# Patient Record
Sex: MALE | Race: WHITE | NOT HISPANIC OR LATINO | Employment: OTHER | URBAN - METROPOLITAN AREA
[De-identification: names, ages, dates, MRNs, and addresses within clinical notes are randomized per-mention and may not be internally consistent; named-entity substitution may affect disease eponyms.]

---

## 2017-01-26 ENCOUNTER — ALLSCRIPTS OFFICE VISIT (OUTPATIENT)
Dept: OTHER | Facility: OTHER | Age: 62
End: 2017-01-26

## 2017-02-23 ENCOUNTER — ALLSCRIPTS OFFICE VISIT (OUTPATIENT)
Dept: OTHER | Facility: OTHER | Age: 62
End: 2017-02-23

## 2017-03-13 ENCOUNTER — ALLSCRIPTS OFFICE VISIT (OUTPATIENT)
Dept: OTHER | Facility: OTHER | Age: 62
End: 2017-03-13

## 2017-05-01 ENCOUNTER — GENERIC CONVERSION - ENCOUNTER (OUTPATIENT)
Dept: OTHER | Facility: OTHER | Age: 62
End: 2017-05-01

## 2017-06-28 ENCOUNTER — GENERIC CONVERSION - ENCOUNTER (OUTPATIENT)
Dept: OTHER | Facility: OTHER | Age: 62
End: 2017-06-28

## 2017-06-30 ENCOUNTER — APPOINTMENT (OUTPATIENT)
Dept: LAB | Facility: CLINIC | Age: 62
End: 2017-06-30
Payer: COMMERCIAL

## 2017-06-30 ENCOUNTER — TRANSCRIBE ORDERS (OUTPATIENT)
Dept: LAB | Facility: CLINIC | Age: 62
End: 2017-06-30

## 2017-06-30 DIAGNOSIS — K90.89 OTHER SPECIFIED INTESTINAL MALABSORPTION: ICD-10-CM

## 2017-06-30 DIAGNOSIS — Z98.84 BARIATRIC SURGERY STATUS: Primary | ICD-10-CM

## 2017-06-30 DIAGNOSIS — Z98.84 BARIATRIC SURGERY STATUS: ICD-10-CM

## 2017-06-30 LAB
25(OH)D3 SERPL-MCNC: 20 NG/ML (ref 30–100)
ERYTHROCYTE [DISTWIDTH] IN BLOOD BY AUTOMATED COUNT: 12.8 % (ref 11.6–15.1)
FERRITIN SERPL-MCNC: 13 NG/ML (ref 8–388)
HCT VFR BLD AUTO: 35.9 % (ref 36.5–49.3)
HGB BLD-MCNC: 12.5 G/DL (ref 12–17)
MCH RBC QN AUTO: 30 PG (ref 26.8–34.3)
MCHC RBC AUTO-ENTMCNC: 34.8 G/DL (ref 31.4–37.4)
MCV RBC AUTO: 86 FL (ref 82–98)
PLATELET # BLD AUTO: 248 THOUSANDS/UL (ref 149–390)
PMV BLD AUTO: 10.1 FL (ref 8.9–12.7)
RBC # BLD AUTO: 4.17 MILLION/UL (ref 3.88–5.62)
VIT B12 SERPL-MCNC: 406 PG/ML (ref 100–900)
WBC # BLD AUTO: 5.24 THOUSAND/UL (ref 4.31–10.16)

## 2017-06-30 PROCEDURE — 82728 ASSAY OF FERRITIN: CPT

## 2017-06-30 PROCEDURE — 82306 VITAMIN D 25 HYDROXY: CPT

## 2017-06-30 PROCEDURE — 82607 VITAMIN B-12: CPT

## 2017-06-30 PROCEDURE — 85027 COMPLETE CBC AUTOMATED: CPT

## 2017-06-30 PROCEDURE — 36415 COLL VENOUS BLD VENIPUNCTURE: CPT

## 2017-08-07 ENCOUNTER — APPOINTMENT (OUTPATIENT)
Dept: LAB | Facility: CLINIC | Age: 62
End: 2017-08-07
Payer: COMMERCIAL

## 2017-08-07 ENCOUNTER — TRANSCRIBE ORDERS (OUTPATIENT)
Dept: ADMINISTRATIVE | Facility: HOSPITAL | Age: 62
End: 2017-08-07

## 2017-08-07 ENCOUNTER — TRANSCRIBE ORDERS (OUTPATIENT)
Dept: LAB | Facility: CLINIC | Age: 62
End: 2017-08-07

## 2017-08-07 ENCOUNTER — ALLSCRIPTS OFFICE VISIT (OUTPATIENT)
Dept: OTHER | Facility: OTHER | Age: 62
End: 2017-08-07

## 2017-08-07 DIAGNOSIS — R35.1 NOCTURIA: Primary | ICD-10-CM

## 2017-08-07 DIAGNOSIS — R35.1 NOCTURIA: ICD-10-CM

## 2017-08-07 DIAGNOSIS — G40.909 NONINTRACTABLE EPILEPSY WITHOUT STATUS EPILEPTICUS, UNSPECIFIED EPILEPSY TYPE (HCC): Primary | ICD-10-CM

## 2017-08-07 LAB — PSA SERPL-MCNC: 0.3 NG/ML (ref 0–4)

## 2017-08-07 PROCEDURE — 84153 ASSAY OF PSA TOTAL: CPT

## 2017-09-13 ENCOUNTER — GENERIC CONVERSION - ENCOUNTER (OUTPATIENT)
Dept: OTHER | Facility: OTHER | Age: 62
End: 2017-09-13

## 2017-10-05 ENCOUNTER — GENERIC CONVERSION - ENCOUNTER (OUTPATIENT)
Dept: OTHER | Facility: OTHER | Age: 62
End: 2017-10-05

## 2017-10-05 ENCOUNTER — HOSPITAL ENCOUNTER (OUTPATIENT)
Dept: NEUROLOGY | Facility: AMBULATORY SURGERY CENTER | Age: 62
Discharge: HOME | End: 2017-10-05
Payer: COMMERCIAL

## 2017-10-05 DIAGNOSIS — G40.909 NONINTRACTABLE EPILEPSY WITHOUT STATUS EPILEPTICUS, UNSPECIFIED EPILEPSY TYPE (HCC): ICD-10-CM

## 2017-10-05 PROCEDURE — 95819 EEG AWAKE AND ASLEEP: CPT

## 2017-10-05 NOTE — PROCEDURES
ELECTROENCEPHALOGRAM (EEG)      Patient Name:  Carmen Reyes  MRN: [id number]   :  1955 File #: [id number] -[id number]   Age: 57 y o  Encounter #: [id number]   Date performed: 10/5/2017            Report date: 10/5/2017          Study type: Routine, sleep deprived EEG    ICD 10 diagnosis: Other Epilepsy G40 909    Start time: 08:04 End time: 08:42     -------------------------------------------------------------------------------------------------------------------   Patient History: This recording was observed in a 57 y o  male with history of transient global amnesia to determine risk of epilepsy  Medications:  None listed  -------------------------------------------------------------------------------------------------------------------   Description of Procedure:  · 32 channel digital recording with electrodes placed according to the International 10-20 system with additional T1/T2 electrodes, EOG, EKG, and simultaneous video  The recording was technically satisfactory  -------------------------------------------------------------------------------------------------------------------   EEG Description:    The recording was performed with the patient awake, drowsy, and asleep  He was fully oriented  During wakefulness, there were long runs of well regulated, low amplitude, posteriorly dominant, symmetric 10-11 cps alpha rhythm that attenuated with eye opening  There were symmetric low amplitude, frontally dominant beta activities  With drowsiness, alpha activity attenuated and was replaced by diffusely distributed theta activities  During drowsiness, there was one left anterior temporal sharp wave discharge and one left anterior mid-temporal spike wave discharge    With sleep, symmetric vertex sharp waves and sleep spindles were present      -------------------------------------------------------------------------------------------------------------------   Activation Procedures:  Hyperventilation was performed for 3-4 minutes and did not produce any changes  Stepped photic stimulation between 1-20 cps was performed and induced symmetric photic driving  Other findings: The single lead ECG demonstrated normal sinus rhythm    -------------------------------------------------------------------------------------------------------------------   EEG Interpretation: This Routine, sleep deprived EEG recorded during wakefulness, drowsiness, and sleep is abnormal  Two left anterior and anterior mid-temporal spike wave discharges are a potential source of seizures       Stephenie Good MD   [facility] [place]

## 2017-10-11 ENCOUNTER — GENERIC CONVERSION - ENCOUNTER (OUTPATIENT)
Dept: OTHER | Facility: OTHER | Age: 62
End: 2017-10-11

## 2017-10-12 ENCOUNTER — GENERIC CONVERSION - ENCOUNTER (OUTPATIENT)
Dept: OTHER | Facility: OTHER | Age: 62
End: 2017-10-12

## 2017-11-02 ENCOUNTER — ALLSCRIPTS OFFICE VISIT (OUTPATIENT)
Dept: OTHER | Facility: OTHER | Age: 62
End: 2017-11-02

## 2017-11-28 ENCOUNTER — GENERIC CONVERSION - ENCOUNTER (OUTPATIENT)
Dept: OTHER | Facility: OTHER | Age: 62
End: 2017-11-28

## 2017-11-28 ENCOUNTER — ALLSCRIPTS OFFICE VISIT (OUTPATIENT)
Dept: OTHER | Facility: OTHER | Age: 62
End: 2017-11-28

## 2017-11-28 DIAGNOSIS — E01.0 IODINE-DEFICIENCY RELATED DIFFUSE GOITER: ICD-10-CM

## 2017-11-28 DIAGNOSIS — R53.83 OTHER FATIGUE: ICD-10-CM

## 2017-12-01 ENCOUNTER — APPOINTMENT (OUTPATIENT)
Dept: LAB | Facility: MEDICAL CENTER | Age: 62
End: 2017-12-01
Payer: COMMERCIAL

## 2017-12-01 ENCOUNTER — HOSPITAL ENCOUNTER (OUTPATIENT)
Dept: RADIOLOGY | Facility: MEDICAL CENTER | Age: 62
Discharge: HOME | End: 2017-12-01
Payer: COMMERCIAL

## 2017-12-01 DIAGNOSIS — R53.83 OTHER FATIGUE: ICD-10-CM

## 2017-12-01 DIAGNOSIS — E01.0 IODINE-DEFICIENCY RELATED DIFFUSE GOITER: ICD-10-CM

## 2017-12-01 LAB — TSH SERPL DL<=0.05 MIU/L-ACNC: 1.67 UIU/ML (ref 0.36–3.74)

## 2017-12-01 PROCEDURE — 84443 ASSAY THYROID STIM HORMONE: CPT

## 2017-12-01 PROCEDURE — 86800 THYROGLOBULIN ANTIBODY: CPT

## 2017-12-01 PROCEDURE — 76536 US EXAM OF HEAD AND NECK: CPT

## 2017-12-01 PROCEDURE — 36415 COLL VENOUS BLD VENIPUNCTURE: CPT

## 2017-12-02 LAB — THYROGLOB AB SERPL-ACNC: <1 IU/ML (ref 0–0.9)

## 2017-12-04 ENCOUNTER — GENERIC CONVERSION - ENCOUNTER (OUTPATIENT)
Dept: OTHER | Facility: OTHER | Age: 62
End: 2017-12-04

## 2017-12-18 ENCOUNTER — GENERIC CONVERSION - ENCOUNTER (OUTPATIENT)
Dept: FAMILY MEDICINE CLINIC | Facility: CLINIC | Age: 62
End: 2017-12-18

## 2018-01-10 NOTE — PROGRESS NOTES
Assessment    1  Allergic rhinitis (477 9) (J30 9)   2  Laryngeal spasm (478 75) (J38 5)   3  Encounter for preventive health examination (V70 0) (Z00 00)    Plan  Allergic rhinitis    · PredniSONE 10 MG Oral Tablet; Take 3 po bid for 2 days , then 2 po bid for 2  days, then 1 po bid for 2 days, then 1 qd for 2 days and stop  Disc degeneration, lumbar    · Oxycodone-Acetaminophen 5-325 MG Oral Tablet; Take 1 to 2 tablets every 6  hours as needed for pain  Health Maintenance    · Oxybutynin Chloride ER 5 MG Oral Tablet Extended Release 24 Hour; Take 1/2 tab  bid  Laryngeal spasm    · Hyoscyamine Sulfate ER 0 375 MG Oral Tablet Extended Release 12 Hour    Discussion/Summary    Willl await pts response to meds and see him back in one year's time  Possible side effects of new medications were reviewed with the patient/guardian today  The treatment plan was reviewed with the patient/guardian  The patient/guardian understands and agrees with the treatment plan      Chief Complaint  pe wellness - insurance      History of Present Illness  , Adult Male: The patient is being seen for a health maintenance evaluation  The last health maintenance visit was 1 year(s) ago  General Health: The patient's health since the last visit is described as good  He has regular dental visits  He denies vision problems  He denies hearing loss  Immunizations status: up to date  Lifestyle:  He consumes a diverse and healthy diet  He does not have any weight concerns  He does not exercise regularly  He does not use tobacco  He denies alcohol use  He denies drug use  Reproductive health:  the patient is sexually active  He denies erectile dysfunction  Screening: cancer screening reviewed and current  metabolic screening reviewed and current  risk screening reviewed and current  HPI: The patient presents for a wellness physical  He is up to date with labs and preventative studies   Recent ENT eval revealed allergic rhinitis and laryngeal spasm  current meds are not helping  Review of Systems    Constitutional: No fever or chills, feels well, no tiredness, no recent weight gain or weight loss  Eyes: No complaints of eye pain, no red eyes, no discharge from eyes, no itchy eyes  ENT: no complaints of earache, no hearing loss, no nosebleeds, no nasal discharge, no sore throat, no hoarseness  Cardiovascular: No complaints of slow heart rate, no fast heart rate, no chest pain, no palpitations, no leg claudication, no lower extremity  Respiratory: PND and + laryngeal s pasms, but No complaints of shortness of breath, no wheezing, no cough, no SOB on exertion, no orthopnea or PND  Gastrointestinal: No complaints of abdominal pain, no constipation, no nausea or vomiting, no diarrhea or bloody stools  Genitourinary: No complaints of dysuria, no incontinence, no hesitancy, no nocturia, no genital lesion, no testicular pain  Musculoskeletal: No complaints of arthralgia, no myalgias, no joint swelling or stiffness, no limb pain or swelling  Integumentary: No complaints of skin rash or skin lesions, no itching, no skin wound, no dry skin  Neurological: No compliants of headache, no confusion, no convulsions, no numbness or tingling, no dizziness or fainting, no limb weakness, no difficulty walking  Psychiatric: Is not suicidal, no sleep disturbances, no anxiety or depression, no change in personality, no emotional problems  Endocrine: No complaints of proptosis, no hot flashes, no muscle weakness, no erectile dysfunction, no deepening of the voice, no feelings of weakness  Hematologic/Lymphatic: No complaints of swollen glands, no swollen glands in the neck, does not bleed easily, no easy bruising  Active Problems    1  Acute asthma exacerbation (493 92) (J45 901)   2  Acute otitis media (382 9) (H66 90)   3  Acute sinusitis (461 9) (J01 90)   4  Allergic to IV contrast (V15 08) (Z91 041)   5   Amnesia, global, transient (437  7) (G45 4)   6  Anemia (285 9) (D64 9)   7  Anxiety (300 00) (F41 9)   8  Asthma (493 90) (J45 909)   9  Bilateral chronic knee pain (321 88,697 25) (M25 561,M25 562,G89 29)   10  BPH (benign prostatic hyperplasia) (600 00) (N40 0)   11  Candidiasis, cutaneous (112 3) (B37 2)   12  Cough (786 2) (R05)   13  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)   14  Disc degeneration, lumbar (722 52) (M51 36)   15  Encounter for screening for malignant neoplasm of colon (V76 51) (Z12 11)   16  Epilepsy (345 90) (G40 909)   17  Fatigue (780 79) (R53 83)   18  Flu vaccine need (V04 81) (Z23)   19  Hyperglycemia (790 29) (R73 9)   20  Hyperlipidemia (272 4) (E78 5)   21  Insomnia (780 52) (G47 00)   22  Lower abdominal pain (789 09) (R10 30)   23  Lumbar radiculopathy (724 4) (M54 16)   24  Macrocytic Anemia With Vitamin B12 Deficiency (281 1)   25  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)   26  Non morbid obesity due to excess calories (278 00) (E66 09)   27  Preoperative clearance (V72 84) (Z01 818)   28  S/P gastric bypass (V45 86) (Z98 84)   29  Sciatica of left side (724 3) (M54 32)   30  Vitamin B12 deficiency (266 2) (E53 8)   31   Vitamin D deficiency (268 9) (E55 9)    Past Medical History    · History of Preoperative clearance (V72 84) (Z01 818)    Surgical History    · History of Knee Surgery   · History of Lower Back Surgery   · History of Total Knee Arthroplasty    Family History  Mother    · Family history of Heart Disease (V17 49)   · Family history of Kidney Cancer (V16 51)   · Family history of No history of seizures  Father    · Family history of Hypertension (V17 49)   · Family history of No history of seizures  Sister    · Family history of Bipolar Disorder NOS    Social History    · Alcohol Use (History)   · Always uses seat belt   · Caffeine use (V49 89) (F15 90)   · Daily Coffee Consumption (___ Cups/Day)   · Daily Cola Consumption (___ Cans/Day)   · Dental care, regularly   · Employed   · Denied: History of Exercise frequency (times/week)   · Guns in the Home: Stored in locked cabinet   · Has 2 children   · Marital History - Currently    · Multiple organ donor (V59 9) (Z52 9)   · Never A Smoker   · No drug use   · Patient has living will (V49 89) (Z78 9)   · Denied: History of Pets / animals   · Power of  in existence   · Trade school   · Water intake, adequate (per day)    Current Meds   1  Aspir-81 81 MG Oral Tablet Delayed Release; TAKE 1 TABLET DAILY; Therapy: 07Sep2016 to Recorded   2  B-12 1000 MCG Oral Capsule; 1 TAB QD Recorded   3  Baclofen 10 MG Oral Tablet; Therapy: (Lawson Barber) to Recorded   4  [employer] D3 1000 UNIT Oral Capsule; 1 qd Recorded   5  Dulera 200-5 MCG/ACT Inhalation Aerosol; INHALE 2 PUFFS TWICE DAILY  RINSE   MOUTH AFTER USE; Therapy: 00OHS1166 to (Evaluate:23Jun2017); Last Rx:42Jhm7640 Ordered   6  Eszopiclone 3 MG Oral Tablet; Take 1 tablet by mouth at bedtime; Therapy: 99UBM8639 to (Madisyn Trammell)  Requested for: 17Ajt5521; Last   Rx:07Sep2017 Ordered   7  LevETIRAcetam 500 MG Oral Tablet; TAKE 1 TABLET TWICE DAILY AS DIRECTED; Therapy: 17CXU7315 to (Evaluate:11Oct2018)  Requested for: 74ILG5772; Last   Rx:21Idq3009 Ordered   8  Meloxicam 15 MG Oral Tablet; TAKE 1 TABLET DAILY WITH FOOD; Therapy: 06WYY0194 to (Evaluate:21Mar2017)  Requested for: 62Cxv3447; Last   Rx:21Nov2016 Ordered   9  Methocarbamol 500 MG Oral Tablet; TAKE 1 TABLET TWICE DAILY AS NEEDED FOR   spasms; Therapy: 26SFK4335 to (Evaluate:42Aen5273)  Requested for: 88Lpb6560; Last   Rx:33Otj7894 Ordered   10  Multivitamin TABS; Therapy: (Nia Melendez) to Recorded   11  NexIUM 20 MG Oral Packet; Therapy: (Lawson Barber) to Recorded   12  Oxycodone-Acetaminophen 5-325 MG Oral Tablet; Take 1 to 2 tablets every 6 hours as    needed for pain; Last Rx:04Oct2017 Ordered   13   Qvar 80 MCG/ACT Inhalation Aerosol Solution; INHALE 2 PUFFS TWICE DAILY; Therapy: (Recorded:10Tqj7800) to Recorded   14  Singulair 10 MG Oral Tablet; Therapy: 41YUB8811 to (Last Rx:02Mar2011)  Requested for: 02Mar2011 Ordered   15  Spiriva HandiHaler 18 MCG Inhalation Capsule; INHALE CONTENTS OF CAPSULE    ONCE DAY; Therapy: 74TXC2225 to (Evaluate:82Djx8893); Last Rx:13Mar2017 Ordered   16  Xopenex HFA AERO; INHALE 2 PUFFS EVERY 4-6 HOURS AS NEEDED; Therapy: (Recorded:65Jhr2856) to Recorded    Allergies    1  IV Dye    Vitals   Recorded: [id number] 01:18PM   Temperature 98 F   Heart Rate 70   Systolic 588   Diastolic 74   Height 5 ft 11 1 in   Weight 288 lb 3 oz   BMI Calculated 40 08   BSA Calculated 2 47     Physical Exam    Constitutional   General appearance: No acute distress, well appearing and well nourished  Head and Face   Head and face: Normal     Palpation of the face and sinuses: No sinus tenderness  Eyes   Conjunctiva and lids: No erythema, swelling or discharge  Pupils and irises: Equal, round, reactive to light  Ophthalmoscopic examination: Normal fundi and optic discs  Ears, Nose, Mouth, and Throat   External inspection of ears and nose: Normal     Otoscopic examination: Tympanic membranes translucent with normal light reflex  Canals patent without erythema  Hearing: Normal     Nasal mucosa, septum, and turbinates: Normal without edema or erythema  Lips, teeth, and gums: Normal, good dentition  Oropharynx: Abnormal   + pnd    Neck   Neck: Supple, symmetric, trachea midline, no masses  Thyroid: Normal, no thyromegaly  Pulmonary   Respiratory effort: No increased work of breathing or signs of respiratory distress  Auscultation of lungs: Clear to auscultation  Cardiovascular   Auscultation of heart: Normal rate and rhythm, normal S1 and S2, no murmurs  Carotid pulses: 2+ bilaterally  Pedal pulses: 2+ bilaterally      Examination of extremities for edema and/or varicosities: Normal     Abdomen   Abdomen: Non-tender, no masses  Liver and spleen: No hepatomegaly or splenomegaly  Lymphatic   Palpation of lymph nodes in neck: No lymphadenopathy  Musculoskeletal   Inspection/palpation of digits and nails: Normal without clubbing or cyanosis  Skin   Skin and subcutaneous tissue: Normal without rashes or lesions  Palpation of skin and subcutaneous tissue: Normal turgor  Neurologic   Reflexes: 2+ and symmetric  Psychiatric   Judgment and insight: Normal     Orientation to person, place and time: Normal     Recent and remote memory: Intact  Mood and affect: Normal        Results/Data  PHQ-2 Adult Depression Screening 64YEX7046 01:22PM User, [facility]     Test Name Result Flag Reference   PHQ-2 Adult Depression Score 0     Over the last two weeks, how often have you been bothered by any of the following problems? Little interest or pleasure in doing things: Not at all - 0  Feeling down, depressed, or hopeless: Not at all - 0   PHQ-2 Adult Depression Screening Negative         Health Management  Encounter for screening for malignant neoplasm of colon   COLONOSCOPY; every 5 years; Last 89JDM8094; Next Due: 68KTF6533; Active    Future Appointments    Date/Time Provider Specialty Site   02/07/2018 10:00 AM DOYLE Henao   Neurology [address]     Signatures   Electronically signed by : Ingris Lee DO; Nov 6 2017  5:37AM EST                       (Author)

## 2018-01-12 VITALS
BODY MASS INDEX: 41.02 KG/M2 | OXYGEN SATURATION: 96 % | HEART RATE: 76 BPM | SYSTOLIC BLOOD PRESSURE: 128 MMHG | TEMPERATURE: 96.5 F | WEIGHT: 293 LBS | HEIGHT: 71 IN | DIASTOLIC BLOOD PRESSURE: 80 MMHG

## 2018-01-12 NOTE — PROCEDURES
Procedures by Lavelle Price MD at 10/5/2017   2:53 PM      Author:  Lavelle Price MD Service:  Neurology Author Type:  Physician    Filed:  10/5/2017  3:40 PM Date of Service:  10/5/2017  2:53 PM Status:  Signed    :  Lavelle Price MD (Physician)        Procedure Orders:       1  EEG Sleep deprived [[id number]] ordered by  at 17 1021                  ELECTROENCEPHALOGRAM (EEG)      Patient Name:  Mari Xie  MRN: [id number]   :  1955 File #: [id number] -46   Age: 57 y o  Encounter #: [id number]   Date performed: 10/5/2017            Report date: 10/5/2017          Study type: Routine, sleep deprived EEG    ICD 10 diagnosis: Other Epilepsy G40 909    Start time: 08:04 End time: 08:42     -------------------------------------------------------------------------------------------------------------------   Patient History: This recording was observed in a 57 y o  male with history of transient global amnesia to determine risk of epilepsy  Medications:  None listed  -------------------------------------------------------------------------------------------------------------------   Description of Procedure:  ·  32 channel digital recording with electrodes placed according to the International 10-20 system with additional  T1/T2 electrodes, EOG, EKG, and simultaneous  video  The recording was technically satisfactory  -------------------------------------------------------------------------------------------------------------------   EEG Description:    The recording was performed with the patient awake, drowsy, and asleep  He was fully oriented  During wakefulness, there were long runs of well regulated, low amplitude, posteriorly  dominant, symmetric 10-11 cps alpha rhythm that attenuated with eye opening  There were symmetric low amplitude, frontally dominant beta activities       With drowsiness, alpha activity attenuated and was replaced by diffusely distributed theta activities  During drowsiness, there was one left anterior  temporal sharp wave discharge and one left anterior mid-temporal spike wave discharge  With sleep, symmetric vertex sharp waves and sleep spindles were present      -------------------------------------------------------------------------------------------------------------------   Activation Procedures:  Hyperventilation was performed for 3-4  minutes and did not produce any changes  Stepped photic stimulation between 1-20 cps was performed and induced symmetric  photic driving  Other findings: The single lead ECG demonstrated normal sinus rhythm    -------------------------------------------------------------------------------------------------------------------   EEG Interpretation: This Routine, sleep deprived EEG recorded during wakefulness, drowsiness, and sleep is abnormal   Two left anterior and anterior mid-temporal spike wave discharges are a potential source of seizures  hTalia Vidal MD   [address] [facility]               Received for:Pramod HIGUERA    Oct  5 2017  3:40PM [facility] Standard Time

## 2018-01-12 NOTE — RESULT NOTES
Verified Results  * XR CHEST PA & LATERAL 30WMW4667 09:58AM Leida Polka Order Number: [id number]     Test Name Result Flag Reference   XR CHEST PA & LATERAL (Report)     CHEST      INDICATION: Cough, wheezing and asthma  COMPARISON: Chest radiographs December 14, 2012     VIEWS: Frontal and lateral projections; 3 images     FINDINGS:        Cardiomediastinal silhouette appears unremarkable  The lungs are clear  Mild prominent interstitial markings, consistent with the patient's history of reactive airway disease and asthma  No pneumothorax or pleural effusion  Visualized osseous structures appear within normal limits for the patient's age  Mild degenerative changes thoracic spine  IMPRESSION:   No active pulmonary disease  Workstation performed: [id number]     Signed by:   Kale Nicholson DO   11/29/16     * XR SINUSES ROUTINE 3+ VIEWS 85IHT1836 09:58AM Leida Polka Order Number: [id number]     Test Name Result Flag Reference   XR SINUSES ROUTINE 3+ VIEWS (Report)     PARANASAL SINUSES     INDICATION: Nasal drainage  History of sinus infection  COMPARISON: None     VIEWS: 5; 5 images     FINDINGS:     No evidence of sinus opacification or air-fluid levels  No lytic or blastic lesions are identified  IMPRESSION:   No plain radiographic evidence of sinusitis         Workstation performed: [id number]     Signed by:   Kale Nicholson DO   11/29/16

## 2018-01-13 VITALS
HEIGHT: 71 IN | BODY MASS INDEX: 41.16 KG/M2 | DIASTOLIC BLOOD PRESSURE: 80 MMHG | SYSTOLIC BLOOD PRESSURE: 140 MMHG | TEMPERATURE: 98.3 F | WEIGHT: 294 LBS | HEART RATE: 76 BPM

## 2018-01-13 VITALS
HEART RATE: 72 BPM | SYSTOLIC BLOOD PRESSURE: 122 MMHG | OXYGEN SATURATION: 98 % | DIASTOLIC BLOOD PRESSURE: 84 MMHG | BODY MASS INDEX: 41.58 KG/M2 | WEIGHT: 297 LBS | TEMPERATURE: 96.3 F | HEIGHT: 71 IN

## 2018-01-13 VITALS
SYSTOLIC BLOOD PRESSURE: 128 MMHG | HEART RATE: 70 BPM | BODY MASS INDEX: 40.35 KG/M2 | DIASTOLIC BLOOD PRESSURE: 74 MMHG | HEIGHT: 71 IN | WEIGHT: 288.19 LBS | TEMPERATURE: 98 F

## 2018-01-14 VITALS
BODY MASS INDEX: 40.68 KG/M2 | DIASTOLIC BLOOD PRESSURE: 78 MMHG | SYSTOLIC BLOOD PRESSURE: 134 MMHG | OXYGEN SATURATION: 71 % | WEIGHT: 292.5 LBS | HEART RATE: 71 BPM

## 2018-01-22 ENCOUNTER — TRANSCRIBE ORDERS (OUTPATIENT)
Dept: ADMINISTRATIVE | Facility: HOSPITAL | Age: 63
End: 2018-01-22

## 2018-01-22 VITALS
SYSTOLIC BLOOD PRESSURE: 124 MMHG | DIASTOLIC BLOOD PRESSURE: 76 MMHG | HEART RATE: 74 BPM | RESPIRATION RATE: 18 BRPM | HEIGHT: 71 IN | BODY MASS INDEX: 40.09 KG/M2 | TEMPERATURE: 98.4 F | WEIGHT: 286.38 LBS

## 2018-01-22 DIAGNOSIS — J32.9 CHRONIC SINUSITIS, UNSPECIFIED LOCATION: Primary | ICD-10-CM

## 2018-01-23 NOTE — RESULT NOTES
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  History taken directly from the electronic ordering system  COMPARISON: None  TECHNIQUE:  Ultrasound of the thyroid was performed with a high frequency linear transducer in transverse and sagittal planes including volumetric imaging sweeps as well as traditional still imaging technique  FINDINGS: Normal homogeneous smooth echotexture  Right lobe: 5 x 1 5 x 2 1 cm  Left lobe: 4 6 x 1 9 x 1 6 cm  Isthmus: 0 8 cm  Nodule #1   Isthmus right of midline measuring 0 2 x 0 3 cm  No priors for comparison  COMPOSITION: 0 points, cystic or almost completely cystic  ECHOGENICITY: 0 points, anechoic  SHAPE: 0 points, wider-than-tall  MARGIN: 0 points, smooth  ECHOGENIC FOCI: 0 points, none or large comet-tail artifacts  TI-RADS Score: TR 1 (0 points), Benign  No FNA  IMPRESSION:     Subcentimeter cyst right side of the isthmus for which no follow-up imaging is required  Mild smooth thickening of the thyroid isthmus without overall gland enlargement  Advise continued intermittent clinical surveillance  Reference: ACR Thyroid Imaging, Reporting and Data System (TI-RADS): White Paper of the [employer]   J AM Hayden Radiol 4576;74:228-620  (additional recommendations based on American Thyroid Association 2015 guidelines )       Workstation performed: [id number]     Signed by:   Alejandra Hughes MD   12/3/17

## 2018-01-25 ENCOUNTER — TELEPHONE (OUTPATIENT)
Dept: FAMILY MEDICINE CLINIC | Facility: CLINIC | Age: 63
End: 2018-01-25

## 2018-01-25 NOTE — TELEPHONE ENCOUNTER
Tell them to just give him 120 pills then since they still of the scripted not dispense the 180 tablets

## 2018-01-25 NOTE — TELEPHONE ENCOUNTER
Spoke with pharmacy and they will only cover 60 before a prior auth is needed   [facility] if pt is interested and will give us a call back on what he decides to do

## 2018-01-29 ENCOUNTER — HOSPITAL ENCOUNTER (OUTPATIENT)
Dept: RADIOLOGY | Facility: MEDICAL CENTER | Age: 63
Discharge: HOME | End: 2018-01-29
Payer: COMMERCIAL

## 2018-01-29 DIAGNOSIS — J32.9 CHRONIC SINUSITIS, UNSPECIFIED LOCATION: ICD-10-CM

## 2018-01-29 PROCEDURE — 70486 CT MAXILLOFACIAL W/O DYE: CPT

## 2018-02-02 DIAGNOSIS — J30.9 CHRONIC ALLERGIC RHINITIS, UNSPECIFIED SEASONALITY, UNSPECIFIED TRIGGER: Primary | ICD-10-CM

## 2018-02-02 RX ORDER — PROMETHAZINE HYDROCHLORIDE 25 MG/1
TABLET ORAL
Refills: 0 | COMMUNITY
Start: 2017-12-21 | End: 2018-02-02 | Stop reason: SDUPTHER

## 2018-02-05 RX ORDER — PROMETHAZINE HYDROCHLORIDE 25 MG/1
25 TABLET ORAL EVERY 6 HOURS PRN
Qty: 30 TABLET | Refills: 0 | Status: SHIPPED | OUTPATIENT
Start: 2018-02-05 | End: 2018-03-12 | Stop reason: SDUPTHER

## 2018-02-22 DIAGNOSIS — G89.29 CHRONIC LOW BACK PAIN WITHOUT SCIATICA, UNSPECIFIED BACK PAIN LATERALITY: Primary | ICD-10-CM

## 2018-02-22 DIAGNOSIS — M54.50 CHRONIC LOW BACK PAIN WITHOUT SCIATICA, UNSPECIFIED BACK PAIN LATERALITY: Primary | ICD-10-CM

## 2018-02-22 RX ORDER — OXYCODONE HYDROCHLORIDE AND ACETAMINOPHEN 5; 325 MG/1; MG/1
1-2 TABLET ORAL EVERY 6 HOURS
Qty: 120 TABLET | Refills: 0 | Status: SHIPPED | OUTPATIENT
Start: 2018-02-22 | End: 2018-03-12 | Stop reason: SDUPTHER

## 2018-02-22 RX ORDER — OXYCODONE HYDROCHLORIDE AND ACETAMINOPHEN 5; 325 MG/1; MG/1
1-2 TABLET ORAL EVERY 6 HOURS
Refills: 0 | COMMUNITY
Start: 2018-01-26 | End: 2018-02-22 | Stop reason: SDUPTHER

## 2018-02-23 ENCOUNTER — TELEPHONE (OUTPATIENT)
Dept: FAMILY MEDICINE CLINIC | Facility: CLINIC | Age: 63
End: 2018-02-23

## 2018-03-12 DIAGNOSIS — G89.29 CHRONIC LOW BACK PAIN WITHOUT SCIATICA, UNSPECIFIED BACK PAIN LATERALITY: ICD-10-CM

## 2018-03-12 DIAGNOSIS — G47.00 INSOMNIA, UNSPECIFIED TYPE: Primary | ICD-10-CM

## 2018-03-12 DIAGNOSIS — M54.50 CHRONIC LOW BACK PAIN WITHOUT SCIATICA, UNSPECIFIED BACK PAIN LATERALITY: ICD-10-CM

## 2018-03-12 DIAGNOSIS — J30.9 CHRONIC ALLERGIC RHINITIS, UNSPECIFIED SEASONALITY, UNSPECIFIED TRIGGER: ICD-10-CM

## 2018-03-12 RX ORDER — OXYCODONE HYDROCHLORIDE AND ACETAMINOPHEN 5; 325 MG/1; MG/1
1-2 TABLET ORAL EVERY 6 HOURS
Qty: 120 TABLET | Refills: 0 | Status: SHIPPED | OUTPATIENT
Start: 2018-03-12 | End: 2018-04-10 | Stop reason: SDUPTHER

## 2018-03-12 RX ORDER — ESZOPICLONE 3 MG/1
1 TABLET, FILM COATED ORAL
COMMUNITY
Start: 2015-01-12 | End: 2018-03-12 | Stop reason: SDUPTHER

## 2018-03-12 RX ORDER — PROMETHAZINE HYDROCHLORIDE 25 MG/1
25 TABLET ORAL EVERY 6 HOURS PRN
Qty: 30 TABLET | Refills: 0 | Status: SHIPPED | OUTPATIENT
Start: 2018-03-12 | End: 2018-05-08 | Stop reason: SDUPTHER

## 2018-03-12 RX ORDER — ESZOPICLONE 3 MG/1
3 TABLET, FILM COATED ORAL
Qty: 30 TABLET | Refills: 2 | OUTPATIENT
Start: 2018-03-12 | End: 2018-07-18 | Stop reason: SDUPTHER

## 2018-04-10 DIAGNOSIS — M54.50 CHRONIC LOW BACK PAIN WITHOUT SCIATICA, UNSPECIFIED BACK PAIN LATERALITY: ICD-10-CM

## 2018-04-10 DIAGNOSIS — G89.29 CHRONIC LOW BACK PAIN WITHOUT SCIATICA, UNSPECIFIED BACK PAIN LATERALITY: ICD-10-CM

## 2018-04-10 RX ORDER — OXYCODONE HYDROCHLORIDE AND ACETAMINOPHEN 5; 325 MG/1; MG/1
1-2 TABLET ORAL EVERY 6 HOURS
Qty: 120 TABLET | Refills: 0 | Status: SHIPPED | OUTPATIENT
Start: 2018-04-10 | End: 2018-05-08 | Stop reason: SDUPTHER

## 2018-04-24 ENCOUNTER — OFFICE VISIT (OUTPATIENT)
Dept: NEUROLOGY | Facility: CLINIC | Age: 63
End: 2018-04-24
Payer: COMMERCIAL

## 2018-04-24 VITALS
SYSTOLIC BLOOD PRESSURE: 124 MMHG | HEIGHT: 71 IN | HEART RATE: 67 BPM | DIASTOLIC BLOOD PRESSURE: 72 MMHG | BODY MASS INDEX: 41.66 KG/M2 | WEIGHT: 297.6 LBS

## 2018-04-24 DIAGNOSIS — G40.009 PARTIAL IDIOPATHIC EPILEPSY WITH SEIZURES OF LOCALIZED ONSET, NOT INTRACTABLE, WITHOUT STATUS EPILEPTICUS (HCC): Primary | ICD-10-CM

## 2018-04-24 PROCEDURE — 99213 OFFICE O/P EST LOW 20 MIN: CPT | Performed by: PSYCHIATRY & NEUROLOGY

## 2018-04-24 RX ORDER — HYOSCYAMINE SULFATE EXTENDED-RELEASE 0.38 MG/1
1 TABLET ORAL EVERY 12 HOURS PRN
COMMUNITY
Start: 2017-11-02 | End: 2018-10-09 | Stop reason: ALTCHOICE

## 2018-04-24 RX ORDER — CHOLECALCIFEROL (VITAMIN D3) 25 MCG
1 TABLET,CHEWABLE ORAL
COMMUNITY

## 2018-04-24 RX ORDER — HYDROCHLOROTHIAZIDE 25 MG/1
1 TABLET ORAL
Refills: 3 | COMMUNITY
Start: 2018-02-20

## 2018-04-24 RX ORDER — NORTRIPTYLINE HYDROCHLORIDE 25 MG/1
CAPSULE ORAL
Refills: 2 | COMMUNITY
Start: 2018-04-09 | End: 2019-10-31 | Stop reason: ALTCHOICE

## 2018-04-24 RX ORDER — BACLOFEN 10 MG/1
TABLET ORAL
Refills: 2 | COMMUNITY
Start: 2018-04-09 | End: 2018-11-15

## 2018-04-24 RX ORDER — OXYBUTYNIN CHLORIDE 5 MG/1
0.5 TABLET, EXTENDED RELEASE ORAL 2 TIMES DAILY
COMMUNITY
Start: 2017-11-02 | End: 2018-06-07 | Stop reason: SDUPTHER

## 2018-04-24 RX ORDER — ESOMEPRAZOLE MAGNESIUM 20 MG/1
FOR SUSPENSION ORAL
COMMUNITY
End: 2018-10-09 | Stop reason: ALTCHOICE

## 2018-04-24 RX ORDER — DIPHENHYDRAMINE HCL 25 MG
25 TABLET ORAL EVERY 6 HOURS PRN
COMMUNITY
End: 2021-03-26 | Stop reason: ALTCHOICE

## 2018-04-24 RX ORDER — OMEPRAZOLE 40 MG/1
CAPSULE, DELAYED RELEASE ORAL
Refills: 3 | COMMUNITY
Start: 2018-04-09 | End: 2021-11-15 | Stop reason: ALTCHOICE

## 2018-04-24 RX ORDER — FLUTICASONE PROPIONATE 50 MCG
2 SPRAY, SUSPENSION (ML) NASAL 2 TIMES DAILY PRN
COMMUNITY

## 2018-04-24 RX ORDER — LEVALBUTEROL TARTRATE 45 UG/1
AEROSOL, METERED ORAL
Refills: 2 | COMMUNITY
Start: 2018-02-22 | End: 2019-10-11 | Stop reason: ALTCHOICE

## 2018-04-24 RX ORDER — LEVETIRACETAM 500 MG/1
1 TABLET ORAL 2 TIMES DAILY
COMMUNITY
Start: 2017-10-16 | End: 2018-04-24 | Stop reason: SDUPTHER

## 2018-04-24 RX ORDER — LEVETIRACETAM 500 MG/1
500 TABLET ORAL 2 TIMES DAILY
Qty: 180 TABLET | Refills: 3 | Status: SHIPPED | OUTPATIENT
Start: 2018-04-24 | End: 2019-04-23

## 2018-04-24 RX ORDER — MONTELUKAST SODIUM 10 MG/1
10 TABLET ORAL EVERY EVENING
Refills: 3 | COMMUNITY
Start: 2018-03-22

## 2018-04-24 RX ORDER — LORATADINE 10 MG/1
10 TABLET ORAL
COMMUNITY
End: 2018-10-09 | Stop reason: ALTCHOICE

## 2018-04-24 NOTE — PROGRESS NOTES
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  EEG reveals left temporal epileptiform discharges  He is at some elevated risk for injury due to his profession which is additional motivation to continue AED therapy  Transient global amnesia is another possible explanation for his events, but is less likely given his EEG findings  TGA is typically a single lifetime event with up to 10% of patients having a second event  The first event apparently involved additional symptoms including perioral numbness and lightheadedness  Patient Instructions   1  Continue levetiracetam 500mg twice daily  2  Return in about one year  Diagnoses and all orders for this visit:    Partial idiopathic epilepsy with seizures of localized onset, not intractable, without status epilepticus ([facility] [address] )  -     levETIRAcetam (KEPPRA) 500 mg tablet; Take 1 tablet (500 mg total) by mouth 2 (two) times a day    Other orders  -     levalbuterol (XOPENEX HFA) 45 mcg/act inhaler; INHALE 2 PUFFS EVERY 4 HOURS AS NEED FOR COUGH, WHEEZE, CHEST TIGHTNESS AND SHORTNESS OF BREATH  -     Discontinue: levETIRAcetam (KEPPRA) 500 mg tablet; Take 1 tablet by mouth 2 (two) times a day  -     hydrochlorothiazide (HYDRODIURIL) 25 mg tablet; Take 1 tablet by mouth daily  -     oxybutynin (DITROPAN-XL) 5 mg 24 hr tablet; Take 0 5 tablets by mouth 2 (two) times a day  -     montelukast (SINGULAIR) 10 mg tablet; Take 10 mg by mouth every evening  -     hyoscyamine (LEVBID) 0 375 mg 12 hr tablet; Take 1 tablet by mouth every 12 (twelve) hours as needed  -     omeprazole (PriLOSEC) 40 MG capsule; TAKE 1 CAPSULE EVERY MORNING (1/2 HOUR BEFORE BREAKFAST)  -     nortriptyline (PAMELOR) 25 mg capsule; TAKE 1 (ONE) CAPSULE DAILY AT BEDTIME  -     esomeprazole (NEXIUM) 20 mg packet;  Take by mouth  -     Cyanocobalamin (B-12) 1000 MCG CAPS; Take 1 tablet by mouth daily  -     PROAIR  (90 Base) MCG/ACT inhaler; INHALE 2 PUFFS EVERY 4 HOURS AS NEEDED  MAY USE 2 PUFFS 20-30 MINUTES BEFORE PHYSICAL EXERTION  -     baclofen 10 mg tablet; 1 (ONE) TABLET TWO TIMES DAILY, AS NEEDED  -     Cholecalciferol 2000 units CAPS; Take 1 capsule by mouth  -     Mometasone Furo-Formoterol Fum (DULERA) 200-5 MCG/ACT AERO; Inhale 2 puffs 2 (two) times a day  -     fluticasone (FLONASE) 50 mcg/act nasal spray; 2 sprays into each nostril  -     loratadine (CLARITIN) 10 mg tablet; Take 10 mg by mouth  -     beclomethasone (QVAR) 80 MCG/ACT inhaler; Inhale 2 puffs 2 (two) times a day  -     diphenhydrAMINE (BENADRYL) 25 mg tablet; Take 25 mg by mouth every 6 (six) hours as needed for itching        Subjective    Radha Lepe is returning to the [place] [address] [facility] for follow up regarding 2 events concerning for seizure  The first episode was 3/26/12  Most recent was event was 8/27/14  During the events he had trouble recalling the last few hours  Both were shortly after sex  Intake visit was 2/11/15  Interval Events:   Seizures since last visit: None  Hospitalizations: no    He was last seen in August 2017 at which time he had decreased levetiracetam on his own to a subtherapeutic dose  At that visit Adibu stop levetiracetam and get an EEG while off therapy  The sleep-deprived EEG revealed clear left anterior and anterior mid temporal spike wave discharges  Levetiracetam was restarted at 500 mg b i d     Continues to work primarily a night shift  He works at the [employer]  He sometimes has to climb up tall tab hours  He recently had to climb up 300 stairs to fix an elevator  There been no recent problems at work  He is trying to increase exercise and physical activity  Plans to start walking to work  Having 4 yards of topsoil dumped at his house today       Current AEDs:  levetiracetam 500 mg bid  Medication side effects: None  Medication adherence: Yes    Event/Seizure semiology:  Two episodes of transient memory problems and confusion lasting a couple hours occurring on 3/26/2012 and 8/27/2014  Special Features  Status epilepticus: no  Self Injury Seizures: no  Precipitating Factors: none    Epilepsy Risk Factors:  None    Prior AEDs:  None    Prior Evaluation:  Sleep-deprived EEG done October 2017: This Routine, sleep deprived EEG recorded during wakefulness,   drowsiness, and sleep is abnormal  Two left anterior and anterior   mid-temporal spike wave discharges are a potential source of   seizures  History Reviewed: The following were reviewed and updated as appropriate: allergies, current medications, past medical history, past surgical history and problem list    Psychiatric History:  None    Social History:   Driving: Yes  Lives Alone: No  Occupation: works at [employer]  Review of Systems  Constitutional: Negative  Negative for appetite change and fever  HENT: Positive for congestion and sore throat  Negative for hearing loss, tinnitus, trouble swallowing and voice change  Throat irritation   Eyes: Negative  Negative for photophobia and pain  Respiratory: Positive for shortness of breath  Cardiovascular: Negative  Negative for palpitations  Gastrointestinal: Negative  Negative for nausea and vomiting  Endocrine: Negative  Negative for cold intolerance and heat intolerance  Genitourinary: Negative  Negative for dysuria, frequency and urgency  Musculoskeletal: Negative  Negative for myalgias and neck pain  Skin: Negative  Negative for rash  Neurological: Negative  Negative for dizziness, tremors, seizures, syncope, facial asymmetry, speech difficulty, weakness, light-headedness, numbness and headaches  Hematological: Negative  Does not bruise/bleed easily  Psychiatric/Behavioral: Negative    Negative for confusion, hallucinations and sleep disturbance  Ten systems were reviewed and negative except for what is documented separately or in the HPI  Objective    /72 (BP Location: Left arm, Patient Position: Sitting, Cuff Size: Large)   Pulse 67   Ht 5' 10 5" (1 791 m)   Wt 135 kg (297 lb 9 6 oz)   BMI 42 10 kg/m²      General Exam  No acute distress  Neurologic Exam  Mental Status:  Alert and oriented x 3  Language: normal fluency and comprehension  Cranial Nerves: Face symmetric  No dysarthria  Gait: Normal casual gait

## 2018-04-24 NOTE — PROGRESS NOTES
Review of Systems   Constitutional: Negative  Negative for appetite change and fever  HENT: Positive for congestion and sore throat  Negative for hearing loss, tinnitus, trouble swallowing and voice change  Throat irritation   Eyes: Negative  Negative for photophobia and pain  Respiratory: Positive for shortness of breath  Cardiovascular: Negative  Negative for palpitations  Gastrointestinal: Negative  Negative for nausea and vomiting  Endocrine: Negative  Negative for cold intolerance and heat intolerance  Genitourinary: Negative  Negative for dysuria, frequency and urgency  Musculoskeletal: Negative  Negative for myalgias and neck pain  Skin: Negative  Negative for rash  Neurological: Negative  Negative for dizziness, tremors, seizures, syncope, facial asymmetry, speech difficulty, weakness, light-headedness, numbness and headaches  Hematological: Negative  Does not bruise/bleed easily  Psychiatric/Behavioral: Negative  Negative for confusion, hallucinations and sleep disturbance

## 2018-05-08 DIAGNOSIS — G89.29 CHRONIC LOW BACK PAIN WITHOUT SCIATICA, UNSPECIFIED BACK PAIN LATERALITY: ICD-10-CM

## 2018-05-08 DIAGNOSIS — M54.50 CHRONIC LOW BACK PAIN WITHOUT SCIATICA, UNSPECIFIED BACK PAIN LATERALITY: ICD-10-CM

## 2018-05-08 DIAGNOSIS — J30.89 ALLERGIC RHINITIS DUE TO OTHER ALLERGIC TRIGGER, UNSPECIFIED SEASONALITY: Primary | ICD-10-CM

## 2018-05-08 RX ORDER — PROMETHAZINE HYDROCHLORIDE 25 MG/1
25 TABLET ORAL EVERY 6 HOURS PRN
Qty: 30 TABLET | Refills: 0 | Status: SHIPPED | OUTPATIENT
Start: 2018-05-08 | End: 2018-06-04 | Stop reason: SDUPTHER

## 2018-05-08 RX ORDER — OXYCODONE HYDROCHLORIDE AND ACETAMINOPHEN 5; 325 MG/1; MG/1
1-2 TABLET ORAL EVERY 6 HOURS
Qty: 120 TABLET | Refills: 0 | Status: SHIPPED | OUTPATIENT
Start: 2018-05-08 | End: 2018-06-04 | Stop reason: SDUPTHER

## 2018-06-04 DIAGNOSIS — G89.29 CHRONIC LOW BACK PAIN WITHOUT SCIATICA, UNSPECIFIED BACK PAIN LATERALITY: ICD-10-CM

## 2018-06-04 DIAGNOSIS — J30.89 ALLERGIC RHINITIS DUE TO OTHER ALLERGIC TRIGGER, UNSPECIFIED SEASONALITY: ICD-10-CM

## 2018-06-04 DIAGNOSIS — M54.50 CHRONIC LOW BACK PAIN WITHOUT SCIATICA, UNSPECIFIED BACK PAIN LATERALITY: ICD-10-CM

## 2018-06-04 RX ORDER — PROMETHAZINE HYDROCHLORIDE 25 MG/1
25 TABLET ORAL EVERY 6 HOURS PRN
Qty: 30 TABLET | Refills: 0 | Status: SHIPPED | OUTPATIENT
Start: 2018-06-04 | End: 2018-06-07 | Stop reason: ALTCHOICE

## 2018-06-04 RX ORDER — OXYCODONE HYDROCHLORIDE AND ACETAMINOPHEN 5; 325 MG/1; MG/1
1-2 TABLET ORAL EVERY 6 HOURS
Qty: 120 TABLET | Refills: 0 | Status: SHIPPED | OUTPATIENT
Start: 2018-06-04 | End: 2018-07-02 | Stop reason: SDUPTHER

## 2018-06-07 ENCOUNTER — TELEPHONE (OUTPATIENT)
Dept: NEUROLOGY | Facility: CLINIC | Age: 63
End: 2018-06-07

## 2018-06-07 ENCOUNTER — OFFICE VISIT (OUTPATIENT)
Dept: FAMILY MEDICINE CLINIC | Facility: CLINIC | Age: 63
End: 2018-06-07
Payer: COMMERCIAL

## 2018-06-07 VITALS
HEIGHT: 71 IN | HEART RATE: 68 BPM | BODY MASS INDEX: 41.49 KG/M2 | WEIGHT: 296.4 LBS | TEMPERATURE: 98.1 F | SYSTOLIC BLOOD PRESSURE: 142 MMHG | DIASTOLIC BLOOD PRESSURE: 80 MMHG

## 2018-06-07 DIAGNOSIS — M65.331 TRIGGER MIDDLE FINGER OF RIGHT HAND: Primary | ICD-10-CM

## 2018-06-07 DIAGNOSIS — B35.4 TINEA CORPORIS: ICD-10-CM

## 2018-06-07 DIAGNOSIS — J30.9 ALLERGIC RHINITIS, UNSPECIFIED SEASONALITY, UNSPECIFIED TRIGGER: ICD-10-CM

## 2018-06-07 PROCEDURE — 99214 OFFICE O/P EST MOD 30 MIN: CPT | Performed by: FAMILY MEDICINE

## 2018-06-07 RX ORDER — CLOBETASOL PROPIONATE 0.5 MG/G
CREAM TOPICAL 2 TIMES DAILY
Qty: 60 G | Refills: 3 | Status: SHIPPED | OUTPATIENT
Start: 2018-06-07 | End: 2019-08-29 | Stop reason: SDUPTHER

## 2018-06-07 RX ORDER — KETOCONAZOLE 20 MG/G
CREAM TOPICAL 2 TIMES DAILY
Qty: 60 G | Refills: 3 | Status: SHIPPED | OUTPATIENT
Start: 2018-06-07 | End: 2019-03-11 | Stop reason: SDUPTHER

## 2018-06-07 RX ORDER — DEXTROMETHORPHAN HYDROBROMIDE AND PROMETHAZINE HYDROCHLORIDE 15; 6.25 MG/5ML; MG/5ML
5 SYRUP ORAL 4 TIMES DAILY PRN
Qty: 240 ML | Refills: 0 | Status: SHIPPED | OUTPATIENT
Start: 2018-06-07 | End: 2018-10-09 | Stop reason: ALTCHOICE

## 2018-06-07 RX ORDER — OXYBUTYNIN CHLORIDE 5 MG/1
TABLET ORAL
Refills: 3 | COMMUNITY
Start: 2018-05-23 | End: 2020-11-11 | Stop reason: ALTCHOICE

## 2018-06-08 ENCOUNTER — TELEPHONE (OUTPATIENT)
Dept: FAMILY MEDICINE CLINIC | Facility: CLINIC | Age: 63
End: 2018-06-08

## 2018-06-08 NOTE — PROGRESS NOTES
Patient ID: Arlen Ware is a 60 y o  male  HPI: 60 y o male presents for evaluation of bilateral middle trigger fingers and pnd coughing a dry cough  SUBJECTIVE    Family History   Problem Relation Age of Onset    Heart disease Mother     Kidney cancer Mother     Hypertension Father     Bipolar disorder Sister      bipolar disorder NOS     Social History     Social History    Marital status: /Civil Union     Spouse name: N/A    Number of children: 2    Years of education: trade school     Occupational History          employed     Social History Main Topics    Smoking status: Never Smoker    Smokeless tobacco: Never Used    Alcohol use Yes      Comment: rare    Drug use: No    Sexual activity: Not on file     Other Topics Concern    Not on file     Social History Narrative    Always uses seat belt    Caffeine use    Daily coffee consumption    Daily cola consumption    Dental care, regularly    DENIED exercise frequency    Guns in the home:stored in locked cabinet    Multiple organ donor    Patient has living will    DENIED pets/animals    Power of  in existence    Water intake, adequate (per day)     No past medical history on file    Past Surgical History:   Procedure Laterality Date    BACK SURGERY      lower back surgery    KNEE SURGERY      TOTAL KNEE ARTHROPLASTY      last assessed-11/22/2016     Allergies   Allergen Reactions    Iv Dye  [Iodinated Diagnostic Agents] Hives    Penicillins      Other reaction(s): Unknown Reaction       Current Outpatient Prescriptions:     baclofen 10 mg tablet, 1 (ONE) TABLET TWO TIMES DAILY, AS NEEDED, Disp: , Rfl: 2    beclomethasone (QVAR) 80 MCG/ACT inhaler, Inhale 2 puffs 2 (two) times a day, Disp: , Rfl:     Cholecalciferol 2000 units CAPS, Take 1 capsule by mouth, Disp: , Rfl:     Cyanocobalamin (B-12) 1000 MCG CAPS, Take 1 tablet by mouth daily, Disp: , Rfl:     diphenhydrAMINE (BENADRYL) 25 mg tablet, Take 25 mg by mouth every 6 (six) hours as needed for itching, Disp: , Rfl:     esomeprazole (NEXIUM) 20 mg packet, Take by mouth, Disp: , Rfl:     eszopiclone (LUNESTA) tablet, Take 1 tablet (3 mg total) by mouth daily at bedtime, Disp: 30 tablet, Rfl: 2    fluticasone (FLONASE) 50 mcg/act nasal spray, 2 sprays into each nostril, Disp: , Rfl:     hydrochlorothiazide (HYDRODIURIL) 25 mg tablet, Take 1 tablet by mouth daily, Disp: , Rfl: 3    hyoscyamine (LEVBID) 0 375 mg 12 hr tablet, Take 1 tablet by mouth every 12 (twelve) hours as needed, Disp: , Rfl:     levalbuterol (XOPENEX HFA) 45 mcg/act inhaler, INHALE 2 PUFFS EVERY 4 HOURS AS NEED FOR COUGH, WHEEZE, CHEST TIGHTNESS AND SHORTNESS OF BREATH, Disp: , Rfl: 2    levETIRAcetam (KEPPRA) 500 mg tablet, Take 1 tablet (500 mg total) by mouth 2 (two) times a day, Disp: 180 tablet, Rfl: 3    loratadine (CLARITIN) 10 mg tablet, Take 10 mg by mouth, Disp: , Rfl:     Mometasone Furo-Formoterol Fum (DULERA) 200-5 MCG/ACT AERO, Inhale 2 puffs 2 (two) times a day, Disp: , Rfl:     montelukast (SINGULAIR) 10 mg tablet, Take 10 mg by mouth every evening, Disp: , Rfl: 3    nortriptyline (PAMELOR) 25 mg capsule, TAKE 1 (ONE) CAPSULE DAILY AT BEDTIME, Disp: , Rfl: 2    omeprazole (PriLOSEC) 40 MG capsule, TAKE 1 CAPSULE EVERY MORNING (1/2 HOUR BEFORE BREAKFAST), Disp: , Rfl: 3    oxybutynin (DITROPAN) 5 mg tablet, TAKE 0 5 TABLETS (2 5 MG TOTAL) BY MOUTH 2 (TWO) TIMES A DAY , Disp: , Rfl: 3    oxyCODONE-acetaminophen (PERCOCET) 5-325 mg per tablet, Take 1-2 tablets by mouth every 6 (six) hours Max Daily Amount: 8 tablets, Disp: 120 tablet, Rfl: 0    PROAIR  (90 Base) MCG/ACT inhaler, INHALE 2 PUFFS EVERY 4 HOURS AS NEEDED  MAY USE 2 PUFFS 20-30 MINUTES BEFORE PHYSICAL EXERTION, Disp: , Rfl: 3    clobetasol (TEMOVATE) 0 05 % cream, Apply topically 2 (two) times a day, Disp: 60 g, Rfl: 3    ketoconazole (NIZORAL) 2 % cream, Apply topically 2 (two) times a day, Disp: 60 g, Rfl: 3    promethazine-dextromethorphan (PHENERGAN-DM) 6 25-15 mg/5 mL oral syrup, Take 5 mL by mouth 4 (four) times a day as needed for cough, Disp: 240 mL, Rfl: 0    Review of Systems  Constitutional:     Denies fever, chills ,fatigue ,weakness ,weight loss, weight gain     ENT: Denies earache ,loss of hearing ,nosebleed, nasal discharge,nasal congestion ,sore throat ,hoarseness  Pulmonary: Denies shortness of breath ,+ dry cough  ,dyspnea on exertion, orthopnea  ,PND   Cardiovascular:  Denies bradycardia , tachycardia  ,palpations, lower extremity edema leg, claudication  Breast:  Denies new or changing breast lumps ,nipple discharge ,nipple changes  Abdomen:  Denies abdominal pain , anorexia , indigestion, nausea, vomiting, constipation, diarrhea  Musculoskeletal: Denies myalgias, arthralgias, joint swelling, joint stiffness , limb pain, limb swelling + bilateral trigger fingers of middle fingers  Gu: denies dysuria, polyuria  Skin: Denies skin rash, skin lesion, skin wound, itching, dry skin  Neuro: Denies headache, numbness, tingling, confusion, loss of consciousness, dizziness, vertigo  Psychiatric: Denies feelings of depression, suicidal ideation, anxiety, sleep disturbances    OBJECTIVE    Constitutional:   NAD, well appearing and well nourished      ENT:   Conjunctiva and lids: no injection, edema, or discharge     Pupils and iris: MARY bilaterally    External inspection of ears and nose: normal without deformities or discharge  Otoscopic exam: Canals patent without erythema  Nasal mucosa, septum and turbinates: Normal or edema or discharge         Oropharynx:  Moist mucosa, normal tongue and tonsils without lesions  No erythema + pnd       Pulmonary:Respiratory effort normal rate and rhythm, no increased work of breathing   Auscultation of lungs:  Clear bilaterally with no adventitious breath sounds       Cardiovascular: regular rate and rhythm, S1 and S2, no murmur, no edema and/or varicosities of LE      Abdomen: Soft and non-distended     Positive bowel sounds      No heptomegaly or splenomegaly      Gu: no suprapubic tenderness or CVA tenderness, no urethral discharge  Lymphatic:  No anterior or posterior cervical lymphadenopathy         Musculoskeletal:  Gait and station: Normal gait      Digits and nails normal without clubbing or cyanosis       Inspection/palpation of joints, bones, and muscles:  No joint tenderness, swelling, full active and passive range of motion; bilat middle finger trigger fingers      Skin: Normal skin turgor and no rashes      Neuro:     Normal reflexes     Psych:   alert and oriented to person, place and time     normal mood and affect       Assessment/Plan:Diagnoses and all orders for this visit:    Trigger middle finger of right hand  -     Ambulatory referral to Orthopedic Surgery; Future    Allergic rhinitis, unspecified seasonality, unspecified trigger  -     promethazine-dextromethorphan (PHENERGAN-DM) 6 25-15 mg/5 mL oral syrup; Take 5 mL by mouth 4 (four) times a day as needed for cough    Tinea corporis  -     ketoconazole (NIZORAL) 2 % cream; Apply topically 2 (two) times a day  -     clobetasol (TEMOVATE) 0 05 % cream; Apply topically 2 (two) times a day    Other orders  -     oxybutynin (DITROPAN) 5 mg tablet; TAKE 0 5 TABLETS (2 5 MG TOTAL) BY MOUTH 2 (TWO) TIMES A DAY  Reviewed with patient plan to treat with above     Patient instructed to call in 72 hours if not feeling better or if symptoms worsen

## 2018-06-18 DIAGNOSIS — G47.09 OTHER INSOMNIA: Primary | ICD-10-CM

## 2018-06-19 RX ORDER — ESZOPICLONE 3 MG/1
3 TABLET, FILM COATED ORAL
Qty: 30 TABLET | Refills: 0 | Status: SHIPPED | OUTPATIENT
Start: 2018-06-19 | End: 2018-08-22 | Stop reason: SDUPTHER

## 2018-07-02 DIAGNOSIS — G89.29 CHRONIC LOW BACK PAIN WITHOUT SCIATICA, UNSPECIFIED BACK PAIN LATERALITY: ICD-10-CM

## 2018-07-02 DIAGNOSIS — M54.50 CHRONIC LOW BACK PAIN WITHOUT SCIATICA, UNSPECIFIED BACK PAIN LATERALITY: ICD-10-CM

## 2018-07-02 RX ORDER — OXYCODONE HYDROCHLORIDE AND ACETAMINOPHEN 5; 325 MG/1; MG/1
1-2 TABLET ORAL EVERY 6 HOURS
Qty: 120 TABLET | Refills: 0 | Status: SHIPPED | OUTPATIENT
Start: 2018-07-02 | End: 2018-07-30 | Stop reason: SDUPTHER

## 2018-07-18 DIAGNOSIS — G47.00 INSOMNIA, UNSPECIFIED TYPE: ICD-10-CM

## 2018-07-18 RX ORDER — ESZOPICLONE 3 MG/1
3 TABLET, FILM COATED ORAL
Qty: 30 TABLET | Refills: 0 | Status: SHIPPED | OUTPATIENT
Start: 2018-07-18 | End: 2018-10-09 | Stop reason: ALTCHOICE

## 2018-07-30 DIAGNOSIS — G89.29 CHRONIC LOW BACK PAIN WITHOUT SCIATICA, UNSPECIFIED BACK PAIN LATERALITY: ICD-10-CM

## 2018-07-30 DIAGNOSIS — M54.50 CHRONIC LOW BACK PAIN WITHOUT SCIATICA, UNSPECIFIED BACK PAIN LATERALITY: ICD-10-CM

## 2018-07-30 RX ORDER — OXYCODONE HYDROCHLORIDE AND ACETAMINOPHEN 5; 325 MG/1; MG/1
1 TABLET ORAL EVERY 6 HOURS
Qty: 120 TABLET | Refills: 0 | Status: SHIPPED | OUTPATIENT
Start: 2018-07-30 | End: 2018-08-30 | Stop reason: SDUPTHER

## 2018-08-06 ENCOUNTER — OFFICE VISIT (OUTPATIENT)
Dept: OBGYN CLINIC | Facility: CLINIC | Age: 63
End: 2018-08-06
Payer: COMMERCIAL

## 2018-08-06 VITALS
HEIGHT: 71 IN | BODY MASS INDEX: 41.8 KG/M2 | SYSTOLIC BLOOD PRESSURE: 135 MMHG | WEIGHT: 298.6 LBS | HEART RATE: 69 BPM | DIASTOLIC BLOOD PRESSURE: 88 MMHG

## 2018-08-06 DIAGNOSIS — M65.342 TRIGGER RING FINGER OF LEFT HAND: Primary | ICD-10-CM

## 2018-08-06 DIAGNOSIS — M65.331 TRIGGER MIDDLE FINGER OF RIGHT HAND: ICD-10-CM

## 2018-08-06 PROCEDURE — 20550 NJX 1 TENDON SHEATH/LIGAMENT: CPT | Performed by: ORTHOPAEDIC SURGERY

## 2018-08-06 PROCEDURE — 99203 OFFICE O/P NEW LOW 30 MIN: CPT | Performed by: ORTHOPAEDIC SURGERY

## 2018-08-06 RX ORDER — LIDOCAINE HYDROCHLORIDE 10 MG/ML
0.5 INJECTION, SOLUTION EPIDURAL; INFILTRATION; INTRACAUDAL; PERINEURAL
Status: COMPLETED | OUTPATIENT
Start: 2018-08-06 | End: 2018-08-06

## 2018-08-06 RX ORDER — BETAMETHASONE SODIUM PHOSPHATE AND BETAMETHASONE ACETATE 3; 3 MG/ML; MG/ML
6 INJECTION, SUSPENSION INTRA-ARTICULAR; INTRALESIONAL; INTRAMUSCULAR; SOFT TISSUE
Status: COMPLETED | OUTPATIENT
Start: 2018-08-06 | End: 2018-08-06

## 2018-08-06 RX ADMIN — LIDOCAINE HYDROCHLORIDE 0.5 ML: 10 INJECTION, SOLUTION EPIDURAL; INFILTRATION; INTRACAUDAL; PERINEURAL at 11:24

## 2018-08-06 RX ADMIN — BETAMETHASONE SODIUM PHOSPHATE AND BETAMETHASONE ACETATE 6 MG: 3; 3 INJECTION, SUSPENSION INTRA-ARTICULAR; INTRALESIONAL; INTRAMUSCULAR; SOFT TISSUE at 11:24

## 2018-08-06 RX ADMIN — LIDOCAINE HYDROCHLORIDE 0.5 ML: 10 INJECTION, SOLUTION EPIDURAL; INFILTRATION; INTRACAUDAL; PERINEURAL at 11:26

## 2018-08-06 RX ADMIN — BETAMETHASONE SODIUM PHOSPHATE AND BETAMETHASONE ACETATE 6 MG: 3; 3 INJECTION, SUSPENSION INTRA-ARTICULAR; INTRALESIONAL; INTRAMUSCULAR; SOFT TISSUE at 11:26

## 2018-08-06 NOTE — PROGRESS NOTES
ASSESSMENT/PLAN:    Assessment:   Trigger Finger  left  ring finger and right long finger    Plan:   Steroid injections were discussed with the patient today along with surgical options  The patient would like to proceed with injections today  It was also discussed with the patient that if his index finger on his left side starts giving him an issue we can treat this finger as well  Follow Up:  6  week(s)    To Do Next Visit:       General Discussions:     Trigger FInger: The anatomy and physiology of trigger finger was discussed with the patient today in the office  Edema and increased contact pressure within the flexor tendons at the A1 pulley can cause pain, crepitation, and limitation of function  Treatment options include resting MP blocking splints to decrease edema, oral anti-inflammatory medications, home or formal therapy exercises, up to 2 steroid injections within the tendon sheath, or surgical release  While majority of patients do respond to conservative treatment, up to 20% may require surgical release  Operative Discussions:         _____________________________________________________  CHIEF COMPLAINT:  Chief Complaint   Patient presents with    Left Ring Finger - Pain, Locking    Right Middle Finger - Pain, Locking         SUBJECTIVE:  Tonya Edwards is a 60y o  year old male who presents with Catching and Locking to the left ring finger and right long finger  This started  5 month(s) ago as Insidious in nature  Patient states increased difficulties with driving and climbing a ladder at work  Patient denies numbness and tingling  Radiation: None  Previous Treatments: Resume activities as tolerated without relief  Associated symptoms: No Complaints    PAST MEDICAL HISTORY:  History reviewed  No pertinent past medical history      PAST SURGICAL HISTORY:  Past Surgical History:   Procedure Laterality Date    BACK SURGERY      lower back surgery    KNEE SURGERY      TOTAL KNEE ARTHROPLASTY      last assessed-11/22/2016       FAMILY HISTORY:  Family History   Problem Relation Age of Onset    Heart disease Mother     Kidney cancer Mother     Hypertension Father     Bipolar disorder Sister         bipolar disorder NOS       SOCIAL HISTORY:  Social History   Substance Use Topics    Smoking status: Never Smoker    Smokeless tobacco: Never Used    Alcohol use Yes      Comment: rare       MEDICATIONS:    Current Outpatient Prescriptions:     baclofen 10 mg tablet, 1 (ONE) TABLET TWO TIMES DAILY, AS NEEDED, Disp: , Rfl: 2    Cholecalciferol 2000 units CAPS, Take 1 capsule by mouth, Disp: , Rfl:     clobetasol (TEMOVATE) 0 05 % cream, Apply topically 2 (two) times a day, Disp: 60 g, Rfl: 3    Cyanocobalamin (B-12) 1000 MCG CAPS, Take 1 tablet by mouth daily, Disp: , Rfl:     diphenhydrAMINE (BENADRYL) 25 mg tablet, Take 25 mg by mouth every 6 (six) hours as needed for itching, Disp: , Rfl:     eszopiclone (LUNESTA) tablet, Take 1 tablet (3 mg total) by mouth daily at bedtime as needed for sleep Take immediately before bedtime, Disp: 30 tablet, Rfl: 0    hydrochlorothiazide (HYDRODIURIL) 25 mg tablet, Take 1 tablet by mouth daily, Disp: , Rfl: 3    ketoconazole (NIZORAL) 2 % cream, Apply topically 2 (two) times a day, Disp: 60 g, Rfl: 3    levETIRAcetam (KEPPRA) 500 mg tablet, Take 1 tablet (500 mg total) by mouth 2 (two) times a day, Disp: 180 tablet, Rfl: 3    Mometasone Furo-Formoterol Fum (DULERA) 200-5 MCG/ACT AERO, Inhale 2 puffs 2 (two) times a day, Disp: , Rfl:     montelukast (SINGULAIR) 10 mg tablet, Take 10 mg by mouth every evening, Disp: , Rfl: 3    nortriptyline (PAMELOR) 25 mg capsule, TAKE 1 (ONE) CAPSULE DAILY AT BEDTIME, Disp: , Rfl: 2    omeprazole (PriLOSEC) 40 MG capsule, TAKE 1 CAPSULE EVERY MORNING (1/2 HOUR BEFORE BREAKFAST), Disp: , Rfl: 3    oxybutynin (DITROPAN) 5 mg tablet, TAKE 0 5 TABLETS (2 5 MG TOTAL) BY MOUTH 2 (TWO) TIMES A DAY , Disp: , Rfl: 3    oxyCODONE-acetaminophen (PERCOCET) 5-325 mg per tablet, Take 1 tablet by mouth every 6 (six) hours Max Daily Amount: 4 tablets, Disp: 120 tablet, Rfl: 0    PROAIR  (90 Base) MCG/ACT inhaler, INHALE 2 PUFFS EVERY 4 HOURS AS NEEDED  MAY USE 2 PUFFS 20-30 MINUTES BEFORE PHYSICAL EXERTION, Disp: , Rfl: 3    promethazine-dextromethorphan (PHENERGAN-DM) 6 25-15 mg/5 mL oral syrup, Take 5 mL by mouth 4 (four) times a day as needed for cough, Disp: 240 mL, Rfl: 0    beclomethasone (QVAR) 80 MCG/ACT inhaler, Inhale 2 puffs 2 (two) times a day, Disp: , Rfl:     esomeprazole (NEXIUM) 20 mg packet, Take by mouth, Disp: , Rfl:     eszopiclone (LUNESTA) tablet, Take 1 tablet (3 mg total) by mouth daily at bedtime, Disp: 30 tablet, Rfl: 0    fluticasone (FLONASE) 50 mcg/act nasal spray, 2 sprays into each nostril, Disp: , Rfl:     hyoscyamine (LEVBID) 0 375 mg 12 hr tablet, Take 1 tablet by mouth every 12 (twelve) hours as needed, Disp: , Rfl:     levalbuterol (XOPENEX HFA) 45 mcg/act inhaler, INHALE 2 PUFFS EVERY 4 HOURS AS NEED FOR COUGH, WHEEZE, CHEST TIGHTNESS AND SHORTNESS OF BREATH, Disp: , Rfl: 2    loratadine (CLARITIN) 10 mg tablet, Take 10 mg by mouth, Disp: , Rfl:     ALLERGIES:  Allergies   Allergen Reactions    Iv Dye  [Iodinated Diagnostic Agents] Hives    Penicillins      Other reaction(s): Unknown Reaction       REVIEW OF SYSTEMS:  Pertinent items are noted in HPI  A comprehensive review of systems was negative      LABS:  HgA1c: No results found for: HGBA1C  BMP: No results found for: GLUCOSE, CALCIUM, NA, K, CO2, CL, BUN, CREATININE      _____________________________________________________  PHYSICAL EXAMINATION:  General: well developed and well nourished, alert, oriented times 3 and appears comfortable  Psychiatric: Normal  HEENT: Trachea Midline, No torticollis  Cardiovascular: No discernable arrhythmia  Pulmonary: No wheezing or stridor  Skin: nodules to right long finger and left index and ring finger A1 pulley   Neurovascular: Sensation Intact to the Median, Ulnar, Radial Nerve, Motor Intact to the Median, Ulnar, Radial Nerve and Pulses Intact    MUSCULOSKELETAL EXAMINATION:  LEFT SIDE:  Finger:  nodule to index finger without crepition or locing, nodule to ring finger with limitied motion, 60% flexion to ring finger, tenderness to A1 pulley, no locking in the office today  RIGHT SIDE:  Finger:  long finger: active locking in the office today, full ROM, tenderness A1 pulley of long finger    _____________________________________________________  STUDIES REVIEWED:  No Studies to review      PROCEDURES PERFORMED:  Hand/upper extremity injection  Date/Time: 8/6/2018 11:24 AM  Consent given by: patient  Site marked: site marked  Supporting Documentation  Indications: tendon swelling   Procedure Details  Condition:trigger finger Location: ring finger - L ring A1   Medications administered: 6 mg betamethasone acetate-betamethasone sodium phosphate 6 (3-3) mg/mL; 0 5 mL lidocaine (PF) 1 %  Patient tolerance: patient tolerated the procedure well with no immediate complications  Dressing:  Sterile dressing applied   Hand/upper extremity injection  Date/Time: 8/6/2018 11:26 AM  Consent given by: patient  Site marked: site marked  Supporting Documentation  Indications: tendon swelling   Procedure Details  Condition:trigger finger Location: long finger - R long A1   Medications administered: 0 5 mL lidocaine (PF) 1 %; 6 mg betamethasone acetate-betamethasone sodium phosphate 6 (3-3) mg/mL  Patient tolerance: patient tolerated the procedure well with no immediate complications  Dressing:  Sterile dressing applied           Scribe Attestation    I,:   Kelly Lopez am acting as a scribe while in the presence of the attending physician :        I,:   Diana Martinez MD personally performed the services described in this documentation    as scribed in my presence :

## 2018-08-22 DIAGNOSIS — G47.09 OTHER INSOMNIA: ICD-10-CM

## 2018-08-22 RX ORDER — ESZOPICLONE 3 MG/1
3 TABLET, FILM COATED ORAL
Qty: 30 TABLET | Refills: 0 | Status: SHIPPED | OUTPATIENT
Start: 2018-08-22 | End: 2018-09-21 | Stop reason: SDUPTHER

## 2018-08-30 ENCOUNTER — TELEPHONE (OUTPATIENT)
Dept: FAMILY MEDICINE CLINIC | Facility: CLINIC | Age: 63
End: 2018-08-30

## 2018-08-30 DIAGNOSIS — G89.29 CHRONIC LOW BACK PAIN WITHOUT SCIATICA, UNSPECIFIED BACK PAIN LATERALITY: ICD-10-CM

## 2018-08-30 DIAGNOSIS — M54.50 CHRONIC LOW BACK PAIN WITHOUT SCIATICA, UNSPECIFIED BACK PAIN LATERALITY: ICD-10-CM

## 2018-08-30 RX ORDER — OXYCODONE HYDROCHLORIDE AND ACETAMINOPHEN 5; 325 MG/1; MG/1
1 TABLET ORAL EVERY 6 HOURS
Qty: 120 TABLET | Refills: 0 | Status: SHIPPED | OUTPATIENT
Start: 2018-08-30 | End: 2018-09-26 | Stop reason: SDUPTHER

## 2018-09-21 DIAGNOSIS — G47.09 OTHER INSOMNIA: ICD-10-CM

## 2018-09-21 RX ORDER — ESZOPICLONE 3 MG/1
3 TABLET, FILM COATED ORAL
Qty: 30 TABLET | Refills: 0 | Status: SHIPPED | OUTPATIENT
Start: 2018-09-21 | End: 2018-10-17 | Stop reason: SDUPTHER

## 2018-09-24 ENCOUNTER — OFFICE VISIT (OUTPATIENT)
Dept: OBGYN CLINIC | Facility: CLINIC | Age: 63
End: 2018-09-24
Payer: COMMERCIAL

## 2018-09-24 VITALS
WEIGHT: 296.8 LBS | HEART RATE: 78 BPM | HEIGHT: 71 IN | SYSTOLIC BLOOD PRESSURE: 129 MMHG | BODY MASS INDEX: 41.55 KG/M2 | DIASTOLIC BLOOD PRESSURE: 81 MMHG

## 2018-09-24 DIAGNOSIS — M65.342 TRIGGER RING FINGER OF LEFT HAND: ICD-10-CM

## 2018-09-24 DIAGNOSIS — M65.331 TRIGGER MIDDLE FINGER OF RIGHT HAND: Primary | ICD-10-CM

## 2018-09-24 PROCEDURE — 99213 OFFICE O/P EST LOW 20 MIN: CPT | Performed by: ORTHOPAEDIC SURGERY

## 2018-09-24 PROCEDURE — 20550 NJX 1 TENDON SHEATH/LIGAMENT: CPT | Performed by: ORTHOPAEDIC SURGERY

## 2018-09-24 RX ORDER — LIDOCAINE HYDROCHLORIDE 10 MG/ML
1 INJECTION, SOLUTION INFILTRATION; PERINEURAL
Status: COMPLETED | OUTPATIENT
Start: 2018-09-24 | End: 2018-09-24

## 2018-09-24 RX ORDER — BETAMETHASONE SODIUM PHOSPHATE AND BETAMETHASONE ACETATE 3; 3 MG/ML; MG/ML
6 INJECTION, SUSPENSION INTRA-ARTICULAR; INTRALESIONAL; INTRAMUSCULAR; SOFT TISSUE
Status: COMPLETED | OUTPATIENT
Start: 2018-09-24 | End: 2018-09-24

## 2018-09-24 RX ORDER — CETIRIZINE HYDROCHLORIDE 10 MG/1
10 TABLET ORAL
COMMUNITY

## 2018-09-24 RX ADMIN — LIDOCAINE HYDROCHLORIDE 1 ML: 10 INJECTION, SOLUTION INFILTRATION; PERINEURAL at 18:18

## 2018-09-24 RX ADMIN — BETAMETHASONE SODIUM PHOSPHATE AND BETAMETHASONE ACETATE 6 MG: 3; 3 INJECTION, SUSPENSION INTRA-ARTICULAR; INTRALESIONAL; INTRAMUSCULAR; SOFT TISSUE at 18:18

## 2018-09-24 NOTE — PROGRESS NOTES
ASSESSMENT/PLAN:    Assessment:   Right long and left ring trigger fingers    Plan:   Resume activities as tolerated and steroid injections to both trigger fingers, which were his 2nd injections  Follow Up:  6  week(s)    To Do Next Visit:    evaluate the efficacy of today's cortisone injections    General Discussions:     Trigger FInger: The anatomy and physiology of trigger finger was discussed with the patient today in the office  Edema and increased contact pressure within the flexor tendons at the A1 pulley can cause pain, crepitation, and limitation of function  Treatment options include resting MP blocking splints to decrease edema, oral anti-inflammatory medications, home or formal therapy exercises, up to 2 steroid injections within the tendon sheath, or surgical release  While majority of patients do respond to conservative treatment, up to 20% may require surgical release  Operative Discussions:   will discuss at his next visit if warranted    _____________________________________________________  CHIEF COMPLAINT:  Chief Complaint   Patient presents with    Left Hand - Follow-up    Right Hand - Follow-up         SUBJECTIVE:  Bob Schmitz is a 60y o  year old male who presents for follow up of his left ring and right long trigger fingers  At his visit 6 weeks ago cortisone injections were performed to both fingers with improvement but no resolution  He is interested in repeating cortisone injections as he had relief with the 1st injection  He is contemplating surgical procedures for his trigger fingers however needs to have a hernia operation which takes precedence  PAST MEDICAL HISTORY:  History reviewed  No pertinent past medical history      PAST SURGICAL HISTORY:  Past Surgical History:   Procedure Laterality Date    BACK SURGERY      lower back surgery    KNEE SURGERY      TOTAL KNEE ARTHROPLASTY      last assessed-11/22/2016       FAMILY HISTORY:  Family History   Problem Relation Age of Onset    Heart disease Mother     Kidney cancer Mother     Hypertension Father     Bipolar disorder Sister         bipolar disorder NOS       SOCIAL HISTORY:  Social History   Substance Use Topics    Smoking status: Never Smoker    Smokeless tobacco: Never Used    Alcohol use Yes      Comment: rare       MEDICATIONS:    Current Outpatient Prescriptions:     baclofen 10 mg tablet, 1 (ONE) TABLET TWO TIMES DAILY, AS NEEDED, Disp: , Rfl: 2    beclomethasone (QVAR) 80 MCG/ACT inhaler, Inhale 2 puffs 2 (two) times a day, Disp: , Rfl:     cetirizine (ZyrTEC) 10 mg tablet, Take 10 mg by mouth daily, Disp: , Rfl:     Cholecalciferol 2000 units CAPS, Take 1 capsule by mouth, Disp: , Rfl:     clobetasol (TEMOVATE) 0 05 % cream, Apply topically 2 (two) times a day, Disp: 60 g, Rfl: 3    Cyanocobalamin (B-12) 1000 MCG CAPS, Take 1 tablet by mouth daily, Disp: , Rfl:     diphenhydrAMINE (BENADRYL) 25 mg tablet, Take 25 mg by mouth every 6 (six) hours as needed for itching, Disp: , Rfl:     eszopiclone (LUNESTA) 3 MG tablet, Take 1 tablet (3 mg total) by mouth daily at bedtime as needed for sleep, Disp: 30 tablet, Rfl: 0    fluticasone (FLONASE) 50 mcg/act nasal spray, 2 sprays into each nostril, Disp: , Rfl:     hydrochlorothiazide (HYDRODIURIL) 25 mg tablet, Take 1 tablet by mouth daily, Disp: , Rfl: 3    ketoconazole (NIZORAL) 2 % cream, Apply topically 2 (two) times a day, Disp: 60 g, Rfl: 3    levalbuterol (XOPENEX HFA) 45 mcg/act inhaler, INHALE 2 PUFFS EVERY 4 HOURS AS NEED FOR COUGH, WHEEZE, CHEST TIGHTNESS AND SHORTNESS OF BREATH, Disp: , Rfl: 2    levETIRAcetam (KEPPRA) 500 mg tablet, Take 1 tablet (500 mg total) by mouth 2 (two) times a day, Disp: 180 tablet, Rfl: 3    Mometasone Furo-Formoterol Fum (DULERA) 200-5 MCG/ACT AERO, Inhale 2 puffs 2 (two) times a day, Disp: , Rfl:     montelukast (SINGULAIR) 10 mg tablet, Take 10 mg by mouth every evening, Disp: , Rfl: 3   nortriptyline (PAMELOR) 25 mg capsule, TAKE 1 (ONE) CAPSULE DAILY AT BEDTIME, Disp: , Rfl: 2    omeprazole (PriLOSEC) 40 MG capsule, TAKE 1 CAPSULE EVERY MORNING (1/2 HOUR BEFORE BREAKFAST), Disp: , Rfl: 3    oxybutynin (DITROPAN) 5 mg tablet, TAKE 0 5 TABLETS (2 5 MG TOTAL) BY MOUTH 2 (TWO) TIMES A DAY , Disp: , Rfl: 3    oxyCODONE-acetaminophen (PERCOCET) 5-325 mg per tablet, Take 1 tablet by mouth every 6 (six) hours Max Daily Amount: 4 tablets, Disp: 120 tablet, Rfl: 0    PROAIR  (90 Base) MCG/ACT inhaler, INHALE 2 PUFFS EVERY 4 HOURS AS NEEDED  MAY USE 2 PUFFS 20-30 MINUTES BEFORE PHYSICAL EXERTION, Disp: , Rfl: 3    esomeprazole (NEXIUM) 20 mg packet, Take by mouth, Disp: , Rfl:     eszopiclone (LUNESTA) tablet, Take 1 tablet (3 mg total) by mouth daily at bedtime (Patient not taking: Reported on 9/24/2018 ), Disp: 30 tablet, Rfl: 0    hyoscyamine (LEVBID) 0 375 mg 12 hr tablet, Take 1 tablet by mouth every 12 (twelve) hours as needed, Disp: , Rfl:     loratadine (CLARITIN) 10 mg tablet, Take 10 mg by mouth, Disp: , Rfl:     promethazine-dextromethorphan (PHENERGAN-DM) 6 25-15 mg/5 mL oral syrup, Take 5 mL by mouth 4 (four) times a day as needed for cough (Patient not taking: Reported on 9/24/2018 ), Disp: 240 mL, Rfl: 0    ALLERGIES:  Allergies   Allergen Reactions    Iv Dye  [Iodinated Diagnostic Agents] Hives    Penicillins      Other reaction(s): Unknown Reaction       REVIEW OF SYSTEMS:  Pertinent items are noted in HPI  A comprehensive review of systems was negative      LABS:  HgA1c: No results found for: HGBA1C  BMP: No results found for: GLUCOSE, CALCIUM, NA, K, CO2, CL, BUN, CREATININE        _____________________________________________________  PHYSICAL EXAMINATION:  General: well developed and well nourished, alert, oriented times 3 and appears comfortable  Psychiatric: Normal  HEENT: Trachea Midline, No torticollis  Cardiovascular: No discernable arrhythmia  Pulmonary: No wheezing or stridor  Skin: No masses, erthema, lacerations, fluctation, ulcerations  Neurovascular: Sensation Intact to the Median, Ulnar, Radial Nerve, Motor Intact to the Median, Ulnar, Radial Nerve and Pulses Intact    MUSCULOSKELETAL EXAMINATION:  LEFT SIDE:  Finger:  No instability, Full ROM, Tenderness A 1 pulley, Triggering  ring finger, Nodules  ring finger and Crepitus  RIGHT SIDE:  Finger:  No instability, Full ROM, Tenderness A 1 pulley, Triggering  long finger and Crepitus    _____________________________________________________  STUDIES REVIEWED:  No Studies to review      PROCEDURES PERFORMED:  Hand/upper extremity injection  Date/Time: 9/24/2018 6:18 PM  Consent given by: patient  Site marked: site marked  Supporting Documentation  Indications: diagnostic, pain and tendon swelling   Procedure Details  Condition:trigger finger Location: long finger - R long A1   Preparation: Patient was prepped and draped in the usual sterile fashion  Needle size: 25 G  Ultrasound guidance: no  Approach: volar  Medications administered: 1 mL lidocaine 1 %; 6 mg betamethasone acetate-betamethasone sodium phosphate 6 (3-3) mg/mL  Patient tolerance: patient tolerated the procedure well with no immediate complications  Dressing:  Sterile dressing applied   Hand/upper extremity injection  Date/Time: 9/24/2018 6:18 PM  Consent given by: patient  Site marked: site marked  Supporting Documentation  Indications: diagnostic, pain and tendon swelling   Procedure Details  Condition:trigger finger Location: ring finger - L ring A1   Preparation: Patient was prepped and draped in the usual sterile fashion  Needle size: 25 G  Ultrasound guidance: no  Approach: volar  Medications administered: 1 mL lidocaine 1 %; 6 mg betamethasone acetate-betamethasone sodium phosphate 6 (3-3) mg/mL  Patient tolerance: patient tolerated the procedure well with no immediate complications  Dressing:  Sterile dressing applied           Scribe Attestation    I,:   Oracio Lyons am acting as a scribe while in the presence of the attending physician :        I,:   Barby Howard MD personally performed the services described in this documentation    as scribed in my presence :

## 2018-09-26 DIAGNOSIS — M54.50 CHRONIC LOW BACK PAIN WITHOUT SCIATICA, UNSPECIFIED BACK PAIN LATERALITY: ICD-10-CM

## 2018-09-26 DIAGNOSIS — G89.29 CHRONIC LOW BACK PAIN WITHOUT SCIATICA, UNSPECIFIED BACK PAIN LATERALITY: ICD-10-CM

## 2018-09-26 RX ORDER — OXYCODONE HYDROCHLORIDE AND ACETAMINOPHEN 5; 325 MG/1; MG/1
1 TABLET ORAL EVERY 6 HOURS
Qty: 120 TABLET | Refills: 0 | Status: SHIPPED | OUTPATIENT
Start: 2018-09-26 | End: 2018-10-23 | Stop reason: SDUPTHER

## 2018-10-09 ENCOUNTER — OFFICE VISIT (OUTPATIENT)
Dept: FAMILY MEDICINE CLINIC | Facility: CLINIC | Age: 63
End: 2018-10-09
Payer: COMMERCIAL

## 2018-10-09 VITALS
HEIGHT: 71 IN | DIASTOLIC BLOOD PRESSURE: 84 MMHG | TEMPERATURE: 98.6 F | WEIGHT: 291.4 LBS | BODY MASS INDEX: 40.8 KG/M2 | HEART RATE: 78 BPM | SYSTOLIC BLOOD PRESSURE: 118 MMHG

## 2018-10-09 DIAGNOSIS — E55.9 VITAMIN D DEFICIENCY: ICD-10-CM

## 2018-10-09 DIAGNOSIS — Z13.83 SCREENING FOR CARDIOVASCULAR, RESPIRATORY, AND GENITOURINARY DISEASES: ICD-10-CM

## 2018-10-09 DIAGNOSIS — Z13.6 SCREENING FOR CARDIOVASCULAR, RESPIRATORY, AND GENITOURINARY DISEASES: ICD-10-CM

## 2018-10-09 DIAGNOSIS — Z13.1 SCREENING FOR DIABETES MELLITUS: ICD-10-CM

## 2018-10-09 DIAGNOSIS — K40.90 NON-RECURRENT UNILATERAL INGUINAL HERNIA WITHOUT OBSTRUCTION OR GANGRENE: ICD-10-CM

## 2018-10-09 DIAGNOSIS — Z00.00 ROUTINE PHYSICAL EXAMINATION: Primary | ICD-10-CM

## 2018-10-09 DIAGNOSIS — Z12.5 SCREENING FOR PROSTATE CANCER: ICD-10-CM

## 2018-10-09 DIAGNOSIS — Z13.89 SCREENING FOR CARDIOVASCULAR, RESPIRATORY, AND GENITOURINARY DISEASES: ICD-10-CM

## 2018-10-09 PROBLEM — J30.9 ALLERGIC RHINITIS: Status: ACTIVE | Noted: 2017-11-02

## 2018-10-09 PROBLEM — J45.901 ACUTE ASTHMA EXACERBATION: Status: ACTIVE | Noted: 2017-02-23

## 2018-10-09 PROBLEM — E01.0 THYROMEGALY: Status: ACTIVE | Noted: 2017-11-28

## 2018-10-09 PROBLEM — I10 ESSENTIAL HYPERTENSION: Status: ACTIVE | Noted: 2017-10-06

## 2018-10-09 PROCEDURE — 99396 PREV VISIT EST AGE 40-64: CPT | Performed by: NURSE PRACTITIONER

## 2018-10-09 NOTE — PROGRESS NOTES
Assessment/Plan:     Diagnoses and all orders for this visit:    Routine physical examination    Non-recurrent unilateral inguinal hernia without obstruction or gangrene  -     Ambulatory referral to General Surgery; Future    Vitamin D deficiency  -     Vitamin D 25 hydroxy; Future    Screening for diabetes mellitus  -     HEMOGLOBIN A1C W/ EAG ESTIMATION; Future    Screening for cardiovascular, respiratory, and genitourinary diseases  -     Lipid panel; Future  -     Basic metabolic panel; Future  -     CBC (Includes Diff/Plt) (Refl); Future    Screening for prostate cancer  -     Cancel: PSA Total, Diagnostic; Future    #1 Routine physical examination  Normal physical examination without abnormality except inguinal hernia  #2 Left inguinal hernia  Discussed with patient potential treatment options  Referral to Dr Aron Corona given to patient for further evaluation and treatment  #3 Routine screening blood work  Patient given routine lab work to check for diabetes, prostate and cardiovascular/respiratory/ diseases and vitamin deficiencies  Patient instructed to call for problems or concerns in the interim     Subjective:      Patient ID: Cesar Serrano is a 60 y o  male  60year old male presenting for annual wellness physical examination and routine lab work  His last health maintenance visit was almost one year ago  General Health: Patient health since last visit is described as good  He maintains regular dental visits and denies vision or hearing problems  Immunizations are up to date with exception of annual influenza vaccine  Patient continues routine follow-up with neurology and cardiology with issues well managed  Lifestyle: He consumes a diverse and healthy diet but does have concerns regarding weight management, related to past year problem with feeling something stuck in his throat  He does not exercise regularly due to past knee and back issues   He denies use of tobacco, alcohol and recreational drug use  Screening:   Cardiovascular: Discussed risk and benefits; health diet, healthy weight and physical activity   Routine lab work ordered  Colorectal: Discussed risks and benefits; healthy high fiber diet  (Up to date with last colonoscopy 10/02/18)  Diabetes Mellitus: Discussed risks and benefits; healthy diet, healthy weight and increase physical activity  Routine lab work ordered   Prostate Cancer: discussed risks and benefits  PSA ordered  Skin: Discussed risks and benefits of routine skin check exams     Family History   Problem Relation Age of Onset    Heart disease Mother     Kidney cancer Mother     Hypertension Father     Bipolar disorder Sister         bipolar disorder NOS     Social History     Social History    Marital status: /Civil Union     Spouse name: N/A    Number of children: 2    Years of education: trade school     Occupational History          employed     Social History Main Topics    Smoking status: Never Smoker    Smokeless tobacco: Never Used    Alcohol use Yes      Comment: rare    Drug use: No    Sexual activity: Not on file     Other Topics Concern    Not on file     Social History Narrative    Always uses seat belt    Caffeine use    Daily coffee consumption    Daily cola consumption    Dental care, regularly    DENIED exercise frequency    Guns in the home:stored in locked cabinet    Multiple organ donor    Patient has living will    DENIED pets/animals    Power of  in existence    Water intake, adequate (per day)     No past medical history on file    Past Surgical History:   Procedure Laterality Date    BACK SURGERY      lower back surgery    KNEE SURGERY      TOTAL KNEE ARTHROPLASTY      last assessed-11/22/2016     Allergies   Allergen Reactions    Iv Dye  [Iodinated Diagnostic Agents] Hives    Penicillins      Other reaction(s): Unknown Reaction       Current Outpatient Prescriptions:     baclofen 10 mg tablet, 1 (ONE) TABLET TWO TIMES DAILY, AS NEEDED, Disp: , Rfl: 2    beclomethasone (QVAR) 80 MCG/ACT inhaler, Inhale 2 puffs 2 (two) times a day, Disp: , Rfl:     cetirizine (ZyrTEC) 10 mg tablet, Take 10 mg by mouth daily, Disp: , Rfl:     Cholecalciferol 2000 units CAPS, Take 1 capsule by mouth, Disp: , Rfl:     clobetasol (TEMOVATE) 0 05 % cream, Apply topically 2 (two) times a day, Disp: 60 g, Rfl: 3    Cyanocobalamin (B-12) 1000 MCG CAPS, Take 1 tablet by mouth daily, Disp: , Rfl:     diphenhydrAMINE (BENADRYL) 25 mg tablet, Take 25 mg by mouth every 6 (six) hours as needed for itching, Disp: , Rfl:     eszopiclone (LUNESTA) 3 MG tablet, Take 1 tablet (3 mg total) by mouth daily at bedtime as needed for sleep, Disp: 30 tablet, Rfl: 0    fluticasone (FLONASE) 50 mcg/act nasal spray, 2 sprays into each nostril, Disp: , Rfl:     hydrochlorothiazide (HYDRODIURIL) 25 mg tablet, Take 1 tablet by mouth daily, Disp: , Rfl: 3    ketoconazole (NIZORAL) 2 % cream, Apply topically 2 (two) times a day, Disp: 60 g, Rfl: 3    levalbuterol (XOPENEX HFA) 45 mcg/act inhaler, INHALE 2 PUFFS EVERY 4 HOURS AS NEED FOR COUGH, WHEEZE, CHEST TIGHTNESS AND SHORTNESS OF BREATH, Disp: , Rfl: 2    levETIRAcetam (KEPPRA) 500 mg tablet, Take 1 tablet (500 mg total) by mouth 2 (two) times a day, Disp: 180 tablet, Rfl: 3    Mometasone Furo-Formoterol Fum (DULERA) 200-5 MCG/ACT AERO, Inhale 2 puffs 2 (two) times a day, Disp: , Rfl:     montelukast (SINGULAIR) 10 mg tablet, Take 10 mg by mouth every evening, Disp: , Rfl: 3    nortriptyline (PAMELOR) 25 mg capsule, TAKE 1 (ONE) CAPSULE DAILY AT BEDTIME, Disp: , Rfl: 2    omeprazole (PriLOSEC) 40 MG capsule, TAKE 1 CAPSULE EVERY MORNING (1/2 HOUR BEFORE BREAKFAST), Disp: , Rfl: 3    oxybutynin (DITROPAN) 5 mg tablet, TAKE 0 5 TABLETS (2 5 MG TOTAL) BY MOUTH 2 (TWO) TIMES A DAY , Disp: , Rfl: 3    oxyCODONE-acetaminophen (PERCOCET) 5-325 mg per tablet, Take 1 tablet by mouth every 6 (six) hours Max Daily Amount: 4 tablets, Disp: 120 tablet, Rfl: 0      Review of Systems   Constitutional: Negative  HENT: Negative  Eyes: Negative  Respiratory: Negative  Cardiovascular: Negative  Gastrointestinal: Negative  Endocrine: Negative  Genitourinary: Negative  Musculoskeletal: Negative  Skin: Negative  Allergic/Immunologic: Negative  Neurological: Negative  Hematological: Negative  Psychiatric/Behavioral: Negative  Objective:    /84   Pulse 78   Temp 98 6 °F (37 °C)   Ht 5' 10 5" (1 791 m)   Wt 132 kg (291 lb 6 4 oz)   BMI 41 22 kg/m² (Reviewed)     Physical Exam   Constitutional: He is oriented to person, place, and time  Vital signs are normal  He appears well-developed and well-nourished  HENT:   Head: Normocephalic and atraumatic  Right Ear: Tympanic membrane, external ear and ear canal normal    Left Ear: Tympanic membrane, external ear and ear canal normal    Nose: Nose normal    Mouth/Throat: Uvula is midline, oropharynx is clear and moist and mucous membranes are normal    Eyes: Pupils are equal, round, and reactive to light  Conjunctivae, EOM and lids are normal    Neck: Trachea normal and full passive range of motion without pain  Carotid bruit is not present  Cardiovascular: Normal rate, regular rhythm and normal heart sounds  Pulses:       Carotid pulses are 2+ on the right side, and 2+ on the left side  Radial pulses are 2+ on the right side, and 2+ on the left side  Popliteal pulses are 2+ on the right side, and 2+ on the left side  Dorsalis pedis pulses are 2+ on the right side, and 2+ on the left side  Posterior tibial pulses are 2+ on the right side, and 2+ on the left side  Pulmonary/Chest: Effort normal and breath sounds normal    Abdominal: Soft  Normal appearance and bowel sounds are normal  There is no hepatosplenomegaly  A hernia is present  Hernia confirmed positive in the left inguinal area  Musculoskeletal: Normal range of motion  Neurological: He is alert and oriented to person, place, and time  He has normal strength and normal reflexes  No cranial nerve deficit  Skin: Skin is warm, dry and intact  Psychiatric: He has a normal mood and affect   His behavior is normal

## 2018-10-12 LAB — HBA1C MFR BLD HPLC: 6.1 %

## 2018-10-17 ENCOUNTER — TRANSCRIBE ORDERS (OUTPATIENT)
Dept: ADMINISTRATIVE | Facility: HOSPITAL | Age: 63
End: 2018-10-17

## 2018-10-17 DIAGNOSIS — G47.09 OTHER INSOMNIA: ICD-10-CM

## 2018-10-17 DIAGNOSIS — R10.32 LEFT GROIN PAIN: Primary | ICD-10-CM

## 2018-10-17 RX ORDER — ESZOPICLONE 3 MG/1
3 TABLET, FILM COATED ORAL
Qty: 30 TABLET | Refills: 0 | Status: SHIPPED | OUTPATIENT
Start: 2018-10-17 | End: 2018-11-16 | Stop reason: SDUPTHER

## 2018-10-17 NOTE — TELEPHONE ENCOUNTER
From: Gisela Jimenez  Sent: 10/17/2018 1:03 AM EDT  Subject: Medication Renewal Request    Gisela Jimenez would like a refill of the following medications:     eszopiclone (LUNESTA) 3 MG tablet [Sherry Emmanuel DO]    Preferred pharmacy: [id number]

## 2018-10-23 DIAGNOSIS — G89.29 CHRONIC LOW BACK PAIN WITHOUT SCIATICA, UNSPECIFIED BACK PAIN LATERALITY: ICD-10-CM

## 2018-10-23 DIAGNOSIS — M54.50 CHRONIC LOW BACK PAIN WITHOUT SCIATICA, UNSPECIFIED BACK PAIN LATERALITY: ICD-10-CM

## 2018-10-23 RX ORDER — OXYCODONE HYDROCHLORIDE AND ACETAMINOPHEN 5; 325 MG/1; MG/1
1 TABLET ORAL EVERY 6 HOURS
Qty: 120 TABLET | Refills: 0 | Status: SHIPPED | OUTPATIENT
Start: 2018-10-23 | End: 2018-11-16 | Stop reason: SDUPTHER

## 2018-10-24 ENCOUNTER — HOSPITAL ENCOUNTER (OUTPATIENT)
Dept: RADIOLOGY | Facility: MEDICAL CENTER | Age: 63
Discharge: HOME | End: 2018-10-24

## 2018-10-24 DIAGNOSIS — R10.32 LEFT GROIN PAIN: ICD-10-CM

## 2018-10-25 ENCOUNTER — HOSPITAL ENCOUNTER (OUTPATIENT)
Dept: RADIOLOGY | Facility: MEDICAL CENTER | Age: 63
Discharge: HOME | End: 2018-10-25
Payer: COMMERCIAL

## 2018-10-25 PROCEDURE — 72192 CT PELVIS W/O DYE: CPT

## 2018-11-08 ENCOUNTER — TRANSCRIBE ORDERS (OUTPATIENT)
Dept: ADMINISTRATIVE | Facility: HOSPITAL | Age: 63
End: 2018-11-08
Payer: COMMERCIAL

## 2018-11-08 ENCOUNTER — APPOINTMENT (OUTPATIENT)
Dept: LAB | Facility: MEDICAL CENTER | Age: 63
End: 2018-11-08
Payer: COMMERCIAL

## 2018-11-08 DIAGNOSIS — K40.30 INGUINAL HERNIA WITH OBSTRUCTION WITHOUT GANGRENE, RECURRENCE NOT SPECIFIED, UNSPECIFIED LATERALITY: Primary | ICD-10-CM

## 2018-11-08 LAB
ANION GAP SERPL CALCULATED.3IONS-SCNC: 4 MMOL/L (ref 4–13)
ATRIAL RATE: 79 BPM
BUN SERPL-MCNC: 21 MG/DL (ref 5–25)
CALCIUM SERPL-MCNC: 9.1 MG/DL (ref 8.3–10.1)
CHLORIDE SERPL-SCNC: 103 MMOL/L (ref 100–108)
CO2 SERPL-SCNC: 28 MMOL/L (ref 21–32)
CREAT SERPL-MCNC: 1.16 MG/DL (ref 0.6–1.3)
ERYTHROCYTE [DISTWIDTH] IN BLOOD BY AUTOMATED COUNT: 12.2 % (ref 11.6–15.1)
GFR SERPL CREATININE-BSD FRML MDRD: 67 ML/MIN/1.73SQ M
GLUCOSE SERPL-MCNC: 53 MG/DL (ref 65–140)
HCT VFR BLD AUTO: 37.4 % (ref 36.5–49.3)
HGB BLD-MCNC: 12.8 G/DL (ref 12–17)
MCH RBC QN AUTO: 30.7 PG (ref 26.8–34.3)
MCHC RBC AUTO-ENTMCNC: 34.2 G/DL (ref 31.4–37.4)
MCV RBC AUTO: 90 FL (ref 82–98)
P AXIS: 41 DEGREES
PLATELET # BLD AUTO: 286 THOUSANDS/UL (ref 149–390)
PMV BLD AUTO: 10.9 FL (ref 8.9–12.7)
POTASSIUM SERPL-SCNC: 4.2 MMOL/L (ref 3.5–5.3)
PR INTERVAL: 204 MS
QRS AXIS: 2 DEGREES
QRSD INTERVAL: 108 MS
QT INTERVAL: 394 MS
QTC INTERVAL: 451 MS
RBC # BLD AUTO: 4.17 MILLION/UL (ref 3.88–5.62)
SODIUM SERPL-SCNC: 135 MMOL/L (ref 136–145)
T WAVE AXIS: 33 DEGREES
VENTRICULAR RATE: 79 BPM
WBC # BLD AUTO: 8.09 THOUSAND/UL (ref 4.31–10.16)

## 2018-11-08 PROCEDURE — 93005 ELECTROCARDIOGRAM TRACING: CPT | Performed by: SURGERY

## 2018-11-08 PROCEDURE — 36415 COLL VENOUS BLD VENIPUNCTURE: CPT | Performed by: SURGERY

## 2018-11-08 PROCEDURE — 85027 COMPLETE CBC AUTOMATED: CPT | Performed by: SURGERY

## 2018-11-08 PROCEDURE — 80048 BASIC METABOLIC PNL TOTAL CA: CPT | Performed by: SURGERY

## 2018-11-08 PROCEDURE — 93010 ELECTROCARDIOGRAM REPORT: CPT | Performed by: SURGERY

## 2018-11-15 NOTE — PRE-PROCEDURE INSTRUCTIONS
Pre-Surgery Instructions:   Medication Instructions    cetirizine (ZyrTEC) 10 mg tablet Instructed patient per Anesthesia Guidelines   Cholecalciferol 2000 units CAPS Instructed patient per Anesthesia Guidelines   clobetasol (TEMOVATE) 0 05 % cream Instructed patient per Anesthesia Guidelines   Cyanocobalamin (B-12) 1000 MCG CAPS Instructed patient per Anesthesia Guidelines   diphenhydrAMINE (BENADRYL) 25 mg tablet Instructed patient per Anesthesia Guidelines   eszopiclone (LUNESTA) 3 MG tablet Instructed patient per Anesthesia Guidelines   fluticasone (FLONASE) 50 mcg/act nasal spray Instructed patient per Anesthesia Guidelines   hydrochlorothiazide (HYDRODIURIL) 25 mg tablet Instructed patient per Anesthesia Guidelines   ketoconazole (NIZORAL) 2 % cream Instructed patient per Anesthesia Guidelines   levalbuterol (XOPENEX HFA) 45 mcg/act inhaler Instructed patient per Anesthesia Guidelines   levETIRAcetam (KEPPRA) 500 mg tablet Instructed patient per Anesthesia Guidelines   Mometasone Furo-Formoterol Fum (DULERA) 200-5 MCG/ACT AERO Instructed patient per Anesthesia Guidelines   montelukast (SINGULAIR) 10 mg tablet Instructed patient per Anesthesia Guidelines   nortriptyline (PAMELOR) 25 mg capsule Instructed patient per Anesthesia Guidelines   omeprazole (PriLOSEC) 40 MG capsule Instructed patient per Anesthesia Guidelines   oxybutynin (DITROPAN) 5 mg tablet Instructed patient per Anesthesia Guidelines   oxyCODONE-acetaminophen (PERCOCET) 5-325 mg per tablet Instructed patient per Anesthesia Guidelines      Pre op and showering instructions reviewed-Patient has hibiclens

## 2018-11-16 DIAGNOSIS — G47.09 OTHER INSOMNIA: ICD-10-CM

## 2018-11-16 DIAGNOSIS — G89.29 CHRONIC LOW BACK PAIN WITHOUT SCIATICA, UNSPECIFIED BACK PAIN LATERALITY: ICD-10-CM

## 2018-11-16 DIAGNOSIS — M54.50 CHRONIC LOW BACK PAIN WITHOUT SCIATICA, UNSPECIFIED BACK PAIN LATERALITY: ICD-10-CM

## 2018-11-16 RX ORDER — OXYCODONE HYDROCHLORIDE AND ACETAMINOPHEN 5; 325 MG/1; MG/1
1 TABLET ORAL EVERY 6 HOURS
Qty: 120 TABLET | Refills: 0 | Status: SHIPPED | OUTPATIENT
Start: 2018-11-16 | End: 2018-12-17 | Stop reason: SDUPTHER

## 2018-11-16 RX ORDER — ESZOPICLONE 3 MG/1
3 TABLET, FILM COATED ORAL
Qty: 30 TABLET | Refills: 3 | Status: SHIPPED | OUTPATIENT
Start: 2018-11-16 | End: 2018-12-17 | Stop reason: SDUPTHER

## 2018-11-23 ENCOUNTER — ANESTHESIA EVENT (OUTPATIENT)
Dept: PERIOP | Facility: HOSPITAL | Age: 63
End: 2018-11-23
Payer: COMMERCIAL

## 2018-11-26 ENCOUNTER — HOSPITAL ENCOUNTER (OUTPATIENT)
Facility: HOSPITAL | Age: 63
Setting detail: OUTPATIENT SURGERY
Discharge: HOME | End: 2018-11-26
Attending: SURGERY | Admitting: SURGERY
Payer: COMMERCIAL

## 2018-11-26 ENCOUNTER — ANESTHESIA (OUTPATIENT)
Dept: PERIOP | Facility: HOSPITAL | Age: 63
End: 2018-11-26
Payer: COMMERCIAL

## 2018-11-26 VITALS
BODY MASS INDEX: 40.88 KG/M2 | TEMPERATURE: 97.5 F | HEART RATE: 84 BPM | DIASTOLIC BLOOD PRESSURE: 78 MMHG | WEIGHT: 292 LBS | RESPIRATION RATE: 18 BRPM | HEIGHT: 71 IN | SYSTOLIC BLOOD PRESSURE: 141 MMHG | OXYGEN SATURATION: 95 %

## 2018-11-26 DIAGNOSIS — K40.90 NON-RECURRENT UNILATERAL INGUINAL HERNIA WITHOUT OBSTRUCTION OR GANGRENE: Primary | ICD-10-CM

## 2018-11-26 PROCEDURE — C1781 MESH (IMPLANTABLE): HCPCS | Performed by: SURGERY

## 2018-11-26 DEVICE — MODIFIED ONFLEX MESH
Type: IMPLANTABLE DEVICE | Site: INGUINAL | Status: FUNCTIONAL
Brand: MODIFIED ONFLEX MESH

## 2018-11-26 RX ORDER — CEFAZOLIN SODIUM 2 G/50ML
2000 SOLUTION INTRAVENOUS ONCE
Status: COMPLETED | OUTPATIENT
Start: 2018-11-26 | End: 2018-11-26

## 2018-11-26 RX ORDER — LIDOCAINE HYDROCHLORIDE 10 MG/ML
INJECTION, SOLUTION INFILTRATION; PERINEURAL AS NEEDED
Status: DISCONTINUED | OUTPATIENT
Start: 2018-11-26 | End: 2018-11-26 | Stop reason: SURG

## 2018-11-26 RX ORDER — CEFAZOLIN SODIUM 1 G/3ML
INJECTION, POWDER, FOR SOLUTION INTRAMUSCULAR; INTRAVENOUS AS NEEDED
Status: DISCONTINUED | OUTPATIENT
Start: 2018-11-26 | End: 2018-11-26 | Stop reason: SURG

## 2018-11-26 RX ORDER — METOCLOPRAMIDE HYDROCHLORIDE 5 MG/ML
10 INJECTION INTRAMUSCULAR; INTRAVENOUS ONCE AS NEEDED
Status: DISCONTINUED | OUTPATIENT
Start: 2018-11-26 | End: 2018-11-26 | Stop reason: HOSPADM

## 2018-11-26 RX ORDER — BUPIVACAINE HYDROCHLORIDE AND EPINEPHRINE 2.5; 5 MG/ML; UG/ML
INJECTION, SOLUTION EPIDURAL; INFILTRATION; INTRACAUDAL; PERINEURAL AS NEEDED
Status: DISCONTINUED | OUTPATIENT
Start: 2018-11-26 | End: 2018-11-26 | Stop reason: HOSPADM

## 2018-11-26 RX ORDER — SODIUM CHLORIDE 9 MG/ML
INJECTION, SOLUTION INTRAVENOUS CONTINUOUS PRN
Status: DISCONTINUED | OUTPATIENT
Start: 2018-11-26 | End: 2018-11-26 | Stop reason: SURG

## 2018-11-26 RX ORDER — ROCURONIUM BROMIDE 10 MG/ML
INJECTION, SOLUTION INTRAVENOUS AS NEEDED
Status: DISCONTINUED | OUTPATIENT
Start: 2018-11-26 | End: 2018-11-26 | Stop reason: SURG

## 2018-11-26 RX ORDER — SODIUM CHLORIDE, SODIUM LACTATE, POTASSIUM CHLORIDE, CALCIUM CHLORIDE 600; 310; 30; 20 MG/100ML; MG/100ML; MG/100ML; MG/100ML
125 INJECTION, SOLUTION INTRAVENOUS CONTINUOUS
Status: DISCONTINUED | OUTPATIENT
Start: 2018-11-26 | End: 2018-11-26 | Stop reason: HOSPADM

## 2018-11-26 RX ORDER — PROPOFOL 10 MG/ML
INJECTION, EMULSION INTRAVENOUS AS NEEDED
Status: DISCONTINUED | OUTPATIENT
Start: 2018-11-26 | End: 2018-11-26 | Stop reason: SURG

## 2018-11-26 RX ORDER — MAGNESIUM HYDROXIDE 1200 MG/15ML
LIQUID ORAL AS NEEDED
Status: DISCONTINUED | OUTPATIENT
Start: 2018-11-26 | End: 2018-11-26 | Stop reason: HOSPADM

## 2018-11-26 RX ORDER — FENTANYL CITRATE 50 UG/ML
INJECTION, SOLUTION INTRAMUSCULAR; INTRAVENOUS AS NEEDED
Status: DISCONTINUED | OUTPATIENT
Start: 2018-11-26 | End: 2018-11-26 | Stop reason: SURG

## 2018-11-26 RX ORDER — FENTANYL CITRATE/PF 50 MCG/ML
25 SYRINGE (ML) INJECTION
Status: COMPLETED | OUTPATIENT
Start: 2018-11-26 | End: 2018-11-26

## 2018-11-26 RX ORDER — GLYCOPYRROLATE 0.2 MG/ML
INJECTION INTRAMUSCULAR; INTRAVENOUS AS NEEDED
Status: DISCONTINUED | OUTPATIENT
Start: 2018-11-26 | End: 2018-11-26 | Stop reason: SURG

## 2018-11-26 RX ORDER — HYDROMORPHONE HCL/PF 1 MG/ML
0.5 SYRINGE (ML) INJECTION
Status: DISCONTINUED | OUTPATIENT
Start: 2018-11-26 | End: 2018-11-26 | Stop reason: HOSPADM

## 2018-11-26 RX ORDER — ONDANSETRON 2 MG/ML
4 INJECTION INTRAMUSCULAR; INTRAVENOUS ONCE AS NEEDED
Status: DISCONTINUED | OUTPATIENT
Start: 2018-11-26 | End: 2018-11-26 | Stop reason: HOSPADM

## 2018-11-26 RX ORDER — OXYCODONE HYDROCHLORIDE AND ACETAMINOPHEN 5; 325 MG/1; MG/1
2 TABLET ORAL EVERY 4 HOURS PRN
Qty: 28 TABLET | Refills: 0 | Status: SHIPPED | OUTPATIENT
Start: 2018-11-26 | End: 2019-10-11 | Stop reason: SDUPTHER

## 2018-11-26 RX ORDER — KETOROLAC TROMETHAMINE 30 MG/ML
INJECTION, SOLUTION INTRAMUSCULAR; INTRAVENOUS AS NEEDED
Status: DISCONTINUED | OUTPATIENT
Start: 2018-11-26 | End: 2018-11-26 | Stop reason: SURG

## 2018-11-26 RX ORDER — SUCCINYLCHOLINE CHLORIDE 20 MG/ML
INJECTION INTRAMUSCULAR; INTRAVENOUS AS NEEDED
Status: DISCONTINUED | OUTPATIENT
Start: 2018-11-26 | End: 2018-11-26 | Stop reason: SURG

## 2018-11-26 RX ORDER — HYDROMORPHONE HCL/PF 1 MG/ML
SYRINGE (ML) INJECTION AS NEEDED
Status: DISCONTINUED | OUTPATIENT
Start: 2018-11-26 | End: 2018-11-26 | Stop reason: SURG

## 2018-11-26 RX ORDER — PROMETHAZINE HYDROCHLORIDE 25 MG/ML
25 INJECTION, SOLUTION INTRAMUSCULAR; INTRAVENOUS ONCE AS NEEDED
Status: DISCONTINUED | OUTPATIENT
Start: 2018-11-26 | End: 2018-11-26 | Stop reason: HOSPADM

## 2018-11-26 RX ORDER — OXYCODONE HYDROCHLORIDE AND ACETAMINOPHEN 5; 325 MG/1; MG/1
2 TABLET ORAL EVERY 4 HOURS PRN
Status: DISCONTINUED | OUTPATIENT
Start: 2018-11-26 | End: 2018-11-26 | Stop reason: HOSPADM

## 2018-11-26 RX ORDER — OXYCODONE HYDROCHLORIDE AND ACETAMINOPHEN 5; 325 MG/1; MG/1
1 TABLET ORAL EVERY 4 HOURS PRN
Status: DISCONTINUED | OUTPATIENT
Start: 2018-11-26 | End: 2018-11-26 | Stop reason: SDUPTHER

## 2018-11-26 RX ORDER — ONDANSETRON 2 MG/ML
4 INJECTION INTRAMUSCULAR; INTRAVENOUS EVERY 4 HOURS PRN
Status: DISCONTINUED | OUTPATIENT
Start: 2018-11-26 | End: 2018-11-26 | Stop reason: HOSPADM

## 2018-11-26 RX ORDER — ONDANSETRON 2 MG/ML
INJECTION INTRAMUSCULAR; INTRAVENOUS AS NEEDED
Status: DISCONTINUED | OUTPATIENT
Start: 2018-11-26 | End: 2018-11-26 | Stop reason: SURG

## 2018-11-26 RX ORDER — ONDANSETRON 2 MG/ML
4 INJECTION INTRAMUSCULAR; INTRAVENOUS EVERY 6 HOURS PRN
Status: DISCONTINUED | OUTPATIENT
Start: 2018-11-26 | End: 2018-11-26 | Stop reason: SDUPTHER

## 2018-11-26 RX ORDER — MIDAZOLAM HYDROCHLORIDE 1 MG/ML
INJECTION INTRAMUSCULAR; INTRAVENOUS AS NEEDED
Status: DISCONTINUED | OUTPATIENT
Start: 2018-11-26 | End: 2018-11-26 | Stop reason: SURG

## 2018-11-26 RX ORDER — MORPHINE SULFATE 10 MG/ML
4 INJECTION, SOLUTION INTRAMUSCULAR; INTRAVENOUS
Status: DISCONTINUED | OUTPATIENT
Start: 2018-11-26 | End: 2018-11-26 | Stop reason: HOSPADM

## 2018-11-26 RX ADMIN — FENTANYL CITRATE 25 MCG: 50 INJECTION, SOLUTION INTRAMUSCULAR; INTRAVENOUS at 13:50

## 2018-11-26 RX ADMIN — KETOROLAC TROMETHAMINE 30 MG: 30 INJECTION, SOLUTION INTRAMUSCULAR at 13:26

## 2018-11-26 RX ADMIN — DEXAMETHASONE SODIUM PHOSPHATE 4 MG: 10 INJECTION INTRAMUSCULAR; INTRAVENOUS at 12:29

## 2018-11-26 RX ADMIN — PROPOFOL 200 MG: 10 INJECTION, EMULSION INTRAVENOUS at 12:16

## 2018-11-26 RX ADMIN — ONDANSETRON 4 MG: 2 INJECTION INTRAMUSCULAR; INTRAVENOUS at 12:29

## 2018-11-26 RX ADMIN — SODIUM CHLORIDE: 0.9 INJECTION, SOLUTION INTRAVENOUS at 11:18

## 2018-11-26 RX ADMIN — FENTANYL CITRATE 25 MCG: 50 INJECTION, SOLUTION INTRAMUSCULAR; INTRAVENOUS at 14:01

## 2018-11-26 RX ADMIN — HYDROMORPHONE HYDROCHLORIDE 0.5 MG: 1 INJECTION, SOLUTION INTRAMUSCULAR; INTRAVENOUS; SUBCUTANEOUS at 12:30

## 2018-11-26 RX ADMIN — CEFAZOLIN SODIUM 1000 MG: 1 INJECTION, POWDER, FOR SOLUTION INTRAMUSCULAR; INTRAVENOUS at 12:21

## 2018-11-26 RX ADMIN — ROCURONIUM BROMIDE 10 MG: 10 INJECTION INTRAVENOUS at 12:57

## 2018-11-26 RX ADMIN — ROCURONIUM BROMIDE 20 MG: 10 INJECTION INTRAVENOUS at 12:30

## 2018-11-26 RX ADMIN — FENTANYL CITRATE 25 MCG: 50 INJECTION, SOLUTION INTRAMUSCULAR; INTRAVENOUS at 13:54

## 2018-11-26 RX ADMIN — NEOSTIGMINE METHYLSULFATE 3 MG: 1 INJECTION, SOLUTION INTRAMUSCULAR; INTRAVENOUS; SUBCUTANEOUS at 13:26

## 2018-11-26 RX ADMIN — LIDOCAINE HYDROCHLORIDE 75 MG: 10 INJECTION, SOLUTION INFILTRATION; PERINEURAL at 12:16

## 2018-11-26 RX ADMIN — FENTANYL CITRATE 100 MCG: 50 INJECTION INTRAMUSCULAR; INTRAVENOUS at 12:16

## 2018-11-26 RX ADMIN — MIDAZOLAM HYDROCHLORIDE 2 MG: 1 INJECTION, SOLUTION INTRAMUSCULAR; INTRAVENOUS at 12:07

## 2018-11-26 RX ADMIN — CEFAZOLIN SODIUM 2000 MG: 2 SOLUTION INTRAVENOUS at 12:18

## 2018-11-26 RX ADMIN — GLYCOPYRROLATE 0.4 MG: 0.2 INJECTION, SOLUTION INTRAMUSCULAR; INTRAVENOUS at 13:26

## 2018-11-26 RX ADMIN — OXYCODONE HYDROCHLORIDE AND ACETAMINOPHEN 2 TABLET: 5; 325 TABLET ORAL at 14:50

## 2018-11-26 RX ADMIN — FENTANYL CITRATE 25 MCG: 50 INJECTION, SOLUTION INTRAMUSCULAR; INTRAVENOUS at 14:09

## 2018-11-26 RX ADMIN — SUCCINYLCHOLINE CHLORIDE 140 MG: 20 INJECTION, SOLUTION INTRAMUSCULAR; INTRAVENOUS at 12:16

## 2018-11-26 NOTE — DISCHARGE INSTRUCTIONS
Please call the office when you leave to schedule an appointment to be seen in 2-3 weeks    Activity:    Do not lift more than 25 pounds (a gallon of milk) for 4 weeks post-operatively                 Walking is encouraged  Normal daily activities including climbing steps are okay  Do not engage in strenuous activity or contact sports for 4-6 weeks post-operatively    Return to work:   Return to work to be discussed at first post-operative visit    Diet:   You may return to your normal heart healthy diet    Wound Care: Your wound is closed with histoacryl  It is okay to shower  Wash incision gently with soap and water and pat dry  Do not soak incisions in bath water or swim for two weeks  Do not apply any creams or ointments  Apply ice as needed to wound    Pain Medication:   Please take as directed  No driving while taking narcotic pain medications    Other:  If you have questions after discharge please call the office      If you have increased pain, fever >101 5, increased drainage, redness or a bad smell at your surgery site, please call us immediately or come directly to the Emergency Room

## 2018-11-26 NOTE — ANESTHESIA PREPROCEDURE EVALUATION
Review of Systems/Medical History      No history of anesthetic complications     Cardiovascular  Hyperlipidemia, Hypertension ,    Pulmonary  Asthma , poorly controlled ,        GI/Hepatic    GERD ,             Endo/Other  History of thyroid disease ,   Obesity    GYN  Negative gynecology ROS          Hematology  Anemia ,     Musculoskeletal    Arthritis     Neurology  Seizures well controlled,     Psychology   Anxiety,              Physical Exam    Airway    Mallampati score: II  TM Distance: >3 FB  Neck ROM: full     Dental       Cardiovascular      Pulmonary      Other Findings        Anesthesia Plan  ASA Score- 3     Anesthesia Type- general with ASA Monitors  Additional Monitors:   Airway Plan: ETT  Comment: General anesthesia, endotracheal tube; standard ASA monitors  Risks and benefits discussed with patient; patient consented and agrees to proceed  I saw and evaluated the patient  If seen with CRNA, we have discussed the anesthetic plan and I am in agreement that the plan is appropriate for the patient        Plan Factors-    Induction- intravenous  Postoperative Plan- Plan for postoperative opioid use  Planned trial extubation    Informed Consent- Anesthetic plan and risks discussed with patient  I personally reviewed this patient with the CRNA  Discussed and agreed on the Anesthesia Plan with the CRNA  [facility]

## 2018-11-26 NOTE — OP NOTE
OPERATIVE REPORT  PATIENT NAME: Anupama Garcia    :  1955  MRN: [id number]  Pt Location: [facility] 02    SURGERY DATE: 2018    Surgeon(s) and Role:     * Mark Foy DO - Primary     * Chavez Hawk MD - Assisting    Preop Diagnosis:  Unilateral inguinal hernia without obstruction or gangrene [K40 90]    Post-Op Diagnosis Codes:     * Unilateral inguinal hernia without obstruction or gangrene [K40 90]    Procedure(s) (LRB):  INGUINAL HERNIA REPAIR (Left) with large on flex mesh    Specimen(s):  * No specimens in log *    Estimated Blood Loss:   Minimal    Drains:  Urethral Catheter Latex 16 Fr  (Active)   Number of days: 0       Anesthesia Type:   General    Operative Indications:  Unilateral inguinal hernia without obstruction or gangrene [K40 90]      Operative Findings:  Large indirect inguinal hernia  ASA 2  Wound class 1  Height 71 inches  Weight 132 kg/280 lb  BMI 41    Complications:   None    Procedure and Technique:  Patient was brought the operative suite and identified by visualization, conversation, by armband  Sequential compression pumps were placed  He received perioperative antibiotics  Once under general anesthesia Mckeon catheter was placed under sterile technique  Abdomen left inguinal region was then prepped and draped in a sterile fashion  Time-out was performed and was assured that his prep was dry  Local was instilled over the left inguinal canal   Oblique skin incision was made subcutaneous tissues were then divided hot cautery down the external oblique fascia  I did encounter a fair amount of scar tissue from his previous abdominal plasty  Could see an obvious hernia coming through the external ring  External oblique fascia was opened up through the external ring to the level the internal ring  Ld retractors then placed for exposure  Cremasteric fibers then skeletonized off the cord structures and a large hernia was then delivered up into the wound  Careful dissection was carried out to separate the hernia sac and large cord lipoma away from the cord structures  These were then reduced into the preperitoneal space  This was developed with a laparotomy sponge to allow room for the mesh  Cord structures were encircled with a Penrose drain to help with control  On flex large mesh was then placed into the defect  It was assured that the tip was under the tubercle  I did have to split the medial side of the preperitoneal mesh to allow room for the cord structures to come out  Tails were anchored each side of the transversalis tissues of the hernia opening  Tails were trimmed  Onlay mesh was then placed on the floor the canal and anchored with 2 0 Vicryl to the pubic tubercle shelving edge and conjoint tendon  I made a cut from the superior aspect of the mesh down to the cord structures  The resultant 2 tails were then brought around the cord and anchored to themselves as well as the underlying transversalis tissues with 2 0 Vicryl  Remaining aspects of the tails were tucked under the external oblique fascia  Irrigation is carried out  Local was instilled  External oblique fascia was reapproximated top of the cord with a running 2 0 Vicryl suture  More local was instilled irrigation is carried out 2 0 Vicryl was used to close subcutaneous tissues  Four 0 Monocryl was used to close skin in a subcuticular fashion  Wound was washed and dried  Sterile histoacryl was applied  Assure that the testicles in the scrotum at the completion the case  He was awakened in the operating room and returned to the recovery area in stable condition having tolerated the procedure well     I was present for the entire procedure    Patient Disposition:  PACU     SIGNATURE: Teetee Bermudez DO  DATE: November 26, 2018  TIME: 1:20 PM

## 2018-11-26 NOTE — ANESTHESIA POSTPROCEDURE EVALUATION
Post-Op Assessment Note      CV Status:  Stable    Mental Status:  Alert and awake    Hydration Status:  Euvolemic    PONV Controlled:  Controlled    Airway Patency:  Patent    Post Op Vitals Reviewed: Yes          Staff: CRNA       Comments: vss sv nonobstructed uneventful           BP   171/95   Temp  97 9   Pulse 114   Resp 18   SpO2 98

## 2018-11-27 ENCOUNTER — TELEPHONE (OUTPATIENT)
Dept: OBGYN CLINIC | Facility: HOSPITAL | Age: 63
End: 2018-11-27

## 2018-11-27 NOTE — TELEPHONE ENCOUNTER
Pt last seen in Sept No surgical paperwork has been done and pt has a scheduled appointment for Mon with [address]  Is this ok?

## 2018-11-27 NOTE — TELEPHONE ENCOUNTER
Joselito Arrow  [phone]    Dr Germain Diane    Patient called to advise his Hernia surgery is completed and he will be able to reschedule surgery anytime after 12/5  Please advise patient   thanks

## 2018-12-10 ENCOUNTER — OFFICE VISIT (OUTPATIENT)
Dept: OBGYN CLINIC | Facility: CLINIC | Age: 63
End: 2018-12-10
Payer: COMMERCIAL

## 2018-12-10 VITALS
SYSTOLIC BLOOD PRESSURE: 141 MMHG | BODY MASS INDEX: 41.89 KG/M2 | HEART RATE: 90 BPM | WEIGHT: 299.2 LBS | DIASTOLIC BLOOD PRESSURE: 84 MMHG | HEIGHT: 71 IN

## 2018-12-10 DIAGNOSIS — M65.30 TRIGGER FINGER, UNSPECIFIED FINGER, UNSPECIFIED LATERALITY: Primary | ICD-10-CM

## 2018-12-10 PROCEDURE — 99213 OFFICE O/P EST LOW 20 MIN: CPT | Performed by: PHYSICIAN ASSISTANT

## 2018-12-10 NOTE — PROGRESS NOTES
ASSESSMENT/PLAN:    Assessment:   Right long and left ring trigger fingers    Plan:   Surgery was discussed in detail today in case patient's triggers return  At this time, he is doing well so he will continue to watch these    Follow Up:  PRN    Operative Discussions:  Trigger Finger Release: The anatomy and physiology of trigger finger was discussed with the patient today in the office  Edema and increased contact pressure within the flexor tendons at the A1 pulley can cause pain, crepitation, and limitation of function  Treatment options include resting MP blocking splints to decrease edema, oral anti-inflammatory medications, home or formal therapy exercises, up to 2 steroid injections or surgical release  While majority of patients do respond to conservative treatment, up to 20% may require surgical release  The patient has elected release of the trigger finger  The patient has elected to undergo a release of the A1 pulley (trigger finger)  A small incision will be made over the palmar aspect of the hand, the tendon sheath holding the flexor tendons will be released  In the postoperative period, light activities are allowed immediately, driving is allowed when narcotic medication has stopped, and the incision may get wet after 2 days  Heavy activities (lifting more than approximately 10 pounds) will be allowed after the follow up appointment in 1-2 weeks  While the pain and discomfort within the wrist typically improves rapidly, some residual discomfort may be present for up to 6 weeks  The nodule that is typically palpable in the palmar aspect of the hand will not be removed, as this would necessitate removal of a portion of the flexor tendon, however the catching, clicking, and locking should resolve  Approximate success rate is 98%  The risks and benefits of the procedure were explained to the patient, which include, but are not limited to: Bleeding, infection, recurrence, pain, scar, damage to tendons, damage to nerves, and damage to blood vessels, need for future surgery and complications related to anesthesia  If bony work is done, risks also include malunion and nonunion  These risks, along with alternative conservative treatment options, and postoperative protocols were voiced back and understood by the patient  All questions were answered to the patient's satisfaction  The patient agrees to comply with a standard postoperative protocol, and is willing to proceed  Education was provided via written and auditory forms  There were no barriers to learning  Written handouts regarding wound care, incision and scar care, and general preoperative information, as well as risks and benefits were provided to the patient       _____________________________________________________  CHIEF COMPLAINT:  Chief Complaint   Patient presents with    Right Middle Finger - Follow-up    Left Ring Finger - Follow-up         SUBJECTIVE:  Zeina Miller is a 58y o  year old male who presents for follow up regarding left ring and right long trigger fingers  Patient states the last set of injections (which were his 2nd) did improve symptoms  He still has slight stiffness sensation, primarily of the left ring finger       PAST MEDICAL HISTORY:  Past Medical History:   Diagnosis Date    Asthma     GERD (gastroesophageal reflux disease)     Hypertension     Memory loss, short term     R/O Seizures but placed on Keppra       PAST SURGICAL HISTORY:  Past Surgical History:   Procedure Laterality Date    ABDOMINOPLASTY      BACK SURGERY      lower back surgery    CARPAL TUNNEL RELEASE Bilateral     CHOLECYSTECTOMY      COLONOSCOPY      GASTRIC BYPASS      HERNIA REPAIR Right     JOINT REPLACEMENT Bilateral     Knee    KNEE ARTHROSCOPY Bilateral     X3    MI REPAIR ING HERNIA,5+Y/O,REDUCIBL Left 11/26/2018    Procedure: INGUINAL HERNIA REPAIR;  Surgeon: Karmen Quintero DO;  [facility];  Service: General FAMILY HISTORY:  Family History   Problem Relation Age of Onset    Heart disease Mother     Kidney cancer Mother     Hypertension Father     Bipolar disorder Sister         bipolar disorder NOS       SOCIAL HISTORY:  Social History   Substance Use Topics    Smoking status: Never Smoker    Smokeless tobacco: Never Used    Alcohol use Yes      Comment: rare socially       MEDICATIONS:    Current Outpatient Prescriptions:     cetirizine (ZyrTEC) 10 mg tablet, Take 10 mg by mouth daily after dinner  , Disp: , Rfl:     Cholecalciferol 2000 units CAPS, Take 1 capsule by mouth daily after dinner  , Disp: , Rfl:     clobetasol (TEMOVATE) 0 05 % cream, Apply topically 2 (two) times a day (Patient taking differently: Apply topically 2 (two) times a day as needed  ), Disp: 60 g, Rfl: 3    Cyanocobalamin (B-12) 1000 MCG CAPS, Take 1 tablet by mouth daily after dinner  , Disp: , Rfl:     diphenhydrAMINE (BENADRYL) 25 mg tablet, Take 25 mg by mouth every 6 (six) hours as needed for itching, Disp: , Rfl:     eszopiclone (LUNESTA) 3 MG tablet, Take 1 tablet (3 mg total) by mouth daily at bedtime as needed for sleep, Disp: 30 tablet, Rfl: 3    fluticasone (FLONASE) 50 mcg/act nasal spray, 2 sprays into each nostril 2 (two) times a day as needed  , Disp: , Rfl:     hydrochlorothiazide (HYDRODIURIL) 25 mg tablet, Take 1 tablet by mouth daily in the early morning  , Disp: , Rfl: 3    ketoconazole (NIZORAL) 2 % cream, Apply topically 2 (two) times a day (Patient taking differently: Apply topically 2 (two) times a day as needed  ), Disp: 60 g, Rfl: 3    levalbuterol (XOPENEX HFA) 45 mcg/act inhaler, INHALE 2 PUFFS EVERY 4 HOURS AS NEED FOR COUGH, WHEEZE, CHEST TIGHTNESS AND SHORTNESS OF BREATH, Disp: , Rfl: 2    levETIRAcetam (KEPPRA) 500 mg tablet, Take 1 tablet (500 mg total) by mouth 2 (two) times a day, Disp: 180 tablet, Rfl: 3    Mometasone Furo-Formoterol Fum (DULERA) 200-5 MCG/ACT AERO, Inhale 2 puffs 2 (two) times a day, Disp: , Rfl:     montelukast (SINGULAIR) 10 mg tablet, Take 10 mg by mouth every evening, Disp: , Rfl: 3    nortriptyline (PAMELOR) 25 mg capsule, TAKE 1 (ONE) CAPSULE DAILY AT BEDTIME, Disp: , Rfl: 2    omeprazole (PriLOSEC) 40 MG capsule, TAKE 1 CAPSULE EVERY MORNING (1/2 HOUR BEFORE BREAKFAST), Disp: , Rfl: 3    oxybutynin (DITROPAN) 5 mg tablet, TAKE 0 5 TABLETS (2 5 MG TOTAL) BY MOUTH 2 (TWO) TIMES A DAY , Disp: , Rfl: 3    oxyCODONE-acetaminophen (PERCOCET) 5-325 mg per tablet, Take 1 tablet by mouth every 6 (six) hours Max Daily Amount: 4 tablets, Disp: 120 tablet, Rfl: 0    oxyCODONE-acetaminophen (PERCOCET) 5-325 mg per tablet, Take 2 tablets by mouth every 4 (four) hours as needed for moderate pain for up to 20 doses Max Daily Amount: 12 tablets, Disp: 28 tablet, Rfl: 0    Pseudoeph-Doxylamine-DM-APAP (NYQUIL PO), Take by mouth, Disp: , Rfl:     ALLERGIES:  Allergies   Allergen Reactions    Iv Dye [Iodinated Diagnostic Agents] Hives    Penicillins Other (See Comments)     ? Had as a child-Unknown reaction       REVIEW OF SYSTEMS:  Pertinent items are noted in HPI  A comprehensive review of systems was negative      LABS:  HgA1c:   Lab Results   Component Value Date    HGBA1C 6 1 10/09/2018     BMP:   Lab Results   Component Value Date    CALCIUM 9 1 11/08/2018    K 4 2 11/08/2018    CO2 28 11/08/2018     11/08/2018    BUN 21 11/08/2018    CREATININE 1 16 11/08/2018           _____________________________________________________  PHYSICAL EXAMINATION:  General: well developed and well nourished, alert, oriented times 3 and appears comfortable  Psychiatric: Normal  HEENT: Trachea Midline, No torticollis  Cardiovascular: No discernable arrhythmia  Pulmonary: No wheezing or stridor  Skin: No masses, erthema, lacerations, fluctation, ulcerations  Neurovascular: Sensation Intact to the Median, Ulnar, Radial Nerve, Motor Intact to the Median, Ulnar, Radial Nerve and Pulses Intact    MUSCULOSKELETAL EXAMINATION:  LEFT SIDE:  Finger:  No tenderness, Nodules  ring finger and mild crepitation  RIGHT SIDE:  Finger:  No tenderness, No Triggering  long finger and Nodules  long finger    _____________________________________________________  STUDIES REVIEWED:  No Studies to review      PROCEDURES PERFORMED:  Procedures  No Procedures performed today

## 2018-12-14 DIAGNOSIS — G89.29 CHRONIC LOW BACK PAIN WITHOUT SCIATICA, UNSPECIFIED BACK PAIN LATERALITY: ICD-10-CM

## 2018-12-14 DIAGNOSIS — M54.50 CHRONIC LOW BACK PAIN WITHOUT SCIATICA, UNSPECIFIED BACK PAIN LATERALITY: ICD-10-CM

## 2018-12-14 DIAGNOSIS — G47.09 OTHER INSOMNIA: ICD-10-CM

## 2018-12-14 RX ORDER — OXYCODONE HYDROCHLORIDE AND ACETAMINOPHEN 5; 325 MG/1; MG/1
1 TABLET ORAL EVERY 6 HOURS
Qty: 120 TABLET | Refills: 0 | Status: CANCELLED | OUTPATIENT
Start: 2018-12-14

## 2018-12-14 RX ORDER — ESZOPICLONE 3 MG/1
3 TABLET, FILM COATED ORAL
Qty: 30 TABLET | Refills: 0 | Status: CANCELLED | OUTPATIENT
Start: 2018-12-14

## 2018-12-14 NOTE — TELEPHONE ENCOUNTER
From: Alessandra Hurley  Sent: 12/14/2018 1:28 PM EST  Subject: Medication Renewal Request    Alessandra Hurley would like a refill of the following medications:     eszopiclone (LUNESTA) 3 MG tablet [Sherry Meza DO]     oxyCODONE-acetaminophen (PERCOCET) 5-325 mg per tablet [Sherry Bahena, ]    Preferred pharmacy: [facility]/PHARMACY #[id number]

## 2018-12-17 ENCOUNTER — TELEPHONE (OUTPATIENT)
Dept: FAMILY MEDICINE CLINIC | Facility: CLINIC | Age: 63
End: 2018-12-17

## 2018-12-17 DIAGNOSIS — G47.09 OTHER INSOMNIA: ICD-10-CM

## 2018-12-17 DIAGNOSIS — G89.29 CHRONIC LOW BACK PAIN WITHOUT SCIATICA, UNSPECIFIED BACK PAIN LATERALITY: ICD-10-CM

## 2018-12-17 DIAGNOSIS — M54.50 CHRONIC LOW BACK PAIN WITHOUT SCIATICA, UNSPECIFIED BACK PAIN LATERALITY: ICD-10-CM

## 2018-12-17 RX ORDER — ESZOPICLONE 3 MG/1
3 TABLET, FILM COATED ORAL
Qty: 30 TABLET | Refills: 0 | Status: SHIPPED | OUTPATIENT
Start: 2018-12-17 | End: 2019-01-11 | Stop reason: SDUPTHER

## 2018-12-17 RX ORDER — OXYCODONE HYDROCHLORIDE AND ACETAMINOPHEN 5; 325 MG/1; MG/1
1 TABLET ORAL EVERY 6 HOURS
Qty: 120 TABLET | Refills: 0 | Status: SHIPPED | OUTPATIENT
Start: 2018-12-17 | End: 2019-01-11 | Stop reason: SDUPTHER

## 2019-01-11 DIAGNOSIS — G47.09 OTHER INSOMNIA: ICD-10-CM

## 2019-01-11 DIAGNOSIS — G89.29 CHRONIC LOW BACK PAIN WITHOUT SCIATICA, UNSPECIFIED BACK PAIN LATERALITY: ICD-10-CM

## 2019-01-11 DIAGNOSIS — M54.50 CHRONIC LOW BACK PAIN WITHOUT SCIATICA, UNSPECIFIED BACK PAIN LATERALITY: ICD-10-CM

## 2019-01-11 RX ORDER — ESZOPICLONE 3 MG/1
3 TABLET, FILM COATED ORAL
Qty: 30 TABLET | Refills: 0 | Status: SHIPPED | OUTPATIENT
Start: 2019-01-11 | End: 2019-02-10 | Stop reason: SDUPTHER

## 2019-01-11 RX ORDER — OXYCODONE HYDROCHLORIDE AND ACETAMINOPHEN 5; 325 MG/1; MG/1
1 TABLET ORAL EVERY 6 HOURS
Qty: 120 TABLET | Refills: 0 | Status: SHIPPED | OUTPATIENT
Start: 2019-01-11 | End: 2019-02-10 | Stop reason: SDUPTHER

## 2019-01-11 NOTE — TELEPHONE ENCOUNTER
From: Aniyah Rocha  Sent: 2019 1:31 PM EST  Subject: Medication Renewal Request    Aniyah Rocha would like a refill of the following medications:     eszopiclone (LUNESTA) 3 MG tablet [Sherry Meza DO]     oxyCODONE-acetaminophen (PERCOCET) 5-325 mg per tablet [Sherry White DO]    Preferred pharmacy: [facility]/PHARMACY #[id number]

## 2019-02-10 DIAGNOSIS — G89.29 CHRONIC LOW BACK PAIN WITHOUT SCIATICA, UNSPECIFIED BACK PAIN LATERALITY: ICD-10-CM

## 2019-02-10 DIAGNOSIS — G47.09 OTHER INSOMNIA: ICD-10-CM

## 2019-02-10 DIAGNOSIS — M54.50 CHRONIC LOW BACK PAIN WITHOUT SCIATICA, UNSPECIFIED BACK PAIN LATERALITY: ICD-10-CM

## 2019-02-11 RX ORDER — OXYCODONE HYDROCHLORIDE AND ACETAMINOPHEN 5; 325 MG/1; MG/1
1 TABLET ORAL EVERY 6 HOURS
Qty: 120 TABLET | Refills: 0 | Status: SHIPPED | OUTPATIENT
Start: 2019-02-11 | End: 2019-03-11 | Stop reason: SDUPTHER

## 2019-02-11 RX ORDER — ESZOPICLONE 3 MG/1
3 TABLET, FILM COATED ORAL
Qty: 30 TABLET | Refills: 0 | Status: SHIPPED | OUTPATIENT
Start: 2019-02-11 | End: 2019-03-11 | Stop reason: SDUPTHER

## 2019-03-11 DIAGNOSIS — G47.09 OTHER INSOMNIA: ICD-10-CM

## 2019-03-11 DIAGNOSIS — B35.4 TINEA CORPORIS: ICD-10-CM

## 2019-03-11 DIAGNOSIS — G89.29 CHRONIC LOW BACK PAIN WITHOUT SCIATICA, UNSPECIFIED BACK PAIN LATERALITY: ICD-10-CM

## 2019-03-11 DIAGNOSIS — M54.50 CHRONIC LOW BACK PAIN WITHOUT SCIATICA, UNSPECIFIED BACK PAIN LATERALITY: ICD-10-CM

## 2019-03-11 RX ORDER — OXYCODONE HYDROCHLORIDE AND ACETAMINOPHEN 5; 325 MG/1; MG/1
1 TABLET ORAL EVERY 6 HOURS
Qty: 120 TABLET | Refills: 0 | Status: SHIPPED | OUTPATIENT
Start: 2019-03-11 | End: 2019-04-11 | Stop reason: SDUPTHER

## 2019-03-11 RX ORDER — ESZOPICLONE 3 MG/1
3 TABLET, FILM COATED ORAL
Qty: 30 TABLET | Refills: 0 | Status: SHIPPED | OUTPATIENT
Start: 2019-03-11 | End: 2019-04-11 | Stop reason: SDUPTHER

## 2019-03-11 RX ORDER — KETOCONAZOLE 20 MG/G
CREAM TOPICAL 2 TIMES DAILY
Qty: 60 G | Refills: 0 | Status: SHIPPED | OUTPATIENT
Start: 2019-03-11 | End: 2019-04-11 | Stop reason: SDUPTHER

## 2019-03-13 ENCOUNTER — APPOINTMENT (OUTPATIENT)
Dept: LAB | Facility: CLINIC | Age: 64
End: 2019-03-13
Payer: COMMERCIAL

## 2019-03-13 ENCOUNTER — TRANSCRIBE ORDERS (OUTPATIENT)
Dept: LAB | Facility: CLINIC | Age: 64
End: 2019-03-13

## 2019-03-13 DIAGNOSIS — J45.902 ASTHMATIC BRONCHITIS WITH STATUS ASTHMATICUS: ICD-10-CM

## 2019-03-13 DIAGNOSIS — Z98.890 PERSONAL HISTORY OF SURGERY TO HEART AND GREAT VESSELS, PRESENTING HAZARDS TO HEALTH: Primary | ICD-10-CM

## 2019-03-13 DIAGNOSIS — R05.9 COUGH: ICD-10-CM

## 2019-03-13 DIAGNOSIS — Z98.890 PERSONAL HISTORY OF SURGERY TO HEART AND GREAT VESSELS, PRESENTING HAZARDS TO HEALTH: ICD-10-CM

## 2019-03-13 LAB
25(OH)D3 SERPL-MCNC: 24.3 NG/ML (ref 30–100)
ALBUMIN SERPL BCP-MCNC: 4 G/DL (ref 3.5–5)
ALP SERPL-CCNC: 142 U/L (ref 46–116)
ALT SERPL W P-5'-P-CCNC: 39 U/L (ref 12–78)
ANION GAP SERPL CALCULATED.3IONS-SCNC: 8 MMOL/L (ref 4–13)
AST SERPL W P-5'-P-CCNC: 21 U/L (ref 5–45)
BASOPHILS # BLD AUTO: 0.03 THOUSANDS/ΜL (ref 0–0.1)
BASOPHILS NFR BLD AUTO: 0 % (ref 0–1)
BILIRUB SERPL-MCNC: 0.35 MG/DL (ref 0.2–1)
BUN SERPL-MCNC: 23 MG/DL (ref 5–25)
CALCIUM SERPL-MCNC: 9 MG/DL (ref 8.3–10.1)
CHLORIDE SERPL-SCNC: 103 MMOL/L (ref 100–108)
CO2 SERPL-SCNC: 27 MMOL/L (ref 21–32)
CREAT SERPL-MCNC: 1.13 MG/DL (ref 0.6–1.3)
EOSINOPHIL # BLD AUTO: 0.29 THOUSAND/ΜL (ref 0–0.61)
EOSINOPHIL NFR BLD AUTO: 4 % (ref 0–6)
ERYTHROCYTE [DISTWIDTH] IN BLOOD BY AUTOMATED COUNT: 12.1 % (ref 11.6–15.1)
GFR SERPL CREATININE-BSD FRML MDRD: 68 ML/MIN/1.73SQ M
GLUCOSE SERPL-MCNC: 101 MG/DL (ref 65–140)
HCT VFR BLD AUTO: 35.3 % (ref 36.5–49.3)
HGB BLD-MCNC: 12.2 G/DL (ref 12–17)
IMM GRANULOCYTES # BLD AUTO: 0.01 THOUSAND/UL (ref 0–0.2)
IMM GRANULOCYTES NFR BLD AUTO: 0 % (ref 0–2)
LYMPHOCYTES # BLD AUTO: 1.59 THOUSANDS/ΜL (ref 0.6–4.47)
LYMPHOCYTES NFR BLD AUTO: 21 % (ref 14–44)
MCH RBC QN AUTO: 30.6 PG (ref 26.8–34.3)
MCHC RBC AUTO-ENTMCNC: 34.6 G/DL (ref 31.4–37.4)
MCV RBC AUTO: 89 FL (ref 82–98)
MONOCYTES # BLD AUTO: 0.59 THOUSAND/ΜL (ref 0.17–1.22)
MONOCYTES NFR BLD AUTO: 8 % (ref 4–12)
NEUTROPHILS # BLD AUTO: 5.2 THOUSANDS/ΜL (ref 1.85–7.62)
NEUTS SEG NFR BLD AUTO: 67 % (ref 43–75)
NRBC BLD AUTO-RTO: 0 /100 WBCS
PLATELET # BLD AUTO: 298 THOUSANDS/UL (ref 149–390)
PMV BLD AUTO: 10.3 FL (ref 8.9–12.7)
POTASSIUM SERPL-SCNC: 4.1 MMOL/L (ref 3.5–5.3)
PROT SERPL-MCNC: 7.4 G/DL (ref 6.4–8.2)
RBC # BLD AUTO: 3.99 MILLION/UL (ref 3.88–5.62)
SODIUM SERPL-SCNC: 138 MMOL/L (ref 136–145)
VIT B12 SERPL-MCNC: 612 PG/ML (ref 100–900)
WBC # BLD AUTO: 7.71 THOUSAND/UL (ref 4.31–10.16)

## 2019-03-13 PROCEDURE — 82306 VITAMIN D 25 HYDROXY: CPT

## 2019-03-13 PROCEDURE — 82607 VITAMIN B-12: CPT

## 2019-03-13 PROCEDURE — 36415 COLL VENOUS BLD VENIPUNCTURE: CPT

## 2019-03-13 PROCEDURE — 80053 COMPREHEN METABOLIC PANEL: CPT

## 2019-03-13 PROCEDURE — 85025 COMPLETE CBC W/AUTO DIFF WBC: CPT

## 2019-04-11 DIAGNOSIS — B35.4 TINEA CORPORIS: ICD-10-CM

## 2019-04-11 DIAGNOSIS — M54.50 CHRONIC LOW BACK PAIN WITHOUT SCIATICA, UNSPECIFIED BACK PAIN LATERALITY: ICD-10-CM

## 2019-04-11 DIAGNOSIS — G47.09 OTHER INSOMNIA: ICD-10-CM

## 2019-04-11 DIAGNOSIS — G89.29 CHRONIC LOW BACK PAIN WITHOUT SCIATICA, UNSPECIFIED BACK PAIN LATERALITY: ICD-10-CM

## 2019-04-11 RX ORDER — KETOCONAZOLE 20 MG/G
CREAM TOPICAL 2 TIMES DAILY
Qty: 60 G | Refills: 0 | Status: SHIPPED | OUTPATIENT
Start: 2019-04-11 | End: 2019-08-01 | Stop reason: SDUPTHER

## 2019-04-11 RX ORDER — ESZOPICLONE 3 MG/1
3 TABLET, FILM COATED ORAL
Qty: 30 TABLET | Refills: 0 | Status: SHIPPED | OUTPATIENT
Start: 2019-04-11 | End: 2019-05-09 | Stop reason: SDUPTHER

## 2019-04-11 RX ORDER — OXYCODONE HYDROCHLORIDE AND ACETAMINOPHEN 5; 325 MG/1; MG/1
1 TABLET ORAL EVERY 6 HOURS
Qty: 120 TABLET | Refills: 0 | Status: SHIPPED | OUTPATIENT
Start: 2019-04-11 | End: 2019-05-09 | Stop reason: SDUPTHER

## 2019-04-17 ENCOUNTER — TRANSCRIBE ORDERS (OUTPATIENT)
Dept: LAB | Facility: CLINIC | Age: 64
End: 2019-04-17

## 2019-04-17 ENCOUNTER — APPOINTMENT (OUTPATIENT)
Dept: LAB | Facility: CLINIC | Age: 64
End: 2019-04-17
Payer: COMMERCIAL

## 2019-04-17 ENCOUNTER — TRANSCRIBE ORDERS (OUTPATIENT)
Dept: ADMINISTRATIVE | Facility: HOSPITAL | Age: 64
End: 2019-04-17

## 2019-04-17 ENCOUNTER — HOSPITAL ENCOUNTER (OUTPATIENT)
Dept: RADIOLOGY | Facility: HOSPITAL | Age: 64
Discharge: HOME | End: 2019-04-17
Payer: COMMERCIAL

## 2019-04-17 DIAGNOSIS — J45.40 MODERATE PERSISTENT ASTHMA, UNSPECIFIED WHETHER COMPLICATED: ICD-10-CM

## 2019-04-17 DIAGNOSIS — J45.40 MODERATE PERSISTENT ASTHMA WITHOUT COMPLICATION: Primary | ICD-10-CM

## 2019-04-17 DIAGNOSIS — J45.40 MODERATE PERSISTENT ASTHMA WITHOUT COMPLICATION: ICD-10-CM

## 2019-04-17 DIAGNOSIS — J45.40 MODERATE PERSISTENT ASTHMA, UNSPECIFIED WHETHER COMPLICATED: Primary | ICD-10-CM

## 2019-04-17 LAB
BASOPHILS # BLD AUTO: 0.03 THOUSANDS/ΜL (ref 0–0.1)
BASOPHILS NFR BLD AUTO: 0 % (ref 0–1)
EOSINOPHIL # BLD AUTO: 0.43 THOUSAND/ΜL (ref 0–0.61)
EOSINOPHIL NFR BLD AUTO: 5 % (ref 0–6)
ERYTHROCYTE [DISTWIDTH] IN BLOOD BY AUTOMATED COUNT: 12.2 % (ref 11.6–15.1)
HCT VFR BLD AUTO: 37.4 % (ref 36.5–49.3)
HGB BLD-MCNC: 12.8 G/DL (ref 12–17)
IMM GRANULOCYTES # BLD AUTO: 0.02 THOUSAND/UL (ref 0–0.2)
IMM GRANULOCYTES NFR BLD AUTO: 0 % (ref 0–2)
LYMPHOCYTES # BLD AUTO: 1.86 THOUSANDS/ΜL (ref 0.6–4.47)
LYMPHOCYTES NFR BLD AUTO: 20 % (ref 14–44)
MCH RBC QN AUTO: 30.3 PG (ref 26.8–34.3)
MCHC RBC AUTO-ENTMCNC: 34.2 G/DL (ref 31.4–37.4)
MCV RBC AUTO: 88 FL (ref 82–98)
MONOCYTES # BLD AUTO: 0.74 THOUSAND/ΜL (ref 0.17–1.22)
MONOCYTES NFR BLD AUTO: 8 % (ref 4–12)
NEUTROPHILS # BLD AUTO: 6.16 THOUSANDS/ΜL (ref 1.85–7.62)
NEUTS SEG NFR BLD AUTO: 67 % (ref 43–75)
NRBC BLD AUTO-RTO: 0 /100 WBCS
PLATELET # BLD AUTO: 290 THOUSANDS/UL (ref 149–390)
PMV BLD AUTO: 9.5 FL (ref 8.9–12.7)
RBC # BLD AUTO: 4.23 MILLION/UL (ref 3.88–5.62)
WBC # BLD AUTO: 9.24 THOUSAND/UL (ref 4.31–10.16)

## 2019-04-17 PROCEDURE — 71046 X-RAY EXAM CHEST 2 VIEWS: CPT

## 2019-04-17 PROCEDURE — 36415 COLL VENOUS BLD VENIPUNCTURE: CPT

## 2019-04-17 PROCEDURE — 85025 COMPLETE CBC W/AUTO DIFF WBC: CPT

## 2019-04-17 PROCEDURE — 82785 ASSAY OF IGE: CPT

## 2019-04-17 PROCEDURE — 70220 X-RAY EXAM OF SINUSES: CPT

## 2019-04-18 LAB — TOTAL IGE SMQN RAST: 87.3 KU/L (ref 0–113)

## 2019-04-23 ENCOUNTER — OFFICE VISIT (OUTPATIENT)
Dept: NEUROLOGY | Facility: CLINIC | Age: 64
End: 2019-04-23
Payer: COMMERCIAL

## 2019-04-23 VITALS
WEIGHT: 302.1 LBS | BODY MASS INDEX: 42.29 KG/M2 | HEIGHT: 71 IN | RESPIRATION RATE: 12 BRPM | DIASTOLIC BLOOD PRESSURE: 68 MMHG | HEART RATE: 66 BPM | SYSTOLIC BLOOD PRESSURE: 130 MMHG

## 2019-04-23 DIAGNOSIS — R41.3 MILD MEMORY DISTURBANCE: ICD-10-CM

## 2019-04-23 DIAGNOSIS — G40.009 PARTIAL IDIOPATHIC EPILEPSY WITH SEIZURES OF LOCALIZED ONSET, NOT INTRACTABLE, WITHOUT STATUS EPILEPTICUS (HCC): Primary | ICD-10-CM

## 2019-04-23 PROCEDURE — 99214 OFFICE O/P EST MOD 30 MIN: CPT | Performed by: PSYCHIATRY & NEUROLOGY

## 2019-04-23 RX ORDER — LEVETIRACETAM 500 MG/1
500 TABLET ORAL 2 TIMES DAILY
Qty: 180 TABLET | Refills: 3 | Status: SHIPPED | OUTPATIENT
Start: 2019-04-23 | End: 2020-04-15 | Stop reason: SDUPTHER

## 2019-05-09 DIAGNOSIS — G47.09 OTHER INSOMNIA: ICD-10-CM

## 2019-05-09 DIAGNOSIS — G89.29 CHRONIC LOW BACK PAIN WITHOUT SCIATICA, UNSPECIFIED BACK PAIN LATERALITY: ICD-10-CM

## 2019-05-09 DIAGNOSIS — M54.50 CHRONIC LOW BACK PAIN WITHOUT SCIATICA, UNSPECIFIED BACK PAIN LATERALITY: ICD-10-CM

## 2019-05-09 RX ORDER — OXYCODONE HYDROCHLORIDE AND ACETAMINOPHEN 5; 325 MG/1; MG/1
1 TABLET ORAL EVERY 6 HOURS
Qty: 120 TABLET | Refills: 0 | Status: SHIPPED | OUTPATIENT
Start: 2019-05-09 | End: 2019-06-07 | Stop reason: SDUPTHER

## 2019-05-09 RX ORDER — ESZOPICLONE 3 MG/1
3 TABLET, FILM COATED ORAL
Qty: 30 TABLET | Refills: 0 | Status: SHIPPED | OUTPATIENT
Start: 2019-05-09 | End: 2019-06-06 | Stop reason: SDUPTHER

## 2019-06-04 DIAGNOSIS — M54.50 CHRONIC LOW BACK PAIN WITHOUT SCIATICA, UNSPECIFIED BACK PAIN LATERALITY: ICD-10-CM

## 2019-06-04 DIAGNOSIS — G47.09 OTHER INSOMNIA: ICD-10-CM

## 2019-06-04 DIAGNOSIS — G89.29 CHRONIC LOW BACK PAIN WITHOUT SCIATICA, UNSPECIFIED BACK PAIN LATERALITY: ICD-10-CM

## 2019-06-04 RX ORDER — ESZOPICLONE 3 MG/1
3 TABLET, FILM COATED ORAL
Qty: 30 TABLET | Refills: 0 | Status: CANCELLED | OUTPATIENT
Start: 2019-06-04

## 2019-06-04 RX ORDER — OXYCODONE HYDROCHLORIDE AND ACETAMINOPHEN 5; 325 MG/1; MG/1
1 TABLET ORAL EVERY 6 HOURS
Qty: 120 TABLET | Refills: 0 | Status: CANCELLED | OUTPATIENT
Start: 2019-06-04

## 2019-06-06 DIAGNOSIS — G47.09 OTHER INSOMNIA: ICD-10-CM

## 2019-06-06 RX ORDER — ESZOPICLONE 3 MG/1
3 TABLET, FILM COATED ORAL
Qty: 30 TABLET | Refills: 0 | Status: SHIPPED | OUTPATIENT
Start: 2019-06-06 | End: 2019-07-08 | Stop reason: SDUPTHER

## 2019-06-07 DIAGNOSIS — M54.50 CHRONIC LOW BACK PAIN WITHOUT SCIATICA, UNSPECIFIED BACK PAIN LATERALITY: ICD-10-CM

## 2019-06-07 DIAGNOSIS — G89.29 CHRONIC LOW BACK PAIN WITHOUT SCIATICA, UNSPECIFIED BACK PAIN LATERALITY: ICD-10-CM

## 2019-06-07 RX ORDER — OXYCODONE HYDROCHLORIDE AND ACETAMINOPHEN 5; 325 MG/1; MG/1
1 TABLET ORAL EVERY 6 HOURS
Qty: 120 TABLET | Refills: 0 | Status: CANCELLED | OUTPATIENT
Start: 2019-06-07

## 2019-06-07 RX ORDER — OXYCODONE HYDROCHLORIDE AND ACETAMINOPHEN 5; 325 MG/1; MG/1
1 TABLET ORAL EVERY 6 HOURS
Qty: 120 TABLET | Refills: 0 | Status: SHIPPED | OUTPATIENT
Start: 2019-06-07 | End: 2019-06-26 | Stop reason: SDUPTHER

## 2019-06-26 DIAGNOSIS — G89.29 CHRONIC LOW BACK PAIN WITHOUT SCIATICA, UNSPECIFIED BACK PAIN LATERALITY: ICD-10-CM

## 2019-06-26 DIAGNOSIS — M54.50 CHRONIC LOW BACK PAIN WITHOUT SCIATICA, UNSPECIFIED BACK PAIN LATERALITY: ICD-10-CM

## 2019-07-01 DIAGNOSIS — M54.50 CHRONIC LOW BACK PAIN WITHOUT SCIATICA, UNSPECIFIED BACK PAIN LATERALITY: ICD-10-CM

## 2019-07-01 DIAGNOSIS — G89.29 CHRONIC LOW BACK PAIN WITHOUT SCIATICA, UNSPECIFIED BACK PAIN LATERALITY: ICD-10-CM

## 2019-07-01 RX ORDER — OXYCODONE HYDROCHLORIDE AND ACETAMINOPHEN 5; 325 MG/1; MG/1
1 TABLET ORAL EVERY 6 HOURS
Qty: 120 TABLET | Refills: 0 | Status: CANCELLED | OUTPATIENT
Start: 2019-07-01

## 2019-07-02 RX ORDER — OXYCODONE HYDROCHLORIDE AND ACETAMINOPHEN 5; 325 MG/1; MG/1
1 TABLET ORAL EVERY 6 HOURS
Qty: 120 TABLET | Refills: 0 | Status: SHIPPED | OUTPATIENT
Start: 2019-07-02 | End: 2019-08-01 | Stop reason: SDUPTHER

## 2019-07-08 DIAGNOSIS — G47.09 OTHER INSOMNIA: ICD-10-CM

## 2019-07-08 RX ORDER — ESZOPICLONE 3 MG/1
3 TABLET, FILM COATED ORAL
Qty: 30 TABLET | Refills: 3 | Status: SHIPPED | OUTPATIENT
Start: 2019-07-08 | End: 2019-10-21 | Stop reason: SDUPTHER

## 2019-07-25 DIAGNOSIS — G47.09 OTHER INSOMNIA: ICD-10-CM

## 2019-07-25 DIAGNOSIS — G89.29 CHRONIC LOW BACK PAIN WITHOUT SCIATICA, UNSPECIFIED BACK PAIN LATERALITY: ICD-10-CM

## 2019-07-25 DIAGNOSIS — M54.50 CHRONIC LOW BACK PAIN WITHOUT SCIATICA, UNSPECIFIED BACK PAIN LATERALITY: ICD-10-CM

## 2019-07-25 RX ORDER — ESZOPICLONE 3 MG/1
3 TABLET, FILM COATED ORAL
Qty: 30 TABLET | Refills: 0 | Status: CANCELLED | OUTPATIENT
Start: 2019-07-25

## 2019-07-25 RX ORDER — OXYCODONE HYDROCHLORIDE AND ACETAMINOPHEN 5; 325 MG/1; MG/1
1 TABLET ORAL EVERY 6 HOURS
Qty: 120 TABLET | Refills: 0 | Status: CANCELLED | OUTPATIENT
Start: 2019-07-25

## 2019-08-01 DIAGNOSIS — B35.4 TINEA CORPORIS: ICD-10-CM

## 2019-08-01 DIAGNOSIS — G89.29 CHRONIC LOW BACK PAIN WITHOUT SCIATICA, UNSPECIFIED BACK PAIN LATERALITY: ICD-10-CM

## 2019-08-01 DIAGNOSIS — G47.09 OTHER INSOMNIA: ICD-10-CM

## 2019-08-01 DIAGNOSIS — M54.50 CHRONIC LOW BACK PAIN WITHOUT SCIATICA, UNSPECIFIED BACK PAIN LATERALITY: ICD-10-CM

## 2019-08-01 RX ORDER — ESZOPICLONE 3 MG/1
3 TABLET, FILM COATED ORAL
Qty: 30 TABLET | Refills: 0 | Status: CANCELLED | OUTPATIENT
Start: 2019-08-01

## 2019-08-02 RX ORDER — KETOCONAZOLE 20 MG/G
CREAM TOPICAL 2 TIMES DAILY
Qty: 60 G | Refills: 3 | Status: SHIPPED | OUTPATIENT
Start: 2019-08-02 | End: 2019-10-21 | Stop reason: SDUPTHER

## 2019-08-02 RX ORDER — OXYCODONE HYDROCHLORIDE AND ACETAMINOPHEN 5; 325 MG/1; MG/1
1 TABLET ORAL EVERY 6 HOURS
Qty: 120 TABLET | Refills: 0 | Status: SHIPPED | OUTPATIENT
Start: 2019-08-02 | End: 2019-08-27 | Stop reason: SDUPTHER

## 2019-08-19 ENCOUNTER — TRANSCRIBE ORDERS (OUTPATIENT)
Dept: LAB | Facility: CLINIC | Age: 64
End: 2019-08-19

## 2019-08-19 ENCOUNTER — APPOINTMENT (OUTPATIENT)
Dept: LAB | Facility: CLINIC | Age: 64
End: 2019-08-19
Payer: COMMERCIAL

## 2019-08-19 DIAGNOSIS — K21.9 GASTROESOPHAGEAL REFLUX DISEASE, ESOPHAGITIS PRESENCE NOT SPECIFIED: ICD-10-CM

## 2019-08-19 DIAGNOSIS — K21.9 GASTROESOPHAGEAL REFLUX DISEASE, ESOPHAGITIS PRESENCE NOT SPECIFIED: Primary | ICD-10-CM

## 2019-08-19 LAB
25(OH)D3 SERPL-MCNC: 19.4 NG/ML (ref 30–100)
ALBUMIN SERPL BCP-MCNC: 4.4 G/DL (ref 3.5–5)
ALP SERPL-CCNC: 124 U/L (ref 46–116)
ALT SERPL W P-5'-P-CCNC: 29 U/L (ref 12–78)
ANION GAP SERPL CALCULATED.3IONS-SCNC: 7 MMOL/L (ref 4–13)
AST SERPL W P-5'-P-CCNC: 14 U/L (ref 5–45)
BILIRUB SERPL-MCNC: 0.43 MG/DL (ref 0.2–1)
BUN SERPL-MCNC: 19 MG/DL (ref 5–25)
CALCIUM SERPL-MCNC: 9.3 MG/DL (ref 8.3–10.1)
CHLORIDE SERPL-SCNC: 109 MMOL/L (ref 100–108)
CO2 SERPL-SCNC: 27 MMOL/L (ref 21–32)
CREAT SERPL-MCNC: 0.92 MG/DL (ref 0.6–1.3)
GFR SERPL CREATININE-BSD FRML MDRD: 88 ML/MIN/1.73SQ M
GGT SERPL-CCNC: 68 U/L (ref 5–85)
GLUCOSE SERPL-MCNC: 97 MG/DL (ref 65–140)
IRON SERPL-MCNC: 76 UG/DL (ref 65–175)
POTASSIUM SERPL-SCNC: 3.9 MMOL/L (ref 3.5–5.3)
PROT SERPL-MCNC: 7.3 G/DL (ref 6.4–8.2)
SODIUM SERPL-SCNC: 143 MMOL/L (ref 136–145)

## 2019-08-19 PROCEDURE — 82306 VITAMIN D 25 HYDROXY: CPT

## 2019-08-19 PROCEDURE — 36415 COLL VENOUS BLD VENIPUNCTURE: CPT

## 2019-08-19 PROCEDURE — 80053 COMPREHEN METABOLIC PANEL: CPT

## 2019-08-19 PROCEDURE — 82977 ASSAY OF GGT: CPT

## 2019-08-19 PROCEDURE — 83540 ASSAY OF IRON: CPT

## 2019-08-27 DIAGNOSIS — M54.50 CHRONIC LOW BACK PAIN WITHOUT SCIATICA, UNSPECIFIED BACK PAIN LATERALITY: ICD-10-CM

## 2019-08-27 DIAGNOSIS — G89.29 CHRONIC LOW BACK PAIN WITHOUT SCIATICA, UNSPECIFIED BACK PAIN LATERALITY: ICD-10-CM

## 2019-08-27 RX ORDER — OXYCODONE HYDROCHLORIDE AND ACETAMINOPHEN 5; 325 MG/1; MG/1
1 TABLET ORAL EVERY 6 HOURS
Qty: 120 TABLET | Refills: 0 | Status: SHIPPED | OUTPATIENT
Start: 2019-08-27 | End: 2019-09-24 | Stop reason: SDUPTHER

## 2019-08-29 ENCOUNTER — TELEPHONE (OUTPATIENT)
Dept: FAMILY MEDICINE CLINIC | Facility: CLINIC | Age: 64
End: 2019-08-29

## 2019-08-29 DIAGNOSIS — B35.4 TINEA CORPORIS: ICD-10-CM

## 2019-08-29 RX ORDER — CLOBETASOL PROPIONATE 0.5 MG/G
CREAM TOPICAL 2 TIMES DAILY
Qty: 60 G | Refills: 0 | Status: SHIPPED | OUTPATIENT
Start: 2019-08-29 | End: 2020-07-19 | Stop reason: SDUPTHER

## 2019-08-29 NOTE — TELEPHONE ENCOUNTER
Called pt to see if he needed a refill that was requested from [facility] - if so they do not cover the clobetasol 0 05% cream any more we will need to call in augmented bethamethasone
[id number]

## 2019-09-24 DIAGNOSIS — M54.50 CHRONIC LOW BACK PAIN WITHOUT SCIATICA, UNSPECIFIED BACK PAIN LATERALITY: ICD-10-CM

## 2019-09-24 DIAGNOSIS — G89.29 CHRONIC LOW BACK PAIN WITHOUT SCIATICA, UNSPECIFIED BACK PAIN LATERALITY: ICD-10-CM

## 2019-09-26 RX ORDER — OXYCODONE HYDROCHLORIDE AND ACETAMINOPHEN 5; 325 MG/1; MG/1
1 TABLET ORAL EVERY 6 HOURS
Qty: 120 TABLET | Refills: 0 | Status: SHIPPED | OUTPATIENT
Start: 2019-09-26 | End: 2019-10-21 | Stop reason: SDUPTHER

## 2019-10-21 DIAGNOSIS — G89.29 CHRONIC LOW BACK PAIN WITHOUT SCIATICA, UNSPECIFIED BACK PAIN LATERALITY: ICD-10-CM

## 2019-10-21 DIAGNOSIS — G47.09 OTHER INSOMNIA: ICD-10-CM

## 2019-10-21 DIAGNOSIS — M54.50 CHRONIC LOW BACK PAIN WITHOUT SCIATICA, UNSPECIFIED BACK PAIN LATERALITY: ICD-10-CM

## 2019-10-21 DIAGNOSIS — B35.4 TINEA CORPORIS: ICD-10-CM

## 2019-10-21 RX ORDER — OXYCODONE HYDROCHLORIDE AND ACETAMINOPHEN 5; 325 MG/1; MG/1
1 TABLET ORAL EVERY 6 HOURS
Qty: 120 TABLET | Refills: 0 | Status: SHIPPED | OUTPATIENT
Start: 2019-10-21 | End: 2019-11-17 | Stop reason: SDUPTHER

## 2019-10-21 RX ORDER — ESZOPICLONE 3 MG/1
3 TABLET, FILM COATED ORAL
Qty: 30 TABLET | Refills: 0 | Status: SHIPPED | OUTPATIENT
Start: 2019-10-21 | End: 2019-11-26 | Stop reason: SDUPTHER

## 2019-10-21 RX ORDER — KETOCONAZOLE 20 MG/G
CREAM TOPICAL 2 TIMES DAILY
Qty: 60 G | Refills: 0 | Status: SHIPPED | OUTPATIENT
Start: 2019-10-21 | End: 2019-12-14 | Stop reason: SDUPTHER

## 2019-10-31 ENCOUNTER — APPOINTMENT (OUTPATIENT)
Dept: LAB | Facility: CLINIC | Age: 64
End: 2019-10-31
Payer: COMMERCIAL

## 2019-10-31 ENCOUNTER — OFFICE VISIT (OUTPATIENT)
Dept: FAMILY MEDICINE CLINIC | Facility: CLINIC | Age: 64
End: 2019-10-31
Payer: COMMERCIAL

## 2019-10-31 VITALS
BODY MASS INDEX: 41.24 KG/M2 | DIASTOLIC BLOOD PRESSURE: 82 MMHG | HEART RATE: 72 BPM | HEIGHT: 71 IN | RESPIRATION RATE: 16 BRPM | WEIGHT: 294.6 LBS | SYSTOLIC BLOOD PRESSURE: 124 MMHG | TEMPERATURE: 97.3 F

## 2019-10-31 DIAGNOSIS — E01.0 THYROMEGALY: ICD-10-CM

## 2019-10-31 DIAGNOSIS — Z00.00 ANNUAL PHYSICAL EXAM: ICD-10-CM

## 2019-10-31 DIAGNOSIS — Z00.00 ANNUAL PHYSICAL EXAM: Primary | ICD-10-CM

## 2019-10-31 LAB
CHOLEST SERPL-MCNC: 175 MG/DL (ref 50–200)
GLUCOSE P FAST SERPL-MCNC: 128 MG/DL (ref 65–99)
HDLC SERPL-MCNC: 49 MG/DL
LDLC SERPL CALC-MCNC: 101 MG/DL (ref 0–100)
NONHDLC SERPL-MCNC: 126 MG/DL
TRIGL SERPL-MCNC: 127 MG/DL

## 2019-10-31 PROCEDURE — 82947 ASSAY GLUCOSE BLOOD QUANT: CPT

## 2019-10-31 PROCEDURE — 80061 LIPID PANEL: CPT

## 2019-10-31 PROCEDURE — 99396 PREV VISIT EST AGE 40-64: CPT | Performed by: NURSE PRACTITIONER

## 2019-10-31 PROCEDURE — 36415 COLL VENOUS BLD VENIPUNCTURE: CPT

## 2019-10-31 NOTE — PATIENT INSTRUCTIONS

## 2019-10-31 NOTE — PROGRESS NOTES
NAME: Coty Patterson  AGE: 58 y o  SEX: male  : 1955     DATE: 10/31/2019     Assessment and Plan:     Problem List Items Addressed This Visit        Endocrine    Thyromegaly    Relevant Orders    US thyroid      Other Visit Diagnoses     Annual physical exam    -  Primary    Relevant Orders    Lipid panel    Glucose, fasting        Immunizations and preventive care screenings were discussed with patient today  Appropriate education was printed on patient's after visit summary  Counseling:  Alcohol/drug use: discussed moderation in alcohol intake, the recommendations for healthy alcohol use, and avoidance of illicit drug use  Dental Health: discussed importance of regular tooth brushing, flossing, and dental visits  Injury prevention: discussed safety/seat belts, safety helmets, smoke detectors, carbon dioxide detectors, and smoking near bedding or upholstery  Sexual health: discussed sexually transmitted diseases, partner selection, use of condoms, avoidance of unintended pregnancy, and contraceptive alternatives  · Exercise: the importance of regular exercise/physical activity was discussed  Recommend exercise 3-5 times per week for at least 30 minutes  Return in 1 year (on 10/31/2020)  Chief Complaint:     Chief Complaint   Patient presents with    Physical Exam     work       History of Present Illness:     Adult Annual Physical   Patient here for a comprehensive physical exam  The patient reports no problems  Diet and Physical Activity  · Diet/Nutrition: well balanced diet, limited junk food, consuming 3-5 servings of fruits/vegetables daily and adequate fiber intake  · Exercise: no formal exercise        Depression Screening  PHQ-9 Depression Screening    PHQ-9:    Frequency of the following problems over the past two weeks:       Little interest or pleasure in doing things:  0 - not at all  Feeling down, depressed, or hopeless:  0 - not at all  PHQ-2 Score:  0       General Health  · Sleep: sleeps poorly and gets 4-6 hours of sleep on average  · Hearing: decreased - bilateral and requires use of hearing aids  · Vision: no vision problems, goes for regular eye exams and wears glasses  · Dental: regular dental visits, brushes teeth twice daily and flosses teeth occasionally   Health  · Symptoms include: urinary frequency, nocturia and weak urinary stream     Review of Systems:     Review of Systems   Constitutional: Negative  HENT: Negative  Eyes: Negative  Respiratory: Negative  Cardiovascular: Negative  Gastrointestinal: Negative  Endocrine: Negative  Genitourinary: Negative  Musculoskeletal: Negative  Skin: Negative  Neurological: Negative  Hematological: Negative  Psychiatric/Behavioral: Negative         Past Medical History:     Past Medical History:   Diagnosis Date    Allergic rhinitis     Asthma     Disease of thyroid gland     GERD (gastroesophageal reflux disease)     HL (hearing loss)     Hypertension     Memory loss, short term     R/O Seizures but placed on Keppra    Tinnitus       Past Surgical History:     Past Surgical History:   Procedure Laterality Date    ABDOMINOPLASTY      ADENOIDECTOMY      BACK SURGERY      lower back surgery    CARPAL TUNNEL RELEASE Bilateral     CHOLECYSTECTOMY      COLONOSCOPY      GASTRIC BYPASS      HERNIA REPAIR Right     JOINT REPLACEMENT Bilateral     Knee    KNEE ARTHROSCOPY Bilateral     X3    MT REPAIR ING HERNIA,5+Y/O,REDUCIBL Left 11/26/2018    Procedure: INGUINAL HERNIA REPAIR;  Surgeon: Supriya Schuster DO;  [facility];  Service: General    TONSILLECTOMY        Family History:     Family History   Problem Relation Age of Onset    Heart disease Mother     Kidney cancer Mother     Hypertension Father     Bipolar disorder Sister         bipolar disorder NOS      Social History:     Social History     Socioeconomic History    Marital status: /Civil Union     Spouse name: Not on file    Number of children: 2    Years of education: trade school    Highest education level: Not on file   Occupational History     Comment: employed   Social Needs    Financial resource strain: Not on file    Food insecurity:     Worry: Not on file     Inability: Not on file   [employer] needs:     Medical: Not on file     Non-medical: Not on file   Tobacco Use    Smoking status: Never Smoker    Smokeless tobacco: Never Used   Substance and Sexual Activity    Alcohol use: Yes     Comment: rare socially    Drug use: No    Sexual activity: Not on file   Lifestyle    Physical activity:     Days per week: Not on file     Minutes per session: Not on file    Stress: Not on file   Relationships    Social connections:     Talks on phone: Not on file     Gets together: Not on file     Attends Mandaen service: Not on file     Active member of club or organization: Not on file     Attends meetings of clubs or organizations: Not on file     Relationship status: Not on file    Intimate partner violence:     Fear of current or ex partner: Not on file     Emotionally abused: Not on file     Physically abused: Not on file     Forced sexual activity: Not on file   Other Topics Concern    Not on file   Social History Narrative    Always uses seat belt    Caffeine use    Daily coffee consumption    Daily cola consumption    Dental care, regularly    DENIED exercise frequency    Guns in the home:stored in locked cabinet    Multiple organ donor    Patient has living will    DENIED pets/animals    Power of  in existence    Water intake, adequate (per day)      Current Medications:     Current Outpatient Medications   Medication Sig Dispense Refill    albuterol (PROVENTIL HFA,VENTOLIN HFA) 90 mcg/act inhaler       atorvastatin (LIPITOR) 10 mg tablet TAKE 1 TABLET BY MOUTH EVERY DAY AT NIGHT  6    Benralizumab (FASENRA SC) Inject under the skin      cetirizine (ZyrTEC) 10 mg tablet Take 10 mg by mouth daily after dinner        Cholecalciferol 2000 units CAPS Take 1 capsule by mouth daily after dinner        clobetasol (TEMOVATE) 0 05 % cream Apply topically 2 (two) times a day 60 g 0    Cyanocobalamin (B-12) 1000 MCG CAPS Take 1 tablet by mouth daily after dinner        diphenhydrAMINE (BENADRYL) 25 mg tablet Take 25 mg by mouth every 6 (six) hours as needed for itching      eszopiclone (LUNESTA) 3 MG tablet Take 1 tablet (3 mg total) by mouth daily at bedtime as needed for sleep 30 tablet 0    fluticasone (FLONASE) 50 mcg/act nasal spray 2 sprays into each nostril 2 (two) times a day as needed        hydrochlorothiazide (HYDRODIURIL) 25 mg tablet Take 1 tablet by mouth daily in the early morning    3    ketoconazole (NIZORAL) 2 % cream Apply topically 2 (two) times a day 60 g 0    levETIRAcetam (KEPPRA) 500 mg tablet Take 1 tablet (500 mg total) by mouth 2 (two) times a day 180 tablet 3    Mometasone Furo-Formoterol Fum (DULERA) 200-5 MCG/ACT AERO Inhale 2 puffs 2 (two) times a day      montelukast (SINGULAIR) 10 mg tablet Take 10 mg by mouth every evening  3    omeprazole (PriLOSEC) 40 MG capsule TAKE 1 CAPSULE EVERY MORNING (1/2 HOUR BEFORE BREAKFAST)  3    oxybutynin (DITROPAN) 5 mg tablet TAKE 0 5 TABLETS (2 5 MG TOTAL) BY MOUTH 2 (TWO) TIMES A DAY  3    oxyCODONE-acetaminophen (PERCOCET) 5-325 mg per tablet Take 1 tablet by mouth every 6 (six) hoursMax Daily Amount: 4 tablets 120 tablet 0    Pseudoeph-Doxylamine-DM-APAP (NYQUIL PO) Take by mouth as needed        No current facility-administered medications for this visit  Allergies: Allergies   Allergen Reactions    Iv Dye [Iodinated Diagnostic Agents] Hives    Other      Environmental    Penicillins Other (See Comments)     ?  Had as a child-Unknown reaction      Physical Exam:     /82   Pulse 72   Temp (!) 97 3 °F (36 3 °C)   Resp 16   Ht 5' 10 5" (1 791 m)   Wt 134 kg (294 lb 9 6 oz) BMI 41 67 kg/m² (Reviewed)    Physical Exam   Constitutional: He is oriented to person, place, and time  Vital signs are normal  He appears well-developed and well-nourished  HENT:   Head: Normocephalic and atraumatic  Right Ear: Tympanic membrane, external ear and ear canal normal    Left Ear: Tympanic membrane, external ear and ear canal normal    Nose: Nose normal    Mouth/Throat: Uvula is midline, oropharynx is clear and moist and mucous membranes are normal    Eyes: Pupils are equal, round, and reactive to light  Conjunctivae, EOM and lids are normal    Neck: Trachea normal and full passive range of motion without pain  Neck supple  Carotid bruit is not present  Thyromegaly present  Cardiovascular: Normal rate, regular rhythm, normal heart sounds and intact distal pulses  Pulmonary/Chest: Effort normal and breath sounds normal    Abdominal: Soft  Bowel sounds are normal  There is no tenderness  Lymphadenopathy:     He has no cervical adenopathy  Neurological: He is alert and oriented to person, place, and time  He has normal strength and normal reflexes  No cranial nerve deficit or sensory deficit  Skin: Skin is warm  Capillary refill takes less than 2 seconds  No cyanosis  Nails show no clubbing  Psychiatric: He has a normal mood and affect  His speech is normal and behavior is normal      BMI Counseling: Body mass index is 41 67 kg/m²  The BMI is above normal  Nutrition recommendations include reducing portion sizes, decreasing overall calorie intake, consuming healthier snacks, moderation in carbohydrate intake and increasing intake of lean protein  Exercise recommendations include exercising 3-5 times per week

## 2019-11-11 ENCOUNTER — HOSPITAL ENCOUNTER (OUTPATIENT)
Dept: RADIOLOGY | Facility: MEDICAL CENTER | Age: 64
Discharge: HOME | End: 2019-11-11
Payer: COMMERCIAL

## 2019-11-11 DIAGNOSIS — E01.0 THYROMEGALY: ICD-10-CM

## 2019-11-11 PROCEDURE — 76536 US EXAM OF HEAD AND NECK: CPT

## 2019-11-17 DIAGNOSIS — G89.29 CHRONIC LOW BACK PAIN WITHOUT SCIATICA, UNSPECIFIED BACK PAIN LATERALITY: ICD-10-CM

## 2019-11-17 DIAGNOSIS — M54.50 CHRONIC LOW BACK PAIN WITHOUT SCIATICA, UNSPECIFIED BACK PAIN LATERALITY: ICD-10-CM

## 2019-11-18 RX ORDER — OXYCODONE HYDROCHLORIDE AND ACETAMINOPHEN 5; 325 MG/1; MG/1
1 TABLET ORAL EVERY 6 HOURS
Qty: 120 TABLET | Refills: 0 | Status: SHIPPED | OUTPATIENT
Start: 2019-11-18 | End: 2019-12-14 | Stop reason: SDUPTHER

## 2019-11-26 DIAGNOSIS — G47.09 OTHER INSOMNIA: ICD-10-CM

## 2019-11-27 RX ORDER — ESZOPICLONE 3 MG/1
3 TABLET, FILM COATED ORAL
Qty: 30 TABLET | Refills: 0 | Status: SHIPPED | OUTPATIENT
Start: 2019-11-27 | End: 2019-12-22 | Stop reason: SDUPTHER

## 2019-12-14 DIAGNOSIS — G89.29 CHRONIC LOW BACK PAIN WITHOUT SCIATICA, UNSPECIFIED BACK PAIN LATERALITY: ICD-10-CM

## 2019-12-14 DIAGNOSIS — B35.4 TINEA CORPORIS: ICD-10-CM

## 2019-12-14 DIAGNOSIS — M54.50 CHRONIC LOW BACK PAIN WITHOUT SCIATICA, UNSPECIFIED BACK PAIN LATERALITY: ICD-10-CM

## 2019-12-16 RX ORDER — KETOCONAZOLE 20 MG/G
CREAM TOPICAL 2 TIMES DAILY
Qty: 60 G | Refills: 0 | Status: SHIPPED | OUTPATIENT
Start: 2019-12-16 | End: 2020-07-19 | Stop reason: SDUPTHER

## 2019-12-16 RX ORDER — OXYCODONE HYDROCHLORIDE AND ACETAMINOPHEN 5; 325 MG/1; MG/1
1 TABLET ORAL EVERY 6 HOURS
Qty: 120 TABLET | Refills: 0 | Status: SHIPPED | OUTPATIENT
Start: 2019-12-16 | End: 2020-01-10 | Stop reason: SDUPTHER

## 2019-12-22 DIAGNOSIS — G47.09 OTHER INSOMNIA: ICD-10-CM

## 2019-12-23 DIAGNOSIS — G47.09 OTHER INSOMNIA: ICD-10-CM

## 2019-12-23 RX ORDER — ESZOPICLONE 3 MG/1
3 TABLET, FILM COATED ORAL
Qty: 30 TABLET | Refills: 0 | Status: CANCELLED | OUTPATIENT
Start: 2019-12-23

## 2019-12-23 RX ORDER — ESZOPICLONE 3 MG/1
3 TABLET, FILM COATED ORAL
Qty: 30 TABLET | Refills: 0 | Status: SHIPPED | OUTPATIENT
Start: 2019-12-23 | End: 2020-01-21 | Stop reason: SDUPTHER

## 2020-01-10 DIAGNOSIS — G89.29 CHRONIC LOW BACK PAIN WITHOUT SCIATICA, UNSPECIFIED BACK PAIN LATERALITY: ICD-10-CM

## 2020-01-10 DIAGNOSIS — M54.50 CHRONIC LOW BACK PAIN WITHOUT SCIATICA, UNSPECIFIED BACK PAIN LATERALITY: ICD-10-CM

## 2020-01-10 RX ORDER — OXYCODONE HYDROCHLORIDE AND ACETAMINOPHEN 5; 325 MG/1; MG/1
1 TABLET ORAL EVERY 6 HOURS
Qty: 120 TABLET | Refills: 0 | Status: SHIPPED | OUTPATIENT
Start: 2020-01-10 | End: 2020-02-06 | Stop reason: SDUPTHER

## 2020-01-10 NOTE — TELEPHONE ENCOUNTER
12/16/2019  1  12/16/2019  OXYCODONE-ACETAMINOPHEN 5-325  120 0  30  [facility]  [phone]  [facility] ([id number])  0  30 0 MME  Private Pay      Last filled 12/16 as per pdmp

## 2020-01-21 DIAGNOSIS — G47.09 OTHER INSOMNIA: ICD-10-CM

## 2020-01-22 RX ORDER — ESZOPICLONE 3 MG/1
3 TABLET, FILM COATED ORAL
Qty: 30 TABLET | Refills: 0 | Status: SHIPPED | OUTPATIENT
Start: 2020-01-22 | End: 2020-02-19 | Stop reason: SDUPTHER

## 2020-02-06 DIAGNOSIS — G89.29 CHRONIC LOW BACK PAIN WITHOUT SCIATICA, UNSPECIFIED BACK PAIN LATERALITY: ICD-10-CM

## 2020-02-06 DIAGNOSIS — M54.50 CHRONIC LOW BACK PAIN WITHOUT SCIATICA, UNSPECIFIED BACK PAIN LATERALITY: ICD-10-CM

## 2020-02-07 RX ORDER — OXYCODONE HYDROCHLORIDE AND ACETAMINOPHEN 5; 325 MG/1; MG/1
1 TABLET ORAL EVERY 6 HOURS
Qty: 120 TABLET | Refills: 0 | Status: SHIPPED | OUTPATIENT
Start: 2020-02-07 | End: 2020-03-04 | Stop reason: SDUPTHER

## 2020-02-19 DIAGNOSIS — G47.09 OTHER INSOMNIA: ICD-10-CM

## 2020-02-19 RX ORDER — ESZOPICLONE 3 MG/1
3 TABLET, FILM COATED ORAL
Qty: 30 TABLET | Refills: 0 | Status: SHIPPED | OUTPATIENT
Start: 2020-02-19 | End: 2020-03-21 | Stop reason: SDUPTHER

## 2020-03-04 DIAGNOSIS — M54.50 CHRONIC LOW BACK PAIN WITHOUT SCIATICA, UNSPECIFIED BACK PAIN LATERALITY: ICD-10-CM

## 2020-03-04 DIAGNOSIS — G89.29 CHRONIC LOW BACK PAIN WITHOUT SCIATICA, UNSPECIFIED BACK PAIN LATERALITY: ICD-10-CM

## 2020-03-04 RX ORDER — OXYCODONE HYDROCHLORIDE AND ACETAMINOPHEN 5; 325 MG/1; MG/1
1 TABLET ORAL EVERY 6 HOURS
Qty: 120 TABLET | Refills: 0 | Status: SHIPPED | OUTPATIENT
Start: 2020-03-04 | End: 2020-04-01 | Stop reason: SDUPTHER

## 2020-03-20 DIAGNOSIS — G47.09 OTHER INSOMNIA: ICD-10-CM

## 2020-03-21 DIAGNOSIS — G47.09 OTHER INSOMNIA: ICD-10-CM

## 2020-03-23 RX ORDER — ESZOPICLONE 3 MG/1
3 TABLET, FILM COATED ORAL
Qty: 30 TABLET | Refills: 0 | Status: SHIPPED | OUTPATIENT
Start: 2020-03-23 | End: 2020-04-15 | Stop reason: SDUPTHER

## 2020-03-23 RX ORDER — ESZOPICLONE 3 MG/1
3 TABLET, FILM COATED ORAL
Qty: 30 TABLET | Refills: 0 | Status: SHIPPED | OUTPATIENT
Start: 2020-03-23 | End: 2020-04-17 | Stop reason: SDUPTHER

## 2020-04-01 ENCOUNTER — TELEPHONE (OUTPATIENT)
Dept: NEUROLOGY | Facility: CLINIC | Age: 65
End: 2020-04-01

## 2020-04-01 DIAGNOSIS — M54.50 CHRONIC LOW BACK PAIN WITHOUT SCIATICA, UNSPECIFIED BACK PAIN LATERALITY: ICD-10-CM

## 2020-04-01 DIAGNOSIS — G89.29 CHRONIC LOW BACK PAIN WITHOUT SCIATICA, UNSPECIFIED BACK PAIN LATERALITY: ICD-10-CM

## 2020-04-01 RX ORDER — OXYCODONE HYDROCHLORIDE AND ACETAMINOPHEN 5; 325 MG/1; MG/1
1 TABLET ORAL EVERY 6 HOURS
Qty: 120 TABLET | Refills: 0 | Status: SHIPPED | OUTPATIENT
Start: 2020-04-01 | End: 2020-04-28 | Stop reason: SDUPTHER

## 2020-04-14 DIAGNOSIS — G47.09 OTHER INSOMNIA: ICD-10-CM

## 2020-04-14 RX ORDER — ESZOPICLONE 3 MG/1
3 TABLET, FILM COATED ORAL
Qty: 30 TABLET | Refills: 0 | Status: CANCELLED | OUTPATIENT
Start: 2020-04-14

## 2020-04-15 ENCOUNTER — TELEMEDICINE (OUTPATIENT)
Dept: NEUROLOGY | Facility: CLINIC | Age: 65
End: 2020-04-15
Payer: COMMERCIAL

## 2020-04-15 DIAGNOSIS — G40.009 PARTIAL IDIOPATHIC EPILEPSY WITH SEIZURES OF LOCALIZED ONSET, NOT INTRACTABLE, WITHOUT STATUS EPILEPTICUS (HCC): ICD-10-CM

## 2020-04-15 DIAGNOSIS — G47.09 OTHER INSOMNIA: ICD-10-CM

## 2020-04-15 PROCEDURE — 99441 PR PHYS/QHP TELEPHONE EVALUATION 5-10 MIN: CPT | Performed by: PSYCHIATRY & NEUROLOGY

## 2020-04-15 RX ORDER — ESZOPICLONE 3 MG/1
3 TABLET, FILM COATED ORAL
Qty: 30 TABLET | Refills: 0 | Status: CANCELLED | OUTPATIENT
Start: 2020-04-15

## 2020-04-15 RX ORDER — LOSARTAN POTASSIUM 25 MG/1
25 TABLET ORAL DAILY
COMMUNITY
Start: 2020-02-19

## 2020-04-15 RX ORDER — LEVETIRACETAM 500 MG/1
500 TABLET ORAL 2 TIMES DAILY
Qty: 180 TABLET | Refills: 3 | Status: SHIPPED | OUTPATIENT
Start: 2020-04-15 | End: 2021-04-21 | Stop reason: SDUPTHER

## 2020-04-15 RX ORDER — AMOXICILLIN 500 MG/1
2000 CAPSULE ORAL AS NEEDED
COMMUNITY
Start: 2020-03-15 | End: 2022-03-21

## 2020-04-17 DIAGNOSIS — G47.09 OTHER INSOMNIA: ICD-10-CM

## 2020-04-17 RX ORDER — ESZOPICLONE 3 MG/1
3 TABLET, FILM COATED ORAL
Qty: 30 TABLET | Refills: 0 | Status: SHIPPED | OUTPATIENT
Start: 2020-04-17 | End: 2020-05-13 | Stop reason: SDUPTHER

## 2020-04-28 DIAGNOSIS — M54.50 CHRONIC LOW BACK PAIN WITHOUT SCIATICA, UNSPECIFIED BACK PAIN LATERALITY: ICD-10-CM

## 2020-04-28 DIAGNOSIS — G89.29 CHRONIC LOW BACK PAIN WITHOUT SCIATICA, UNSPECIFIED BACK PAIN LATERALITY: ICD-10-CM

## 2020-04-28 RX ORDER — OXYCODONE HYDROCHLORIDE AND ACETAMINOPHEN 5; 325 MG/1; MG/1
1 TABLET ORAL EVERY 6 HOURS
Qty: 120 TABLET | Refills: 0 | Status: SHIPPED | OUTPATIENT
Start: 2020-04-28 | End: 2020-05-27 | Stop reason: SDUPTHER

## 2020-05-11 DIAGNOSIS — G47.09 OTHER INSOMNIA: ICD-10-CM

## 2020-05-11 RX ORDER — ESZOPICLONE 3 MG/1
3 TABLET, FILM COATED ORAL
Qty: 30 TABLET | Refills: 0 | Status: CANCELLED | OUTPATIENT
Start: 2020-05-11

## 2020-05-13 DIAGNOSIS — G47.09 OTHER INSOMNIA: ICD-10-CM

## 2020-05-14 RX ORDER — ESZOPICLONE 3 MG/1
3 TABLET, FILM COATED ORAL
Qty: 30 TABLET | Refills: 0 | Status: SHIPPED | OUTPATIENT
Start: 2020-05-14 | End: 2020-06-10 | Stop reason: SDUPTHER

## 2020-05-26 DIAGNOSIS — G89.29 CHRONIC LOW BACK PAIN WITHOUT SCIATICA, UNSPECIFIED BACK PAIN LATERALITY: ICD-10-CM

## 2020-05-26 DIAGNOSIS — M54.50 CHRONIC LOW BACK PAIN WITHOUT SCIATICA, UNSPECIFIED BACK PAIN LATERALITY: ICD-10-CM

## 2020-05-26 RX ORDER — OXYCODONE HYDROCHLORIDE AND ACETAMINOPHEN 5; 325 MG/1; MG/1
1 TABLET ORAL EVERY 6 HOURS
Qty: 120 TABLET | Refills: 0 | Status: CANCELLED | OUTPATIENT
Start: 2020-05-26

## 2020-05-27 DIAGNOSIS — G89.29 CHRONIC LOW BACK PAIN WITHOUT SCIATICA, UNSPECIFIED BACK PAIN LATERALITY: ICD-10-CM

## 2020-05-27 DIAGNOSIS — M54.50 CHRONIC LOW BACK PAIN WITHOUT SCIATICA, UNSPECIFIED BACK PAIN LATERALITY: ICD-10-CM

## 2020-05-27 RX ORDER — OXYCODONE HYDROCHLORIDE AND ACETAMINOPHEN 5; 325 MG/1; MG/1
1 TABLET ORAL EVERY 6 HOURS
Qty: 120 TABLET | Refills: 0 | Status: SHIPPED | OUTPATIENT
Start: 2020-05-27 | End: 2020-06-23 | Stop reason: SDUPTHER

## 2020-06-10 DIAGNOSIS — G47.09 OTHER INSOMNIA: ICD-10-CM

## 2020-06-10 RX ORDER — ESZOPICLONE 3 MG/1
3 TABLET, FILM COATED ORAL
Qty: 30 TABLET | Refills: 0 | Status: SHIPPED | OUTPATIENT
Start: 2020-06-10 | End: 2020-07-11 | Stop reason: SDUPTHER

## 2020-06-23 DIAGNOSIS — G89.29 CHRONIC LOW BACK PAIN WITHOUT SCIATICA, UNSPECIFIED BACK PAIN LATERALITY: ICD-10-CM

## 2020-06-23 DIAGNOSIS — M54.50 CHRONIC LOW BACK PAIN WITHOUT SCIATICA, UNSPECIFIED BACK PAIN LATERALITY: ICD-10-CM

## 2020-06-23 RX ORDER — OXYCODONE HYDROCHLORIDE AND ACETAMINOPHEN 5; 325 MG/1; MG/1
1 TABLET ORAL EVERY 6 HOURS
Qty: 120 TABLET | Refills: 0 | Status: SHIPPED | OUTPATIENT
Start: 2020-06-23 | End: 2020-07-19 | Stop reason: SDUPTHER

## 2020-06-24 DIAGNOSIS — M54.50 CHRONIC LOW BACK PAIN WITHOUT SCIATICA, UNSPECIFIED BACK PAIN LATERALITY: ICD-10-CM

## 2020-06-24 DIAGNOSIS — G89.29 CHRONIC LOW BACK PAIN WITHOUT SCIATICA, UNSPECIFIED BACK PAIN LATERALITY: ICD-10-CM

## 2020-06-24 RX ORDER — OXYCODONE HYDROCHLORIDE AND ACETAMINOPHEN 5; 325 MG/1; MG/1
1 TABLET ORAL EVERY 6 HOURS
Qty: 120 TABLET | Refills: 0 | Status: CANCELLED | OUTPATIENT
Start: 2020-06-24

## 2020-07-11 DIAGNOSIS — G47.09 OTHER INSOMNIA: ICD-10-CM

## 2020-07-13 RX ORDER — ESZOPICLONE 3 MG/1
3 TABLET, FILM COATED ORAL
Qty: 30 TABLET | Refills: 0 | Status: SHIPPED | OUTPATIENT
Start: 2020-07-13 | End: 2020-08-09 | Stop reason: SDUPTHER

## 2020-07-19 DIAGNOSIS — M54.50 CHRONIC LOW BACK PAIN WITHOUT SCIATICA, UNSPECIFIED BACK PAIN LATERALITY: ICD-10-CM

## 2020-07-19 DIAGNOSIS — B35.4 TINEA CORPORIS: ICD-10-CM

## 2020-07-19 DIAGNOSIS — G89.29 CHRONIC LOW BACK PAIN WITHOUT SCIATICA, UNSPECIFIED BACK PAIN LATERALITY: ICD-10-CM

## 2020-07-20 RX ORDER — CLOBETASOL PROPIONATE 0.5 MG/G
CREAM TOPICAL 2 TIMES DAILY
Qty: 60 G | Refills: 0 | Status: SHIPPED | OUTPATIENT
Start: 2020-07-20 | End: 2021-03-30 | Stop reason: SDUPTHER

## 2020-07-20 RX ORDER — OXYCODONE HYDROCHLORIDE AND ACETAMINOPHEN 5; 325 MG/1; MG/1
1 TABLET ORAL EVERY 6 HOURS
Qty: 120 TABLET | Refills: 0 | Status: SHIPPED | OUTPATIENT
Start: 2020-07-20 | End: 2020-08-17 | Stop reason: SDUPTHER

## 2020-07-20 RX ORDER — KETOCONAZOLE 20 MG/G
CREAM TOPICAL 2 TIMES DAILY
Qty: 60 G | Refills: 0 | Status: SHIPPED | OUTPATIENT
Start: 2020-07-20 | End: 2021-03-30 | Stop reason: SDUPTHER

## 2020-08-09 DIAGNOSIS — G47.09 OTHER INSOMNIA: ICD-10-CM

## 2020-08-10 RX ORDER — ESZOPICLONE 3 MG/1
3 TABLET, FILM COATED ORAL
Qty: 30 TABLET | Refills: 0 | Status: SHIPPED | OUTPATIENT
Start: 2020-08-10 | End: 2020-09-07 | Stop reason: SDUPTHER

## 2020-08-17 DIAGNOSIS — G89.29 CHRONIC LOW BACK PAIN WITHOUT SCIATICA, UNSPECIFIED BACK PAIN LATERALITY: ICD-10-CM

## 2020-08-17 DIAGNOSIS — M54.50 CHRONIC LOW BACK PAIN WITHOUT SCIATICA, UNSPECIFIED BACK PAIN LATERALITY: ICD-10-CM

## 2020-08-17 RX ORDER — OXYCODONE HYDROCHLORIDE AND ACETAMINOPHEN 5; 325 MG/1; MG/1
1 TABLET ORAL EVERY 6 HOURS
Qty: 120 TABLET | Refills: 0 | Status: CANCELLED | OUTPATIENT
Start: 2020-08-17

## 2020-08-17 RX ORDER — OXYCODONE HYDROCHLORIDE AND ACETAMINOPHEN 5; 325 MG/1; MG/1
1 TABLET ORAL EVERY 6 HOURS
Qty: 120 TABLET | Refills: 0 | Status: SHIPPED | OUTPATIENT
Start: 2020-08-17 | End: 2020-09-14 | Stop reason: SDUPTHER

## 2020-08-17 NOTE — TELEPHONE ENCOUNTER
Patient called said its prob too soon he just wanted to make sure he didn't run out   He will call back before the weekend

## 2020-08-17 NOTE — TELEPHONE ENCOUNTER
Please call pt and find out how many he is taking because it might be a quantity issue if he is taking more than one pill her per day

## 2020-09-07 DIAGNOSIS — G47.09 OTHER INSOMNIA: ICD-10-CM

## 2020-09-08 RX ORDER — ESZOPICLONE 3 MG/1
3 TABLET, FILM COATED ORAL
Qty: 30 TABLET | Refills: 0 | Status: SHIPPED | OUTPATIENT
Start: 2020-09-08 | End: 2020-10-05 | Stop reason: SDUPTHER

## 2020-09-14 DIAGNOSIS — G89.29 CHRONIC LOW BACK PAIN WITHOUT SCIATICA, UNSPECIFIED BACK PAIN LATERALITY: ICD-10-CM

## 2020-09-14 DIAGNOSIS — M54.50 CHRONIC LOW BACK PAIN WITHOUT SCIATICA, UNSPECIFIED BACK PAIN LATERALITY: ICD-10-CM

## 2020-09-15 RX ORDER — OXYCODONE HYDROCHLORIDE AND ACETAMINOPHEN 5; 325 MG/1; MG/1
1 TABLET ORAL EVERY 6 HOURS
Qty: 120 TABLET | Refills: 0 | Status: SHIPPED | OUTPATIENT
Start: 2020-09-15 | End: 2020-10-12 | Stop reason: SDUPTHER

## 2020-10-05 DIAGNOSIS — G47.09 OTHER INSOMNIA: ICD-10-CM

## 2020-10-06 RX ORDER — ESZOPICLONE 3 MG/1
3 TABLET, FILM COATED ORAL
Qty: 30 TABLET | Refills: 0 | Status: SHIPPED | OUTPATIENT
Start: 2020-10-06 | End: 2020-11-04 | Stop reason: SDUPTHER

## 2020-10-09 ENCOUNTER — TELEPHONE (OUTPATIENT)
Dept: FAMILY MEDICINE CLINIC | Facility: CLINIC | Age: 65
End: 2020-10-09

## 2020-10-12 DIAGNOSIS — M54.50 CHRONIC LOW BACK PAIN WITHOUT SCIATICA, UNSPECIFIED BACK PAIN LATERALITY: ICD-10-CM

## 2020-10-12 DIAGNOSIS — G89.29 CHRONIC LOW BACK PAIN WITHOUT SCIATICA, UNSPECIFIED BACK PAIN LATERALITY: ICD-10-CM

## 2020-10-13 RX ORDER — OXYCODONE HYDROCHLORIDE AND ACETAMINOPHEN 5; 325 MG/1; MG/1
1 TABLET ORAL EVERY 6 HOURS
Qty: 120 TABLET | Refills: 0 | Status: SHIPPED | OUTPATIENT
Start: 2020-10-13 | End: 2020-11-11 | Stop reason: DRUGHIGH

## 2020-11-04 DIAGNOSIS — G47.09 OTHER INSOMNIA: ICD-10-CM

## 2020-11-04 RX ORDER — ESZOPICLONE 3 MG/1
3 TABLET, FILM COATED ORAL
Qty: 30 TABLET | Refills: 0 | Status: SHIPPED | OUTPATIENT
Start: 2020-11-04 | End: 2020-12-02 | Stop reason: SDUPTHER

## 2020-11-06 ENCOUNTER — TELEPHONE (OUTPATIENT)
Dept: FAMILY MEDICINE CLINIC | Facility: CLINIC | Age: 65
End: 2020-11-06

## 2020-11-09 DIAGNOSIS — Z98.84 STATUS POST GASTRIC BYPASS FOR OBESITY: ICD-10-CM

## 2020-11-09 DIAGNOSIS — E01.0 THYROMEGALY: ICD-10-CM

## 2020-11-09 DIAGNOSIS — N40.0 BENIGN PROSTATIC HYPERPLASIA WITHOUT LOWER URINARY TRACT SYMPTOMS: Primary | ICD-10-CM

## 2020-11-09 DIAGNOSIS — E78.00 HYPERCHOLESTEROLEMIA: ICD-10-CM

## 2020-11-10 ENCOUNTER — LAB (OUTPATIENT)
Dept: LAB | Facility: CLINIC | Age: 65
End: 2020-11-10
Payer: COMMERCIAL

## 2020-11-10 DIAGNOSIS — E78.00 HYPERCHOLESTEROLEMIA: ICD-10-CM

## 2020-11-10 DIAGNOSIS — E01.0 THYROMEGALY: ICD-10-CM

## 2020-11-10 DIAGNOSIS — Z98.84 STATUS POST GASTRIC BYPASS FOR OBESITY: ICD-10-CM

## 2020-11-10 DIAGNOSIS — N40.0 BENIGN PROSTATIC HYPERPLASIA WITHOUT LOWER URINARY TRACT SYMPTOMS: ICD-10-CM

## 2020-11-10 LAB
ANION GAP SERPL CALCULATED.3IONS-SCNC: 4 MMOL/L (ref 4–13)
BUN SERPL-MCNC: 23 MG/DL (ref 5–25)
CALCIUM SERPL-MCNC: 9.8 MG/DL (ref 8.3–10.1)
CHLORIDE SERPL-SCNC: 108 MMOL/L (ref 100–108)
CHOLEST SERPL-MCNC: 139 MG/DL (ref 50–200)
CO2 SERPL-SCNC: 29 MMOL/L (ref 21–32)
CREAT SERPL-MCNC: 0.96 MG/DL (ref 0.6–1.3)
ERYTHROCYTE [DISTWIDTH] IN BLOOD BY AUTOMATED COUNT: 11.9 % (ref 11.6–15.1)
GFR SERPL CREATININE-BSD FRML MDRD: 83 ML/MIN/1.73SQ M
GLUCOSE P FAST SERPL-MCNC: 135 MG/DL (ref 65–99)
HCT VFR BLD AUTO: 39 % (ref 36.5–49.3)
HDLC SERPL-MCNC: 56 MG/DL
HGB BLD-MCNC: 13.4 G/DL (ref 12–17)
LDLC SERPL CALC-MCNC: 50 MG/DL (ref 0–100)
MCH RBC QN AUTO: 31.5 PG (ref 26.8–34.3)
MCHC RBC AUTO-ENTMCNC: 34.4 G/DL (ref 31.4–37.4)
MCV RBC AUTO: 92 FL (ref 82–98)
PLATELET # BLD AUTO: 282 THOUSANDS/UL (ref 149–390)
PMV BLD AUTO: 10 FL (ref 8.9–12.7)
POTASSIUM SERPL-SCNC: 4.8 MMOL/L (ref 3.5–5.3)
PSA SERPL-MCNC: 0.6 NG/ML (ref 0–4)
RBC # BLD AUTO: 4.25 MILLION/UL (ref 3.88–5.62)
SODIUM SERPL-SCNC: 141 MMOL/L (ref 136–145)
TRIGL SERPL-MCNC: 166 MG/DL
TSH SERPL DL<=0.05 MIU/L-ACNC: 1.23 UIU/ML (ref 0.36–3.74)
WBC # BLD AUTO: 7.27 THOUSAND/UL (ref 4.31–10.16)

## 2020-11-10 PROCEDURE — 80061 LIPID PANEL: CPT

## 2020-11-10 PROCEDURE — 80048 BASIC METABOLIC PNL TOTAL CA: CPT

## 2020-11-10 PROCEDURE — 84443 ASSAY THYROID STIM HORMONE: CPT

## 2020-11-10 PROCEDURE — 36415 COLL VENOUS BLD VENIPUNCTURE: CPT

## 2020-11-10 PROCEDURE — G0103 PSA SCREENING: HCPCS

## 2020-11-10 PROCEDURE — 85027 COMPLETE CBC AUTOMATED: CPT

## 2020-11-11 ENCOUNTER — OFFICE VISIT (OUTPATIENT)
Dept: FAMILY MEDICINE CLINIC | Facility: CLINIC | Age: 65
End: 2020-11-11
Payer: COMMERCIAL

## 2020-11-11 VITALS
SYSTOLIC BLOOD PRESSURE: 124 MMHG | WEIGHT: 284 LBS | BODY MASS INDEX: 39.76 KG/M2 | OXYGEN SATURATION: 98 % | DIASTOLIC BLOOD PRESSURE: 84 MMHG | HEIGHT: 71 IN | TEMPERATURE: 97.8 F | HEART RATE: 71 BPM

## 2020-11-11 DIAGNOSIS — E78.5 HYPERLIPIDEMIA, UNSPECIFIED HYPERLIPIDEMIA TYPE: ICD-10-CM

## 2020-11-11 DIAGNOSIS — M77.8 TENDINITIS OF RIGHT SHOULDER: ICD-10-CM

## 2020-11-11 DIAGNOSIS — K21.00 GASTROESOPHAGEAL REFLUX DISEASE WITH ESOPHAGITIS, UNSPECIFIED WHETHER HEMORRHAGE: ICD-10-CM

## 2020-11-11 DIAGNOSIS — M54.50 CHRONIC LOW BACK PAIN WITHOUT SCIATICA, UNSPECIFIED BACK PAIN LATERALITY: ICD-10-CM

## 2020-11-11 DIAGNOSIS — G89.29 CHRONIC LOW BACK PAIN WITHOUT SCIATICA, UNSPECIFIED BACK PAIN LATERALITY: ICD-10-CM

## 2020-11-11 DIAGNOSIS — Z00.00 ANNUAL PHYSICAL EXAM: Primary | ICD-10-CM

## 2020-11-11 DIAGNOSIS — E13.9 DIABETES 1.5, MANAGED AS TYPE 2 (HCC): ICD-10-CM

## 2020-11-11 PROCEDURE — 1160F RVW MEDS BY RX/DR IN RCRD: CPT | Performed by: FAMILY MEDICINE

## 2020-11-11 PROCEDURE — 99397 PER PM REEVAL EST PAT 65+ YR: CPT | Performed by: FAMILY MEDICINE

## 2020-11-11 PROCEDURE — 3725F SCREEN DEPRESSION PERFORMED: CPT | Performed by: FAMILY MEDICINE

## 2020-11-11 PROCEDURE — 3288F FALL RISK ASSESSMENT DOCD: CPT | Performed by: FAMILY MEDICINE

## 2020-11-11 PROCEDURE — 99214 OFFICE O/P EST MOD 30 MIN: CPT | Performed by: FAMILY MEDICINE

## 2020-11-11 PROCEDURE — 3079F DIAST BP 80-89 MM HG: CPT | Performed by: FAMILY MEDICINE

## 2020-11-11 PROCEDURE — 3074F SYST BP LT 130 MM HG: CPT | Performed by: FAMILY MEDICINE

## 2020-11-11 PROCEDURE — 1101F PT FALLS ASSESS-DOCD LE1/YR: CPT | Performed by: FAMILY MEDICINE

## 2020-11-11 PROCEDURE — 3008F BODY MASS INDEX DOCD: CPT | Performed by: FAMILY MEDICINE

## 2020-11-11 RX ORDER — PREDNISONE 10 MG/1
TABLET ORAL
Qty: 26 TABLET | Refills: 0 | Status: SHIPPED | OUTPATIENT
Start: 2020-11-11 | End: 2021-03-26 | Stop reason: ALTCHOICE

## 2020-11-11 RX ORDER — OXYCODONE AND ACETAMINOPHEN 7.5; 325 MG/1; MG/1
1 TABLET ORAL EVERY 6 HOURS PRN
Qty: 120 TABLET | Refills: 0 | Status: SHIPPED | OUTPATIENT
Start: 2020-11-11 | End: 2020-12-07 | Stop reason: SDUPTHER

## 2020-11-11 RX ORDER — PANTOPRAZOLE SODIUM 40 MG/1
40 TABLET, DELAYED RELEASE ORAL
Qty: 30 TABLET | Refills: 5 | Status: SHIPPED | OUTPATIENT
Start: 2020-11-11 | End: 2021-05-03

## 2020-11-11 RX ORDER — TAMSULOSIN HYDROCHLORIDE 0.4 MG/1
0.4 CAPSULE ORAL
COMMUNITY

## 2020-11-19 ENCOUNTER — TELEPHONE (OUTPATIENT)
Dept: FAMILY MEDICINE CLINIC | Facility: CLINIC | Age: 65
End: 2020-11-19

## 2020-11-19 DIAGNOSIS — M25.50 ARTHRALGIA, UNSPECIFIED JOINT: Primary | ICD-10-CM

## 2020-11-19 RX ORDER — CELECOXIB 200 MG/1
200 CAPSULE ORAL DAILY
Qty: 30 CAPSULE | Refills: 2 | Status: SHIPPED | OUTPATIENT
Start: 2020-11-19 | End: 2020-12-08 | Stop reason: SDUPTHER

## 2020-11-23 ENCOUNTER — APPOINTMENT (OUTPATIENT)
Dept: RADIOLOGY | Facility: MEDICAL CENTER | Age: 65
End: 2020-11-23
Payer: COMMERCIAL

## 2020-11-23 DIAGNOSIS — M77.8 TENDINITIS OF RIGHT SHOULDER: ICD-10-CM

## 2020-11-23 PROCEDURE — 73030 X-RAY EXAM OF SHOULDER: CPT

## 2020-12-02 DIAGNOSIS — G47.09 OTHER INSOMNIA: ICD-10-CM

## 2020-12-02 RX ORDER — ESZOPICLONE 3 MG/1
3 TABLET, FILM COATED ORAL
Qty: 30 TABLET | Refills: 0 | Status: SHIPPED | OUTPATIENT
Start: 2020-12-02 | End: 2020-12-28 | Stop reason: SDUPTHER

## 2020-12-07 DIAGNOSIS — M54.50 CHRONIC LOW BACK PAIN WITHOUT SCIATICA, UNSPECIFIED BACK PAIN LATERALITY: ICD-10-CM

## 2020-12-07 DIAGNOSIS — G89.29 CHRONIC LOW BACK PAIN WITHOUT SCIATICA, UNSPECIFIED BACK PAIN LATERALITY: ICD-10-CM

## 2020-12-08 DIAGNOSIS — M25.50 ARTHRALGIA, UNSPECIFIED JOINT: ICD-10-CM

## 2020-12-08 RX ORDER — CELECOXIB 200 MG/1
200 CAPSULE ORAL DAILY
Qty: 90 CAPSULE | Refills: 1 | Status: SHIPPED | OUTPATIENT
Start: 2020-12-08 | End: 2021-05-30

## 2020-12-08 RX ORDER — OXYCODONE AND ACETAMINOPHEN 7.5; 325 MG/1; MG/1
1 TABLET ORAL EVERY 6 HOURS PRN
Qty: 120 TABLET | Refills: 0 | Status: SHIPPED | OUTPATIENT
Start: 2020-12-08 | End: 2021-01-05 | Stop reason: SDUPTHER

## 2020-12-28 DIAGNOSIS — G47.09 OTHER INSOMNIA: ICD-10-CM

## 2020-12-28 RX ORDER — ESZOPICLONE 3 MG/1
3 TABLET, FILM COATED ORAL
Qty: 30 TABLET | Refills: 0 | Status: SHIPPED | OUTPATIENT
Start: 2020-12-30 | End: 2021-01-28 | Stop reason: SDUPTHER

## 2020-12-29 ENCOUNTER — LAB (OUTPATIENT)
Dept: LAB | Facility: CLINIC | Age: 65
End: 2020-12-29
Payer: COMMERCIAL

## 2020-12-29 DIAGNOSIS — E13.9 DIABETES 1.5, MANAGED AS TYPE 2 (HCC): ICD-10-CM

## 2020-12-29 LAB
EST. AVERAGE GLUCOSE BLD GHB EST-MCNC: 146 MG/DL
HBA1C MFR BLD: 6.7 %

## 2020-12-29 PROCEDURE — 3044F HG A1C LEVEL LT 7.0%: CPT | Performed by: FAMILY MEDICINE

## 2020-12-29 PROCEDURE — 83036 HEMOGLOBIN GLYCOSYLATED A1C: CPT

## 2020-12-29 PROCEDURE — 36415 COLL VENOUS BLD VENIPUNCTURE: CPT

## 2021-01-05 DIAGNOSIS — G89.29 CHRONIC LOW BACK PAIN WITHOUT SCIATICA, UNSPECIFIED BACK PAIN LATERALITY: ICD-10-CM

## 2021-01-05 DIAGNOSIS — M54.50 CHRONIC LOW BACK PAIN WITHOUT SCIATICA, UNSPECIFIED BACK PAIN LATERALITY: ICD-10-CM

## 2021-01-05 RX ORDER — OXYCODONE AND ACETAMINOPHEN 7.5; 325 MG/1; MG/1
1 TABLET ORAL EVERY 6 HOURS PRN
Qty: 120 TABLET | Refills: 0 | Status: SHIPPED | OUTPATIENT
Start: 2021-01-05 | End: 2021-02-02 | Stop reason: SDUPTHER

## 2021-01-28 DIAGNOSIS — G47.09 OTHER INSOMNIA: ICD-10-CM

## 2021-01-29 RX ORDER — ESZOPICLONE 3 MG/1
3 TABLET, FILM COATED ORAL
Qty: 30 TABLET | Refills: 0 | Status: SHIPPED | OUTPATIENT
Start: 2021-01-29 | End: 2021-02-25 | Stop reason: SDUPTHER

## 2021-02-02 DIAGNOSIS — M54.50 CHRONIC LOW BACK PAIN WITHOUT SCIATICA, UNSPECIFIED BACK PAIN LATERALITY: ICD-10-CM

## 2021-02-02 DIAGNOSIS — G89.29 CHRONIC LOW BACK PAIN WITHOUT SCIATICA, UNSPECIFIED BACK PAIN LATERALITY: ICD-10-CM

## 2021-02-03 RX ORDER — OXYCODONE AND ACETAMINOPHEN 7.5; 325 MG/1; MG/1
1 TABLET ORAL EVERY 6 HOURS PRN
Qty: 120 TABLET | Refills: 0 | Status: SHIPPED | OUTPATIENT
Start: 2021-02-03 | End: 2021-03-01 | Stop reason: SDUPTHER

## 2021-02-13 DIAGNOSIS — Z23 ENCOUNTER FOR IMMUNIZATION: ICD-10-CM

## 2021-02-17 ENCOUNTER — IMMUNIZATIONS (OUTPATIENT)
Dept: FAMILY MEDICINE CLINIC | Facility: HOSPITAL | Age: 66
End: 2021-02-17

## 2021-02-17 DIAGNOSIS — Z23 ENCOUNTER FOR IMMUNIZATION: Primary | ICD-10-CM

## 2021-02-17 PROCEDURE — 0001A SARS-COV-2 / COVID-19 MRNA VACCINE (PFIZER-BIONTECH) 30 MCG: CPT

## 2021-02-17 PROCEDURE — 91300 SARS-COV-2 / COVID-19 MRNA VACCINE (PFIZER-BIONTECH) 30 MCG: CPT

## 2021-02-25 DIAGNOSIS — G47.09 OTHER INSOMNIA: ICD-10-CM

## 2021-02-26 RX ORDER — ESZOPICLONE 3 MG/1
3 TABLET, FILM COATED ORAL
Qty: 30 TABLET | Refills: 0 | Status: SHIPPED | OUTPATIENT
Start: 2021-02-26 | End: 2021-03-26 | Stop reason: ALTCHOICE

## 2021-03-01 DIAGNOSIS — M54.50 CHRONIC LOW BACK PAIN WITHOUT SCIATICA, UNSPECIFIED BACK PAIN LATERALITY: ICD-10-CM

## 2021-03-01 DIAGNOSIS — G89.29 CHRONIC LOW BACK PAIN WITHOUT SCIATICA, UNSPECIFIED BACK PAIN LATERALITY: ICD-10-CM

## 2021-03-01 RX ORDER — OXYCODONE AND ACETAMINOPHEN 7.5; 325 MG/1; MG/1
1 TABLET ORAL EVERY 6 HOURS PRN
Qty: 120 TABLET | Refills: 0 | Status: SHIPPED | OUTPATIENT
Start: 2021-03-01 | End: 2021-03-30 | Stop reason: SDUPTHER

## 2021-03-08 ENCOUNTER — IMMUNIZATIONS (OUTPATIENT)
Dept: FAMILY MEDICINE CLINIC | Facility: HOSPITAL | Age: 66
End: 2021-03-08

## 2021-03-08 DIAGNOSIS — Z23 ENCOUNTER FOR IMMUNIZATION: Primary | ICD-10-CM

## 2021-03-08 PROCEDURE — 0002A SARS-COV-2 / COVID-19 MRNA VACCINE (PFIZER-BIONTECH) 30 MCG: CPT

## 2021-03-08 PROCEDURE — 91300 SARS-COV-2 / COVID-19 MRNA VACCINE (PFIZER-BIONTECH) 30 MCG: CPT

## 2021-03-26 ENCOUNTER — OFFICE VISIT (OUTPATIENT)
Dept: FAMILY MEDICINE CLINIC | Facility: CLINIC | Age: 66
End: 2021-03-26
Payer: COMMERCIAL

## 2021-03-26 VITALS
BODY MASS INDEX: 39.06 KG/M2 | HEIGHT: 71 IN | OXYGEN SATURATION: 98 % | SYSTOLIC BLOOD PRESSURE: 138 MMHG | HEART RATE: 80 BPM | TEMPERATURE: 98.7 F | WEIGHT: 279 LBS | DIASTOLIC BLOOD PRESSURE: 78 MMHG

## 2021-03-26 DIAGNOSIS — F41.9 ANXIETY: ICD-10-CM

## 2021-03-26 DIAGNOSIS — G89.29 CHRONIC RIGHT SHOULDER PAIN: ICD-10-CM

## 2021-03-26 DIAGNOSIS — E78.00 HYPERCHOLESTEROLEMIA: ICD-10-CM

## 2021-03-26 DIAGNOSIS — E53.8 B12 DEFICIENCY: ICD-10-CM

## 2021-03-26 DIAGNOSIS — M25.511 CHRONIC RIGHT SHOULDER PAIN: ICD-10-CM

## 2021-03-26 DIAGNOSIS — G47.00 INSOMNIA, UNSPECIFIED TYPE: ICD-10-CM

## 2021-03-26 DIAGNOSIS — I10 ESSENTIAL HYPERTENSION: ICD-10-CM

## 2021-03-26 DIAGNOSIS — E11.9 TYPE 2 DIABETES MELLITUS WITHOUT COMPLICATION, WITHOUT LONG-TERM CURRENT USE OF INSULIN (HCC): Primary | ICD-10-CM

## 2021-03-26 DIAGNOSIS — E55.9 VITAMIN D DEFICIENCY: ICD-10-CM

## 2021-03-26 PROCEDURE — 3008F BODY MASS INDEX DOCD: CPT | Performed by: FAMILY MEDICINE

## 2021-03-26 PROCEDURE — 99215 OFFICE O/P EST HI 40 MIN: CPT | Performed by: FAMILY MEDICINE

## 2021-03-26 PROCEDURE — 3725F SCREEN DEPRESSION PERFORMED: CPT | Performed by: FAMILY MEDICINE

## 2021-03-26 PROCEDURE — 1036F TOBACCO NON-USER: CPT | Performed by: FAMILY MEDICINE

## 2021-03-26 PROCEDURE — 1160F RVW MEDS BY RX/DR IN RCRD: CPT | Performed by: FAMILY MEDICINE

## 2021-03-26 RX ORDER — ZOLPIDEM TARTRATE 12.5 MG/1
12.5 TABLET, FILM COATED, EXTENDED RELEASE ORAL
Qty: 30 TABLET | Refills: 0 | Status: SHIPPED | OUTPATIENT
Start: 2021-03-26 | End: 2021-04-21 | Stop reason: SDUPTHER

## 2021-03-26 RX ORDER — ALPRAZOLAM 0.25 MG/1
0.25 TABLET ORAL 3 TIMES DAILY PRN
Qty: 40 TABLET | Refills: 2 | Status: SHIPPED | OUTPATIENT
Start: 2021-03-26 | End: 2021-05-17 | Stop reason: SDUPTHER

## 2021-03-29 NOTE — PROGRESS NOTES
Patient ID: Darvin Angel is a 76 y o  male  HPI: 76 y o male presents for follow up of niddm, hypertension and hypercholesterolemia  Pt is due for labs and is post -bariatric surgery and needs vit B12 levels as well as vit D for deficencies  He complains of chronic right shoulder pain that has not responded to conservative treatment and it hurts with use as well as if he rolls on it in his sleep  He also is having trouble falling and staying asleep which is causing him to feel very fatigued  He is also requesting a prn med for anxiety due to home stressors  Patient deneis any dyspnea, chest pain or palpitations        SUBJECTIVE    Family History   Problem Relation Age of Onset    Heart disease Mother     Kidney cancer Mother     Hypertension Father     Bipolar disorder Sister         bipolar disorder NOS     Social History     Socioeconomic History    Marital status: /Civil Union     Spouse name: Not on file    Number of children: 2    Years of education: trade school    Highest education level: Not on file   Occupational History     Comment: employed   Social Needs    Financial resource strain: Not on file    Food insecurity     Worry: Not on file     Inability: Not on file   [employer] needs     Medical: Not on file     Non-medical: Not on file   Tobacco Use    Smoking status: Never Smoker    Smokeless tobacco: Never Used   Substance and Sexual Activity    Alcohol use: Yes     Frequency: Monthly or less     Drinks per session: 1 or 2     Binge frequency: Never    Drug use: Never    Sexual activity: Not Currently     Partners: Female   Lifestyle    Physical activity     Days per week: Not on file     Minutes per session: Not on file    Stress: Not on file   Relationships    Social connections     Talks on phone: Not on file     Gets together: Not on file     Attends Cheondoism service: Not on file     Active member of club or organization: Not on file     Attends meetings of clubs or organizations: Not on file     Relationship status: Not on file    Intimate partner violence     Fear of current or ex partner: Not on file     Emotionally abused: Not on file     Physically abused: Not on file     Forced sexual activity: Not on file   Other Topics Concern    Not on file   Social History Narrative    Always uses seat belt    Caffeine use    Daily coffee consumption    Daily cola consumption    Dental care, regularly    DENIED exercise frequency    Guns in the home:stored in locked cabinet    Multiple organ donor    Patient has living will    DENIED pets/animals    Power of  in existence    Water intake, adequate (per day)     Past Medical History:   Diagnosis Date    Allergic rhinitis     Asthma     Disease of thyroid gland     GERD (gastroesophageal reflux disease)     HL (hearing loss)     Hypertension     Memory loss, short term     R/O Seizures but placed on Keppra    Tinnitus      Past Surgical History:   Procedure Laterality Date    ABDOMINOPLASTY      ADENOIDECTOMY      BACK SURGERY      lower back surgery    CARPAL TUNNEL RELEASE Bilateral     CHOLECYSTECTOMY      COLONOSCOPY      GASTRIC BYPASS      HERNIA REPAIR Right     JOINT REPLACEMENT Bilateral     Knee    KNEE ARTHROSCOPY Bilateral     X3    CT REPAIR ING HERNIA,5+Y/O,REDUCIBL Left 11/26/2018    Procedure: INGUINAL HERNIA REPAIR;  Surgeon: Carl Dance, DO;  [facility];  Service: General    TONSILLECTOMY       Allergies   Allergen Reactions    Iv Dye [Iodinated Diagnostic Agents] Hives    Other      Environmental    Penicillins Other (See Comments)     ?  Had as a child-Unknown reaction       Current Outpatient Medications:     albuterol (PROVENTIL HFA,VENTOLIN HFA) 90 mcg/act inhaler, , Disp: , Rfl:     amoxicillin (AMOXIL) 500 mg capsule, Take 2,000 mg by mouth as needed 1 hour prior to dental appointment, Disp: , Rfl:     atorvastatin (LIPITOR) 10 mg tablet, TAKE 1 TABLET BY MOUTH EVERY DAY AT NIGHT, Disp: , Rfl: 6    Benralizumab (FASENRA SC), Inject under the skin EVERY OTHER MONTH, Disp: , Rfl:     celecoxib (CeleBREX) 200 mg capsule, Take 1 capsule (200 mg total) by mouth daily, Disp: 90 capsule, Rfl: 1    cetirizine (ZyrTEC) 10 mg tablet, Take 10 mg by mouth daily after dinner  , Disp: , Rfl:     Cholecalciferol 2000 units CAPS, Take 1 capsule by mouth daily after dinner  , Disp: , Rfl:     clobetasol (TEMOVATE) 0 05 % cream, Apply topically 2 (two) times a day, Disp: 60 g, Rfl: 0    Cyanocobalamin (B-12) 1000 MCG CAPS, Take 1 tablet by mouth daily after dinner  , Disp: , Rfl:     fluticasone (FLONASE) 50 mcg/act nasal spray, 2 sprays into each nostril 2 (two) times a day as needed  , Disp: , Rfl:     hydrochlorothiazide (HYDRODIURIL) 25 mg tablet, Take 1 tablet by mouth daily in the early morning  , Disp: , Rfl: 3    ketoconazole (NIZORAL) 2 % cream, Apply topically 2 (two) times a day, Disp: 60 g, Rfl: 0    levETIRAcetam (KEPPRA) 500 mg tablet, Take 1 tablet (500 mg total) by mouth 2 (two) times a day, Disp: 180 tablet, Rfl: 3    losartan (COZAAR) 25 mg tablet, Take 25 mg by mouth daily, Disp: , Rfl:     Mometasone Furo-Formoterol Fum (DULERA) 200-5 MCG/ACT AERO, Inhale 2 puffs 2 (two) times a day, Disp: , Rfl:     montelukast (SINGULAIR) 10 mg tablet, Take 10 mg by mouth every evening, Disp: , Rfl: 3    omeprazole (PriLOSEC) 40 MG capsule, TAKE 1 CAPSULE EVERY MORNING (1/2 HOUR BEFORE BREAKFAST), Disp: , Rfl: 3    oxyCODONE-acetaminophen (PERCOCET) 7 5-325 MG per tablet, Take 1 tablet by mouth every 6 (six) hours as needed for moderate painMax Daily Amount: 4 tablets, Disp: 120 tablet, Rfl: 0    pantoprazole (PROTONIX) 40 mg tablet, Take 1 tablet (40 mg total) by mouth daily before breakfast, Disp: 30 tablet, Rfl: 5    Pseudoeph-Doxylamine-DM-APAP (NYQUIL PO), Take by mouth as needed , Disp: , Rfl:     tamsulosin (Flomax) 0 4 mg, Take 0 4 mg by mouth daily with dinner, Disp: , Rfl:     ALPRAZolam (XANAX) 0 25 mg tablet, Take 1 tablet (0 25 mg total) by mouth 3 (three) times a day as needed for anxiety, Disp: 40 tablet, Rfl: 2    metFORMIN (GLUCOPHAGE) 500 mg tablet, Take 1 tablet (500 mg total) by mouth 2 (two) times a day with meals, Disp: 180 tablet, Rfl: 2    zolpidem (AMBIEN CR) 12 5 MG CR tablet, Take 1 tablet (12 5 mg total) by mouth daily at bedtime as needed for sleep, Disp: 30 tablet, Rfl: 0    Review of Systems  Constitutional:     Denies fever, chills ,weakness ,weight loss, weight gain  +fatigue   ENT: Denies earache ,loss of hearing ,nosebleed, nasal discharge,nasal congestion ,sore throat ,hoarseness  Pulmonary: Denies shortness of breath ,cough  ,dyspnea on exertion, orthopnea  ,PND   Cardiovascular:  Denies bradycardia , tachycardia  ,palpations, lower extremity edema leg, claudication  Breast:  Denies new or changing breast lumps ,nipple discharge ,nipple changes  Abdomen:  Denies abdominal pain , anorexia , indigestion, nausea, vomiting, constipation, diarrhea  Musculoskeletal: Denies myalgias,  joint swelling, joint stiffness , limb pain, limb swelling+ right shoulder pain with rom   Gu: denies dysuria, polyuria  Skin: Denies skin rash, skin lesion, skin wound, itching, dry skin  Neuro: Denies headache, numbness, tingling, confusion, loss of consciousness, dizziness, vertigo  Psychiatric: Denies feelings of depression, suicidal ideation,+ anxiety, +sleep disturbances    OBJECTIVE  /78   Pulse 80   Temp 98 7 °F (37 1 °C)   Ht 5' 10 5" (1 791 m)   Wt 127 kg (279 lb)   SpO2 98%   BMI 39 47 kg/m²   Constitutional:   NAD, well appearing and well nourished      ENT:   Conjunctiva and lids: no injection, edema, or discharge     Pupils and iris: MARY bilaterally    External inspection of ears and nose: normal without deformities or discharge  Otoscopic exam: Canals patent without erythema         Nasal mucosa, septum and turbinates: Normal or edema or discharge         Oropharynx:  Moist mucosa, normal tongue and tonsils without lesions  No erythema        Pulmonary:Respiratory effort normal rate and rhythm, no increased work of breathing  Auscultation of lungs:  Clear bilaterally with no adventitious breath sounds       Cardiovascular: regular rate and rhythm, S1 and S2, no murmur, no edema and/or varicosities of LE      Abdomen: Soft and non-distended     Positive bowel sounds      No heptomegaly or splenomegaly      Gu: no suprapubic tenderness or CVA tenderness, no urethral discharge  Lymphatic:  No anterior or posterior cervical lymphadenopathy         Musculoskeletal:  Gait and station: Normal gait      Digits and nails normal without clubbing or cyanosis       Inspection/palpation of joints, bones, and muscles:  + right shouler tenderness overlying tendon of triceps and pain with active and passive range of motion  Of right shoulder     Skin: Normal skin turgor and no rashes      Neuro:    Normal reflexes     Psych:   alert and oriented to person, place and time     normal mood and affect       Assessment/Plan:Diagnoses and all orders for this visit:    Type 2 diabetes mellitus without complication, without long-term current use of insulin ([facility] [address] )  Comments:  Stable on current therpay   Orders:  -     HEMOGLOBIN A1C W/ EAG ESTIMATION; Future    Essential hypertension  Comments:  continue with current meds  Orders:  -     Comprehensive metabolic panel; Future    Hypercholesterolemia  Comments:  continue with statins  Orders:  -     Lipid Panel with Direct LDL reflex; Future    Insomnia, unspecified type  Comments:  Pt to call with response to meds in 2-3 days  Orders:  -     zolpidem (AMBIEN CR) 12 5 MG CR tablet; Take 1 tablet (12 5 mg total) by mouth daily at bedtime as needed for sleep    Chronic right shoulder pain  -     Cancel: Ambulatory referral to Orthopedic Surgery;  Future  -     Ambulatory referral to Orthopedic Surgery; Future    Anxiety  -     ALPRAZolam (XANAX) 0 25 mg tablet; Take 1 tablet (0 25 mg total) by mouth 3 (three) times a day as needed for anxiety    B12 deficiency  -     Vitamin B12; Future    Vitamin D deficiency  -     Vitamin D 25 hydroxy; Future        Reviewed with patient plan to treat with above jose l      Patient instructed to call in 72 hours if not feeling better or if symptoms worsen

## 2021-03-30 DIAGNOSIS — B35.4 TINEA CORPORIS: ICD-10-CM

## 2021-03-30 DIAGNOSIS — M54.50 CHRONIC LOW BACK PAIN WITHOUT SCIATICA, UNSPECIFIED BACK PAIN LATERALITY: ICD-10-CM

## 2021-03-30 DIAGNOSIS — G89.29 CHRONIC LOW BACK PAIN WITHOUT SCIATICA, UNSPECIFIED BACK PAIN LATERALITY: ICD-10-CM

## 2021-03-30 RX ORDER — CLOBETASOL PROPIONATE 0.5 MG/G
CREAM TOPICAL 2 TIMES DAILY
Qty: 60 G | Refills: 0 | Status: SHIPPED | OUTPATIENT
Start: 2021-03-30 | End: 2021-11-22

## 2021-03-30 RX ORDER — OXYCODONE AND ACETAMINOPHEN 7.5; 325 MG/1; MG/1
1 TABLET ORAL EVERY 6 HOURS PRN
Qty: 120 TABLET | Refills: 0 | Status: SHIPPED | OUTPATIENT
Start: 2021-03-30 | End: 2021-04-27 | Stop reason: SDUPTHER

## 2021-03-30 RX ORDER — KETOCONAZOLE 20 MG/G
CREAM TOPICAL 2 TIMES DAILY
Qty: 60 G | Refills: 0 | Status: SHIPPED | OUTPATIENT
Start: 2021-03-30 | End: 2021-08-15 | Stop reason: SDUPTHER

## 2021-04-13 ENCOUNTER — OFFICE VISIT (OUTPATIENT)
Dept: OBGYN CLINIC | Facility: HOSPITAL | Age: 66
End: 2021-04-13
Payer: COMMERCIAL

## 2021-04-13 VITALS
BODY MASS INDEX: 39.94 KG/M2 | DIASTOLIC BLOOD PRESSURE: 77 MMHG | HEIGHT: 70 IN | WEIGHT: 279 LBS | HEART RATE: 75 BPM | SYSTOLIC BLOOD PRESSURE: 143 MMHG

## 2021-04-13 DIAGNOSIS — M75.41 IMPINGEMENT SYNDROME OF RIGHT SHOULDER: Primary | ICD-10-CM

## 2021-04-13 DIAGNOSIS — M19.011 PRIMARY OSTEOARTHRITIS OF RIGHT SHOULDER: ICD-10-CM

## 2021-04-13 PROCEDURE — 1036F TOBACCO NON-USER: CPT | Performed by: ORTHOPAEDIC SURGERY

## 2021-04-13 PROCEDURE — 20610 DRAIN/INJ JOINT/BURSA W/O US: CPT | Performed by: ORTHOPAEDIC SURGERY

## 2021-04-13 PROCEDURE — 1160F RVW MEDS BY RX/DR IN RCRD: CPT | Performed by: ORTHOPAEDIC SURGERY

## 2021-04-13 PROCEDURE — 99213 OFFICE O/P EST LOW 20 MIN: CPT | Performed by: ORTHOPAEDIC SURGERY

## 2021-04-13 RX ORDER — BUPIVACAINE HYDROCHLORIDE 7.5 MG/ML
1 INJECTION, SOLUTION EPIDURAL; RETROBULBAR
Status: COMPLETED | OUTPATIENT
Start: 2021-04-13 | End: 2021-04-13

## 2021-04-13 RX ORDER — BETAMETHASONE SODIUM PHOSPHATE AND BETAMETHASONE ACETATE 3; 3 MG/ML; MG/ML
6 INJECTION, SUSPENSION INTRA-ARTICULAR; INTRALESIONAL; INTRAMUSCULAR; SOFT TISSUE
Status: COMPLETED | OUTPATIENT
Start: 2021-04-13 | End: 2021-04-13

## 2021-04-13 RX ADMIN — BETAMETHASONE SODIUM PHOSPHATE AND BETAMETHASONE ACETATE 6 MG: 3; 3 INJECTION, SUSPENSION INTRA-ARTICULAR; INTRALESIONAL; INTRAMUSCULAR; SOFT TISSUE at 15:41

## 2021-04-13 RX ADMIN — BUPIVACAINE HYDROCHLORIDE 1 ML: 7.5 INJECTION, SOLUTION EPIDURAL; RETROBULBAR at 15:41

## 2021-04-13 NOTE — PROGRESS NOTES
Assessment  Diagnoses and all orders for this visit:    Impingement syndrome of right shoulder  -     Ambulatory referral to Physical Therapy; Future    Primary osteoarthritis of right shoulder  -     Ambulatory referral to Physical Therapy; Future    Discussion and Plan:    The patient has an examination consistent with subacromial impingement syndrome of the right shoulder  I have discussed with the patient the pathophysiology of this diagnosis and reviewed how the examination correlates with this diagnosis  Treatment options were discussed at length and after discussing these treatment options, the patient elected for and received a subacromial injection of corticosteroid (as described in the procedure note) with a prescription for referral to physical therapy  We will reevaluate the patient in 6-8 weeks  If the symptoms fail to improve with this treatment the patient would be indicated for further imaging in the form of an MRI scan of the shoulder  Subjective:   Patient ID: Awilda Farmer is a 76 y o  male      The patient presents with a chief complaint of right shoulder pain  The pain began 1 year(s) ago and is not associated with an acute injury  The patient describes the pain as aching and dull in intensity,  constant in timing, and localizes the pain to the  right globally  The pain is worse with nothing and relieved by rest   The pain is not associated with numbness and tingling  The pain is not associated with constitutional symptoms  The patient is awoken at night by the pain  Patient has had cortisone injections in the past with minimal relief  The following portions of the patient's history were reviewed and updated as appropriate: allergies, current medications, past family history, past medical history, past social history, past surgical history and problem list     Review of Systems   Constitutional: Negative for chills and fever  HENT: Negative for ear pain and sore throat  Eyes: Negative for pain and visual disturbance  Respiratory: Negative for cough and shortness of breath  Cardiovascular: Negative for chest pain and palpitations  Gastrointestinal: Negative for abdominal pain and vomiting  Genitourinary: Negative for dysuria and hematuria  Musculoskeletal: Negative for arthralgias and back pain  Skin: Negative for color change and rash  Neurological: Negative for seizures and syncope  All other systems reviewed and are negative  Objective:  /77   Pulse 75   Ht 5' 10" (1 778 m)   Wt 127 kg (279 lb)   BMI 40 03 kg/m²       Right Shoulder Exam     Tenderness   The patient is experiencing no tenderness  Range of Motion   The patient has normal right shoulder ROM  Muscle Strength   External rotation: 3/5   Supraspinatus: 3/5     Tests   Apprehension: negative  Giles test: negative  Cross arm: negative  Impingement: negative  Drop arm: positive  Sulcus: absent    Other   Erythema: absent  Scars: absent  Sensation: normal  Pulse: present              Physical Exam  Vitals signs reviewed  HENT:      Head: Normocephalic and atraumatic  Eyes:      General:         Right eye: No discharge  Left eye: No discharge  Conjunctiva/sclera: Conjunctivae normal       Pupils: Pupils are equal, round, and reactive to light  Neck:      Musculoskeletal: Normal range of motion and neck supple  Cardiovascular:      Rate and Rhythm: Normal rate  Pulmonary:      Effort: Pulmonary effort is normal  No respiratory distress  Skin:     General: Skin is warm and dry  Neurological:      Mental Status: He is alert and oriented to person, place, and time  Large joint arthrocentesis: R subacromial bursa  Universal Protocol:  Consent: Verbal consent obtained    Risks and benefits: risks, benefits and alternatives were discussed  Consent given by: patient and parent  Patient understanding: patient states understanding of the procedure being performed  Patient consent: the patient's understanding of the procedure matches consent given  Procedure consent: procedure consent matches procedure scheduled  Relevant documents: relevant documents present and verified  Test results: test results available and properly labeled  Site marked: the operative site was marked  Radiology Images displayed and confirmed  If images not available, report reviewed: imaging studies available  Patient identity confirmed: verbally with patient    Supporting Documentation  Indications: pain and joint swelling   Procedure Details  Location: shoulder - R subacromial bursa  Preparation: Patient was prepped and draped in the usual sterile fashion  Needle size: 22 G  Approach: lateral  Medications administered: 1 mL bupivacaine (PF) 0 75 %; 6 mg betamethasone acetate-betamethasone sodium phosphate 6 (3-3) mg/mL    Patient tolerance: patient tolerated the procedure well with no immediate complications  Dressing:  Sterile dressing applied          I have personally reviewed pertinent films in PACS and my interpretation is as follows  X-rays demonstrated mild glenohumeral joint OA       Scribe Attestation    I,:  Xu Espino am acting as a scribe while in the presence of the attending physician :       I,:  Estela Steiner DO personally performed the services described in this documentation    as scribed in my presence :

## 2021-04-17 NOTE — PROGRESS NOTES
PT Evaluation     Today's date: 2021  Patient name: Elda Rivera  : 1955  MRN: [id number]  Referring provider: Sophie Mccabe MD  Dx:   Encounter Diagnosis     ICD-10-CM    1  Impingement syndrome of right shoulder  M75 [id number] Ambulatory referral to Physical Therapy   2  Primary osteoarthritis of right shoulder  M19 011 Ambulatory referral to Physical Therapy                  Assessment  Assessment details: Elda Rivera is a 76 y o  male who presents with signs and symptoms consistent of subacromial pain syndrome  Patient presents with right shoulder pain (Diffuse), decreased strength, decreased ROM and decreased joint mobility  Pt demonstrates superior migration of humeral head during elevation noting RTC insufficiency  Moderate weakness at involved shoulder with pain upon MMT  Due to these impairments, Patient has difficulty performing a/iadls, reaching, lifting, and work-related activities  Patient would benefit from skilled physical therapy to address the impairments, improve their level of function, and to improve their overall quality of life  Impairments: abnormal coordination, abnormal muscle firing, abnormal or restricted ROM, activity intolerance, impaired physical strength, pain with function, scapular dyskinesis, poor posture  and poor body mechanics  Understanding of Dx/Px/POC: good   Prognosis: good    Goals  Short Term Goals: to be achieved by 4 weeks  1) Patient to be independent with basic HEP  2) Decrease pain to 3/10 at its worst   3) Increase shoulder ROM by 5-10 degrees in all planes  4) Increase shoulder strength by 1/2 MMT grade in all deficient planes  Long Term Goals: to be achieved by discharge  1) FOTO equal to or greater than expected  2) Patient to be independent with comprehensive HEP  3) Patient will demonstrate maximal over head reaching  4) Increase UE strength to 5/5 MMT grade in all planes to improve a/iadls          Plan  Patient would benefit from: PT eval and skilled physical therapy  Planned modality interventions: low level laser therapy  Planned therapy interventions: joint mobilization, manual therapy, neuromuscular re-education, postural training, patient education, therapeutic activities, therapeutic exercise and home exercise program  Frequency: 2x per week for 4-6 weeks  Treatment plan discussed with: patient        Subjective Evaluation    History of Present Illness  Mechanism of injury: History of Current Injury: Pt referred to PT from Dr Hipolito Ramírez with a Dx of subacromial pain syndrome  Pt received a cortisone last week  Pt reports a chief complaints of right shoulder pain for a chronic period  Pt admits to falling while at work which could had contributed to his shoulder pain  Pt does take Oxycodone for pain relief  Right handed  Pain location/Descriptors: Diffuse R shoulder pain (anterior/posterior/lateral)   Aggravating factors: reaching, lifting, work activities  Easing factors: neutral position of shoulder, injections, medications  24 HR pattern: Improves with loosening up  Stiffness in the morning  Imaging: XR: mild OA  Special Questions: Denies any numbness/tingling in RUE  Patient goals:  Reduce pain, Improve reaching/lifting  Hobbies/Interest: Playing with grandson   Occupation:     Pain  Current pain ratin  At best pain ratin  At worst pain ratin      Diagnostic Tests  X-ray: normal  Treatments  Previous treatment: medication and injection treatment  Patient Goals  Patient goals for therapy: decreased pain, increased motion, increased strength and independence with ADLs/IADLs          Objective     Cervical/Thoracic Screen   Cervical range of motion within normal limits  Cervical range of motion within normal limits with the following exceptions: Limited range with Flexion/extension  No radicular symptoms   Neg spurlings-A    Active Range of Motion   Left Shoulder   Flexion: 155 degrees   Extension: 70 degrees   Abduction: 165 degrees   External rotation 0°: 70 degrees   Internal rotation BTB: T8     Right Shoulder   Flexion: 135 degrees with pain  Extension: 70 degrees   Abduction: 145 degrees with pain  External rotation 0°: 40 degrees with pain  Internal rotation BTB: L1 with pain    Additional Active Range of Motion Details  *Total Arc motion:   90 of ER and 80 IR on Right     *+Painful arc with flexion and abduction  *Poor motor control of R shoulder    Scapular Mobility     Right Shoulder   Scapular mobility: fair    Joint Play     Right Shoulder  Hypomobile in the posterior capsule and inferior capsule  Comments  Right posterior capsule comments:  P!  Right inferior capsule comments: P!      Strength/Myotome Testing     Right Shoulder     Planes of Motion   Flexion: 4-   External rotation at 0°: 3+ (pain)   Internal rotation at 0°: 4+     Isolated Muscles   Biceps: 4+   Triceps: 4+       Flowsheet Rows      Most Recent Value   PT/OT G-Codes   Current Score  60   Projected Score  64             Precautions: HTN       Manuals             R GH joint mobs             R scap mobs             R passive stretching                          Neuro Re-Ed             Scap retraction  HEP            Shoulder IR/ER TB            Shoulder isometrics              TB rows             TB extensions                                       Ther Ex             SL shoulder ER HEP            Wall slide Flexion -HEP            P[place]ley                                                                               Ther Activity                                       Gait Training                                       Modalities                                        Pt was given a HEP with written and verbal instruction

## 2021-04-19 ENCOUNTER — EVALUATION (OUTPATIENT)
Dept: PHYSICAL THERAPY | Facility: CLINIC | Age: 66
End: 2021-04-19
Payer: COMMERCIAL

## 2021-04-19 DIAGNOSIS — M19.011 PRIMARY OSTEOARTHRITIS OF RIGHT SHOULDER: ICD-10-CM

## 2021-04-19 DIAGNOSIS — M75.41 IMPINGEMENT SYNDROME OF RIGHT SHOULDER: Primary | ICD-10-CM

## 2021-04-19 PROCEDURE — 97161 PT EVAL LOW COMPLEX 20 MIN: CPT | Performed by: PHYSICAL THERAPIST

## 2021-04-21 ENCOUNTER — OFFICE VISIT (OUTPATIENT)
Dept: NEUROLOGY | Facility: CLINIC | Age: 66
End: 2021-04-21
Payer: COMMERCIAL

## 2021-04-21 VITALS
DIASTOLIC BLOOD PRESSURE: 66 MMHG | SYSTOLIC BLOOD PRESSURE: 128 MMHG | WEIGHT: 274 LBS | HEIGHT: 70 IN | BODY MASS INDEX: 39.22 KG/M2 | HEART RATE: 75 BPM

## 2021-04-21 DIAGNOSIS — G47.00 INSOMNIA, UNSPECIFIED TYPE: ICD-10-CM

## 2021-04-21 DIAGNOSIS — G40.009 PARTIAL IDIOPATHIC EPILEPSY WITH SEIZURES OF LOCALIZED ONSET, NOT INTRACTABLE, WITHOUT STATUS EPILEPTICUS (HCC): ICD-10-CM

## 2021-04-21 PROCEDURE — 99213 OFFICE O/P EST LOW 20 MIN: CPT | Performed by: PSYCHIATRY & NEUROLOGY

## 2021-04-21 PROCEDURE — 3008F BODY MASS INDEX DOCD: CPT | Performed by: ORTHOPAEDIC SURGERY

## 2021-04-21 RX ORDER — ZOLPIDEM TARTRATE 12.5 MG/1
12.5 TABLET, FILM COATED, EXTENDED RELEASE ORAL
Qty: 30 TABLET | Refills: 0 | Status: SHIPPED | OUTPATIENT
Start: 2021-04-21 | End: 2021-05-22 | Stop reason: SDUPTHER

## 2021-04-21 RX ORDER — LEVETIRACETAM 500 MG/1
500 TABLET ORAL 2 TIMES DAILY
Qty: 180 TABLET | Refills: 3 | Status: SHIPPED | OUTPATIENT
Start: 2021-04-21 | End: 2022-04-04 | Stop reason: SDUPTHER

## 2021-04-21 NOTE — PATIENT INSTRUCTIONS
1  Continue current seizure medications unchanged  2  Let us know if there are seizures or problems with your medication  3  Return in one year to see [employer], Tayla Cordova

## 2021-04-21 NOTE — TELEPHONE ENCOUNTER
03/26/2021  1  03/26/2021  ZOLPIDEM TART ER 12 5 MG TAB  30 0  30  [facility]  [id number]  PENNS ([id number])  0     Checked and ok in pdmp

## 2021-04-21 NOTE — PROGRESS NOTES
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  EEG reveals left temporal epileptiform discharges  He is at some elevated risk for injury due to his profession which is additional motivation to continue AED therapy  Transient global amnesia is another possible explanation for his events, but is less likely given his EEG findings  TGA is typically a single lifetime event with up to 10% of patients having a second event  The first event apparently involved additional symptoms including perioral numbness and lightheadedness        Patient Instructions   1  Continue current seizure medications unchanged  2  Let us know if there are seizures or problems with your medication  3  Return in one year to see Tayla Borges and all orders for this visit:    Partial idiopathic epilepsy with seizures of localized onset, not intractable, without status epilepticus ([facility] [address] )  -     levETIRAcetam (KEPPRA) 500 mg tablet; Take 1 tablet (500 mg total) by mouth 2 (two) times a day        Subjective    Dotty Brandon is returning to the [place] [address] [facility] for follow up  The first episode was 3/26/12  Most recent was event was 8/27/14  During the events he had trouble recalling the last few hours  Both were shortly after sex  Intake visit was 2/11/15  Interval Events:   Seizures since last visit: None    No loss of awareness  No staring spells  No evidence of nocturnal seizure  No concerning myoclonus        Current AEDs:  Levetiracetam 500 mg bid  Medication side effects: None  Medication adherence: Yes    Event/Seizure semiology:  Two episodes of transient memory problems and confusion lasting a couple hours occurring on 3/26/2012 and 8/27/2014      Special Features  Status epilepticus: no  Self Injury Seizures: no  Precipitating Factors: none     Epilepsy Risk Factors:  None     Prior AEDs:  None     Prior Evaluation:  Sleep-deprived EEG done October 2017: This Routine, sleep deprived EEG recorded during wakefulness,   drowsiness, and sleep is abnormal  Two left anterior and anterior   mid-temporal spike wave discharges are a potential source of   seizures       History Reviewed: The following were reviewed and updated as appropriate: allergies, current medications, past medical history, past surgical history and problem list     Psychiatric History:  None     Social History:   Driving: Yes  Lives Alone: No  Occupation: works at [email or web address]  Planning to retire in 2021  Objective    /66 (BP Location: Left arm, Patient Position: Sitting, Cuff Size: Standard)   Pulse 75   Ht 5' 10" (1 778 m)   Wt 124 kg (274 lb)   BMI 39 31 kg/m²      General Exam  No acute distress  Neurologic Exam  Mental Status:  Alert and oriented x 3  Language: normal fluency and comprehension  Cranial Nerves: No dysarthria  Gait: Normal casual gait  ROS:    Review of Systems   Constitutional: Negative  Negative for appetite change and fever  HENT: Negative  Negative for hearing loss, tinnitus, trouble swallowing and voice change  Eyes: Negative  Negative for photophobia and pain  Respiratory: Negative  Negative for shortness of breath  Cardiovascular: Negative  Negative for palpitations  Gastrointestinal: Negative  Negative for nausea and vomiting  Endocrine: Negative  Negative for cold intolerance  Genitourinary: Negative  Negative for dysuria, frequency and urgency  Musculoskeletal: Negative  Negative for myalgias and neck pain  Skin: Negative  Negative for rash  Neurological: Positive for dizziness and light-headedness  Negative for tremors, seizures, syncope, facial asymmetry, speech difficulty, weakness, numbness and headaches  Hematological: Negative    Does not bruise/bleed easily  Psychiatric/Behavioral: Negative  Negative for confusion, hallucinations and sleep disturbance  All other systems reviewed and are negative  ROS reviewed and updated as appropriate

## 2021-04-26 ENCOUNTER — OFFICE VISIT (OUTPATIENT)
Dept: PHYSICAL THERAPY | Facility: CLINIC | Age: 66
End: 2021-04-26
Payer: COMMERCIAL

## 2021-04-26 DIAGNOSIS — M19.011 PRIMARY OSTEOARTHRITIS OF RIGHT SHOULDER: ICD-10-CM

## 2021-04-26 DIAGNOSIS — M75.41 IMPINGEMENT SYNDROME OF RIGHT SHOULDER: Primary | ICD-10-CM

## 2021-04-26 PROCEDURE — 97110 THERAPEUTIC EXERCISES: CPT | Performed by: PHYSICAL THERAPIST

## 2021-04-26 PROCEDURE — 97140 MANUAL THERAPY 1/> REGIONS: CPT | Performed by: PHYSICAL THERAPIST

## 2021-04-26 PROCEDURE — 97112 NEUROMUSCULAR REEDUCATION: CPT | Performed by: PHYSICAL THERAPIST

## 2021-04-26 NOTE — PROGRESS NOTES
Daily Note     Today's date: 2021  Patient name: Alessandra Hurley  : 1955  MRN: [id number]  Referring provider: Micah Llamas MD  Dx:   Encounter Diagnosis     ICD-10-CM    1  Impingement syndrome of right shoulder  M75 41    2  Primary osteoarthritis of right shoulder  M19 011        Start Time: 1525  Stop Time: 1610  Total time in clinic (min): 45 minutes    Subjective: Pt notes right shoulder is  "loosening up " He complains about a "rib problem" on the left side that occurs during work  Pt remains compliant with his HEP  He notes  90% improved since last week  Objective: See treatment diary below  Right Shoulder   Flexion: 165 degrees with pain  Abduction: 170 degrees with pain    Assessment: Tolerated treatment well  Progressive improvement in shoulder ROM  Started rotator cuff loading with fatigue but no pain  Patient currently more concerned about rib/intercostal discomfort  Patient would benefit from continued PT  Plan: Continue per plan of care        Precautions: HTN       Manuals             R GH joint mobs  3' G III           R scap mobs  2' GrIII           R passive stretching  5'                        Neuro Re-Ed             Scap retraction  HEP            Shoulder IR/ER TB 2x10 gtb IR/ 2x10 rtb ER           Shoulder isometrics              TB rows  30x btb           TB extensions  30x btb           Serratus press  2x10 2#           Prone T  nv           Prone Y  nv           Ther Ex             SL shoulder ER HEP 20x 2#           Wall slide Flexion -HEP 20x with PB on wall            Pulley   4'                                                                            Ther Activity             Shoulder circles with ball on wall  10x cw/ccw                        Gait Training                                       Modalities                                       1:1 with PT from [phone]    Updated HEP (TB IR/ER)

## 2021-04-27 DIAGNOSIS — E13.9 DIABETES 1.5, MANAGED AS TYPE 2 (HCC): ICD-10-CM

## 2021-04-27 DIAGNOSIS — G89.29 CHRONIC LOW BACK PAIN WITHOUT SCIATICA, UNSPECIFIED BACK PAIN LATERALITY: ICD-10-CM

## 2021-04-27 DIAGNOSIS — M54.50 CHRONIC LOW BACK PAIN WITHOUT SCIATICA, UNSPECIFIED BACK PAIN LATERALITY: ICD-10-CM

## 2021-04-27 RX ORDER — OXYCODONE AND ACETAMINOPHEN 7.5; 325 MG/1; MG/1
1 TABLET ORAL EVERY 6 HOURS PRN
Qty: 120 TABLET | Refills: 0 | Status: SHIPPED | OUTPATIENT
Start: 2021-04-27 | End: 2021-05-22 | Stop reason: SDUPTHER

## 2021-04-27 NOTE — TELEPHONE ENCOUNTER
03/31/2021  1  03/30/2021  OXYCODONE-ACETAMINOPHN 7 5-325  120 0  30  [facility]  [id number]  PENNS ([id number])  0  45  0 MME  Comm [employer]  [place]

## 2021-04-28 ENCOUNTER — OFFICE VISIT (OUTPATIENT)
Dept: PHYSICAL THERAPY | Facility: CLINIC | Age: 66
End: 2021-04-28
Payer: COMMERCIAL

## 2021-04-28 DIAGNOSIS — M75.41 IMPINGEMENT SYNDROME OF RIGHT SHOULDER: Primary | ICD-10-CM

## 2021-04-28 DIAGNOSIS — M19.011 PRIMARY OSTEOARTHRITIS OF RIGHT SHOULDER: ICD-10-CM

## 2021-04-28 PROCEDURE — 97112 NEUROMUSCULAR REEDUCATION: CPT

## 2021-04-28 PROCEDURE — 97110 THERAPEUTIC EXERCISES: CPT

## 2021-04-28 PROCEDURE — 97140 MANUAL THERAPY 1/> REGIONS: CPT

## 2021-04-28 NOTE — PROGRESS NOTES
Daily Note     Today's date: 2021  Patient name: Carla Degroot  : 1955  MRN: [id number]  Referring provider: Megan Stevenson MD  Dx:   Encounter Diagnosis     ICD-10-CM    1  Impingement syndrome of right shoulder  M75 41    2  Primary osteoarthritis of right shoulder  M19 011                   Subjective: Patient reports that the shoulder is feeling pretty good  "Feels 100x better" but has minor discomfort in the morning or end-range flexion/abduction  States that he still feels discomfort during daily activities but not as bad as it used to  Objective: See treatment diary below      Assessment: Tolerated treatment well  Patient exhibited good technique with therapeutic exercises and was able to add in overhead press with a light weight to mimic kinematics desired at work with overhead lifting  Held prone exercises today due to rib pain  Plan: Continue per plan of care  Progress treatment as tolerated         Precautions: HTN       Manuals            R GH joint mobs  3' G III 3' G III          R scap mobs  2' GrIII           R passive stretching  5' 5'                       Neuro Re-Ed             Scap retraction  HEP            Shoulder IR/ER TB 2x10 gtb IR/ 2x10 rtb ER 2x10 gtb IR/ 2x10 rtb ER          Shoulder isometrics              TB rows  30x btb 30x btb          TB extensions  30x btb 30x btb          Overhead press   2#, 3x10          Serratus press  2x10 2# 2x10 2#          Prone T  nv Held d/t rib pain          Prone Y  nv Held d/t rib pain          D1 flex/ext   2x10 ea ytb                       Ther Ex             SL shoulder ER HEP 20x 2# 20x 2#          Wall slide Flexion -HEP 20x with PB on wall  20x with PB on wall           Pulley   4' 4'          B horiz abd   btb 2x10                       Thoracic self extension mobs- seated with 1/2 foam   x10                                    Ther Activity             Shoulder circles with ball on wall  10x cw/ccw 10x cw/ccw                       Gait Training                                       Modalities

## 2021-04-29 LAB
LEFT EYE DIABETIC RETINOPATHY: NORMAL
RIGHT EYE DIABETIC RETINOPATHY: NORMAL

## 2021-05-02 DIAGNOSIS — K21.00 GASTROESOPHAGEAL REFLUX DISEASE WITH ESOPHAGITIS, UNSPECIFIED WHETHER HEMORRHAGE: ICD-10-CM

## 2021-05-03 RX ORDER — PANTOPRAZOLE SODIUM 40 MG/1
TABLET, DELAYED RELEASE ORAL
Qty: 90 TABLET | Refills: 1 | Status: SHIPPED | OUTPATIENT
Start: 2021-05-03 | End: 2021-11-01

## 2021-05-04 ENCOUNTER — OFFICE VISIT (OUTPATIENT)
Dept: PHYSICAL THERAPY | Facility: CLINIC | Age: 66
End: 2021-05-04
Payer: COMMERCIAL

## 2021-05-04 DIAGNOSIS — M75.41 IMPINGEMENT SYNDROME OF RIGHT SHOULDER: Primary | ICD-10-CM

## 2021-05-04 DIAGNOSIS — M19.011 PRIMARY OSTEOARTHRITIS OF RIGHT SHOULDER: ICD-10-CM

## 2021-05-04 PROCEDURE — 97110 THERAPEUTIC EXERCISES: CPT | Performed by: PHYSICAL THERAPIST

## 2021-05-04 PROCEDURE — 97140 MANUAL THERAPY 1/> REGIONS: CPT | Performed by: PHYSICAL THERAPIST

## 2021-05-04 PROCEDURE — 97112 NEUROMUSCULAR REEDUCATION: CPT | Performed by: PHYSICAL THERAPIST

## 2021-05-04 NOTE — PROGRESS NOTES
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  Objective: See treatment diary below      Assessment: Tolerated treatment well  Decreased exercises today to accommodate for shoulder soreness  However, we did continue with light loading  Pain present a Patient would benefit from continued PT      Plan: Continue per plan of care        Precautions: HTN       Manuals           R GH joint mobs  3' G III 3' G III 3' GIII         R scap mobs  2' GrIII  2' Gr III         R passive stretching  5' 5' 5'                      Neuro Re-Ed             Scap retraction  HEP            Shoulder IR/ER TB 2x10 gtb IR/ 2x10 rtb ER 2x10 gtb IR/ 2x10 rtb ER 2x10 ytb with modification during ER (limited motion_         Shoulder isometrics              TB rows  30x btb 30x btb 3x10 btb         TB extensions  30x btb 30x btb 3x10 btb         Overhead press   2#, 3x10          Serratus press  2x10 2# 2x10 2# against wall 2x10          Prone T  nv Held d/t rib pain kneeling on incline bench 10x         Prone Y  nv Held d/t rib pain Kneeling on incline bend 10x         D1 flex/ext   2x10 ea ytb                       Ther Ex             SL shoulder ER HEP 20x 2# 20x 2# 2x10 2#         Wall slide Flexion -HEP 20x with PB on wall  20x with PB on wall  2x10 with PB         Pulley   4' 4' 4'         B horiz abd   btb 2x10                       Thoracic self extension mobs- seated with 1/2 foam   x10                                    Ther Activity             Shoulder circles with ball on wall  10x cw/ccw 10x cw/ccw                       Gait Training                                       Modalities                                       1:1 with PT from 415-5

## 2021-05-06 ENCOUNTER — OFFICE VISIT (OUTPATIENT)
Dept: PHYSICAL THERAPY | Facility: CLINIC | Age: 66
End: 2021-05-06
Payer: COMMERCIAL

## 2021-05-06 DIAGNOSIS — M75.41 IMPINGEMENT SYNDROME OF RIGHT SHOULDER: Primary | ICD-10-CM

## 2021-05-06 DIAGNOSIS — M19.011 PRIMARY OSTEOARTHRITIS OF RIGHT SHOULDER: ICD-10-CM

## 2021-05-06 PROCEDURE — 97112 NEUROMUSCULAR REEDUCATION: CPT | Performed by: PHYSICAL THERAPIST

## 2021-05-06 PROCEDURE — 97140 MANUAL THERAPY 1/> REGIONS: CPT | Performed by: PHYSICAL THERAPIST

## 2021-05-06 PROCEDURE — 97110 THERAPEUTIC EXERCISES: CPT | Performed by: PHYSICAL THERAPIST

## 2021-05-06 NOTE — PROGRESS NOTES
Daily Note     Today's date: 2021  Patient name: Richy Rivera  : 1955  MRN: [id number]  Referring provider: Raul Hardy MD  Dx:   Encounter Diagnosis     ICD-10-CM    1  Impingement syndrome of right shoulder  M75 41    2  Primary osteoarthritis of right shoulder  M19 011        Start Time: 174  Stop Time: 182  Total time in clinic (min): 38 minutes    Subjective: Pt denies any new complaints      Objective: See treatment diary below      Assessment: Pt continues to struggle with ER  Modification taken to reduce ER ROM during light/gentle resistance to accommodate for soreness  Tolerated treatment well  Patient would benefit from continued PT      Plan: Continue per plan of care        Precautions: HTN       Manuals          R GH joint mobs  3' G III 3' G III 3' GIII 3' GIII        R scap mobs  2' GrIII  2' Gr III 2' Gr III        R passive stretching  5' 5' 5' 5'                     Neuro Re-Ed             Scap retraction  HEP            Shoulder IR/ER TB 2x10 gtb IR/ 2x10 rtb ER 2x10 gtb IR/ 2x10 rtb ER 2x10 ytb with modification during ER (limited motion_ 2x10 ytb        Shoulder isometrics              TB rows  30x btb 30x btb 3x10 btb 3x10 btb        TB extensions  30x btb 30x btb 3x10 btb 3x10 btb        Overhead press   2#, 3x10          Serratus press  2x10 2# 2x10 2# against wall 2x10          Prone T  nv Held d/t rib pain kneeling on incline bench 10x         Prone Y  nv Held d/t rib pain Kneeling on incline bend 10x         D1 flex/ext   2x10 ea ytb                       Ther Ex             SL shoulder ER HEP 20x 2# 20x 2# 2x10 2# 2x10 2#        Wall slide Flexion -HEP 20x with PB on wall  20x with PB on wall  2x10 with PB 2x10 with PB        Pulley   4' 4' 4' 5'        B horiz abd   btb 2x10                       Thoracic self extension mobs- seated with 1/2 foam   x10                                    Ther Activity             Shoulder circles with ball on wall 10x cw/ccw 10x cw/ccw  2x10 cw/ccw                     Gait Training                                       Modalities                                       1:1 with PT from [phone]

## 2021-05-10 ENCOUNTER — OFFICE VISIT (OUTPATIENT)
Dept: PHYSICAL THERAPY | Facility: CLINIC | Age: 66
End: 2021-05-10
Payer: COMMERCIAL

## 2021-05-10 DIAGNOSIS — M19.011 PRIMARY OSTEOARTHRITIS OF RIGHT SHOULDER: ICD-10-CM

## 2021-05-10 DIAGNOSIS — M75.41 IMPINGEMENT SYNDROME OF RIGHT SHOULDER: Primary | ICD-10-CM

## 2021-05-10 PROCEDURE — 97112 NEUROMUSCULAR REEDUCATION: CPT | Performed by: PHYSICAL THERAPIST

## 2021-05-10 PROCEDURE — 97140 MANUAL THERAPY 1/> REGIONS: CPT | Performed by: PHYSICAL THERAPIST

## 2021-05-10 NOTE — PROGRESS NOTES
Daily Note     Today's date: 5/10/2021  Patient name: Eagle Coombs  : 1955  MRN: [id number]  Referring provider: Yo Bhatti MD  Dx:   Encounter Diagnosis     ICD-10-CM    1  Impingement syndrome of right shoulder  M75 41    2  Primary osteoarthritis of right shoulder  M19 011        Start Time: 1645  Stop Time: 1730  Total time in clinic (min): 45 minutes    Subjective: Pt notes less soreness in shoulder since last week  Objective: See treatment diary below      Assessment: PROM full and pain free in all planes  Posterior cuff strengthening program fatigues right shoulder fairly quickly  Modified ER motion during resistance to accommodate for soreness  Resumed Prone scapular stabilization exercises  Tolerated treatment well  Patient would benefit from continued PT      Plan: Continue per plan of care        Precautions: HTN       Manuals   5/10       R GH joint mobs  3' G III 3' G III 3' GIII 3' GIII 3' GIII       R scap mobs  2' GrIII  2' Gr III 2' Gr III 2' Gr III       R passive stretching  5' 5' 5' 5' 5'                    Neuro Re-Ed             Scap retraction  HEP            Shoulder IR/ER TB 2x10 gtb IR/ 2x10 rtb ER 2x10 gtb IR/ 2x10 rtb ER 2x10 ytb with modification during ER (limited motion_ 2x10 ytb 2x10 ytb        Shoulder isometrics              TB rows  30x btb 30x btb 3x10 btb 3x10 btb 3x10 btb       TB extensions  30x btb 30x btb 3x10 btb 3x10 btb 3x10 btb       Overhead press   2#, 3x10          Serratus press  2x10 2# 2x10 2# against wall 2x10   against wall 2x10        Prone T  nv Held d/t rib pain kneeling on incline bench 10x  Kneeling on incline bend 10x       Prone Y  nv Held d/t rib pain Kneeling on incline bend 10x  Kneeling on incline bend 10x       D1 flex/ext   2x10 ea ytb   nv       Shoulder abduction with TB      nv       Shoulder flexion with TB      nv       Ther Ex             SL shoulder ER HEP 20x 2# 20x 2# 2x10 2# 2x10 2# 2x10 2#       Wall slide Flexion -HEP 20x with PB on wall  20x with PB on wall  2x10 with PB 2x10 with PB        Pulley   4' 4' 4' 5' 5'       B horiz abd   btb 2x10                       Thoracic self extension mobs- seated with 1/2 foam   x10                                    Ther Activity             Shoulder circles with ball on wall  10x cw/ccw 10x cw/ccw  2x10 cw/ccw 2x10 cw/ccw                    Gait Training                                       Modalities                                       1:1 with PT from [phone]

## 2021-05-13 ENCOUNTER — OFFICE VISIT (OUTPATIENT)
Dept: PHYSICAL THERAPY | Facility: CLINIC | Age: 66
End: 2021-05-13
Payer: COMMERCIAL

## 2021-05-13 DIAGNOSIS — M19.011 PRIMARY OSTEOARTHRITIS OF RIGHT SHOULDER: ICD-10-CM

## 2021-05-13 DIAGNOSIS — M75.41 IMPINGEMENT SYNDROME OF RIGHT SHOULDER: Primary | ICD-10-CM

## 2021-05-13 PROCEDURE — 97112 NEUROMUSCULAR REEDUCATION: CPT

## 2021-05-13 PROCEDURE — 97140 MANUAL THERAPY 1/> REGIONS: CPT

## 2021-05-13 NOTE — PROGRESS NOTES
Daily Note     Today's date: 2021  Patient name: Awilda Farmer  : 1955  MRN: [id number]  Referring provider: Gosia Mcdaniel MD  Dx:   Encounter Diagnosis     ICD-10-CM    1  Impingement syndrome of right shoulder  M75 41    2  Primary osteoarthritis of right shoulder  M19 011                   Subjective: Pt states he is not feeling well due to his allergies  Pt c/o increased scapular discomfort today  Objective: See treatment diary below      Assessment: Tolerated treatment well  Patient would benefit from continued PT  Mild discomfort noted w/ PROM  Occasional cueing needed for proper technique  Able to increase reps w/ bench T, Y's to 20 ea  Plan: Progress treatment as tolerated         Precautions: HTN       Manuals  4/26 4/28 5/4 5/6 5/10 5/13      R GH joint mobs  3' G III 3' G III 3' GIII 3' GIII 3' GIII JK      R scap mobs  2' GrIII  2' Gr III 2' Gr III 2' Gr III JK      R passive stretching  5' 5' 5' 5' 5' LM                   Neuro Re-Ed             Scap retraction  HEP            Shoulder IR/ER TB 2x10 gtb IR/ 2x10 rtb ER 2x10 gtb IR/ 2x10 rtb ER 2x10 ytb with modification during ER (limited motion_ 2x10 ytb 2x10 ytb  YTB 2x10 ea      Shoulder isometrics              TB rows  30x btb 30x btb 3x10 btb 3x10 btb 3x10 btb BTB 2x10      TB extensions  30x btb 30x btb 3x10 btb 3x10 btb 3x10 btb BTB 2x10      Overhead press   2#, 3x10          Serratus press  2x10 2# 2x10 2# against wall 2x10   against wall 2x10  NV      Prone T  nv Held d/t rib pain kneeling on incline bench 10x  Kneeling on incline bend 10x kneeling on incline bench 2x10      Prone Y  nv Held d/t rib pain Kneeling on incline bend 10x  Kneeling on incline bend 10x kneeling on incline bench 2x10      D1 flex/ext   2x10 ea ytb   nv       Shoulder abduction with TB      nv       Shoulder flexion with TB      nv       Ther Ex             SL shoulder ER HEP 20x 2# 20x 2# 2x10 2# 2x10 2# 2x10 2# 2# 2x10      Wall slide Flexion -HEP 20x with PB on wall  20x with PB on wall  2x10 with PB 2x10 with PB        Pulley   4' 4' 4' 5' 5' 5'      B horiz abd   btb 2x10                       Thoracic self extension mobs- seated with 1/2 foam   x10                                    Ther Activity             Shoulder circles with ball on wall  10x cw/ccw 10x cw/ccw  2x10 cw/ccw 2x10 cw/ccw 2x10 cw/ccw                   Gait Training                                       Modalities                                         1:1 [phone]

## 2021-05-17 ENCOUNTER — EVALUATION (OUTPATIENT)
Dept: PHYSICAL THERAPY | Facility: CLINIC | Age: 66
End: 2021-05-17
Payer: COMMERCIAL

## 2021-05-17 DIAGNOSIS — M75.41 IMPINGEMENT SYNDROME OF RIGHT SHOULDER: Primary | ICD-10-CM

## 2021-05-17 DIAGNOSIS — M19.011 PRIMARY OSTEOARTHRITIS OF RIGHT SHOULDER: ICD-10-CM

## 2021-05-17 DIAGNOSIS — F41.9 ANXIETY: ICD-10-CM

## 2021-05-17 PROCEDURE — 97140 MANUAL THERAPY 1/> REGIONS: CPT | Performed by: PHYSICAL THERAPIST

## 2021-05-17 PROCEDURE — 97110 THERAPEUTIC EXERCISES: CPT | Performed by: PHYSICAL THERAPIST

## 2021-05-17 PROCEDURE — 97112 NEUROMUSCULAR REEDUCATION: CPT | Performed by: PHYSICAL THERAPIST

## 2021-05-17 NOTE — PROGRESS NOTES
[id number]    Today's date: 2021  Patient name: Margarita Vazquez  : 1955  MRN: [id number]  Referring provider: Kaitlin Luciano MD  Dx:   Encounter Diagnosis     ICD-10-CM    1  Impingement syndrome of right shoulder  M75 41    2  Primary osteoarthritis of right shoulder  M19 011        Start Time: 1530  Stop Time: 1610  Total time in clinic (min): 40 minutes    Assessment  Assessment details: Sanford Wolf reports good improvements in right shoulder pain over the course of 1 month of physical therapy  He does follow up with Dr Pauly Mera next week  Functional status measure has improved to 76  Patient notes pain has reduced to 0-3/10  Right shoulder ROM has improved actively along with less discomfort at end range  Strength gradually improving with ER, ABD, and flexion  Pt continues to have pain at involved shoulder with lifting, reaching, and overhead activity  Overall, patient has made steady progress toward goals and would benefit from additional PT at this time  Impairments: abnormal coordination, abnormal muscle firing, abnormal or restricted ROM, activity intolerance, impaired physical strength, pain with function, scapular dyskinesis, poor posture  and poor body mechanics  Understanding of Dx/Px/POC: good   Prognosis: good    Goals  Short Term Goals: to be achieved by 4 weeks  1) Patient to be independent with basic HEP  2) Decrease pain to 3/10 at its worst   3) Increase shoulder ROM by 5-10 degrees in all planes  4) Increase shoulder strength by 1/2 MMT grade in all deficient planes  Long Term Goals: to be achieved by discharge  1) FOTO equal to or greater than expected  2) Patient to be independent with comprehensive HEP  3) Patient will demonstrate maximal over head reaching  4) Increase UE strength to 5/5 MMT grade in all planes to improve a/iadls          Plan  Patient would benefit from: PT eval and skilled physical therapy  Planned modality interventions: low level laser therapy  Planned therapy interventions: joint mobilization, manual therapy, neuromuscular re-education, postural training, patient education, therapeutic activities, therapeutic exercise and home exercise program  Frequency: 1-2x per week for 4 weeks  Treatment plan discussed with: patient        Subjective Evaluation    History of Present Illness  Mechanism of injury: History of Current Injury: Pt referred to PT from Dr Renetta Salguero with a Dx of subacromial pain syndrome  Pt received a cortisone last week  Pt reports a chief complaints of right shoulder pain for a chronic period  Pt admits to falling while at work which could had contributed to his shoulder pain  Pt does take Oxycodone for pain relief  Right handed  Pain location/Descriptors: Diffuse R shoulder pain (anterior/posterior/lateral)   Aggravating factors: reaching, lifting, work activities  Easing factors: neutral position of shoulder, injections, medications  24 HR pattern: Improves with loosening up  Stiffness in the morning  Imaging: XR: mild OA  Special Questions: Denies any numbness/tingling in RUE  Patient goals:  Reduce pain, Improve reaching/lifting  Hobbies/Interest: Playing with grandson   Occupation:     Pain  Current pain ratin  At best pain ratin  At worst pain rating: 3      Diagnostic Tests  X-ray: normal  Treatments  Previous treatment: medication and injection treatment  Patient Goals  Patient goals for therapy: decreased pain, increased motion, increased strength and independence with ADLs/IADLs          Objective     Cervical/Thoracic Screen   Cervical range of motion within normal limits  Cervical range of motion within normal limits with the following exceptions: Limited range with Flexion/extension  No radicular symptoms   Neg spurlings-A    Active Range of Motion   Left Shoulder   Flexion: 155 degrees   Extension: 70 degrees   Abduction: 165 degrees   External rotation 0°: 70 degrees   Internal rotation BTB: T8     Right Shoulder   Flexion: 160 degrees with pain  Extension: 84 degrees   Abduction: 155 degrees with pain  External rotation 0°: 65 degrees with pain  Internal rotation BTB: T10 with pain    Additional Active Range of Motion Details  *Total Arc motion:   90 of ER and 60 IR on Right     *+Painful arc with flexion and abduction  *Poor motor control of R shoulder    Scapular Mobility     Right Shoulder   Scapular mobility: fair    Joint Play     Right Shoulder  Hypomobile in the posterior capsule and inferior capsule  Comments  Right posterior capsule comments:  P!  Right inferior capsule comments: P!      Strength/Myotome Testing     Right Shoulder     Planes of Motion   Flexion: 4   External rotation at 0°: 4   Internal rotation at 0°: 4+     Isolated Muscles   Biceps: 5   Triceps: 4+       Flowsheet Rows      Most Recent Value   PT/OT G-Codes   Current Score  76   Projected Score  64             Precautions: HTN       Manuals  4/26 4/28 5/4 5/6 5/10 5/13 5/17     R GH joint mobs  3' G III 3' G III 3' GIII 3' GIII 3' GIII JK 4' gr III     R scap mobs  2' GrIII  2' Gr III 2' Gr III 2' Gr III JK      R passive stretching  5' 5' 5' 5' 5' LM 4'                  Neuro Re-Ed             Scap retraction  HEP            Shoulder IR/ER TB 2x10 gtb IR/ 2x10 rtb ER 2x10 gtb IR/ 2x10 rtb ER 2x10 ytb with modification during ER (limited motion_ 2x10 ytb 2x10 ytb  YTB 2x10 ea rtb 2x10      Shoulder isometrics              TB rows  30x btb 30x btb 3x10 btb 3x10 btb 3x10 btb BTB 2x10      TB extensions  30x btb 30x btb 3x10 btb 3x10 btb 3x10 btb BTB 2x10      Overhead press   2#, 3x10          Serratus press  2x10 2# 2x10 2# against wall 2x10   against wall 2x10  NV      Prone T  nv Held d/t rib pain kneeling on incline bench 10x  Kneeling on incline bend 10x kneeling on incline bench 2x10      Prone Y  nv Held d/t rib pain Kneeling on incline bend 10x  Kneeling on incline bend 10x kneeling on incline bench 2x10 D1 flexion   2x10 ea ytb   nv  2x10 ytb      D2 flexion        2x10 ytb     Shoulder abduction with TB      nv       Shoulder flexion with TB      nv       Ther Ex             SL shoulder ER HEP 20x 2# 20x 2# 2x10 2# 2x10 2# 2x10 2# 2# 2x10      Wall slide Flexion -HEP 20x with PB on wall  20x with PB on wall  2x10 with PB 2x10 with PB        Pulley   4' 4' 4' 5' 5' 5' 5'     B horiz abd   btb 2x10                       Thoracic self extension mobs- seated with 1/2 foam   x10                                    Ther Activity             Shoulder circles with ball on wall  10x cw/ccw 10x cw/ccw  2x10 cw/ccw 2x10 cw/ccw 2x10 cw/ccw      Shoulder 90/90 toss -trampoline        nv     Gait Training                                       Modalities                                       1:1 with PT from 330-415p  Pt was re-evaluate today x 20 minutes

## 2021-05-17 NOTE — TELEPHONE ENCOUNTER
04/21/2021  1  03/26/2021  ALPRAZOLAM 0 25 MG TABLET  40 0  13  [facility]  [id number]  [facility] ([id number]) 3

## 2021-05-18 RX ORDER — ALPRAZOLAM 0.25 MG/1
0.25 TABLET ORAL 3 TIMES DAILY PRN
Qty: 40 TABLET | Refills: 0 | Status: SHIPPED | OUTPATIENT
Start: 2021-05-18 | End: 2021-06-11 | Stop reason: SDUPTHER

## 2021-05-20 ENCOUNTER — OFFICE VISIT (OUTPATIENT)
Dept: PHYSICAL THERAPY | Facility: CLINIC | Age: 66
End: 2021-05-20
Payer: COMMERCIAL

## 2021-05-20 DIAGNOSIS — M19.011 PRIMARY OSTEOARTHRITIS OF RIGHT SHOULDER: ICD-10-CM

## 2021-05-20 DIAGNOSIS — M75.41 IMPINGEMENT SYNDROME OF RIGHT SHOULDER: Primary | ICD-10-CM

## 2021-05-20 PROCEDURE — 97110 THERAPEUTIC EXERCISES: CPT | Performed by: PHYSICAL THERAPIST

## 2021-05-20 PROCEDURE — 97140 MANUAL THERAPY 1/> REGIONS: CPT | Performed by: PHYSICAL THERAPIST

## 2021-05-20 PROCEDURE — 97112 NEUROMUSCULAR REEDUCATION: CPT | Performed by: PHYSICAL THERAPIST

## 2021-05-20 NOTE — PROGRESS NOTES
Daily Note     Today's date: 2021  Patient name: Chiquita Cleveland  : 1955  MRN: [id number]  Referring provider: Jennifer Lopez MD  Dx:   Encounter Diagnosis     ICD-10-CM    1  Impingement syndrome of right shoulder  M75 41    2  Primary osteoarthritis of right shoulder  M19 011        Start Time: 1515  Stop Time: 1600  Total time in clinic (min): 45 minutes    Subjective: Pt reports shoulder doing well today  Minimal to no pain  Objective: See treatment diary below      Assessment: Continued POC focusing on flexibility and rotator cuff strength  Increase SL ER to 3#  Increase program to enhance difficulty  Tolerated treatment well without adverse soreness or discomfort  PROM nearly full in all planes  Patient demonstrated fatigue post treatment and exhibited good technique with therapeutic exercises  Pt will follow up with Dr Graciela Rojas next week  Pt would benefit from a few more PT sessions to increase strength and stability at involved shoulder  Plan: Continue per plan of care        Precautions: HTN       Manuals   5/4 5/6 5/10 5/13 5/17 5/20    R GH joint mobs  3' G III 3' G III 3' GIII 3' GIII 3' GIII JK 4' gr III 4' gr III    R scap mobs  2' GrIII  2' Gr III 2' Gr III 2' Gr III JK  4' gr III    R passive stretching  5' 5' 5' 5' 5' LM 4' 4'                 Neuro Re-Ed             Scap retraction  HEP            Shoulder IR/ER TB 2x10 gtb IR/ 2x10 rtb ER 2x10 gtb IR/ 2x10 rtb ER 2x10 ytb with modification during ER (limited motion_ 2x10 ytb 2x10 ytb  YTB 2x10 ea rtb 2x10      Shoulder isometrics              TB rows  30x btb 30x btb 3x10 btb 3x10 btb 3x10 btb BTB 2x10      TB extensions  30x btb 30x btb 3x10 btb 3x10 btb 3x10 btb BTB 2x10      Overhead press   2#, 3x10          Serratus press  2x10 2# 2x10 2# against wall 2x10   against wall 2x10  NV      Prone T  nv Held d/t rib pain kneeling on incline bench 10x  Kneeling on incline bend 10x kneeling on incline bench 2x10 Prone Y  nv Held d/t rib pain Kneeling on incline bend 10x  Kneeling on incline bend 10x kneeling on incline bench 2x10      D1 flexion   2x10 ea ytb   nv  2x10 ytb      D2 flexion        2x10 ytb     Shoulder abduction with TB      nv   2x10 ytb     Shoulder flexion with TB      nv       TB shoulder horizontal abduction         2x10 ytb     Ther Ex             SL shoulder ER HEP 20x 2# 20x 2# 2x10 2# 2x10 2# 2x10 2# 2# 2x10  2x10 3#    Wall slide Flexion -HEP 20x with PB on wall  20x with PB on wall  2x10 with PB 2x10 with PB        Pulley   4' 4' 4' 5' 5' 5' 5' 5'    B horiz abd   btb 2x10                       Thoracic self extension mobs- seated with 1/2 foam   x10                                    Ther Activity             Shoulder circles with ball on wall  10x cw/ccw 10x cw/ccw  2x10 cw/ccw 2x10 cw/ccw 2x10 cw/ccw      Shoulder 90/90 toss -trampoline        nv     Shoulder flexion with abduction isometric TB         2x10    Gait Training                                       Modalities                                       1:1 with PT from 264-381p

## 2021-05-22 DIAGNOSIS — G47.00 INSOMNIA, UNSPECIFIED TYPE: ICD-10-CM

## 2021-05-22 DIAGNOSIS — M54.50 CHRONIC LOW BACK PAIN WITHOUT SCIATICA, UNSPECIFIED BACK PAIN LATERALITY: ICD-10-CM

## 2021-05-22 DIAGNOSIS — G89.29 CHRONIC LOW BACK PAIN WITHOUT SCIATICA, UNSPECIFIED BACK PAIN LATERALITY: ICD-10-CM

## 2021-05-24 ENCOUNTER — OFFICE VISIT (OUTPATIENT)
Dept: OBGYN CLINIC | Facility: OTHER | Age: 66
End: 2021-05-24
Payer: COMMERCIAL

## 2021-05-24 VITALS
HEART RATE: 60 BPM | WEIGHT: 268 LBS | SYSTOLIC BLOOD PRESSURE: 113 MMHG | BODY MASS INDEX: 38.45 KG/M2 | DIASTOLIC BLOOD PRESSURE: 67 MMHG

## 2021-05-24 DIAGNOSIS — M75.41 IMPINGEMENT SYNDROME OF RIGHT SHOULDER: Primary | ICD-10-CM

## 2021-05-24 PROCEDURE — 1160F RVW MEDS BY RX/DR IN RCRD: CPT | Performed by: ORTHOPAEDIC SURGERY

## 2021-05-24 PROCEDURE — 1036F TOBACCO NON-USER: CPT | Performed by: ORTHOPAEDIC SURGERY

## 2021-05-24 PROCEDURE — 99213 OFFICE O/P EST LOW 20 MIN: CPT | Performed by: ORTHOPAEDIC SURGERY

## 2021-05-24 RX ORDER — ZOLPIDEM TARTRATE 12.5 MG/1
12.5 TABLET, FILM COATED, EXTENDED RELEASE ORAL
Qty: 30 TABLET | Refills: 0 | Status: SHIPPED | OUTPATIENT
Start: 2021-05-24 | End: 2021-06-21 | Stop reason: SDUPTHER

## 2021-05-24 RX ORDER — BENRALIZUMAB 30 MG/ML
INJECTION, SOLUTION SUBCUTANEOUS
COMMUNITY
Start: 2021-05-18 | End: 2021-11-15 | Stop reason: SDUPTHER

## 2021-05-24 RX ORDER — OXYBUTYNIN CHLORIDE 5 MG/1
TABLET ORAL
COMMUNITY
Start: 2021-04-10

## 2021-05-24 RX ORDER — LOSARTAN POTASSIUM 25 MG/1
TABLET ORAL
COMMUNITY
Start: 2021-05-05 | End: 2021-11-15 | Stop reason: SDUPTHER

## 2021-05-24 RX ORDER — HYDROCHLOROTHIAZIDE 25 MG/1
TABLET ORAL
COMMUNITY
Start: 2021-05-05 | End: 2021-11-15 | Stop reason: ALTCHOICE

## 2021-05-24 RX ORDER — OXYCODONE AND ACETAMINOPHEN 7.5; 325 MG/1; MG/1
1 TABLET ORAL EVERY 6 HOURS PRN
Qty: 120 TABLET | Refills: 0 | Status: SHIPPED | OUTPATIENT
Start: 2021-05-24 | End: 2021-06-21 | Stop reason: SDUPTHER

## 2021-05-24 NOTE — TELEPHONE ENCOUNTER
04/27/2021  1  04/27/2021  OXYCODONE-ACETAMINOPHN 7 5-325  120 0  30  [facility]  [id number]  [facility] ([id number])  0  45  0 MME  [place]    04/24/2021  1  04/21/2021  ZOLPIDEM TART ER 12 5 MG TAB  30 0  30  [facility]  [id number]  [facility] ([id number])  0

## 2021-05-24 NOTE — PROGRESS NOTES
Assessment  Diagnoses and all orders for this visit:    Impingement syndrome of right shoulder-improving      Discussion and Plan:    · Patient is reporting improvements of his pain/symptoms since beginning formal PT/HEP and with the CS injection on 4/13/2021  · Continue with HEP as tolerated  · Gradually return to normal activities as tolerated  Avoid painful maneuvers  · Follow up on an as needed basis    Subjective:   Patient ID: Chicho Cruz is a 76 y o  male      HPI  75 y/o male who presents today for a follow up visit for his right shoulder  He has been treating non operatively for subacromial impingement syndrome with formal PT and he received a CS injection on 4/13/2021  Today, he notes overall improvements of his symptoms  He reports minimal to no pain about the shoulder on a daily basis  He only has a mild "dull, aching" pain with repetitive lifting motions  He has transitioned into HEP  Denies numbness and tingling, fevers or chills  The following portions of the patient's history were reviewed and updated as appropriate: allergies, current medications, past family history, past medical history, past social history, past surgical history and problem list     Review of Systems   Constitutional: Negative for chills, fatigue, fever and unexpected weight change  HENT: Negative for hearing loss, nosebleeds and sore throat  Eyes: Negative for pain, redness and visual disturbance  Respiratory: Negative for cough, shortness of breath and wheezing  Cardiovascular: Negative for chest pain, palpitations and leg swelling  Gastrointestinal: Negative for abdominal pain, nausea and vomiting  Endocrine: Negative for polydipsia and polyuria  Genitourinary: Negative for frequency and urgency  Skin: Negative for color change, rash and wound  Neurological: Negative for dizziness, weakness, numbness and headaches     Psychiatric/Behavioral: Negative for behavioral problems, self-injury and suicidal ideas  Objective:  /67 (BP Location: Left arm, Patient Position: Sitting, Cuff Size: Adult)   Pulse 60   Wt 122 kg (268 lb)   BMI 38 45 kg/m²       Left Shoulder Exam     Tenderness   The patient is experiencing no tenderness  Range of Motion   External rotation: 70   Forward flexion: 160   Internal rotation 0 degrees: Lumbar     Muscle Strength   Abduction: 5/5   External rotation: 5/5     Tests   Giles test: negative  Impingement: negative  Drop arm: negative    Other   Erythema: absent  Sensation: normal  Pulse: present             Physical Exam  Constitutional:       General: He is not in acute distress  Appearance: He is well-developed  Eyes:      Conjunctiva/sclera: Conjunctivae normal       Pupils: Pupils are equal, round, and reactive to light  Neck:      Musculoskeletal: Normal range of motion and neck supple  Cardiovascular:      Rate and Rhythm: Normal rate and regular rhythm  Pulmonary:      Effort: Pulmonary effort is normal       Breath sounds: Normal breath sounds  Abdominal:      General: Bowel sounds are normal       Palpations: Abdomen is soft  Skin:     General: Skin is warm and dry  Findings: No erythema or rash  Neurological:      Mental Status: He is alert and oriented to person, place, and time  Deep Tendon Reflexes: Reflexes are normal and symmetric     Psychiatric:         Behavior: Behavior normal        Scribe Attestation    I,:  Garcia Costa am acting as a scribe while in the presence of the attending physician :       I,:  Lucille Prather MD personally performed the services described in this documentation    as scribed in my presence :

## 2021-05-29 DIAGNOSIS — M25.50 ARTHRALGIA, UNSPECIFIED JOINT: ICD-10-CM

## 2021-05-30 RX ORDER — CELECOXIB 200 MG/1
CAPSULE ORAL
Qty: 90 CAPSULE | Refills: 1 | Status: SHIPPED | OUTPATIENT
Start: 2021-05-30 | End: 2021-06-11 | Stop reason: SDUPTHER

## 2021-06-07 NOTE — PROGRESS NOTES
PT-Discharge:   Pt progressed well with PT intervention throughout the course of care  Should shoulder symptoms not resolved, he may return to formal PT  D/C at this time

## 2021-06-11 DIAGNOSIS — F41.9 ANXIETY: ICD-10-CM

## 2021-06-11 DIAGNOSIS — M25.50 ARTHRALGIA, UNSPECIFIED JOINT: ICD-10-CM

## 2021-06-11 RX ORDER — ALPRAZOLAM 0.25 MG/1
0.25 TABLET ORAL 3 TIMES DAILY PRN
Qty: 40 TABLET | Refills: 0 | Status: SHIPPED | OUTPATIENT
Start: 2021-06-11 | End: 2021-06-30 | Stop reason: SDUPTHER

## 2021-06-11 RX ORDER — CELECOXIB 200 MG/1
200 CAPSULE ORAL DAILY
Qty: 90 CAPSULE | Refills: 0 | Status: SHIPPED | OUTPATIENT
Start: 2021-06-11 | End: 2021-11-08 | Stop reason: SDUPTHER

## 2021-06-11 NOTE — TELEPHONE ENCOUNTER
[place]MP verified  Patient follows with Dr Alli Velasco for this controlled substance  Last refills:    05/25/2021  1   05/24/2021  Oxycodone-Acetaminophn 7 5-325  120 00  30 [facility]   [id number]   Pen ([id number])   0  45 00 MME  [place]   05/24/2021  1   05/24/2021  Zolpidem Tart Er 12 5 MG Tab  30 00  30 [facility]   [id number]   [place] ([id number])   0   [place]   05/18/2021  1   03/26/2021  Alprazolam 0 25 MG Tablet  40 00  13 [facility]   [id number]   Pen ([id number])   2   Comm Ins           I will refill as ordered

## 2021-06-12 ENCOUNTER — APPOINTMENT (OUTPATIENT)
Dept: LAB | Facility: MEDICAL CENTER | Age: 66
End: 2021-06-12
Payer: COMMERCIAL

## 2021-06-12 DIAGNOSIS — E55.9 VITAMIN D DEFICIENCY: ICD-10-CM

## 2021-06-12 DIAGNOSIS — E11.9 TYPE 2 DIABETES MELLITUS WITHOUT COMPLICATION, WITHOUT LONG-TERM CURRENT USE OF INSULIN (HCC): ICD-10-CM

## 2021-06-12 DIAGNOSIS — E78.00 HYPERCHOLESTEROLEMIA: ICD-10-CM

## 2021-06-12 DIAGNOSIS — I10 ESSENTIAL HYPERTENSION: ICD-10-CM

## 2021-06-12 DIAGNOSIS — E53.8 B12 DEFICIENCY: ICD-10-CM

## 2021-06-12 LAB
25(OH)D3 SERPL-MCNC: 29.9 NG/ML (ref 30–100)
ALBUMIN SERPL BCP-MCNC: 4.3 G/DL (ref 3.5–5)
ALP SERPL-CCNC: 123 U/L (ref 46–116)
ALT SERPL W P-5'-P-CCNC: 56 U/L (ref 12–78)
ANION GAP SERPL CALCULATED.3IONS-SCNC: 2 MMOL/L (ref 4–13)
AST SERPL W P-5'-P-CCNC: 18 U/L (ref 5–45)
BILIRUB SERPL-MCNC: 0.58 MG/DL (ref 0.2–1)
BUN SERPL-MCNC: 25 MG/DL (ref 5–25)
CALCIUM SERPL-MCNC: 9.8 MG/DL (ref 8.3–10.1)
CHLORIDE SERPL-SCNC: 105 MMOL/L (ref 100–108)
CHOLEST SERPL-MCNC: 121 MG/DL (ref 50–200)
CO2 SERPL-SCNC: 32 MMOL/L (ref 21–32)
CREAT SERPL-MCNC: 1.01 MG/DL (ref 0.6–1.3)
EST. AVERAGE GLUCOSE BLD GHB EST-MCNC: 134 MG/DL
GFR SERPL CREATININE-BSD FRML MDRD: 77 ML/MIN/1.73SQ M
GLUCOSE P FAST SERPL-MCNC: 106 MG/DL (ref 65–99)
HBA1C MFR BLD: 6.3 %
HDLC SERPL-MCNC: 43 MG/DL
LDLC SERPL CALC-MCNC: 56 MG/DL (ref 0–100)
POTASSIUM SERPL-SCNC: 4.6 MMOL/L (ref 3.5–5.3)
PROT SERPL-MCNC: 7.4 G/DL (ref 6.4–8.2)
SODIUM SERPL-SCNC: 139 MMOL/L (ref 136–145)
TRIGL SERPL-MCNC: 110 MG/DL
VIT B12 SERPL-MCNC: 1932 PG/ML (ref 100–900)

## 2021-06-12 PROCEDURE — 82306 VITAMIN D 25 HYDROXY: CPT

## 2021-06-12 PROCEDURE — 80053 COMPREHEN METABOLIC PANEL: CPT

## 2021-06-12 PROCEDURE — 3044F HG A1C LEVEL LT 7.0%: CPT | Performed by: ORTHOPAEDIC SURGERY

## 2021-06-12 PROCEDURE — 83036 HEMOGLOBIN GLYCOSYLATED A1C: CPT

## 2021-06-12 PROCEDURE — 80061 LIPID PANEL: CPT

## 2021-06-12 PROCEDURE — 36415 COLL VENOUS BLD VENIPUNCTURE: CPT

## 2021-06-12 PROCEDURE — 82607 VITAMIN B-12: CPT

## 2021-06-21 DIAGNOSIS — G47.00 INSOMNIA, UNSPECIFIED TYPE: ICD-10-CM

## 2021-06-21 DIAGNOSIS — G89.29 CHRONIC LOW BACK PAIN WITHOUT SCIATICA, UNSPECIFIED BACK PAIN LATERALITY: ICD-10-CM

## 2021-06-21 DIAGNOSIS — M54.50 CHRONIC LOW BACK PAIN WITHOUT SCIATICA, UNSPECIFIED BACK PAIN LATERALITY: ICD-10-CM

## 2021-06-21 RX ORDER — OXYCODONE AND ACETAMINOPHEN 7.5; 325 MG/1; MG/1
1 TABLET ORAL EVERY 6 HOURS PRN
Qty: 120 TABLET | Refills: 0 | Status: SHIPPED | OUTPATIENT
Start: 2021-06-21 | End: 2021-07-17 | Stop reason: SDUPTHER

## 2021-06-21 RX ORDER — ZOLPIDEM TARTRATE 12.5 MG/1
12.5 TABLET, FILM COATED, EXTENDED RELEASE ORAL
Qty: 30 TABLET | Refills: 0 | Status: SHIPPED | OUTPATIENT
Start: 2021-06-21 | End: 2021-07-17 | Stop reason: SDUPTHER

## 2021-06-21 NOTE — TELEPHONE ENCOUNTER
05/25/2021  1  05/24/2021  OXYCODONE-ACETAMINOPHN 7 5-325  120 0  30  [facility]  [id number]  [facility] ([id number])  0  45  0 MME  [place]    05/24/2021  1  05/24/2021  ZOLPIDEM TART ER 12 5 MG TAB  30 0  30  [facility]  [id number]  [facility] ([id number])  0

## 2021-06-30 ENCOUNTER — OFFICE VISIT (OUTPATIENT)
Dept: FAMILY MEDICINE CLINIC | Facility: CLINIC | Age: 66
End: 2021-06-30
Payer: COMMERCIAL

## 2021-06-30 VITALS
WEIGHT: 256 LBS | DIASTOLIC BLOOD PRESSURE: 78 MMHG | HEART RATE: 64 BPM | TEMPERATURE: 99 F | SYSTOLIC BLOOD PRESSURE: 118 MMHG | BODY MASS INDEX: 36.65 KG/M2 | HEIGHT: 70 IN | OXYGEN SATURATION: 95 %

## 2021-06-30 DIAGNOSIS — E55.9 VITAMIN D DEFICIENCY: ICD-10-CM

## 2021-06-30 DIAGNOSIS — L02.91 ABSCESS: ICD-10-CM

## 2021-06-30 DIAGNOSIS — E78.5 HYPERLIPIDEMIA, UNSPECIFIED HYPERLIPIDEMIA TYPE: ICD-10-CM

## 2021-06-30 DIAGNOSIS — I10 ESSENTIAL HYPERTENSION: ICD-10-CM

## 2021-06-30 DIAGNOSIS — E11.9 TYPE 2 DIABETES MELLITUS WITHOUT COMPLICATION, WITHOUT LONG-TERM CURRENT USE OF INSULIN (HCC): Primary | ICD-10-CM

## 2021-06-30 DIAGNOSIS — F41.9 ANXIETY: ICD-10-CM

## 2021-06-30 DIAGNOSIS — G40.009 PARTIAL IDIOPATHIC EPILEPSY WITH SEIZURES OF LOCALIZED ONSET, NOT INTRACTABLE, WITHOUT STATUS EPILEPTICUS (HCC): ICD-10-CM

## 2021-06-30 DIAGNOSIS — J45.40 MODERATE PERSISTENT ASTHMA WITHOUT COMPLICATION: ICD-10-CM

## 2021-06-30 PROCEDURE — 1036F TOBACCO NON-USER: CPT | Performed by: FAMILY MEDICINE

## 2021-06-30 PROCEDURE — 1160F RVW MEDS BY RX/DR IN RCRD: CPT | Performed by: FAMILY MEDICINE

## 2021-06-30 PROCEDURE — 3008F BODY MASS INDEX DOCD: CPT | Performed by: FAMILY MEDICINE

## 2021-06-30 PROCEDURE — 3078F DIAST BP <80 MM HG: CPT | Performed by: FAMILY MEDICINE

## 2021-06-30 PROCEDURE — 3074F SYST BP LT 130 MM HG: CPT | Performed by: FAMILY MEDICINE

## 2021-06-30 PROCEDURE — 99215 OFFICE O/P EST HI 40 MIN: CPT | Performed by: FAMILY MEDICINE

## 2021-06-30 RX ORDER — CEPHALEXIN 500 MG/1
500 CAPSULE ORAL EVERY 8 HOURS SCHEDULED
Qty: 21 CAPSULE | Refills: 0 | Status: SHIPPED | OUTPATIENT
Start: 2021-06-30 | End: 2021-07-07

## 2021-06-30 RX ORDER — ALPRAZOLAM 0.25 MG/1
0.25 TABLET ORAL 3 TIMES DAILY PRN
Qty: 90 TABLET | Refills: 3 | Status: SHIPPED | OUTPATIENT
Start: 2021-06-30 | End: 2021-10-27

## 2021-07-01 PROBLEM — E11.9 TYPE 2 DIABETES MELLITUS WITHOUT COMPLICATION, WITHOUT LONG-TERM CURRENT USE OF INSULIN (HCC): Status: ACTIVE | Noted: 2021-07-01

## 2021-07-01 PROBLEM — J45.40 MODERATE PERSISTENT ASTHMA WITHOUT COMPLICATION: Status: ACTIVE | Noted: 2017-02-23

## 2021-07-01 NOTE — PROGRESS NOTES
Patient ID: Luis Alfonso is a 76 y o  male  HPI: 76 y o male presents for follow up of niddm, hypertension and hypercholesterolemia  Hgba1c is 6 3 and patient's issues are well controlled  Patient deneis any dyspnea, chest pain or palpitations  He has an abscess that opened up and drained purulent material on his back and has significantly decreased in size, but is still there  His asthma is well controlled on inhaler therapy   He is following with neurology for seizures  SUBJECTIVE    Family History   Problem Relation Age of Onset    Heart disease Mother     Kidney cancer Mother     Hypertension Father     Bipolar disorder Sister         bipolar disorder NOS     Social History     Socioeconomic History    Marital status: /Civil Union     Spouse name: Not on file    Number of children: 2    Years of education: trade school    Highest education level: Not on file   Occupational History     Comment: employed   Tobacco Use    Smoking status: Never Smoker    Smokeless tobacco: Never Used   Vaping Use    Vaping Use: Never assessed   Substance and Sexual Activity    Alcohol use: Yes    Drug use: Never    Sexual activity: Not Currently     Partners: Female   Other Topics Concern    Not on file   Social History Narrative    Always uses seat belt    Caffeine use    Daily coffee consumption    Daily cola consumption    Dental care, regularly    DENIED exercise frequency    Guns in the home:stored in locked cabinet    Multiple organ donor    Patient has living will    DENIED pets/animals    Power of  in existence    Water intake, adequate (per day)     Social Determinants of Health     Financial Resource Strain:     Difficulty of Paying Living Expenses:    Food Insecurity:     Worried About Running Out of Food in the Last Year:     [address] in the Last Year:    Transportation Needs:     Lack of Transportation (Medical):      Lack of Transportation (Non-Medical): Physical Activity:     Days of Exercise per Week:     Minutes of Exercise per Session:    Stress:     Feeling of Stress :    Social Connections:     Frequency of Communication with Friends and Family:     Frequency of Social Gatherings with Friends and Family:     Attends [employer] witness Services:     Active Member of Clubs or Organizations:     Attends Club or Organization Meetings:     Marital Status:    Intimate Partner Violence:     Fear of Current or Ex-Partner:     Emotionally Abused:     Physically Abused:     Sexually Abused:      Past Medical History:   Diagnosis Date    Allergic rhinitis     Asthma     Disease of thyroid gland     GERD (gastroesophageal reflux disease)     HL (hearing loss)     Hypertension     Memory loss, short term     R/O Seizures but placed on Keppra    Tinnitus      Past Surgical History:   Procedure Laterality Date    ABDOMINOPLASTY      ADENOIDECTOMY      BACK SURGERY      lower back surgery    CARPAL TUNNEL RELEASE Bilateral     CHOLECYSTECTOMY      COLONOSCOPY      GASTRIC BYPASS      HERNIA REPAIR Right     JOINT REPLACEMENT Bilateral     Knee    KNEE ARTHROSCOPY Bilateral     X3    [place] REPAIR ING HERNIA,5+Y/O,REDUCIBL Left 11/26/2018    Procedure: INGUINAL HERNIA REPAIR;  Surgeon: Ron Birmingham DO;  [facility];  Service: General    TONSILLECTOMY       Allergies   Allergen Reactions    Iv Dye [Iodinated Diagnostic Agents] Hives    Other      Environmental    Penicillins Other (See Comments)     ?  Had as a child-Unknown reaction       Current Outpatient Medications:     albuterol (PROVENTIL HFA,VENTOLIN HFA) 90 mcg/act inhaler, , Disp: , Rfl:     ALPRAZolam (XANAX) 0 25 mg tablet, Take 1 tablet (0 25 mg total) by mouth 3 (three) times a day as needed for anxiety, Disp: 90 tablet, Rfl: 3    amoxicillin (AMOXIL) 500 mg capsule, Take 2,000 mg by mouth as needed 1 hour prior to dental appointment, Disp: , Rfl:     atorvastatin (LIPITOR) 10 mg tablet, TAKE 1 TABLET BY MOUTH EVERY DAY AT NIGHT, Disp: , Rfl: 6    Benralizumab (FASENRA SC), Inject under the skin EVERY OTHER MONTH, Disp: , Rfl:     celecoxib (CeleBREX) 200 mg capsule, Take 1 capsule (200 mg total) by mouth daily, Disp: 90 capsule, Rfl: 0    cetirizine (ZyrTEC) 10 mg tablet, Take 10 mg by mouth daily after dinner  , Disp: , Rfl:     Cholecalciferol 2000 units CAPS, Take 1 capsule by mouth daily after dinner  , Disp: , Rfl:     clobetasol (TEMOVATE) 0 05 % cream, Apply topically 2 (two) times a day, Disp: 60 g, Rfl: 0    Cyanocobalamin (B-12) 1000 MCG CAPS, Take 1 tablet by mouth daily after dinner  , Disp: , Rfl:     Fasenra subcutaneous injection, , Disp: , Rfl:     fluticasone (FLONASE) 50 mcg/act nasal spray, 2 sprays into each nostril 2 (two) times a day as needed  , Disp: , Rfl:     hydrochlorothiazide (HYDRODIURIL) 25 mg tablet, Take 1 tablet by mouth daily in the early morning  , Disp: , Rfl: 3    hydrochlorothiazide (HYDRODIURIL) 25 mg tablet, TAKE 1 TABLET BY MOUTH EVERY DAY, Disp: , Rfl:     ketoconazole (NIZORAL) 2 % cream, Apply topically 2 (two) times a day, Disp: 60 g, Rfl: 0    levETIRAcetam (KEPPRA) 500 mg tablet, Take 1 tablet (500 mg total) by mouth 2 (two) times a day, Disp: 180 tablet, Rfl: 3    losartan (COZAAR) 25 mg tablet, Take 25 mg by mouth daily, Disp: , Rfl:     losartan (COZAAR) 25 mg tablet, TAKE 1 TABLET BY MOUTH EVERY DAY, Disp: , Rfl:     metFORMIN (GLUCOPHAGE) 500 mg tablet, Take 1 tablet (500 mg total) by mouth 2 (two) times a day with meals, Disp: 180 tablet, Rfl: 0    Mometasone Furo-Formoterol Fum (DULERA) 200-5 MCG/ACT AERO, Inhale 2 puffs 2 (two) times a day, Disp: , Rfl:     montelukast (SINGULAIR) 10 mg tablet, Take 10 mg by mouth every evening, Disp: , Rfl: 3    omeprazole (PriLOSEC) 40 MG capsule, TAKE 1 CAPSULE EVERY MORNING (1/2 HOUR BEFORE BREAKFAST), Disp: , Rfl: 3    oxybutynin (DITROPAN) 5 mg tablet, TAKE 1 TABLET BY MOUTH EVERY DAY AT NIGHT, Disp: , Rfl:     oxyCODONE-acetaminophen (PERCOCET) 7 5-325 MG per tablet, Take 1 tablet by mouth every 6 (six) hours as needed for moderate painMax Daily Amount: 4 tablets, Disp: 120 tablet, Rfl: 0    pantoprazole (PROTONIX) 40 mg tablet, TAKE 1 TABLET BY MOUTH DAILY BEFORE BREAKFAST, Disp: 90 tablet, Rfl: 1    Pseudoeph-Doxylamine-DM-APAP (NYQUIL PO), Take by mouth as needed , Disp: , Rfl:     tamsulosin (Flomax) 0 4 mg, Take 0 4 mg by mouth daily with dinner, Disp: , Rfl:     zolpidem (AMBIEN CR) 12 5 MG CR tablet, Take 1 tablet (12 5 mg total) by mouth daily at bedtime as needed for sleep, Disp: 30 tablet, Rfl: 0    cephalexin (KEFLEX) 500 mg capsule, Take 1 capsule (500 mg total) by mouth every 8 (eight) hours for 7 days, Disp: 21 capsule, Rfl: 0    Review of Systems  Constitutional:     Denies fever, chills ,fatigue ,weakness ,weight loss, weight gain     ENT: Denies earache ,loss of hearing ,nosebleed, nasal discharge,nasal congestion ,sore throat ,hoarseness  Pulmonary: Denies shortness of breath ,cough  ,dyspnea on exertion, orthopnea  ,PND   Cardiovascular:  Denies bradycardia , tachycardia  ,palpations, lower extremity edema leg, claudication  Breast:  Denies new or changing breast lumps ,nipple discharge ,nipple changes  Abdomen:  Denies abdominal pain , anorexia , indigestion, nausea, vomiting, constipation, diarrhea  Musculoskeletal: Denies myalgias, arthralgias, joint swelling, joint stiffness , limb pain, limb swelling  Gu: denies dysuria, polyuria  Skin: Denies skin rash, skin lesion, skin wound, itching, dry skin; partially drained abscess mid upper back erythematous    Neuro: Denies headache, numbness, tingling, confusion, loss of consciousness, dizziness, vertigo  Psychiatric: Denies feelings of depression, suicidal ideation, anxiety, sleep disturbances    OBJECTIVE  /78   Pulse 64   Temp 99 °F (37 2 °C)   Ht 5' 10" (1 778 m)   Wt 116 kg (256 lb)   SpO2 95%   BMI 36 73 kg/m²   Constitutional:   NAD, well appearing and well nourished      ENT:   Conjunctiva and lids: no injection, edema, or discharge     Pupils and iris: MARY bilaterally    External inspection of ears and nose: normal without deformities or discharge  Otoscopic exam: Canals patent without erythema  Nasal mucosa, septum and turbinates: Normal or edema or discharge         Oropharynx:  Moist mucosa, normal tongue and tonsils without lesions  No erythema        Pulmonary:Respiratory effort normal rate and rhythm, no increased work of breathing  Auscultation of lungs:  Clear bilaterally with no adventitious breath sounds       Cardiovascular: regular rate and rhythm, S1 and S2, no murmur, no edema and/or varicosities of LE      Abdomen: Soft and non-distended     Positive bowel sounds      No heptomegaly or splenomegaly      Gu: no suprapubic tenderness or CVA tenderness, no urethral discharge  Lymphatic:  No anterior or posterior cervical lymphadenopathy         Musculoskeletal:  Gait and station: Normal gait      Digits and nails normal without clubbing or cyanosis       Inspection/palpation of joints, bones, and muscles:  No joint tenderness, swelling, full active and passive range of motion       Skin: Normal skin turgor and no rashes+ mid upper thoracic abscess tender on palpation with no drainage approx 0 5cm in diameter      Neuro:    Normal reflexes     Psych:   alert and oriented to person, place and time     normal mood and affect       Assessment/Plan:Diagnoses and all orders for this visit:    Type 2 diabetes mellitus without complication, without long-term current use of insulin ([facility])  Comments:  Continue current therapy    Abscess  -     cephalexin (KEFLEX) 500 mg capsule; Take 1 capsule (500 mg total) by mouth every 8 (eight) hours for 7 days    Essential hypertension  Comments:  Stable on present therapy        Hyperlipidemia, unspecified hyperlipidemia type  Comments:  Continue statin thearpy     Anxiety  -     ALPRAZolam (XANAX) 0 25 mg tablet; Take 1 tablet (0 25 mg total) by mouth 3 (three) times a day as needed for anxiety    Vitamin D deficiency  Comments:  Continue with current therapy  Partial idiopathic epilepsy with seizures of localized onset, not intractable, without status epilepticus [facility]  Comments: Follow up with neurology    Moderate persistent asthma without complication  Comments:  Continue current inhaler therapy and singulair  Reviewed with patient plan to treat with above plan      Patient instructed to call in 72 hours if not feeling better or if symptoms worsen

## 2021-07-17 DIAGNOSIS — G89.29 CHRONIC LOW BACK PAIN WITHOUT SCIATICA, UNSPECIFIED BACK PAIN LATERALITY: ICD-10-CM

## 2021-07-17 DIAGNOSIS — M54.50 CHRONIC LOW BACK PAIN WITHOUT SCIATICA, UNSPECIFIED BACK PAIN LATERALITY: ICD-10-CM

## 2021-07-17 DIAGNOSIS — G47.00 INSOMNIA, UNSPECIFIED TYPE: ICD-10-CM

## 2021-07-19 RX ORDER — ZOLPIDEM TARTRATE 12.5 MG/1
12.5 TABLET, FILM COATED, EXTENDED RELEASE ORAL
Qty: 30 TABLET | Refills: 0 | Status: SHIPPED | OUTPATIENT
Start: 2021-07-19 | End: 2021-08-15 | Stop reason: SDUPTHER

## 2021-07-19 RX ORDER — OXYCODONE AND ACETAMINOPHEN 7.5; 325 MG/1; MG/1
1 TABLET ORAL EVERY 6 HOURS PRN
Qty: 120 TABLET | Refills: 0 | Status: SHIPPED | OUTPATIENT
Start: 2021-07-19 | End: 2021-08-15 | Stop reason: SDUPTHER

## 2021-07-19 NOTE — TELEPHONE ENCOUNTER
06/21/2021  1  06/21/2021  OXYCODONE-ACETAMINOPHN 7 5-325  120 0  30  [facility]  [id number]  [facility] ([id number])  0  45  0 MME  Comm Ins  [place]    06/21/2021  1  06/21/2021  ZOLPIDEM TART ER 12 5 MG TAB  30 0  30  [facility]  [id number]  PENNS ([id number])  0

## 2021-07-29 DIAGNOSIS — E13.9 DIABETES 1.5, MANAGED AS TYPE 2 (HCC): ICD-10-CM

## 2021-08-15 DIAGNOSIS — G47.00 INSOMNIA, UNSPECIFIED TYPE: ICD-10-CM

## 2021-08-15 DIAGNOSIS — G89.29 CHRONIC LOW BACK PAIN WITHOUT SCIATICA, UNSPECIFIED BACK PAIN LATERALITY: ICD-10-CM

## 2021-08-15 DIAGNOSIS — M54.50 CHRONIC LOW BACK PAIN WITHOUT SCIATICA, UNSPECIFIED BACK PAIN LATERALITY: ICD-10-CM

## 2021-08-15 DIAGNOSIS — B35.4 TINEA CORPORIS: ICD-10-CM

## 2021-08-16 RX ORDER — OXYCODONE AND ACETAMINOPHEN 7.5; 325 MG/1; MG/1
1 TABLET ORAL EVERY 6 HOURS PRN
Qty: 120 TABLET | Refills: 0 | Status: SHIPPED | OUTPATIENT
Start: 2021-08-16 | End: 2021-09-10 | Stop reason: SDUPTHER

## 2021-08-16 RX ORDER — KETOCONAZOLE 20 MG/G
CREAM TOPICAL 2 TIMES DAILY
Qty: 60 G | Refills: 0 | Status: SHIPPED | OUTPATIENT
Start: 2021-08-16 | End: 2021-11-22

## 2021-08-16 RX ORDER — ZOLPIDEM TARTRATE 12.5 MG/1
12.5 TABLET, FILM COATED, EXTENDED RELEASE ORAL
Qty: 30 TABLET | Refills: 0 | Status: SHIPPED | OUTPATIENT
Start: 2021-08-16 | End: 2021-09-10 | Stop reason: SDUPTHER

## 2021-09-10 DIAGNOSIS — G47.00 INSOMNIA, UNSPECIFIED TYPE: ICD-10-CM

## 2021-09-10 DIAGNOSIS — G89.29 CHRONIC LOW BACK PAIN WITHOUT SCIATICA, UNSPECIFIED BACK PAIN LATERALITY: ICD-10-CM

## 2021-09-10 DIAGNOSIS — M54.50 CHRONIC LOW BACK PAIN WITHOUT SCIATICA, UNSPECIFIED BACK PAIN LATERALITY: ICD-10-CM

## 2021-09-13 RX ORDER — ZOLPIDEM TARTRATE 12.5 MG/1
12.5 TABLET, FILM COATED, EXTENDED RELEASE ORAL
Qty: 30 TABLET | Refills: 0 | Status: SHIPPED | OUTPATIENT
Start: 2021-09-13 | End: 2021-10-07 | Stop reason: SDUPTHER

## 2021-09-13 RX ORDER — OXYCODONE AND ACETAMINOPHEN 7.5; 325 MG/1; MG/1
1 TABLET ORAL EVERY 6 HOURS PRN
Qty: 120 TABLET | Refills: 0 | Status: SHIPPED | OUTPATIENT
Start: 2021-09-13 | End: 2021-10-07 | Stop reason: SDUPTHER

## 2021-10-07 DIAGNOSIS — G47.00 INSOMNIA, UNSPECIFIED TYPE: ICD-10-CM

## 2021-10-07 DIAGNOSIS — G89.29 CHRONIC LOW BACK PAIN WITHOUT SCIATICA, UNSPECIFIED BACK PAIN LATERALITY: ICD-10-CM

## 2021-10-07 DIAGNOSIS — M54.50 CHRONIC LOW BACK PAIN WITHOUT SCIATICA, UNSPECIFIED BACK PAIN LATERALITY: ICD-10-CM

## 2021-10-07 RX ORDER — OXYCODONE AND ACETAMINOPHEN 7.5; 325 MG/1; MG/1
1 TABLET ORAL EVERY 6 HOURS PRN
Qty: 120 TABLET | Refills: 0 | Status: SHIPPED | OUTPATIENT
Start: 2021-10-11 | End: 2021-11-08 | Stop reason: SDUPTHER

## 2021-10-07 RX ORDER — ZOLPIDEM TARTRATE 12.5 MG/1
12.5 TABLET, FILM COATED, EXTENDED RELEASE ORAL
Qty: 30 TABLET | Refills: 0 | Status: SHIPPED | OUTPATIENT
Start: 2021-10-11 | End: 2021-11-08 | Stop reason: SDUPTHER

## 2021-10-27 DIAGNOSIS — F41.9 ANXIETY: ICD-10-CM

## 2021-10-27 RX ORDER — ALPRAZOLAM 0.25 MG/1
TABLET ORAL
Qty: 90 TABLET | Refills: 3 | Status: SHIPPED | OUTPATIENT
Start: 2021-10-27 | End: 2022-02-21 | Stop reason: SDUPTHER

## 2021-10-30 DIAGNOSIS — K21.00 GASTROESOPHAGEAL REFLUX DISEASE WITH ESOPHAGITIS, UNSPECIFIED WHETHER HEMORRHAGE: ICD-10-CM

## 2021-11-01 RX ORDER — PANTOPRAZOLE SODIUM 40 MG/1
TABLET, DELAYED RELEASE ORAL
Qty: 90 TABLET | Refills: 1 | Status: SHIPPED | OUTPATIENT
Start: 2021-11-01

## 2021-11-08 DIAGNOSIS — G47.00 INSOMNIA, UNSPECIFIED TYPE: ICD-10-CM

## 2021-11-08 DIAGNOSIS — M54.50 CHRONIC LOW BACK PAIN WITHOUT SCIATICA, UNSPECIFIED BACK PAIN LATERALITY: ICD-10-CM

## 2021-11-08 DIAGNOSIS — E13.9 DIABETES 1.5, MANAGED AS TYPE 2 (HCC): ICD-10-CM

## 2021-11-08 DIAGNOSIS — G89.29 CHRONIC LOW BACK PAIN WITHOUT SCIATICA, UNSPECIFIED BACK PAIN LATERALITY: ICD-10-CM

## 2021-11-08 DIAGNOSIS — M25.50 ARTHRALGIA, UNSPECIFIED JOINT: ICD-10-CM

## 2021-11-09 RX ORDER — CELECOXIB 200 MG/1
200 CAPSULE ORAL DAILY
Qty: 90 CAPSULE | Refills: 0 | Status: SHIPPED | OUTPATIENT
Start: 2021-11-09 | End: 2021-11-15 | Stop reason: SDUPTHER

## 2021-11-09 RX ORDER — ZOLPIDEM TARTRATE 12.5 MG/1
12.5 TABLET, FILM COATED, EXTENDED RELEASE ORAL
Qty: 30 TABLET | Refills: 0 | Status: SHIPPED | OUTPATIENT
Start: 2021-11-09 | End: 2021-12-04 | Stop reason: SDUPTHER

## 2021-11-09 RX ORDER — OXYCODONE AND ACETAMINOPHEN 7.5; 325 MG/1; MG/1
1 TABLET ORAL EVERY 6 HOURS PRN
Qty: 120 TABLET | Refills: 0 | Status: SHIPPED | OUTPATIENT
Start: 2021-11-09 | End: 2021-12-04 | Stop reason: SDUPTHER

## 2021-11-15 ENCOUNTER — OFFICE VISIT (OUTPATIENT)
Dept: FAMILY MEDICINE CLINIC | Facility: CLINIC | Age: 66
End: 2021-11-15
Payer: COMMERCIAL

## 2021-11-15 VITALS
BODY MASS INDEX: 38.37 KG/M2 | HEART RATE: 80 BPM | TEMPERATURE: 98.8 F | DIASTOLIC BLOOD PRESSURE: 60 MMHG | RESPIRATION RATE: 16 BRPM | HEIGHT: 70 IN | SYSTOLIC BLOOD PRESSURE: 108 MMHG | WEIGHT: 268 LBS

## 2021-11-15 DIAGNOSIS — I10 ESSENTIAL HYPERTENSION: ICD-10-CM

## 2021-11-15 DIAGNOSIS — E78.5 HYPERLIPIDEMIA, UNSPECIFIED HYPERLIPIDEMIA TYPE: ICD-10-CM

## 2021-11-15 DIAGNOSIS — M25.50 ARTHRALGIA, UNSPECIFIED JOINT: ICD-10-CM

## 2021-11-15 DIAGNOSIS — M79.641 HAND PAIN, RIGHT: ICD-10-CM

## 2021-11-15 DIAGNOSIS — E55.9 VITAMIN D DEFICIENCY: ICD-10-CM

## 2021-11-15 DIAGNOSIS — E11.9 TYPE 2 DIABETES MELLITUS WITHOUT COMPLICATION, WITHOUT LONG-TERM CURRENT USE OF INSULIN (HCC): Primary | ICD-10-CM

## 2021-11-15 DIAGNOSIS — N40.0 BENIGN PROSTATIC HYPERPLASIA WITHOUT LOWER URINARY TRACT SYMPTOMS: ICD-10-CM

## 2021-11-15 DIAGNOSIS — Z23 NEED FOR VACCINATION: ICD-10-CM

## 2021-11-15 LAB — SL AMB POCT HEMOGLOBIN AIC: 6.8 (ref ?–6.5)

## 2021-11-15 PROCEDURE — 99214 OFFICE O/P EST MOD 30 MIN: CPT | Performed by: FAMILY MEDICINE

## 2021-11-15 PROCEDURE — 1160F RVW MEDS BY RX/DR IN RCRD: CPT | Performed by: FAMILY MEDICINE

## 2021-11-15 PROCEDURE — 90471 IMMUNIZATION ADMIN: CPT

## 2021-11-15 PROCEDURE — 3008F BODY MASS INDEX DOCD: CPT | Performed by: FAMILY MEDICINE

## 2021-11-15 PROCEDURE — 90662 IIV NO PRSV INCREASED AG IM: CPT

## 2021-11-15 PROCEDURE — 3074F SYST BP LT 130 MM HG: CPT | Performed by: FAMILY MEDICINE

## 2021-11-15 PROCEDURE — 3044F HG A1C LEVEL LT 7.0%: CPT | Performed by: FAMILY MEDICINE

## 2021-11-15 PROCEDURE — 83036 HEMOGLOBIN GLYCOSYLATED A1C: CPT | Performed by: FAMILY MEDICINE

## 2021-11-15 PROCEDURE — 1036F TOBACCO NON-USER: CPT | Performed by: FAMILY MEDICINE

## 2021-11-15 PROCEDURE — 3078F DIAST BP <80 MM HG: CPT | Performed by: FAMILY MEDICINE

## 2021-11-15 RX ORDER — CELECOXIB 200 MG/1
200 CAPSULE ORAL DAILY
Qty: 90 CAPSULE | Refills: 0 | Status: SHIPPED | OUTPATIENT
Start: 2021-11-15 | End: 2022-02-28 | Stop reason: SDUPTHER

## 2021-11-21 DIAGNOSIS — B35.4 TINEA CORPORIS: ICD-10-CM

## 2021-11-22 RX ORDER — CLOBETASOL PROPIONATE 0.5 MG/G
CREAM TOPICAL
Qty: 60 G | Refills: 0 | Status: SHIPPED | OUTPATIENT
Start: 2021-11-22

## 2021-12-04 DIAGNOSIS — G89.29 CHRONIC LOW BACK PAIN WITHOUT SCIATICA, UNSPECIFIED BACK PAIN LATERALITY: ICD-10-CM

## 2021-12-04 DIAGNOSIS — M54.50 CHRONIC LOW BACK PAIN WITHOUT SCIATICA, UNSPECIFIED BACK PAIN LATERALITY: ICD-10-CM

## 2021-12-04 DIAGNOSIS — G47.00 INSOMNIA, UNSPECIFIED TYPE: ICD-10-CM

## 2021-12-06 RX ORDER — OXYCODONE AND ACETAMINOPHEN 7.5; 325 MG/1; MG/1
1 TABLET ORAL EVERY 6 HOURS PRN
Qty: 120 TABLET | Refills: 0 | Status: SHIPPED | OUTPATIENT
Start: 2021-12-06 | End: 2021-12-31 | Stop reason: SDUPTHER

## 2021-12-06 RX ORDER — ZOLPIDEM TARTRATE 12.5 MG/1
12.5 TABLET, FILM COATED, EXTENDED RELEASE ORAL
Qty: 30 TABLET | Refills: 3 | Status: SHIPPED | OUTPATIENT
Start: 2021-12-06 | End: 2021-12-31 | Stop reason: SDUPTHER

## 2021-12-11 ENCOUNTER — IMMUNIZATIONS (OUTPATIENT)
Dept: FAMILY MEDICINE CLINIC | Facility: HOSPITAL | Age: 66
End: 2021-12-11

## 2021-12-11 DIAGNOSIS — Z23 ENCOUNTER FOR IMMUNIZATION: Primary | ICD-10-CM

## 2021-12-11 PROCEDURE — 91300 COVID-19 PFIZER VACC 0.3 ML: CPT

## 2021-12-11 PROCEDURE — 0001A COVID-19 PFIZER VACC 0.3 ML: CPT

## 2022-01-05 ENCOUNTER — OFFICE VISIT (OUTPATIENT)
Dept: OBGYN CLINIC | Facility: CLINIC | Age: 67
End: 2022-01-05
Payer: MEDICARE

## 2022-01-05 VITALS
HEIGHT: 70 IN | RESPIRATION RATE: 18 BRPM | SYSTOLIC BLOOD PRESSURE: 126 MMHG | WEIGHT: 275 LBS | HEART RATE: 79 BPM | BODY MASS INDEX: 39.37 KG/M2 | DIASTOLIC BLOOD PRESSURE: 77 MMHG

## 2022-01-05 DIAGNOSIS — M65.341 TRIGGER RING FINGER OF RIGHT HAND: Primary | ICD-10-CM

## 2022-01-05 DIAGNOSIS — M79.641 HAND PAIN, RIGHT: ICD-10-CM

## 2022-01-05 PROCEDURE — 20550 NJX 1 TENDON SHEATH/LIGAMENT: CPT | Performed by: SURGERY

## 2022-01-05 PROCEDURE — 99214 OFFICE O/P EST MOD 30 MIN: CPT | Performed by: SURGERY

## 2022-01-05 RX ORDER — ATORVASTATIN CALCIUM 20 MG/1
1 TABLET, FILM COATED ORAL DAILY
COMMUNITY
Start: 2021-12-06

## 2022-01-05 RX ORDER — DEXAMETHASONE SODIUM PHOSPHATE 100 MG/10ML
40 INJECTION INTRAMUSCULAR; INTRAVENOUS
Status: COMPLETED | OUTPATIENT
Start: 2022-01-05 | End: 2022-01-05

## 2022-01-05 RX ORDER — LIDOCAINE HYDROCHLORIDE 10 MG/ML
1 INJECTION, SOLUTION INFILTRATION; PERINEURAL
Status: COMPLETED | OUTPATIENT
Start: 2022-01-05 | End: 2022-01-05

## 2022-01-05 RX ADMIN — DEXAMETHASONE SODIUM PHOSPHATE 40 MG: 100 INJECTION INTRAMUSCULAR; INTRAVENOUS at 08:24

## 2022-01-05 RX ADMIN — LIDOCAINE HYDROCHLORIDE 1 ML: 10 INJECTION, SOLUTION INFILTRATION; PERINEURAL at 08:24

## 2022-01-05 NOTE — PATIENT INSTRUCTIONS
The risks of diabetes were discussed with the patient  These risks include increased risk of infection, delayed wound healing, increased swelling, increased stiffness, and increased scar formation  While these risks are generally increased in all diabetics, keeping one's blood sugar under strict control can help decrease problems after surgery  If blood sugar levels are too high, or hemoglobin A1c levels are too high, surgery may be delayed or canceled

## 2022-01-05 NOTE — PROGRESS NOTES
Jacqueline HIGUERA  Attending, Orthopaedic Surgery  Hand, Wrist, and Elbow Surgery  [facility]      ORTHOPAEDIC HAND, WRIST, AND ELBOW OFFICE  VISIT       ASSESSMENT/PLAN:      39-year-old male here for his right long and ring trigger fingers  The ring trigger finger is more problematic today with obvious swelling and triggering  Non operative treatments were discussed with the patient that included localized cortisone injection to the A1 pulley of the ring finger  Patient wished to proceed with the injection, which he tolerated well  The anatomy and physiology of trigger finger was discussed with the patient today in the office  Edema and increased contact pressure within the flexor tendons at the A1 pulley can cause pain, crepitation, and limitation of function  Treatment options include resting MP blocking splints to decrease edema, oral anti-inflammatory medications, home or formal therapy exercises, up to 2 steroid injections within the tendon sheath, or surgical release  While majority of patients do respond to conservative treatment, up to 20% may require surgical release  The risks of diabetes were discussed with the patient  These risks include increased risk of infection, delayed wound healing, increased swelling, increased stiffness, and increased scar formation  While these risks are generally increased in all diabetics, keeping one's blood sugar under strict control can help decrease problems after surgery  If blood sugar levels are too high, or hemoglobin A1c levels are too high, surgery may be delayed or canceled  Patient notes that he will check his sugars the next week  The patient verbalized understanding of exam findings and treatment plan  We engaged in the shared decision-making process and treatment options were discussed at length with the patient  Surgical and conservative management discussed today along with risks and benefits      Diagnoses and all orders for this visit:    Trigger ring finger of right hand  -     Hand/upper extremity injection: R ring A1    Hand pain, right  -     Ambulatory referral to Orthopedic Surgery    Other orders  -     atorvastatin (LIPITOR) 20 mg tablet; Take 1 tablet by mouth in the morning        Follow Up:  Return in about 6 weeks (around 2/16/2022) for Right ring trigger finger  To Do Next Visit:  Re-evaluation of current issue      General Discussions:  Trigger Finger: The anatomy and physiology of trigger finger was discussed with the patient today in the office  Edema and increased contact pressure within the flexor tendons at the A1 pulley can cause pain, crepitation, and triggering or locking of the digit resulting in limitation of function  Symptoms can occur at anytime but are typically worse in the morning or after a brief rest from repetitive activity  Treatment options include resting/nighttime MP blocking splints to decrease edema, oral anti-inflammatory medications, home or formal therapy exercises, up to 2 steroid injections within the tendon sheath, or surgical release  While majority of patients do respond to conservative treatment, up to 20% may require surgical release        ____________________________________________________________________________________________________________________________________________      CHIEF COMPLAINT:  Chief Complaint   Patient presents with    Right Ring Finger - Locking, Pain    Right Middle Finger - Locking    Left Middle Finger - Locking       SUBJECTIVE:  Margarita Vazquez is a 78y o  year old RHD male who presents today for an evaluation for his right long and ring trigger fingers  Patient states that the ring finger is worse and locks on him daily  He has treated in 2018 with Dr Tim Villatoro for right long trigger finger and left ring trigger finger with injections  They gave him significant relief and did not need to have surgical intervention   He notes that it's been going on about 5-6 months  Patient is a diabetic on metformin  His last hemoglobin A1c was 6 8 on 11/15/2021       Pain/symptom timing:  Worse during the day when active  Pain/symptom context:  Worse with activites and work  Pain/symptom modifying factors:  Rest makes better, activities make worse  Pain/symptom associated signs/symptoms: none    Prior treatment   · NSAIDsNo   · Injections Yes   · Bracing/Orthotics No    Physical Therapy No     I have personally reviewed all the relevant PMH, PSH, SH, FH, Medications and allergies      PAST MEDICAL HISTORY:  Past Medical History:   Diagnosis Date    Allergic rhinitis     Asthma     Disease of thyroid gland     GERD (gastroesophageal reflux disease)     HL (hearing loss)     Hypertension     Memory loss, short term     R/O Seizures but placed on Keppra    Tinnitus        PAST SURGICAL HISTORY:  Past Surgical History:   Procedure Laterality Date    ABDOMINOPLASTY      ADENOIDECTOMY      BACK SURGERY      lower back surgery    CARPAL TUNNEL RELEASE Bilateral     CHOLECYSTECTOMY      COLONOSCOPY      GASTRIC BYPASS      HERNIA REPAIR Right     JOINT REPLACEMENT Bilateral     Knee    KNEE ARTHROSCOPY Bilateral     X3    [place] REPAIR ING HERNIA,5+Y/O,REDUCIBL Left 11/26/2018    Procedure: INGUINAL HERNIA REPAIR;  Surgeon: Neris Nicholson DO;  [facility];  Service: General    TONSILLECTOMY         FAMILY HISTORY:  Family History   Problem Relation Age of Onset    Heart disease Mother     Kidney cancer Mother     Hypertension Father     Bipolar disorder Sister         bipolar disorder NOS       SOCIAL HISTORY:  Social History     Tobacco Use    Smoking status: Never Smoker    Smokeless tobacco: Never Used   Vaping Use    Vaping Use: Not on file   Substance Use Topics    Alcohol use: Yes     Comment: Social/occasional    Drug use: Never       MEDICATIONS:    Current Outpatient Medications:     albuterol (PROVENTIL HFA,VENTOLIN HFA) 90 mcg/act inhaler, , Disp: , Rfl:     ALPRAZolam (XANAX) 0 25 mg tablet, TAKE 1 TABLET BY MOUTH THREE TIMES A DAY AS NEEDED FOR ANXIETY, Disp: 90 tablet, Rfl: 3    amoxicillin (AMOXIL) 500 mg capsule, Take 2,000 mg by mouth as needed 1 hour prior to dental appointment, Disp: , Rfl:     atorvastatin (LIPITOR) 20 mg tablet, Take 1 tablet by mouth in the morning, Disp: , Rfl:     Benralizumab (FASENRA SC), Inject under the skin EVERY OTHER MONTH, Disp: , Rfl:     celecoxib (CeleBREX) 200 mg capsule, Take 1 capsule (200 mg total) by mouth daily, Disp: 90 capsule, Rfl: 0    cetirizine (ZyrTEC) 10 mg tablet, Take 10 mg by mouth daily after dinner  , Disp: , Rfl:     Cholecalciferol 2000 units CAPS, Take 1 capsule by mouth daily after dinner  , Disp: , Rfl:     clobetasol (TEMOVATE) 0 05 % cream, APPLY TO AFFECTED AREA TWICE A DAY, Disp: 60 g, Rfl: 0    Cyanocobalamin (B-12) 1000 MCG CAPS, Take 1 tablet by mouth daily after dinner  , Disp: , Rfl:     fluticasone (FLONASE) 50 mcg/act nasal spray, 2 sprays into each nostril 2 (two) times a day as needed  , Disp: , Rfl:     hydrochlorothiazide (HYDRODIURIL) 25 mg tablet, Take 1 tablet by mouth daily in the early morning  , Disp: , Rfl: 3    ketoconazole (NIZORAL) 2 % cream, APPLY TO AFFECTED AREA TWICE A DAY, Disp: 60 g, Rfl: 0    levETIRAcetam (KEPPRA) 500 mg tablet, Take 1 tablet (500 mg total) by mouth 2 (two) times a day, Disp: 180 tablet, Rfl: 3    losartan (COZAAR) 25 mg tablet, Take 25 mg by mouth daily, Disp: , Rfl:     metFORMIN (GLUCOPHAGE) 500 mg tablet, Take 1 tablet (500 mg total) by mouth 2 (two) times a day with meals, Disp: 180 tablet, Rfl: 0    montelukast (SINGULAIR) 10 mg tablet, Take 10 mg by mouth every evening, Disp: , Rfl: 3    oxybutynin (DITROPAN) 5 mg tablet, TAKE 1 TABLET BY MOUTH EVERY DAY AT NIGHT, Disp: , Rfl:     oxyCODONE-acetaminophen (PERCOCET) 7 5-325 MG per tablet, Take 1 tablet by mouth every 6 (six) hours as needed for moderate pain Max Daily Amount: 4 tablets, Disp: 120 tablet, Rfl: 0    pantoprazole (PROTONIX) 40 mg tablet, TAKE 1 TABLET BY MOUTH EVERY DAY BEFORE BREAKFAST, Disp: 90 tablet, Rfl: 1    Pseudoeph-Doxylamine-DM-APAP (NYQUIL PO), Take by mouth as needed , Disp: , Rfl:     tamsulosin (Flomax) 0 4 mg, Take 0 4 mg by mouth daily with dinner, Disp: , Rfl:     zolpidem (AMBIEN CR) 12 5 MG CR tablet, Take 1 tablet (12 5 mg total) by mouth daily at bedtime as needed for sleep, Disp: 30 tablet, Rfl: 0    atorvastatin (LIPITOR) 10 mg tablet, TAKE 1 TABLET BY MOUTH EVERY DAY AT NIGHT (Patient not taking: Reported on 1/5/2022), Disp: , Rfl: 6    ALLERGIES:  Allergies   Allergen Reactions    Iv Dye [Iodinated Diagnostic Agents] Hives    Other      Environmental    Penicillins Other (See Comments)     ? Had as a child-Unknown reaction           REVIEW OF SYSTEMS:  Review of Systems   Constitutional: Negative for chills, fever and unexpected weight change  HENT: Negative for hearing loss, nosebleeds and sore throat  Eyes: Negative for pain, redness and visual disturbance  Respiratory: Negative for cough, shortness of breath and wheezing  Cardiovascular: Negative for chest pain, palpitations and leg swelling  Gastrointestinal: Negative for abdominal pain, nausea and vomiting  Endocrine: Negative for polydipsia and polyuria  Genitourinary: Negative for dysuria and hematuria  Skin: Negative for rash and wound  Neurological: Negative for dizziness, light-headedness and headaches  Psychiatric/Behavioral: Negative for decreased concentration, dysphoric mood and suicidal ideas  The patient is not nervous/anxious          VITALS:  Vitals:    01/05/22 0802   BP: 126/77   Pulse: 79   Resp: 18       LABS:  HgA1c:   Lab Results   Component Value Date    HGBA1C 6 8 (A) 11/15/2021     BMP:   Lab Results   Component Value Date    CALCIUM 9 8 06/12/2021    K 4 6 06/12/2021    CO2 32 06/12/2021     06/12/2021 BUN 25 06/12/2021    CREATININE 1 01 06/12/2021       _____________________________________________________  PHYSICAL EXAMINATION:  General: well developed and well nourished, alert, oriented times 3 and appears comfortable  Psychiatric: Normal  HEENT: Normocephalic, Atraumatic Trachea Midline, No torticollis  Pulmonary: No audible wheezing or respiratory distress   Abdomen/GI: Non tender, non distended   Cardiovascular: No pitting edema, 2+ radial pulse   Skin: No masses, erythema, lacerations, fluctation, ulcerations  Neurovascular: Sensation Intact to the Median, Ulnar, Radial Nerve, Motor Intact to the Median, Ulnar, Radial Nerve and Pulses Intact  Musculoskeletal: Normal, except as noted in detailed exam and in HPI  MUSCULOSKELETAL EXAMINATION:    right ring finger:  Positive palpable nodule over the A1 pulley  Positive tenderness to palpation over A1 pulley  Positive catching  Positive clicking  right long finger:  Positive palpable nodule over the A1 pulley  Negative tenderness to palpation over A1 pulley  Negative catching  Negative clicking     ___________________________________________________  STUDIES REVIEWED:  No images reviewed at today's visit      PROCEDURES PERFORMED:  Hand/upper extremity injection: R ring A1  Universal Protocol:  Consent: Verbal consent obtained  Risks and benefits: risks, benefits and alternatives were discussed  Consent given by: patient  Time out: Immediately prior to procedure a "time out" was called to verify the correct patient, procedure, equipment, support staff and site/side marked as required    Timeout called at: 1/5/2022 8:23 AM   Patient understanding: patient states understanding of the procedure being performed  Site marked: the operative site was marked  Patient identity confirmed: verbally with patient    Supporting Documentation  Indications: tendon swelling and pain   Procedure Details  Condition:trigger finger Location: ring finger - R ring A1 Preparation: Patient was prepped and draped in the usual sterile fashion  Needle size: 25 G  Ultrasound guidance: no  Approach: volar  Medications administered: 1 mL lidocaine 1 %; 40 mg dexamethasone 100 mg/10 mL    Patient tolerance: patient tolerated the procedure well with no immediate complications  Dressing:  Sterile dressing applied         _____________________________________________________      Scribe Attestation    I,:  Abhi Reese am acting as a scribe while in the presence of the attending physician :       I,:  Malena De Leon MD personally performed the services described in this documentation    as scribed in my presence :

## 2022-01-27 DIAGNOSIS — E13.9 DIABETES 1.5, MANAGED AS TYPE 2 (HCC): ICD-10-CM

## 2022-01-27 DIAGNOSIS — G89.29 CHRONIC LOW BACK PAIN WITHOUT SCIATICA, UNSPECIFIED BACK PAIN LATERALITY: ICD-10-CM

## 2022-01-27 DIAGNOSIS — M54.50 CHRONIC LOW BACK PAIN WITHOUT SCIATICA, UNSPECIFIED BACK PAIN LATERALITY: ICD-10-CM

## 2022-01-27 DIAGNOSIS — G47.00 INSOMNIA, UNSPECIFIED TYPE: ICD-10-CM

## 2022-01-27 RX ORDER — OXYCODONE AND ACETAMINOPHEN 7.5; 325 MG/1; MG/1
1 TABLET ORAL EVERY 6 HOURS PRN
Qty: 120 TABLET | Refills: 0 | Status: SHIPPED | OUTPATIENT
Start: 2022-01-31 | End: 2022-02-28 | Stop reason: SDUPTHER

## 2022-01-27 RX ORDER — ZOLPIDEM TARTRATE 12.5 MG/1
12.5 TABLET, FILM COATED, EXTENDED RELEASE ORAL
Qty: 30 TABLET | Refills: 0 | Status: SHIPPED | OUTPATIENT
Start: 2022-01-31 | End: 2022-01-31

## 2022-01-27 NOTE — TELEPHONE ENCOUNTER
01/20/2022  1  10/27/2021  ALPRAZOLAM 0 25 MG TABLET  90 0  30  [facility]  [id number]  P[facility] ([id number])  3   Comm [facility]    01/03/2022  1  01/03/2022  OXYCODONE-ACETAMINOPHN 7 5-325  120 0  30  RU KEIRA  [id number]  PEN[facility] ([id number])  0  45  0 MME  Comm [facility]    01/03/2022  1  01/03/2022  ZOLPIDEM TART ER 12 5 MG TAB  30 0  30  [facility]A  [id number]  P[facility] ([id number])  0   Comm [facility]

## 2022-01-31 DIAGNOSIS — G47.00 INSOMNIA, UNSPECIFIED TYPE: ICD-10-CM

## 2022-01-31 RX ORDER — ZOLPIDEM TARTRATE 12.5 MG/1
12.5 TABLET, FILM COATED, EXTENDED RELEASE ORAL
Qty: 30 TABLET | Refills: 0 | Status: SHIPPED | OUTPATIENT
Start: 2022-01-31 | End: 2022-02-28 | Stop reason: SDUPTHER

## 2022-02-16 ENCOUNTER — OFFICE VISIT (OUTPATIENT)
Dept: OBGYN CLINIC | Facility: CLINIC | Age: 67
End: 2022-02-16
Payer: MEDICARE

## 2022-02-16 VITALS
HEART RATE: 71 BPM | WEIGHT: 272 LBS | BODY MASS INDEX: 38.94 KG/M2 | DIASTOLIC BLOOD PRESSURE: 72 MMHG | RESPIRATION RATE: 17 BRPM | HEIGHT: 70 IN | SYSTOLIC BLOOD PRESSURE: 123 MMHG

## 2022-02-16 DIAGNOSIS — M65.341 TRIGGER RING FINGER OF RIGHT HAND: Primary | ICD-10-CM

## 2022-02-16 DIAGNOSIS — M65.331 TRIGGER FINGER, RIGHT MIDDLE FINGER: ICD-10-CM

## 2022-02-16 PROCEDURE — 20550 NJX 1 TENDON SHEATH/LIGAMENT: CPT | Performed by: SURGERY

## 2022-02-16 PROCEDURE — 3074F SYST BP LT 130 MM HG: CPT | Performed by: SURGERY

## 2022-02-16 PROCEDURE — 3078F DIAST BP <80 MM HG: CPT | Performed by: SURGERY

## 2022-02-16 PROCEDURE — 3008F BODY MASS INDEX DOCD: CPT | Performed by: SURGERY

## 2022-02-16 PROCEDURE — 99214 OFFICE O/P EST MOD 30 MIN: CPT | Performed by: SURGERY

## 2022-02-16 PROCEDURE — 1160F RVW MEDS BY RX/DR IN RCRD: CPT | Performed by: SURGERY

## 2022-02-16 PROCEDURE — 1036F TOBACCO NON-USER: CPT | Performed by: SURGERY

## 2022-02-16 RX ORDER — LIDOCAINE HYDROCHLORIDE 10 MG/ML
1 INJECTION, SOLUTION INFILTRATION; PERINEURAL
Status: COMPLETED | OUTPATIENT
Start: 2022-02-16 | End: 2022-02-16

## 2022-02-16 RX ORDER — DEXAMETHASONE SODIUM PHOSPHATE 100 MG/10ML
40 INJECTION INTRAMUSCULAR; INTRAVENOUS
Status: COMPLETED | OUTPATIENT
Start: 2022-02-16 | End: 2022-02-16

## 2022-02-16 RX ADMIN — DEXAMETHASONE SODIUM PHOSPHATE 40 MG: 100 INJECTION INTRAMUSCULAR; INTRAVENOUS at 10:28

## 2022-02-16 RX ADMIN — LIDOCAINE HYDROCHLORIDE 1 ML: 10 INJECTION, SOLUTION INFILTRATION; PERINEURAL at 10:28

## 2022-02-16 NOTE — PROGRESS NOTES
ASSESSMENT/PLAN:      78year old male here for his right ring and long trigger fingers  The ring continues to be more symptomatic for him and gets locked with forced gripping  A 2nd injection was adminstered for the ring trigger today, which he tolerated well  Patient is to keep the fingers moving through full ROM due to some stiffness at the ring  We will re-evaluate in 6 weeks for possible surgical intervention     The patient verbalized understanding of exam findings and treatment plan  We engaged in the shared decision-making process and treatment options were discussed at length with the patient  Surgical and conservative management discussed today along with risks and benefits  Diagnoses and all orders for this visit:    Trigger ring finger of right hand  -     Hand/upper extremity injection: R ring A1    Trigger finger, right middle finger        Follow Up:  Return in about 6 weeks (around 3/30/2022) for right hand  To Do Next Visit:  Re-evaluation of current issue    ____________________________________________________________________________________________________________________________________________      CHIEF COMPLAINT:  Chief Complaint   Patient presents with    Right Middle Finger - Locking, Pain, Follow-up    Right Ring Finger - Pain, Follow-up, Locking       SUBJECTIVE:  Tania Hughes is a 78y o  year old RHD male who presents today for 6 week follow-up for his right long and ring trigger finger  At his last visit in cortisone injection was provided for the right ring trigger finger due to it being more symptomatic than the long  It gave him about 10-20 % relief  He would like to try a 2nd injection today  He has treated in 2018 with Dr Wilmer Perdue for right long trigger finger and left ring trigger finger with injections  Patient is a diabetic  His last hemoglobin A1c on 11/15/2021 was 6 8         I have personally reviewed all the relevant PMH, PSH, SH, FH, Medications and allergies       PAST MEDICAL HISTORY:  Past Medical History:   Diagnosis Date    Allergic rhinitis     Asthma     Disease of thyroid gland     GERD (gastroesophageal reflux disease)     HL (hearing loss)     Hypertension     Memory loss, short term     R/O Seizures but placed on Keppra    Tinnitus        PAST SURGICAL HISTORY:  Past Surgical History:   Procedure Laterality Date    ABDOMINOPLASTY      ADENOIDECTOMY      BACK SURGERY      lower back surgery    CARPAL TUNNEL RELEASE Bilateral     CHOLECYSTECTOMY      COLONOSCOPY      GASTRIC BYPASS      HERNIA REPAIR Right     JOINT REPLACEMENT Bilateral     Knee    KNEE ARTHROSCOPY Bilateral     X3    MS REPAIR ING HERNIA,5+Y/O,REDUCIBL Left 11/26/2018    Procedure: INGUINAL HERNIA REPAIR;  Surgeon: Jeanie Lowe DO;  [facility];  Service: General    TONSILLECTOMY         FAMILY HISTORY:  Family History   Problem Relation Age of Onset    Heart disease Mother     Kidney cancer Mother     Hypertension Father     Bipolar disorder Sister         bipolar disorder NOS       SOCIAL HISTORY:  Social History     Tobacco Use    Smoking status: Never Smoker    Smokeless tobacco: Never Used   Vaping Use    Vaping Use: Not on file   Substance Use Topics    Alcohol use: Yes     Comment: Social/occasional    Drug use: Never       MEDICATIONS:    Current Outpatient Medications:     albuterol (PROVENTIL HFA,VENTOLIN HFA) 90 mcg/act inhaler, , Disp: , Rfl:     ALPRAZolam (XANAX) 0 25 mg tablet, TAKE 1 TABLET BY MOUTH THREE TIMES A DAY AS NEEDED FOR ANXIETY, Disp: 90 tablet, Rfl: 3    amoxicillin (AMOXIL) 500 mg capsule, Take 2,000 mg by mouth as needed 1 hour prior to dental appointment, Disp: , Rfl:     atorvastatin (LIPITOR) 20 mg tablet, Take 1 tablet by mouth in the morning, Disp: , Rfl:     Benralizumab (FASENRA SC), Inject under the skin EVERY OTHER MONTH, Disp: , Rfl:     celecoxib (CeleBREX) 200 mg capsule, Take 1 capsule (200 mg total) by mouth daily, Disp: 90 capsule, Rfl: 0    cetirizine (ZyrTEC) 10 mg tablet, Take 10 mg by mouth daily after dinner  , Disp: , Rfl:     Cholecalciferol 2000 units CAPS, Take 1 capsule by mouth daily after dinner  , Disp: , Rfl:     clobetasol (TEMOVATE) 0 05 % cream, APPLY TO AFFECTED AREA TWICE A DAY, Disp: 60 g, Rfl: 0    Cyanocobalamin (B-12) 1000 MCG CAPS, Take 1 tablet by mouth daily after dinner  , Disp: , Rfl:     fluticasone (FLONASE) 50 mcg/act nasal spray, 2 sprays into each nostril 2 (two) times a day as needed  , Disp: , Rfl:     hydrochlorothiazide (HYDRODIURIL) 25 mg tablet, Take 1 tablet by mouth daily in the early morning  , Disp: , Rfl: 3    ketoconazole (NIZORAL) 2 % cream, APPLY TO AFFECTED AREA TWICE A DAY, Disp: 60 g, Rfl: 0    levETIRAcetam (KEPPRA) 500 mg tablet, Take 1 tablet (500 mg total) by mouth 2 (two) times a day, Disp: 180 tablet, Rfl: 3    losartan (COZAAR) 25 mg tablet, Take 25 mg by mouth daily, Disp: , Rfl:     metFORMIN (GLUCOPHAGE) 500 mg tablet, Take 1 tablet (500 mg total) by mouth 2 (two) times a day with meals, Disp: 180 tablet, Rfl: 0    montelukast (SINGULAIR) 10 mg tablet, Take 10 mg by mouth every evening, Disp: , Rfl: 3    oxybutynin (DITROPAN) 5 mg tablet, TAKE 1 TABLET BY MOUTH EVERY DAY AT NIGHT, Disp: , Rfl:     oxyCODONE-acetaminophen (PERCOCET) 7 5-325 MG per tablet, Take 1 tablet by mouth every 6 (six) hours as needed for moderate pain Max Daily Amount: 4 tablets, Disp: 120 tablet, Rfl: 0    pantoprazole (PROTONIX) 40 mg tablet, TAKE 1 TABLET BY MOUTH EVERY DAY BEFORE BREAKFAST, Disp: 90 tablet, Rfl: 1    Pseudoeph-Doxylamine-DM-APAP (NYQUIL PO), Take by mouth as needed , Disp: , Rfl:     tamsulosin (Flomax) 0 4 mg, Take 0 4 mg by mouth daily with dinner, Disp: , Rfl:     zolpidem (AMBIEN CR) 12 5 MG CR tablet, TAKE 1 TABLET (12 5 MG TOTAL) BY MOUTH DAILY AT BEDTIME AS NEEDED FOR SLEEP, Disp: 30 tablet, Rfl: 0    atorvastatin (LIPITOR) 10 mg tablet, TAKE 1 TABLET BY MOUTH EVERY DAY AT NIGHT (Patient not taking: Reported on 1/5/2022), Disp: , Rfl: 6    ALLERGIES:  Allergies   Allergen Reactions    Iv Dye [Iodinated Diagnostic Agents] Hives    Other      Environmental    Penicillins Other (See Comments)     ? Had as a child-Unknown reaction       REVIEW OF SYSTEMS:  Review of Systems   Constitutional: Negative for chills, fever and unexpected weight change  HENT: Negative for hearing loss, nosebleeds and sore throat  Eyes: Negative for pain, redness and visual disturbance  Respiratory: Negative for cough, shortness of breath and wheezing  Cardiovascular: Negative for chest pain, palpitations and leg swelling  Gastrointestinal: Negative for abdominal pain, nausea and vomiting  Endocrine: Negative for polydipsia and polyuria  Genitourinary: Negative for dysuria and hematuria  Skin: Negative for rash and wound  Neurological: Positive for numbness  Negative for dizziness, light-headedness and headaches  Psychiatric/Behavioral: Negative for decreased concentration, dysphoric mood and suicidal ideas  The patient is not nervous/anxious          VITALS:  Vitals:    02/16/22 1011   BP: 123/72   Pulse: 71   Resp: 17       LABS:  HgA1c:   Lab Results   Component Value Date    HGBA1C 6 8 (A) 11/15/2021     BMP:   Lab Results   Component Value Date    CALCIUM 9 8 06/12/2021    K 4 6 06/12/2021    CO2 32 06/12/2021     06/12/2021    BUN 25 06/12/2021    CREATININE 1 01 06/12/2021       _____________________________________________________  PHYSICAL EXAMINATION:  General: well developed and well nourished, alert, oriented times 3 and appears comfortable  Psychiatric: Normal  HEENT: Normocephalic, Atraumatic Trachea Midline, No torticollis  Pulmonary: No audible wheezing or respiratory distress   Cardiovascular: No pitting edema, 2+ radial pulse   Abdominal/GI: abdomen non tender, non distended   Skin: No masses, erythema, lacerations, fluctation, ulcerations  Neurovascular: Sensation Intact to the Median, Ulnar, Radial Nerve, Motor Intact to the Median, Ulnar, Radial Nerve and Pulses Intact  Musculoskeletal: Normal, except as noted in detailed exam and in HPI  MUSCULOSKELETAL EXAMINATION:    right ring finger:  Positive palpable nodule over the A1 pulley  Positive tenderness to palpation over A1 pulley  Negative catching  Positive clicking  right long finger:  Positive palpable nodule over the A1 pulley  Negative tenderness to palpation over A1 pulley  Negative catching  Negative clicking     ___________________________________________________  STUDIES REVIEWED:  No images reviewed at today's visit      PROCEDURES PERFORMED:  Hand/upper extremity injection: R ring A1  Universal Protocol:  Consent: Verbal consent obtained  Risks and benefits: risks, benefits and alternatives were discussed  Consent given by: patient  Time out: Immediately prior to procedure a "time out" was called to verify the correct patient, procedure, equipment, support staff and site/side marked as required    Timeout called at: 2/16/2022 10:28 AM   Patient understanding: patient states understanding of the procedure being performed  Site marked: the operative site was marked  Patient identity confirmed: verbally with patient    Supporting Documentation  Indications: tendon swelling and pain   Procedure Details  Condition:trigger finger Location: ring finger - R ring A1   Preparation: Patient was prepped and draped in the usual sterile fashion  Needle size: 25 G  Ultrasound guidance: no  Approach: volar  Medications administered: 1 mL lidocaine 1 %; 40 mg dexamethasone 100 mg/10 mL    Patient tolerance: patient tolerated the procedure well with no immediate complications  Dressing:  Sterile dressing applied         _____________________________________________________      Scribe Attestation    I,:  Abhi Reese am acting as a scribe while in the presence of the attending physician :       I,:  Margaret Coy MD personally performed the services described in this documentation    as scribed in my presence :

## 2022-02-21 DIAGNOSIS — F41.9 ANXIETY: ICD-10-CM

## 2022-02-21 RX ORDER — ALPRAZOLAM 0.25 MG/1
0.25 TABLET ORAL 3 TIMES DAILY PRN
Qty: 90 TABLET | Refills: 0 | Status: SHIPPED | OUTPATIENT
Start: 2022-02-21 | End: 2022-03-17 | Stop reason: SDUPTHER

## 2022-02-27 DIAGNOSIS — M54.50 CHRONIC LOW BACK PAIN WITHOUT SCIATICA, UNSPECIFIED BACK PAIN LATERALITY: ICD-10-CM

## 2022-02-27 DIAGNOSIS — G89.29 CHRONIC LOW BACK PAIN WITHOUT SCIATICA, UNSPECIFIED BACK PAIN LATERALITY: ICD-10-CM

## 2022-02-27 DIAGNOSIS — M25.50 ARTHRALGIA, UNSPECIFIED JOINT: ICD-10-CM

## 2022-02-27 DIAGNOSIS — G47.00 INSOMNIA, UNSPECIFIED TYPE: ICD-10-CM

## 2022-02-27 DIAGNOSIS — B35.4 TINEA CORPORIS: ICD-10-CM

## 2022-02-28 DIAGNOSIS — M25.50 ARTHRALGIA, UNSPECIFIED JOINT: ICD-10-CM

## 2022-02-28 DIAGNOSIS — G89.29 CHRONIC LOW BACK PAIN WITHOUT SCIATICA, UNSPECIFIED BACK PAIN LATERALITY: ICD-10-CM

## 2022-02-28 DIAGNOSIS — G47.00 INSOMNIA, UNSPECIFIED TYPE: ICD-10-CM

## 2022-02-28 DIAGNOSIS — M54.50 CHRONIC LOW BACK PAIN WITHOUT SCIATICA, UNSPECIFIED BACK PAIN LATERALITY: ICD-10-CM

## 2022-02-28 DIAGNOSIS — B35.4 TINEA CORPORIS: ICD-10-CM

## 2022-02-28 RX ORDER — OXYCODONE AND ACETAMINOPHEN 7.5; 325 MG/1; MG/1
1 TABLET ORAL EVERY 6 HOURS PRN
Qty: 120 TABLET | Refills: 0 | Status: SHIPPED | OUTPATIENT
Start: 2022-02-28 | End: 2022-03-28 | Stop reason: SDUPTHER

## 2022-02-28 RX ORDER — ZOLPIDEM TARTRATE 12.5 MG/1
12.5 TABLET, FILM COATED, EXTENDED RELEASE ORAL
Qty: 30 TABLET | Refills: 0 | Status: SHIPPED | OUTPATIENT
Start: 2022-02-28 | End: 2022-03-21

## 2022-02-28 RX ORDER — ZOLPIDEM TARTRATE 12.5 MG/1
12.5 TABLET, FILM COATED, EXTENDED RELEASE ORAL
Qty: 30 TABLET | Refills: 3 | Status: SHIPPED | OUTPATIENT
Start: 2022-02-28 | End: 2022-05-23 | Stop reason: SDUPTHER

## 2022-02-28 RX ORDER — CELECOXIB 200 MG/1
200 CAPSULE ORAL DAILY
Qty: 90 CAPSULE | Refills: 0 | Status: SHIPPED | OUTPATIENT
Start: 2022-02-28 | End: 2022-03-21

## 2022-02-28 RX ORDER — KETOCONAZOLE 20 MG/G
1 CREAM TOPICAL 2 TIMES DAILY
Qty: 60 G | Refills: 0 | Status: SHIPPED | OUTPATIENT
Start: 2022-02-28 | End: 2022-03-21

## 2022-02-28 RX ORDER — KETOCONAZOLE 20 MG/G
1 CREAM TOPICAL 2 TIMES DAILY
Qty: 60 G | Refills: 0 | Status: SHIPPED | OUTPATIENT
Start: 2022-02-28

## 2022-02-28 RX ORDER — CELECOXIB 200 MG/1
200 CAPSULE ORAL DAILY
Qty: 90 CAPSULE | Refills: 0 | Status: SHIPPED | OUTPATIENT
Start: 2022-02-28 | End: 2022-05-27

## 2022-02-28 RX ORDER — OXYCODONE AND ACETAMINOPHEN 7.5; 325 MG/1; MG/1
1 TABLET ORAL EVERY 6 HOURS PRN
Qty: 120 TABLET | Refills: 0 | Status: SHIPPED | OUTPATIENT
Start: 2022-02-28 | End: 2022-03-21

## 2022-02-28 NOTE — TELEPHONE ENCOUNTER
1  [id number] 02/21/2022 02/21/2022 ALPRAZolam 90 0 30 0 25 [facility] / 0 [address]    1  [id number] 01/31/2022 01/27/2022 oxyCODONE HCL-ACETAMINOPHEN 120 0 30 325 MG-7 5 MG 45 0 BUSH[facility], [facility] 00 / 0 [address]    1  [id number] 01/31/2022 01/31/2022 Zolpidem Tartrate 30 0 30 12 5 MG NA [facility] 00 / 0 [place]

## 2022-03-17 DIAGNOSIS — F41.9 ANXIETY: ICD-10-CM

## 2022-03-17 RX ORDER — ALPRAZOLAM 0.25 MG/1
0.25 TABLET ORAL 3 TIMES DAILY PRN
Qty: 90 TABLET | Refills: 0 | Status: SHIPPED | OUTPATIENT
Start: 2022-03-17 | End: 2022-04-13 | Stop reason: SDUPTHER

## 2022-03-21 ENCOUNTER — OFFICE VISIT (OUTPATIENT)
Dept: FAMILY MEDICINE CLINIC | Facility: CLINIC | Age: 67
End: 2022-03-21
Payer: MEDICARE

## 2022-03-21 VITALS
TEMPERATURE: 98.5 F | WEIGHT: 279 LBS | DIASTOLIC BLOOD PRESSURE: 76 MMHG | SYSTOLIC BLOOD PRESSURE: 120 MMHG | HEIGHT: 70 IN | BODY MASS INDEX: 39.94 KG/M2 | HEART RATE: 76 BPM

## 2022-03-21 DIAGNOSIS — I10 ESSENTIAL HYPERTENSION: ICD-10-CM

## 2022-03-21 DIAGNOSIS — D49.89 NEOPLASM OF NOSE: ICD-10-CM

## 2022-03-21 DIAGNOSIS — E78.5 HYPERLIPIDEMIA, UNSPECIFIED HYPERLIPIDEMIA TYPE: ICD-10-CM

## 2022-03-21 DIAGNOSIS — J01.90 ACUTE SINUSITIS, RECURRENCE NOT SPECIFIED, UNSPECIFIED LOCATION: ICD-10-CM

## 2022-03-21 DIAGNOSIS — L72.3 SEBACEOUS CYST: ICD-10-CM

## 2022-03-21 DIAGNOSIS — E11.9 TYPE 2 DIABETES MELLITUS WITHOUT COMPLICATION, WITHOUT LONG-TERM CURRENT USE OF INSULIN (HCC): Primary | ICD-10-CM

## 2022-03-21 LAB — SL AMB POCT HEMOGLOBIN AIC: 6.8 (ref ?–6.5)

## 2022-03-21 PROCEDURE — 99215 OFFICE O/P EST HI 40 MIN: CPT | Performed by: FAMILY MEDICINE

## 2022-03-21 PROCEDURE — 83036 HEMOGLOBIN GLYCOSYLATED A1C: CPT | Performed by: FAMILY MEDICINE

## 2022-03-21 RX ORDER — CEFUROXIME AXETIL 500 MG/1
500 TABLET ORAL EVERY 12 HOURS SCHEDULED
Qty: 20 TABLET | Refills: 0 | Status: SHIPPED | OUTPATIENT
Start: 2022-03-21 | End: 2022-03-31

## 2022-03-21 RX ORDER — FLUTICASONE FUROATE AND VILANTEROL TRIFENATATE 100; 25 UG/1; UG/1
1 POWDER RESPIRATORY (INHALATION) DAILY
COMMUNITY

## 2022-03-21 RX ORDER — PREDNISONE 10 MG/1
TABLET ORAL
Qty: 26 TABLET | Refills: 0 | Status: SHIPPED | OUTPATIENT
Start: 2022-03-21 | End: 2022-04-25 | Stop reason: ALTCHOICE

## 2022-03-28 DIAGNOSIS — G89.29 CHRONIC LOW BACK PAIN WITHOUT SCIATICA, UNSPECIFIED BACK PAIN LATERALITY: ICD-10-CM

## 2022-03-28 DIAGNOSIS — M54.50 CHRONIC LOW BACK PAIN WITHOUT SCIATICA, UNSPECIFIED BACK PAIN LATERALITY: ICD-10-CM

## 2022-03-28 RX ORDER — OXYCODONE AND ACETAMINOPHEN 7.5; 325 MG/1; MG/1
1 TABLET ORAL EVERY 6 HOURS PRN
Qty: 120 TABLET | Refills: 0 | Status: SHIPPED | OUTPATIENT
Start: 2022-03-28 | End: 2022-04-24 | Stop reason: SDUPTHER

## 2022-03-29 ENCOUNTER — TELEPHONE (OUTPATIENT)
Dept: FAMILY MEDICINE CLINIC | Facility: CLINIC | Age: 67
End: 2022-03-29

## 2022-03-29 ENCOUNTER — TELEPHONE (OUTPATIENT)
Dept: NEUROLOGY | Facility: CLINIC | Age: 67
End: 2022-03-29

## 2022-03-29 NOTE — TELEPHONE ENCOUNTER
Pt called stating he finished his steroids yesterday and he's back to dealing with the chest tightness  States you had mentioned him doing another prednisone pack and also getting an xray

## 2022-03-30 ENCOUNTER — OFFICE VISIT (OUTPATIENT)
Dept: OBGYN CLINIC | Facility: CLINIC | Age: 67
End: 2022-03-30
Payer: MEDICARE

## 2022-03-30 VITALS
SYSTOLIC BLOOD PRESSURE: 128 MMHG | WEIGHT: 277.4 LBS | HEIGHT: 70 IN | HEART RATE: 73 BPM | DIASTOLIC BLOOD PRESSURE: 80 MMHG | BODY MASS INDEX: 39.71 KG/M2

## 2022-03-30 DIAGNOSIS — M65.331 TRIGGER FINGER, RIGHT MIDDLE FINGER: Primary | ICD-10-CM

## 2022-03-30 DIAGNOSIS — M65.341 TRIGGER RING FINGER OF RIGHT HAND: ICD-10-CM

## 2022-03-30 DIAGNOSIS — R06.2 WHEEZING: Primary | ICD-10-CM

## 2022-03-30 DIAGNOSIS — E11.9 TYPE 2 DIABETES MELLITUS WITHOUT COMPLICATION, WITHOUT LONG-TERM CURRENT USE OF INSULIN (HCC): ICD-10-CM

## 2022-03-30 DIAGNOSIS — Z01.818 PRE-OP TESTING: ICD-10-CM

## 2022-03-30 PROCEDURE — 99215 OFFICE O/P EST HI 40 MIN: CPT | Performed by: SURGERY

## 2022-03-30 PROCEDURE — 3008F BODY MASS INDEX DOCD: CPT | Performed by: SURGERY

## 2022-03-30 RX ORDER — METHYLPREDNISOLONE 4 MG/1
TABLET ORAL
Qty: 21 EACH | Refills: 0 | Status: SHIPPED | OUTPATIENT
Start: 2022-03-30 | End: 2022-04-25 | Stop reason: ALTCHOICE

## 2022-03-30 NOTE — H&P
ASSESSMENT/PLAN:      41-year-old male here for his right long and ring trigger fingers  Patient has treated non operatively with  Cortisone injections; 2 in the ring finger and 1 injection to the long finger  A right ring trigger finger release with possible right long trigger release  The anatomy and physiology of trigger finger was discussed with the patient today in the office  Edema and increased contact pressure within the flexor tendons at the A1 pulley can cause pain, crepitation, and limitation of function  Treatment options include resting MP blocking splints to decrease edema, oral anti-inflammatory medications, home or formal therapy exercises, up to 2 steroid injections within the tendon sheath, or surgical release  While majority of patients do respond to conservative treatment, up to 20% may require surgical release  This is an outpatient procedure where his hand will be wrapped up for 5 days  Using hand for small tasks to keep moving  After 5 days, wrap can come off and can wash, use mild soap and pat dry  Do not submerge in bath, dirty dishwater, pools, ocean, lakes hot tubs  Sutures come out 10-14 days  We will follow up post op  Risks of surgery include bleeding infection damage to surrounding structures recurrence or incomplete resolution of symptoms, need for further surgery  The patient verbalized understanding of exam findings and treatment plan  We engaged in the shared decision-making process and treatment options were discussed at length with the patient  Surgical and conservative management discussed today along with risks and benefits      Diagnoses and all orders for this visit:    Trigger finger, right middle finger  -     Case request operating room: RELEASE TRIGGER FINGER RING WITH POSSIBLE LONG; Standing  -     Case request operating room: RELEASE TRIGGER FINGER RING WITH POSSIBLE LONG    Trigger ring finger of right hand  -     Case request operating room: RELEASE TRIGGER FINGER RING WITH POSSIBLE LONG; Standing  -     Case request operating room: RELEASE TRIGGER FINGER RING WITH POSSIBLE LONG    Type 2 diabetes mellitus without complication, without long-term current use of insulin (HCC)  -     HEMOGLOBIN A1C W/ EAG ESTIMATION; Future    Pre-op testing  -     Basic metabolic panel; Future  -     EKG 12 lead; Future      Trigger Finger Release: The anatomy and physiology of trigger finger was discussed with the patient today in the office  Edema and increased contact pressure within the flexor tendons at the A1 pulley can cause pain, crepitation, and limitation of function  Treatment options include resting MP blocking splints to decrease edema, oral anti-inflammatory medications, home or formal therapy exercises, up to 2 steroid injections or surgical release  While majority of patients do respond to conservative treatment, up to 20% may require surgical release  The patient has elected release of the trigger finger  The patient has elected to undergo a release of the A1 pulley (trigger finger)  A small incision will be made over the palmar aspect of the hand, the tendon sheath holding the flexor tendons will be released  In the postoperative period, light activities are allowed immediately, driving is allowed when narcotic medication has stopped, and the incision may get wet after 2 days  Heavy activities (lifting more than approximately 10 pounds) will be allowed after the follow up appointment in 1-2 weeks  While the pain and discomfort within the wrist typically improves rapidly, some residual discomfort may be present for up to 6 weeks  The nodule that is typically palpable in the palmar aspect of the hand will not be removed, as this would necessitate removal of a portion of the flexor tendon, however the catching, clicking, and locking should resolve  Approximate success rate is 98%  The risks and benefits of the procedure were explained to the patient, which include, but are not limited to: Bleeding, infection, recurrence, pain, scar, damage to tendons, damage to nerves, and damage to blood vessels, need for future surgery and complications related to anesthesia  If bony work is done, risks also include malunion and nonunion  These risks, along with alternative conservative treatment options, and postoperative protocols were voiced back and understood by the patient  All questions were answered to the patient's satisfaction  The patient agrees to comply with a standard postoperative protocol, and is willing to proceed  Education was provided via written and auditory forms  There were no barriers to learning  Written handouts regarding wound care, incision and scar care, and general preoperative information, as well as risks and benefits were provided to the patient  Follow Up:  Return for Follow up post- op  To Do Next Visit:  Re-evaluation of current issue    ____________________________________________________________________________________________________________________________________________      CHIEF COMPLAINT:  Chief Complaint   Patient presents with    Right Ring Finger - Follow-up       SUBJECTIVE:  Aline Akbar is a 78y o  year old  RHD male who presents today for 6 week follow-up for his right ring and long trigger fingers  At his last visit the 2nd injection for right ring trigger finger was administered  The right long finger was asymptomatic after the 1st injection and did not want a 2nd  Patient is a diabetic with his last hemoglobin A1c of 6 8 on 03/21/2022  I have personally reviewed all the relevant PMH, PSH, SH, FH, Medications and allergies       PAST MEDICAL HISTORY:  Past Medical History:   Diagnosis Date    Allergic rhinitis     Asthma     Disease of thyroid gland     GERD (gastroesophageal reflux disease)     HL (hearing loss)     Hypertension     Memory loss, short term     R/O Seizures but placed on Keppra    Tinnitus        PAST SURGICAL HISTORY:  Past Surgical History:   Procedure Laterality Date    ABDOMINOPLASTY      ADENOIDECTOMY      BACK SURGERY      lower back surgery    CARPAL TUNNEL RELEASE Bilateral     CHOLECYSTECTOMY      COLONOSCOPY      GASTRIC BYPASS      HERNIA REPAIR Right     JOINT REPLACEMENT Bilateral     Knee    KNEE ARTHROSCOPY Bilateral     X3    HI REPAIR ING HERNIA,5+Y/O,REDUCIBL Left 11/26/2018    Procedure: INGUINAL HERNIA REPAIR;  Surgeon: Fatemeh Apodaca DO;  [facility];  Service: General    TONSILLECTOMY         FAMILY HISTORY:  Family History   Problem Relation Age of Onset    Heart disease Mother     Kidney cancer Mother     Hypertension Father     Bipolar disorder Sister         bipolar disorder NOS       SOCIAL HISTORY:  Social History     Tobacco Use    Smoking status: Never Smoker    Smokeless tobacco: Never Used   Vaping Use    Vaping Use: Not on file   Substance Use Topics    Alcohol use:  Yes     Alcohol/week: 0 0 standard drinks     Comment: 1 or 2 month    Drug use: No       MEDICATIONS:    Current Outpatient Medications:     albuterol (PROVENTIL HFA,VENTOLIN HFA) 90 mcg/act inhaler, , Disp: , Rfl:     ALPRAZolam (XANAX) 0 25 mg tablet, Take 1 tablet (0 25 mg total) by mouth 3 (three) times a day as needed for anxiety, Disp: 90 tablet, Rfl: 0    atorvastatin (LIPITOR) 10 mg tablet, TAKE 1 TABLET BY MOUTH EVERY DAY AT NIGHT, Disp: , Rfl: 6    atorvastatin (LIPITOR) 20 mg tablet, Take 1 tablet by mouth in the morning, Disp: , Rfl:     Benralizumab (FASENRA SC), Inject under the skin EVERY OTHER MONTH, Disp: , Rfl:     cefuroxime (CEFTIN) 500 mg tablet, Take 1 tablet (500 mg total) by mouth every 12 (twelve) hours for 10 days, Disp: 20 tablet, Rfl: 0    celecoxib (CeleBREX) 200 mg capsule, Take 1 capsule (200 mg total) by mouth daily, Disp: 90 capsule, Rfl: 0    cetirizine (ZyrTEC) 10 mg tablet, Take 10 mg by mouth daily after dinner , Disp: , Rfl:     Cholecalciferol 2000 units CAPS, Take 1 capsule by mouth daily after dinner  , Disp: , Rfl:     clobetasol (TEMOVATE) 0 05 % cream, APPLY TO AFFECTED AREA TWICE A DAY, Disp: 60 g, Rfl: 0    Cyanocobalamin (B-12) 1000 MCG CAPS, Take 1 tablet by mouth daily after dinner  , Disp: , Rfl:     fluticasone (FLONASE) 50 mcg/act nasal spray, 2 sprays into each nostril 2 (two) times a day as needed  , Disp: , Rfl:     fluticasone-vilanterol (Breo Ellipta) 100-25 mcg/inh inhaler, , Disp: , Rfl:     hydrochlorothiazide (HYDRODIURIL) 25 mg tablet, Take 1 tablet by mouth daily in the early morning  , Disp: , Rfl: 3    ketoconazole (NIZORAL) 2 % cream, Apply 1 application topically 2 (two) times a day To affected area, Disp: 60 g, Rfl: 0    levETIRAcetam (KEPPRA) 500 mg tablet, Take 1 tablet (500 mg total) by mouth 2 (two) times a day, Disp: 180 tablet, Rfl: 3    losartan (COZAAR) 25 mg tablet, Take 25 mg by mouth daily, Disp: , Rfl:     metFORMIN (GLUCOPHAGE) 500 mg tablet, Take 1 tablet (500 mg total) by mouth 2 (two) times a day with meals, Disp: 180 tablet, Rfl: 0    montelukast (SINGULAIR) 10 mg tablet, Take 10 mg by mouth every evening, Disp: , Rfl: 3    oxybutynin (DITROPAN) 5 mg tablet, TAKE 1 TABLET BY MOUTH EVERY DAY AT NIGHT, Disp: , Rfl:     oxyCODONE-acetaminophen (PERCOCET) 7 5-325 MG per tablet, Take 1 tablet by mouth every 6 (six) hours as needed for moderate pain Max Daily Amount: 4 tablets, Disp: 120 tablet, Rfl: 0    pantoprazole (PROTONIX) 40 mg tablet, TAKE 1 TABLET BY MOUTH EVERY DAY BEFORE BREAKFAST, Disp: 90 tablet, Rfl: 1    Pseudoeph-Doxylamine-DM-APAP (NYQUIL PO), Take by mouth as needed , Disp: , Rfl:     tamsulosin (Flomax) 0 4 mg, Take 0 4 mg by mouth daily with dinner, Disp: , Rfl:     zolpidem (AMBIEN CR) 12 5 MG CR tablet, Take 1 tablet (12 5 mg total) by mouth daily at bedtime as needed for sleep, Disp: 30 tablet, Rfl: 3    methylPREDNISolone 4 MG tablet therapy pack, Use as directed on package, Disp: 21 each, Rfl: 0    predniSONE 10 mg tablet, 3 tabs po bid x2 days, then 2 tabs po bid x2 days, then 1 tab bid x2 days, then 1 daily until done  (Patient not taking: Reported on 3/30/2022 ), Disp: 26 tablet, Rfl: 0    ALLERGIES:  Allergies   Allergen Reactions    Iv Dye [Iodinated Diagnostic Agents] Hives    Other      Environmental    Penicillins Other (See Comments)     ? Had as a child-Unknown reaction       REVIEW OF SYSTEMS:  Review of Systems   Constitutional: Negative for chills, fever and unexpected weight change  HENT: Negative for hearing loss, nosebleeds and sore throat  Eyes: Negative for pain, redness and visual disturbance  Respiratory: Negative for cough, shortness of breath and wheezing  Cardiovascular: Negative for chest pain, palpitations and leg swelling  Gastrointestinal: Negative for abdominal pain, nausea and vomiting  Endocrine: Negative for polydipsia and polyuria  Genitourinary: Negative for dysuria and hematuria  Skin: Negative for rash and wound  Neurological: Negative for dizziness, light-headedness and headaches  Psychiatric/Behavioral: Negative for decreased concentration, dysphoric mood and suicidal ideas  The patient is not nervous/anxious          VITALS:  Vitals:    03/30/22 1128   BP: 128/80   Pulse: 73       LABS:  HgA1c:   Lab Results   Component Value Date    HGBA1C 6 8 (A) 03/21/2022     BMP:   Lab Results   Component Value Date    CALCIUM 9 8 06/12/2021    K 4 6 06/12/2021    CO2 32 06/12/2021     06/12/2021    BUN 25 06/12/2021    CREATININE 1 01 06/12/2021       _____________________________________________________  PHYSICAL EXAMINATION:  General: well developed and well nourished, alert, oriented times 3 and appears comfortable  Psychiatric: Normal  HEENT: Normocephalic, Atraumatic Trachea Midline, No torticollis  Pulmonary: No audible wheezing or respiratory distress   Cardiovascular: No pitting edema, 2+ radial pulse   Abdominal/GI: abdomen non tender, non distended   Skin: No masses, erythema, lacerations, fluctation, ulcerations  Neurovascular: Sensation Intact to the Median, Ulnar, Radial Nerve, Motor Intact to the Median, Ulnar, Radial Nerve and Pulses Intact  Musculoskeletal: Normal, except as noted in detailed exam and in HPI  MUSCULOSKELETAL EXAMINATION:    right ring finger:  Positive palpable nodule over the A1 pulley  Positive tenderness to palpation over A1 pulley  Positive catching  Positive clicking  right long finger:  Negative palpable nodule over the A1 pulley  Negative tenderness to palpation over A1 pulley  Negative catching   Positive clicking         ___________________________________________________  STUDIES REVIEWED:  No images reviewed at today's visit      PROCEDURES PERFORMED:  Procedures  No Procedures performed today    _____________________________________________________      Vidya Felder    I,:  Monica Montiel am acting as a scribe while in the presence of the attending physician :       I,:  Jacqueline Collins MD personally performed the services described in this documentation    as scribed in my presence :

## 2022-03-30 NOTE — PROGRESS NOTES
ASSESSMENT/PLAN:      70-year-old male here for his right long and ring trigger fingers  Patient has treated non operatively with  Cortisone injections; 2 in the ring finger and 1 injection to the long finger  A right ring trigger finger release with possible right long trigger release  The anatomy and physiology of trigger finger was discussed with the patient today in the office  Edema and increased contact pressure within the flexor tendons at the A1 pulley can cause pain, crepitation, and limitation of function  Treatment options include resting MP blocking splints to decrease edema, oral anti-inflammatory medications, home or formal therapy exercises, up to 2 steroid injections within the tendon sheath, or surgical release  While majority of patients do respond to conservative treatment, up to 20% may require surgical release  This is an outpatient procedure where his hand will be wrapped up for 5 days  Using hand for small tasks to keep moving  After 5 days, wrap can come off and can wash, use mild soap and pat dry  Do not submerge in bath, dirty dishwater, pools, ocean, lakes hot tubs  Sutures come out 10-14 days  We will follow up post op  Risks of surgery include bleeding infection damage to surrounding structures recurrence or incomplete resolution of symptoms, need for further surgery  The patient verbalized understanding of exam findings and treatment plan  We engaged in the shared decision-making process and treatment options were discussed at length with the patient  Surgical and conservative management discussed today along with risks and benefits      Diagnoses and all orders for this visit:    Trigger finger, right middle finger  -     Case request operating room: RELEASE TRIGGER FINGER RING WITH POSSIBLE LONG; Standing  -     Case request operating room: RELEASE TRIGGER FINGER RING WITH POSSIBLE LONG    Trigger ring finger of right hand  -     Case request operating room: RELEASE TRIGGER FINGER RING WITH POSSIBLE LONG; Standing  -     Case request operating room: RELEASE TRIGGER FINGER RING WITH POSSIBLE LONG    Type 2 diabetes mellitus without complication, without long-term current use of insulin (HCC)  -     HEMOGLOBIN A1C W/ EAG ESTIMATION; Future    Pre-op testing  -     Basic metabolic panel; Future  -     EKG 12 lead; Future      Trigger Finger Release: The anatomy and physiology of trigger finger was discussed with the patient today in the office  Edema and increased contact pressure within the flexor tendons at the A1 pulley can cause pain, crepitation, and limitation of function  Treatment options include resting MP blocking splints to decrease edema, oral anti-inflammatory medications, home or formal therapy exercises, up to 2 steroid injections or surgical release  While majority of patients do respond to conservative treatment, up to 20% may require surgical release  The patient has elected release of the trigger finger  The patient has elected to undergo a release of the A1 pulley (trigger finger)  A small incision will be made over the palmar aspect of the hand, the tendon sheath holding the flexor tendons will be released  In the postoperative period, light activities are allowed immediately, driving is allowed when narcotic medication has stopped, and the incision may get wet after 2 days  Heavy activities (lifting more than approximately 10 pounds) will be allowed after the follow up appointment in 1-2 weeks  While the pain and discomfort within the wrist typically improves rapidly, some residual discomfort may be present for up to 6 weeks  The nodule that is typically palpable in the palmar aspect of the hand will not be removed, as this would necessitate removal of a portion of the flexor tendon, however the catching, clicking, and locking should resolve  Approximate success rate is 98%  The risks and benefits of the procedure were explained to the patient, which include, but are not limited to: Bleeding, infection, recurrence, pain, scar, damage to tendons, damage to nerves, and damage to blood vessels, need for future surgery and complications related to anesthesia  If bony work is done, risks also include malunion and nonunion  These risks, along with alternative conservative treatment options, and postoperative protocols were voiced back and understood by the patient  All questions were answered to the patient's satisfaction  The patient agrees to comply with a standard postoperative protocol, and is willing to proceed  Education was provided via written and auditory forms  There were no barriers to learning  Written handouts regarding wound care, incision and scar care, and general preoperative information, as well as risks and benefits were provided to the patient  Follow Up:  Return for Follow up post- op  To Do Next Visit:  Re-evaluation of current issue    ____________________________________________________________________________________________________________________________________________      CHIEF COMPLAINT:  Chief Complaint   Patient presents with    Right Ring Finger - Follow-up       SUBJECTIVE:  Tania Hughes is a 78y o  year old  RHD male who presents today for 6 week follow-up for his right ring and long trigger fingers  At his last visit the 2nd injection for right ring trigger finger was administered  The right long finger was asymptomatic after the 1st injection and did not want a 2nd  Patient is a diabetic with his last hemoglobin A1c of 6 8 on 03/21/2022  I have personally reviewed all the relevant PMH, PSH, SH, FH, Medications and allergies       PAST MEDICAL HISTORY:  Past Medical History:   Diagnosis Date    Allergic rhinitis     Asthma     Disease of thyroid gland     GERD (gastroesophageal reflux disease)     HL (hearing loss)     Hypertension     Memory loss, short term     R/O Seizures but placed on Keppra    Tinnitus        PAST SURGICAL HISTORY:  Past Surgical History:   Procedure Laterality Date    ABDOMINOPLASTY      ADENOIDECTOMY      BACK SURGERY      lower back surgery    CARPAL TUNNEL RELEASE Bilateral     CHOLECYSTECTOMY      COLONOSCOPY      GASTRIC BYPASS      HERNIA REPAIR Right     JOINT REPLACEMENT Bilateral     Knee    KNEE ARTHROSCOPY Bilateral     X3    ID REPAIR ING HERNIA,5+Y/O,REDUCIBL Left 11/26/2018    Procedure: INGUINAL HERNIA REPAIR;  Surgeon: Jaime Eason DO;  [facility];  Service: General    TONSILLECTOMY         FAMILY HISTORY:  Family History   Problem Relation Age of Onset    Heart disease Mother     Kidney cancer Mother     Hypertension Father     Bipolar disorder Sister         bipolar disorder NOS       SOCIAL HISTORY:  Social History     Tobacco Use    Smoking status: Never Smoker    Smokeless tobacco: Never Used   Vaping Use    Vaping Use: Not on file   Substance Use Topics    Alcohol use:  Yes     Alcohol/week: 0 0 standard drinks     Comment: 1 or 2 month    Drug use: No       MEDICATIONS:    Current Outpatient Medications:     albuterol (PROVENTIL HFA,VENTOLIN HFA) 90 mcg/act inhaler, , Disp: , Rfl:     ALPRAZolam (XANAX) 0 25 mg tablet, Take 1 tablet (0 25 mg total) by mouth 3 (three) times a day as needed for anxiety, Disp: 90 tablet, Rfl: 0    atorvastatin (LIPITOR) 10 mg tablet, TAKE 1 TABLET BY MOUTH EVERY DAY AT NIGHT, Disp: , Rfl: 6    atorvastatin (LIPITOR) 20 mg tablet, Take 1 tablet by mouth in the morning, Disp: , Rfl:     Benralizumab (FASENRA SC), Inject under the skin EVERY OTHER MONTH, Disp: , Rfl:     cefuroxime (CEFTIN) 500 mg tablet, Take 1 tablet (500 mg total) by mouth every 12 (twelve) hours for 10 days, Disp: 20 tablet, Rfl: 0    celecoxib (CeleBREX) 200 mg capsule, Take 1 capsule (200 mg total) by mouth daily, Disp: 90 capsule, Rfl: 0    cetirizine (ZyrTEC) 10 mg tablet, Take 10 mg by mouth daily after dinner , Disp: , Rfl:     Cholecalciferol 2000 units CAPS, Take 1 capsule by mouth daily after dinner  , Disp: , Rfl:     clobetasol (TEMOVATE) 0 05 % cream, APPLY TO AFFECTED AREA TWICE A DAY, Disp: 60 g, Rfl: 0    Cyanocobalamin (B-12) 1000 MCG CAPS, Take 1 tablet by mouth daily after dinner  , Disp: , Rfl:     fluticasone (FLONASE) 50 mcg/act nasal spray, 2 sprays into each nostril 2 (two) times a day as needed  , Disp: , Rfl:     fluticasone-vilanterol (Breo Ellipta) 100-25 mcg/inh inhaler, , Disp: , Rfl:     hydrochlorothiazide (HYDRODIURIL) 25 mg tablet, Take 1 tablet by mouth daily in the early morning  , Disp: , Rfl: 3    ketoconazole (NIZORAL) 2 % cream, Apply 1 application topically 2 (two) times a day To affected area, Disp: 60 g, Rfl: 0    levETIRAcetam (KEPPRA) 500 mg tablet, Take 1 tablet (500 mg total) by mouth 2 (two) times a day, Disp: 180 tablet, Rfl: 3    losartan (COZAAR) 25 mg tablet, Take 25 mg by mouth daily, Disp: , Rfl:     metFORMIN (GLUCOPHAGE) 500 mg tablet, Take 1 tablet (500 mg total) by mouth 2 (two) times a day with meals, Disp: 180 tablet, Rfl: 0    montelukast (SINGULAIR) 10 mg tablet, Take 10 mg by mouth every evening, Disp: , Rfl: 3    oxybutynin (DITROPAN) 5 mg tablet, TAKE 1 TABLET BY MOUTH EVERY DAY AT NIGHT, Disp: , Rfl:     oxyCODONE-acetaminophen (PERCOCET) 7 5-325 MG per tablet, Take 1 tablet by mouth every 6 (six) hours as needed for moderate pain Max Daily Amount: 4 tablets, Disp: 120 tablet, Rfl: 0    pantoprazole (PROTONIX) 40 mg tablet, TAKE 1 TABLET BY MOUTH EVERY DAY BEFORE BREAKFAST, Disp: 90 tablet, Rfl: 1    Pseudoeph-Doxylamine-DM-APAP (NYQUIL PO), Take by mouth as needed , Disp: , Rfl:     tamsulosin (Flomax) 0 4 mg, Take 0 4 mg by mouth daily with dinner, Disp: , Rfl:     zolpidem (AMBIEN CR) 12 5 MG CR tablet, Take 1 tablet (12 5 mg total) by mouth daily at bedtime as needed for sleep, Disp: 30 tablet, Rfl: 3    methylPREDNISolone 4 MG tablet therapy pack, Use as directed on package, Disp: 21 each, Rfl: 0    predniSONE 10 mg tablet, 3 tabs po bid x2 days, then 2 tabs po bid x2 days, then 1 tab bid x2 days, then 1 daily until done  (Patient not taking: Reported on 3/30/2022 ), Disp: 26 tablet, Rfl: 0    ALLERGIES:  Allergies   Allergen Reactions    Iv Dye [Iodinated Diagnostic Agents] Hives    Other      Environmental    Penicillins Other (See Comments)     ? Had as a child-Unknown reaction       REVIEW OF SYSTEMS:  Review of Systems   Constitutional: Negative for chills, fever and unexpected weight change  HENT: Negative for hearing loss, nosebleeds and sore throat  Eyes: Negative for pain, redness and visual disturbance  Respiratory: Negative for cough, shortness of breath and wheezing  Cardiovascular: Negative for chest pain, palpitations and leg swelling  Gastrointestinal: Negative for abdominal pain, nausea and vomiting  Endocrine: Negative for polydipsia and polyuria  Genitourinary: Negative for dysuria and hematuria  Skin: Negative for rash and wound  Neurological: Negative for dizziness, light-headedness and headaches  Psychiatric/Behavioral: Negative for decreased concentration, dysphoric mood and suicidal ideas  The patient is not nervous/anxious          VITALS:  Vitals:    03/30/22 1128   BP: 128/80   Pulse: 73       LABS:  HgA1c:   Lab Results   Component Value Date    HGBA1C 6 8 (A) 03/21/2022     BMP:   Lab Results   Component Value Date    CALCIUM 9 8 06/12/2021    K 4 6 06/12/2021    CO2 32 06/12/2021     06/12/2021    BUN 25 06/12/2021    CREATININE 1 01 06/12/2021       _____________________________________________________  PHYSICAL EXAMINATION:  General: well developed and well nourished, alert, oriented times 3 and appears comfortable  Psychiatric: Normal  HEENT: Normocephalic, Atraumatic Trachea Midline, No torticollis  Pulmonary: No audible wheezing or respiratory distress   Cardiovascular: No pitting edema, 2+ radial pulse   Abdominal/GI: abdomen non tender, non distended   Skin: No masses, erythema, lacerations, fluctation, ulcerations  Neurovascular: Sensation Intact to the Median, Ulnar, Radial Nerve, Motor Intact to the Median, Ulnar, Radial Nerve and Pulses Intact  Musculoskeletal: Normal, except as noted in detailed exam and in HPI  MUSCULOSKELETAL EXAMINATION:    right ring finger:  Positive palpable nodule over the A1 pulley  Positive tenderness to palpation over A1 pulley  Positive catching  Positive clicking  right long finger:  Negative palpable nodule over the A1 pulley  Negative tenderness to palpation over A1 pulley  Negative catching   Positive clicking         ___________________________________________________  STUDIES REVIEWED:  No images reviewed at today's visit      PROCEDURES PERFORMED:  Procedures  No Procedures performed today    _____________________________________________________      Lisa Chiang    I,:  Laurie Salmeron am acting as a scribe while in the presence of the attending physician :       I,:  Mark Suresh MD personally performed the services described in this documentation    as scribed in my presence :

## 2022-03-31 ENCOUNTER — CLINICAL SUPPORT (OUTPATIENT)
Dept: URGENT CARE | Facility: MEDICAL CENTER | Age: 67
End: 2022-03-31
Payer: MEDICARE

## 2022-03-31 ENCOUNTER — APPOINTMENT (OUTPATIENT)
Dept: RADIOLOGY | Facility: MEDICAL CENTER | Age: 67
End: 2022-03-31
Payer: MEDICARE

## 2022-03-31 ENCOUNTER — APPOINTMENT (OUTPATIENT)
Dept: LAB | Facility: MEDICAL CENTER | Age: 67
End: 2022-03-31
Payer: MEDICARE

## 2022-03-31 DIAGNOSIS — Z01.818 PRE-OP TESTING: ICD-10-CM

## 2022-03-31 DIAGNOSIS — N40.0 BENIGN PROSTATIC HYPERPLASIA WITHOUT LOWER URINARY TRACT SYMPTOMS: ICD-10-CM

## 2022-03-31 DIAGNOSIS — I10 ESSENTIAL HYPERTENSION: ICD-10-CM

## 2022-03-31 DIAGNOSIS — E55.9 VITAMIN D DEFICIENCY: ICD-10-CM

## 2022-03-31 DIAGNOSIS — R06.2 WHEEZING: ICD-10-CM

## 2022-03-31 DIAGNOSIS — E78.5 HYPERLIPIDEMIA, UNSPECIFIED HYPERLIPIDEMIA TYPE: ICD-10-CM

## 2022-03-31 DIAGNOSIS — E11.9 TYPE 2 DIABETES MELLITUS WITHOUT COMPLICATION, WITHOUT LONG-TERM CURRENT USE OF INSULIN (HCC): ICD-10-CM

## 2022-03-31 LAB
25(OH)D3 SERPL-MCNC: 20.2 NG/ML (ref 30–100)
ALBUMIN SERPL BCP-MCNC: 4.4 G/DL (ref 3.5–5)
ALP SERPL-CCNC: 112 U/L (ref 46–116)
ALT SERPL W P-5'-P-CCNC: 28 U/L (ref 12–78)
ANION GAP SERPL CALCULATED.3IONS-SCNC: 5 MMOL/L (ref 4–13)
AST SERPL W P-5'-P-CCNC: 10 U/L (ref 5–45)
ATRIAL RATE: 58 BPM
BILIRUB SERPL-MCNC: 0.64 MG/DL (ref 0.2–1)
BUN SERPL-MCNC: 28 MG/DL (ref 5–25)
CALCIUM SERPL-MCNC: 9.8 MG/DL (ref 8.3–10.1)
CHLORIDE SERPL-SCNC: 100 MMOL/L (ref 100–108)
CHOLEST SERPL-MCNC: 192 MG/DL
CO2 SERPL-SCNC: 30 MMOL/L (ref 21–32)
CREAT SERPL-MCNC: 1.08 MG/DL (ref 0.6–1.3)
EST. AVERAGE GLUCOSE BLD GHB EST-MCNC: 171 MG/DL
GFR SERPL CREATININE-BSD FRML MDRD: 70 ML/MIN/1.73SQ M
GLUCOSE P FAST SERPL-MCNC: 253 MG/DL (ref 65–99)
HBA1C MFR BLD: 7.6 %
HDLC SERPL-MCNC: 69 MG/DL
LDLC SERPL CALC-MCNC: 92 MG/DL (ref 0–100)
P AXIS: 29 DEGREES
P AXIS: 29 DEGREES
P AXIS: 37 DEGREES
POTASSIUM SERPL-SCNC: 4.4 MMOL/L (ref 3.5–5.3)
PR INTERVAL: 200 MS
PROT SERPL-MCNC: 7.7 G/DL (ref 6.4–8.2)
PSA SERPL-MCNC: 1 NG/ML (ref 0–4)
QRS AXIS: -8 DEGREES
QRSD INTERVAL: 116 MS
QT INTERVAL: 436 MS
QT INTERVAL: 438 MS
QT INTERVAL: 438 MS
QTC INTERVAL: 428 MS
QTC INTERVAL: 429 MS
QTC INTERVAL: 429 MS
SODIUM SERPL-SCNC: 135 MMOL/L (ref 136–145)
T WAVE AXIS: 24 DEGREES
T WAVE AXIS: 24 DEGREES
T WAVE AXIS: 25 DEGREES
TRIGL SERPL-MCNC: 156 MG/DL
VENTRICULAR RATE: 58 BPM

## 2022-03-31 PROCEDURE — 82306 VITAMIN D 25 HYDROXY: CPT

## 2022-03-31 PROCEDURE — 80053 COMPREHEN METABOLIC PANEL: CPT

## 2022-03-31 PROCEDURE — 84153 ASSAY OF PSA TOTAL: CPT

## 2022-03-31 PROCEDURE — 71046 X-RAY EXAM CHEST 2 VIEWS: CPT

## 2022-03-31 PROCEDURE — 83036 HEMOGLOBIN GLYCOSYLATED A1C: CPT

## 2022-03-31 PROCEDURE — 3051F HG A1C>EQUAL 7.0%<8.0%: CPT | Performed by: SURGERY

## 2022-03-31 PROCEDURE — 80061 LIPID PANEL: CPT

## 2022-03-31 PROCEDURE — 93010 ELECTROCARDIOGRAM REPORT: CPT | Performed by: INTERNAL MEDICINE

## 2022-03-31 PROCEDURE — 93005 ELECTROCARDIOGRAM TRACING: CPT

## 2022-03-31 PROCEDURE — 36415 COLL VENOUS BLD VENIPUNCTURE: CPT

## 2022-03-31 NOTE — PROGRESS NOTES
Patient ID: Nicolle Hickey is a 78 y o  male  HPI: 78 y o male presents for follow up of niddm, hypertension and hypercholesterolemia  Hgba1c is 6 8         and patient's issues are well controlled  Patient deneis any dyspnea, chest pain or palpitations  Pt complains of sinus pain, pressure, unable to blow    Out any secretions from nose  He has been intermittently wheezing despite his inhaler therapy and singulair  He has a scaling erythematous patch on his nose that has been there for several mos and scabs, but does not heal   He also has a cyst on his back which is getting larger and intermittently drains and becomes sore  SUBJECTIVE    Family History   Problem Relation Age of Onset    Heart disease Mother     Kidney cancer Mother     Hypertension Father     Bipolar disorder Sister         bipolar disorder NOS     Social History     Socioeconomic History    Marital status: /Civil Union     Spouse name: Not on file    Number of children: 2    Years of education: trade school    Highest education level: Not on file   Occupational History     Comment: employed   Tobacco Use    Smoking status: Never Smoker    Smokeless tobacco: Never Used   Vaping Use    Vaping Use: Not on file   Substance and Sexual Activity    Alcohol use:  Yes     Alcohol/week: 0 0 standard drinks     Comment: 1 or 2 month    Drug use: No    Sexual activity: Not Currently     Partners: Female   Other Topics Concern    Not on file   Social History Narrative    Always uses seat belt    Caffeine use    Daily coffee consumption    Daily cola consumption    Dental care, regularly    DENIED exercise frequency    Guns in the home:stored in locked cabinet    Multiple organ donor    Patient has living will    DENIED pets/animals    Power of  in existence    Water intake, adequate (per day)     Social Determinants of Health     Financial Resource Strain: Not on file   Food Insecurity: Not on file   Transportation Needs: Not on file   Physical Activity: Not on file   Stress: Not on file   Social Connections: Not on file   Intimate Partner Violence: Not on file   Housing Stability: Not on file     Past Medical History:   Diagnosis Date    Allergic rhinitis     Asthma     Disease of thyroid gland     GERD (gastroesophageal reflux disease)     HL (hearing loss)     Hypertension     Memory loss, short term     R/O Seizures but placed on Keppra    Tinnitus      Past Surgical History:   Procedure Laterality Date    ABDOMINOPLASTY      ADENOIDECTOMY      BACK SURGERY      lower back surgery    CARPAL TUNNEL RELEASE Bilateral     CHOLECYSTECTOMY      COLONOSCOPY      GASTRIC BYPASS      HERNIA REPAIR Right     JOINT REPLACEMENT Bilateral     Knee    KNEE ARTHROSCOPY Bilateral     X3    RI REPAIR ING HERNIA,5+Y/O,REDUCIBL Left 11/26/2018    Procedure: INGUINAL HERNIA REPAIR;  Surgeon: Janneth Malik DO;  [facility];  Service: General    TONSILLECTOMY       Allergies   Allergen Reactions    Iv Dye [Iodinated Diagnostic Agents] Hives    Other      Environmental    Penicillins Other (See Comments)     ?  Had as a child-Unknown reaction       Current Outpatient Medications:     albuterol (PROVENTIL HFA,VENTOLIN HFA) 90 mcg/act inhaler, , Disp: , Rfl:     ALPRAZolam (XANAX) 0 25 mg tablet, Take 1 tablet (0 25 mg total) by mouth 3 (three) times a day as needed for anxiety, Disp: 90 tablet, Rfl: 0    atorvastatin (LIPITOR) 10 mg tablet, TAKE 1 TABLET BY MOUTH EVERY DAY AT NIGHT, Disp: , Rfl: 6    atorvastatin (LIPITOR) 20 mg tablet, Take 1 tablet by mouth in the morning, Disp: , Rfl:     Benralizumab (FASENRA SC), Inject under the skin EVERY OTHER MONTH, Disp: , Rfl:     celecoxib (CeleBREX) 200 mg capsule, Take 1 capsule (200 mg total) by mouth daily, Disp: 90 capsule, Rfl: 0    cetirizine (ZyrTEC) 10 mg tablet, Take 10 mg by mouth daily after dinner  , Disp: , Rfl:     Cholecalciferol 2000 units CAPS, Take 1 capsule by mouth daily after dinner  , Disp: , Rfl:     clobetasol (TEMOVATE) 0 05 % cream, APPLY TO AFFECTED AREA TWICE A DAY, Disp: 60 g, Rfl: 0    Cyanocobalamin (B-12) 1000 MCG CAPS, Take 1 tablet by mouth daily after dinner  , Disp: , Rfl:     fluticasone (FLONASE) 50 mcg/act nasal spray, 2 sprays into each nostril 2 (two) times a day as needed  , Disp: , Rfl:     fluticasone-vilanterol (Breo Ellipta) 100-25 mcg/inh inhaler, , Disp: , Rfl:     hydrochlorothiazide (HYDRODIURIL) 25 mg tablet, Take 1 tablet by mouth daily in the early morning  , Disp: , Rfl: 3    ketoconazole (NIZORAL) 2 % cream, Apply 1 application topically 2 (two) times a day To affected area, Disp: 60 g, Rfl: 0    levETIRAcetam (KEPPRA) 500 mg tablet, Take 1 tablet (500 mg total) by mouth 2 (two) times a day, Disp: 180 tablet, Rfl: 3    losartan (COZAAR) 25 mg tablet, Take 25 mg by mouth daily, Disp: , Rfl:     metFORMIN (GLUCOPHAGE) 500 mg tablet, Take 1 tablet (500 mg total) by mouth 2 (two) times a day with meals, Disp: 180 tablet, Rfl: 0    montelukast (SINGULAIR) 10 mg tablet, Take 10 mg by mouth every evening, Disp: , Rfl: 3    oxybutynin (DITROPAN) 5 mg tablet, TAKE 1 TABLET BY MOUTH EVERY DAY AT NIGHT, Disp: , Rfl:     pantoprazole (PROTONIX) 40 mg tablet, TAKE 1 TABLET BY MOUTH EVERY DAY BEFORE BREAKFAST, Disp: 90 tablet, Rfl: 1    Pseudoeph-Doxylamine-DM-APAP (NYQUIL PO), Take by mouth as needed , Disp: , Rfl:     tamsulosin (Flomax) 0 4 mg, Take 0 4 mg by mouth daily with dinner, Disp: , Rfl:     zolpidem (AMBIEN CR) 12 5 MG CR tablet, Take 1 tablet (12 5 mg total) by mouth daily at bedtime as needed for sleep, Disp: 30 tablet, Rfl: 3    cefuroxime (CEFTIN) 500 mg tablet, Take 1 tablet (500 mg total) by mouth every 12 (twelve) hours for 10 days, Disp: 20 tablet, Rfl: 0    methylPREDNISolone 4 MG tablet therapy pack, Use as directed on package, Disp: 21 each, Rfl: 0    oxyCODONE-acetaminophen (PERCOCET) 7 5-325 MG per tablet, Take 1 tablet by mouth every 6 (six) hours as needed for moderate pain Max Daily Amount: 4 tablets, Disp: 120 tablet, Rfl: 0    predniSONE 10 mg tablet, 3 tabs po bid x2 days, then 2 tabs po bid x2 days, then 1 tab bid x2 days, then 1 daily until done  (Patient not taking: Reported on 3/30/2022 ), Disp: 26 tablet, Rfl: 0    Review of Systems  Constitutional:     Denies fever, chills ,fatigue ,weakness ,weight loss, weight gain     ENT: Denies earache ,loss of hearing ,nosebleed, nasal discharge,  ,sore throat ,hoarseness+ sinus pain, pressure and nasal congestion   Pulmonary: Denies shortness of breath ,cough  ,dyspnea on exertion, orthopnea  ,PND + wheezing   Cardiovascular:  Denies bradycardia , tachycardia  ,palpations, lower extremity edema leg, claudication  Breast:  Denies new or changing breast lumps ,nipple discharge ,nipple changes  Abdomen:  Denies abdominal pain , anorexia , indigestion, nausea, vomiting, constipation, diarrhea  Musculoskeletal: Denies myalgias, arthralgias, joint swelling, joint stiffness , limb pain, limb swelling  Gu: denies dysuria, polyuria  Skin: Denies skin rash,+ nasal  skin lesion, skin wound, itching, dry skin+ cyst mid back (tender , enlarging and intermittently drains)  Neuro: Denies headache, numbness, tingling, confusion, loss of consciousness, dizziness, vertigo  Psychiatric: Denies feelings of depression, suicidal ideation, anxiety, sleep disturbances    OBJECTIVE  /76   Pulse 76   Temp 98 5 °F (36 9 °C)   Ht 5' 10" (1 778 m)   Wt 127 kg (279 lb)   BMI 40 03 kg/m²   Constitutional:   NAD, well appearing and well nourished      ENT:   Conjunctiva and lids: no injection, edema, or discharge     Pupils and iris: MARY bilaterally    External inspection of ears and nose: normal without deformities or discharge  Otoscopic exam: Canals patent with erythema         Nasal mucosa, septum and turbinates: + turbinate injection with erythema  Oropharynx:  Moist mucosa, normal tongue and tonsils without lesions  + erythema  + pnd  Pulmonary:Respiratory effort normal rate and rhythm, no increased work of breathing  Auscultation of lungs:  + exp wheezes bilaterally without rales or rhonchi  Cardiovascular: regular rate and rhythm, S1 and S2, no murmur, no edema and/or varicosities of LE      Abdomen: Soft and non-distended     Positive bowel sounds      No heptomegaly or splenomegaly      Gu: no suprapubic tenderness or CVA tenderness, no urethral discharge  Lymphatic:  No anterior or posterior cervical lymphadenopathy         Musculoskeletal:  Gait and station: Normal gait      Digits and nails normal without clubbing or cyanosis       Inspection/palpation of joints, bones, and muscles:  No joint tenderness, swelling, full active and passive range of motion       Skin: Normal skin turgor and nasal lesion on  fip of nose erythematous, scaling and mid back cystic, nonmoveable lesion approx 3 cm in diameter and slightly tender on palpation    Neuro:    Normal reflexes     Psych:   alert and oriented to person, place and time     normal mood and affect       Assessment/Plan:Diagnoses and all orders for this visit:    Type 2 diabetes mellitus without complication, without long-term current use [facility] insulin [facility]  Comments:  Continue current meds  Orders:  -     POCT hemoglobin A1c    Sebaceous cyst  -     Ambulatory Referral to Dermatology; Future    Neoplasm of nose  -     Ambulatory Referral to Dermatology; Future    Acute sinusitis, recurrence not specified, unspecified location  -     cefuroxime (CEFTIN) 500 mg tablet; Take 1 tablet (500 mg total) by mouth every 12 (twelve) hours for 10 days  -     predniSONE 10 mg tablet; 3 tabs po bid x2 days, then 2 tabs po bid x2 days, then 1 tab bid x2 days, then 1 daily until done  (Patient not taking: Reported on 3/30/2022 )    Essential hypertension  Comments:  Cotninue ARB tx        Hyperlipidemia, unspecified hyperlipidemia type  Comments:  Continue statin tx  Other orders  -     fluticasone-vilanterol (Breo Ellipta) 100-25 mcg/inh inhaler        Reviewed with patient plan to treat with above plan      Patient instructed to call in 72 hours if not feeling better or if symptoms worsen

## 2022-04-04 ENCOUNTER — OFFICE VISIT (OUTPATIENT)
Dept: NEUROLOGY | Facility: CLINIC | Age: 67
End: 2022-04-04
Payer: MEDICARE

## 2022-04-04 VITALS
DIASTOLIC BLOOD PRESSURE: 62 MMHG | HEIGHT: 70 IN | TEMPERATURE: 96.8 F | WEIGHT: 278.2 LBS | BODY MASS INDEX: 39.83 KG/M2 | SYSTOLIC BLOOD PRESSURE: 118 MMHG | HEART RATE: 73 BPM

## 2022-04-04 DIAGNOSIS — G40.009 PARTIAL IDIOPATHIC EPILEPSY WITH SEIZURES OF LOCALIZED ONSET, NOT INTRACTABLE, WITHOUT STATUS EPILEPTICUS (HCC): Primary | ICD-10-CM

## 2022-04-04 DIAGNOSIS — G25.0 TREMOR, ESSENTIAL: ICD-10-CM

## 2022-04-04 PROCEDURE — 99214 OFFICE O/P EST MOD 30 MIN: CPT | Performed by: NURSE PRACTITIONER

## 2022-04-04 RX ORDER — LEVETIRACETAM 500 MG/1
500 TABLET ORAL 2 TIMES DAILY
Qty: 180 TABLET | Refills: 3 | Status: SHIPPED | OUTPATIENT
Start: 2022-04-04

## 2022-04-04 NOTE — PROGRESS NOTES
Review of Systems   Constitutional: Negative  Negative for appetite change and fever  HENT: Negative  Negative for hearing loss, tinnitus, trouble swallowing and voice change  Eyes: Negative  Negative for photophobia and pain  Respiratory: Negative  Negative for shortness of breath  Cardiovascular: Negative  Negative for palpitations  Gastrointestinal: Negative  Negative for nausea and vomiting  Endocrine: Negative  Negative for cold intolerance  Genitourinary: Negative  Negative for dysuria, frequency and urgency  Musculoskeletal: Negative  Negative for myalgias and neck pain  Skin: Negative  Negative for rash  Neurological: Negative  Negative for dizziness, tremors, seizures, syncope, facial asymmetry, speech difficulty, weakness, light-headedness, numbness and headaches  Hematological: Negative  Does not bruise/bleed easily  Psychiatric/Behavioral: Negative for confusion and hallucinations  All other systems reviewed and are negative

## 2022-04-04 NOTE — ASSESSMENT & PLAN NOTE
Patient with tremor of right hand, mostly with action  Worse with fine motor movements  No s/s suggestive of parkinsonism  Does not impact daily living  Noted family members with tremor as well  We discussed possible medications but with medications may also bring new side effects  We reviewed propanolol  This would need to be used with caution due to history of asthma as well as given his use of other antihypertensive medications  Could consider primidone as well  Recommended patient have his trigger finger repaired and see how his symptoms are  He will call the office if he feels that his tremor has become worse or more bothersome we can trial a medication

## 2022-04-04 NOTE — ASSESSMENT & PLAN NOTE
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

## 2022-04-04 NOTE — PATIENT INSTRUCTIONS
- Continue with current dose of keppra 500 mg twice  - Call the office with any breakthrough seizures or concerns  - After you have your trigger finger fixed, if the tremor becomes more bothersome, call the office and we can start propanolol    - Would need to have your blood pressure checked more frequently to make sure that your blood pressure does not drop too much  - Common side effects would be related to low blood pressure including dizziness upon standing   - Follow up in one year or sooner if needed

## 2022-04-04 NOTE — PROGRESS NOTES
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  Currently on levetiracetam 500 mg BID without side effects or concerns  Denies further episodes concerning for seizures  His neurologic exam is nonfocal with exception of fine tremor of right hand  Sleep deprived EEG in 2017 revealed two left anterior and anterior mid-temporal spike wave discharges as a potential source of seizures  MRI brain in 2014 was unremarkable  Patient will continue levetiracetam 500 mg BID  He will call the office with any episodes concerning for seizure or issues  Follow up in 1 year or sooner if needed  Tremor, essential  Patient with tremor of right hand, mostly with action  Worse with fine motor movements  No s/s suggestive of parkinsonism  Does not impact daily living  Noted family members with tremor as well  We discussed possible medications but with medications may also bring new side effects  We reviewed propanolol  This would need to be used with caution due to history of asthma as well as given his use of other antihypertensive medications  Could consider primidone as well  Recommended patient have his trigger finger repaired and see how his symptoms are  He will call the office if he feels that his tremor has become worse or more bothersome we can trial a medication  He will Return in about 1 year (around 4/4/2023) for Follow up with Dr Paul Hernandez  Subjective:  Damaris Hart is a 78 y o  male with 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, who is returning to Neurology office for follow up of his seizures   Last seen in the office by  Michael Garcia on 4/21/21  Noted EEG reveals left temporal epileptiform discharges  La Christensen is at some elevated risk for injury due to his profession which is additional motivation to continue AED therapy  Transient global amnesia is another possible explanation for his events, but is less likely given his EEG findings  Rojean Payment is typically a single lifetime event with up to 10% of patients having a second event  The first event apparently involved additional symptoms including perioral numbness and lightheadedness  Since his last visit he continues to do well  No seizures  On keppra 500 mg BID  Denies any side effects or concerns  He does get hand shakes occasionally  Has family members with tremor who improved with medication  He is an  (retired) but has trouble with fine things  He also has trigger finger which he is getting fixed soon  Tremor is mostly the right hand  Usually with fine motor work  He may notice it while eating or drinking as well  He can eat soup off of a spoon without issue  Does not feel that it keeps him from doing anything  May have trouble with his memory sometimes but nothing very concerning  Current seizure medications:  - levetiracetam 500 mg BID  Other medications as per Epic  Event/Seizure semiology:  Two episodes of transient memory problems and confusion lasting a couple hours occurring on 3/26/2012 and 8/27/2014      Special Features  Status epilepticus: no  Self Injury Seizures: no  Precipitating Factors: none     Epilepsy Risk Factors:  None     Prior AEDs:  None     Prior Evaluation:  Sleep-deprived EEG done October 2017: This Routine, sleep deprived EEG recorded during wakefulness,   drowsiness, and sleep is abnormal  Two left anterior and anterior   mid-temporal spike wave discharges are a potential source of   seizures  MRI brain w wo contrast September 2014:  [address]-  There is no discrete mass, mass effect or midline   shift   No abnormal white matter signal identified  Brainstem and   cerebellum demonstrate normal signal  There is no intracranial   hemorrhage  There is no evidence of acute infarction and diffusion   imaging is unremarkable  VENTRICLES-  Normal    POSTCONTRAST IMAGING-  Postcontrast imaging of the brain demonstrates   no abnormal enhancement  SELLA AND PITUITARY GLAND-  Normal    ORBITS-  Normal    PARANASAL SINUSES-  Normal    VASCULATURE-  Evaluation of the major intracranial vasculature   demonstrates appropriate flow voids  CALVARIUM AND SKULL BASE-  Normal    EXTRACRANIAL SOFT TISSUES-  Normal    IMPRESSION-   1  Unremarkable MRI of the brain  Psychiatric History:  None     Social History:   Driving: Yes  Lives Alone: No  Occupation: Retired in December from [employer]  I reviewed prior neurology notes, as documented in Epic/iiko, and summarized above  Objective:    Blood pressure 118/62, pulse 73, temperature (!) 96 8 °F (36 °C), temperature source Temporal, height 5' 10" (1 778 m), weight 126 kg (278 lb 3 2 oz)  Physical Exam  No apparent distress  Appears well nourished  Mood appropriate for situation     Neurologic Exam  Mental status- alert and oriented to person, place, and time  Speech appropriate for conversation  Good attention and knowledge  Cranial Nerves- PERRL, EOMS normal, facial sensation and muscles symmetric, hearing intact bilaterally to finger rubs, tongue midline, palate rise symmetrical, shoulder shrug symmetrical     Motor- No pronator drift  5/5 strength all extremities  Moves all extremities freely  Mild action tremor of right hand  Sensory-  Intact distally in all extremities to light touch  DTRs- not assessed at today's visit  Gait- normal casual gait  Coordination- FNF intact  ROS:    Review of Systems   Constitutional: Negative  Negative for appetite change and fever  HENT: Negative    Negative for hearing loss, tinnitus, trouble swallowing and voice change  Eyes: Negative  Negative for photophobia and pain  Respiratory: Negative  Negative for shortness of breath  Cardiovascular: Negative  Negative for palpitations  Gastrointestinal: Negative  Negative for nausea and vomiting  Endocrine: Negative  Negative for cold intolerance  Genitourinary: Negative  Negative for dysuria, frequency and urgency  Musculoskeletal: Negative  Negative for myalgias and neck pain  Skin: Negative  Negative for rash  Neurological: Negative  Negative for dizziness, tremors, seizures, syncope, facial asymmetry, speech difficulty, weakness, light-headedness, numbness and headaches  Hematological: Negative  Does not bruise/bleed easily  Psychiatric/Behavioral: Negative for confusion and hallucinations  All other systems reviewed and are negative  ROS obtained by MA and reviewed by myself  This note may have been created using voice recognition software  There may be unintentional errors such as grammatical errors, spelling errors, or pronoun errors

## 2022-04-07 ENCOUNTER — CONSULT (OUTPATIENT)
Dept: DERMATOLOGY | Facility: CLINIC | Age: 67
End: 2022-04-07
Payer: MEDICARE

## 2022-04-07 VITALS — TEMPERATURE: 97 F | HEIGHT: 70 IN | BODY MASS INDEX: 39.94 KG/M2 | WEIGHT: 279 LBS

## 2022-04-07 DIAGNOSIS — D49.89 NEOPLASM OF NOSE: ICD-10-CM

## 2022-04-07 DIAGNOSIS — L72.3 SEBACEOUS CYST: ICD-10-CM

## 2022-04-07 DIAGNOSIS — D48.5 NEOPLASM OF UNCERTAIN BEHAVIOR OF SKIN: ICD-10-CM

## 2022-04-07 DIAGNOSIS — L72.0 EPIDERMAL INCLUSION CYST: Primary | ICD-10-CM

## 2022-04-07 PROCEDURE — 99204 OFFICE O/P NEW MOD 45 MIN: CPT | Performed by: DERMATOLOGY

## 2022-04-07 PROCEDURE — 88305 TISSUE EXAM BY PATHOLOGIST: CPT | Performed by: STUDENT IN AN ORGANIZED HEALTH CARE EDUCATION/TRAINING PROGRAM

## 2022-04-07 PROCEDURE — 11102 TANGNTL BX SKIN SINGLE LES: CPT | Performed by: DERMATOLOGY

## 2022-04-07 NOTE — PATIENT INSTRUCTIONS
EPIDERMAL INCLUSION CYST    Assessment and Plan:  Based on a thorough discussion of this condition and the management approach to it (including a comprehensive discussion of the known risks, side effects and potential benefits of treatment), the patient (family) agrees to implement the following specific plan:     Scheduled for excision on 6/01 @ 1: 45    What are epidermal inclusion cysts? Epidermal inclusion cysts are the most common, benign cutaneous cysts  There are many different names for epidermal inclusion cysts, including epidermoid cyst, epidermal cyst, infundibular cyst, inclusion cyst, and keratin cyst  These cysts can occur anywhere on the body and typically present as nodules directly underneath the skin  There is often a visible pore or opening in the center  The cysts are freely moveable and can range from a few millimeters to several centimeters in diameter  The center of epidermoid cysts almost always contains keratin, which has a cheesy appearance, and not sebum  They also do not originate from sebaceous glands  Therefore, epidermal inclusion cysts are not the same as sebaceous cysts  Cysts may remain stable or progressively enlarge over time  There are no reliable predictive factors to tell if an epidermal inclusion cyst will enlarge, become inflamed, or remain quiescent  Infected cysts tend to become larger, turn red, and are more noticeable to the patient  There may be accompanying pain and discomfort  What causes epidermal inclusion cysts? Epidermal inclusion cysts often appear out of the blue and are not contagious  They are due to a proliferation of epidermal cells within the dermis and are more common in men than women  They occur more frequently in patients in their 20s to 45s   Epidermal inclusion cysts by themselves are usually not inherited, but they can be hereditary in rare syndromes such as Keene syndrome, nodular elastosis with cysts and comedones (Favre-Racouchot syndrome), and basal cell nevus syndrome (Gorlin syndrome)  Elderly patients with chronic sun-damaged skin areas have a higher likelihood of developing epidermoid cysts  They often occur in areas where hair follicles have been inflamed or repeatedly irritated are more frequent in patients with acne vulgaris  In the  period, they are called milia  Patients on BRAF inhibitors such as imiquimod and cyclosporine have a higher incidence of epidermoid cysts of the face  How do we diagnose an epidermal inclusion cyst?  Epidermoid inclusion cysts are often diagnosed by history and physical exam  There is usually no need for biopsy prior to removal   Radiographic and laboratory exams, such as ultrasound studies, are unnecessary and not typically ordered unless the practitioner suspects a genetic condition  What is the treatment for an epidermal inclusion cyst?  Inflamed, uninfected epidermal inclusion cysts rarely resolve spontaneously without therapy or surgical intervention  Treatment is not emergent unless desired by the patient  Definitive treatment is via surgical excision with walls intact  This method will prevent recurrence  This is best done when the cyst is not inflamed, to decrease the probability of rupture during surgery  - A local anesthetic will be injected around the cyst  - A small incision is made in the skin overlying the cyst, and contents are expressed  - The incision is repaired with sutures    Another option is to use a 4mm punch biopsy with cyst extraction through the defect  Incision and drainage is often needed if the cyst is infected or inflamed  If there is surrounding cellulitis, oral antibiotic therapy may be necessary  The common agents used target methicillin sensitive Staphylococcal aureus and methicillin resistant S aureus in areas of high prevalence       NEOPLASM OF UNCERTAIN BEHAVIOR OF SKIN    Assessment and Plan:   I have discussed with the patient that a sample of skin via a "skin biopsy would be potentially helpful to further make a specific diagnosis under the microscope   Based on a thorough discussion of this condition and the management approach to it (including a comprehensive discussion of the known risks, side effects and potential benefits of treatment), the patient (family) agrees to implement the following specific plan:    o Procedure:  Skin Biopsy  After a thorough discussion of treatment options and risk/benefits/alternatives (including but not limited to local pain, scarring, dyspigmentation, blistering, possible superinfection, and inability to confirm a diagnosis via histopathology), verbal and written consent were obtained and portion of the rash was biopsied for tissue sample  See below for consent that was obtained from patient and subsequent Procedure Note  INFORMED CONSENT DISCUSSION AND POST-OPERATIVE INSTRUCTIONS FOR PATIENT    I   RATIONALE FOR PROCEDURE  I understand that a skin biopsy allows the Dermatologist to test a lesion or rash under the microscope to obtain a diagnosis  It usually involves numbing the area with numbing medication and removing a small piece of skin; sometimes the area will be closed with sutures  In this specific procedure, sutures are not usually needed  If any sutures are placed, then they are usually need to be removed in 2 weeks or less  I understand that my Dermatologist recommends that a skin "shave" biopsy be performed today  A local anesthetic, similar to the kind that a dentist uses when filling a cavity, will be injected with a very small needle into the skin area to be sampled  The injected skin and tissue underneath "will go to sleep and become numb so no pain should be felt afterwards    An instrument shaped like a tiny "razor blade" (shave biopsy instrument) will be used to cut a small piece of tissue and skin from the area so that a sample of tissue can be taken and examined more closely under the microscope  A slight amount of bleeding will occur, but it will be stopped with direct pressure and a pressure bandage and any other appropriate methods  I understands that a scar will form where the wound was created  Surgical ointment will be applied to help protect the wound  Sutures are not usually needed  II   RISKS AND POTENTIAL COMPLICATIONS   I understand the risks and potential complications of a skin biopsy include but are not limited to the following:   Bleeding   Infection   Pain   Scar/keloid   Skin discoloration   Incomplete Removal   Recurrence   Nerve Damage/Numbness/Loss of Function   Allergic Reaction to Anesthesia   Biopsies are diagnostic procedures and based on findings additional treatment or evaluation may be required   Loss or destruction of specimen resulting in no additional findings    My Dermatologist has explained to me the nature of the condition, the nature of the procedure, and the benefits to be reasonably expected compared with alternative approaches  My Dermatologist has discussed the likelihood of major risks or complications of this procedure including the specific risks listed above, such as bleeding, infection, and scarring/keloid  I understand that a scar is expected after this procedure  I understand that my physician cannot predict if the scar will form a "keloid," which extends beyond the borders of the wound that is created  A keloid is a thick, painful, and bumpy scar  A keloid can be difficult to treat, as it does not always respond well to therapy, which includes injecting cortisone directly into the keloid every few weeks  While this usually reduces the pain and size of the scar, it does not eliminate it  I understand that photographs may be taken before and after the procedure  These will be maintained as part of the medical providers confidential records and may not be made available to me    I further authorize the medical provider to use the photographs for teaching purposes or to illustrate scientific papers, books, or lectures if in his/her judgment, medical research, education, or science may benefit from its use  I have had an opportunity to fully inquire about the risks and benefits of this procedure and its alternatives  I have been given ample time and opportunity to ask questions and to seek a second opinion if I wished to do so  I acknowledge that there have specifically been no guarantees as to the cosmetic results from the procedure  I am aware that with any procedure there is always the possibility of an unexpected complication  III  POST-PROCEDURAL CARE (WHAT YOU WILL NEED TO DO "AFTER THE BIOPSY" TO OPTIMIZE HEALING)     Keep the area clean and dry  Try NOT to remove the bandage or get it wet for the first 24 hours   Gently clean the area and apply surgical ointment (such as Vaseline petrolatum ointment, which is available "over the counter" and not a prescription) to the biopsy site for up to 2 weeks straight  This acts to protect the wound from the outside world   Sutures are not usually placed in this procedure  If any sutures were placed, return for suture removal as instructed (generally 1 week for the face, 2 weeks for the body)   Take Acetaminophen (Tylenol) for discomfort, if no contraindications  Ibuprofen or aspirin could make bleeding worse   Call our office immediately for signs of infection: fever, chills, increased redness, warmth, tenderness, discomfort/pain, or pus or foul smell coming from the wound  WHAT TO DO IF THERE IS ANY BLEEDING? If a small amount of bleeding is noticed, place a clean cloth over the area and apply firm pressure for ten minutes  Check the wound after 10 minutes of direct pressure  If bleeding persists, try one more time for an additional 10 minutes of direct pressure on the area    If the bleeding becomes heavier or does not stop after the second attempt, or if you have any other questions about this procedure, then please call your [facility] by calling [phone] (SKIN)  I hereby acknowledge that I have reviewed and verified the site with my Dermatologist and have requested and authorized my Dermatologist to proceed with the procedure

## 2022-04-07 NOTE — PROGRESS NOTES
[facility] Note     Patient Name: Mary Jones  Encounter Date: 04/07/22     Have you been cared for by a [facility] D[facility]atologist in the last 3 years and, if so, which one? No    · Have you traveled outside of the [address] [place] [address] in the past 3 months or outside of the [facility] area in the last 2 weeks? No     May we call your Preferred Phone number to discuss your specific medical information? Yes     May we leave a detailed message that includes your specific medical information? Yes      Today's Chief Concerns:   Concern #1:  Spot on nose   Concern #2:  Possible cyst on back    Past Medical History:  Have you personally ever had or currently have any of the following? · Skin cancer (such as Melanoma, Basal Cell Carcinoma, Squamous Cell Carcinoma? (If Yes, please provide more detail)- No  · Eczema: YES  · Psoriasis: No  · HIV/AIDS: No  · Hepatitis B or C: No  · Tuberculosis: No  · Systemic Immunosuppression such as Diabetes, Biologic or Immunotherapy, Chemotherapy, Organ Transplantation, Bone Marrow Transplantation (If YES, please provide more detail): YES, diabetes  · Radiation Treatment (If YES, please provide more detail): No  · Any other major medical conditions/concerns? (If Yes, which types)- No    Social History:     What is/was your primary occupation? retired     What are your hobbies/past-times? Family History:  Have any of your "first degree relatives" (parent, brother, sister, or child) had any of the following       · Skin cancer such as Melanoma or Merkel Cell Carcinoma or Pancreatic Cancer? No  · Eczema, Asthma, Hay Fever or Seasonal Allergies: No  · Psoriasis or Psoriatic Arthritis: No  · Do any other medical conditions seem to run in your family? If Yes, what condition and which relatives?   YES, mother has hx of heart disease and kidney cancer    Current Medications:         Current Outpatient Medications:     albuterol (PROVENTIL HFA,VENTOLIN HFA) 90 mcg/act inhaler, , Disp: , Rfl:     ALPRAZolam (XANAX) 0 25 mg tablet, Take 1 tablet (0 25 mg total) by mouth 3 (three) times a day as needed for anxiety, Disp: 90 tablet, Rfl: 0    atorvastatin (LIPITOR) 20 mg tablet, Take 1 tablet by mouth in the morning, Disp: , Rfl:     Benralizumab (FASENRA SC), Inject under the skin EVERY OTHER MONTH, Disp: , Rfl:     celecoxib (CeleBREX) 200 mg capsule, Take 1 capsule (200 mg total) by mouth daily, Disp: 90 capsule, Rfl: 0    cetirizine (ZyrTEC) 10 mg tablet, Take 10 mg by mouth daily after dinner  , Disp: , Rfl:     Cholecalciferol 2000 units CAPS, Take 1 capsule by mouth daily after dinner  , Disp: , Rfl:     clobetasol (TEMOVATE) 0 05 % cream, APPLY TO AFFECTED AREA TWICE A DAY, Disp: 60 g, Rfl: 0    Cyanocobalamin (B-12) 1000 MCG CAPS, Take 1 tablet by mouth daily after dinner  , Disp: , Rfl:     fluticasone (FLONASE) 50 mcg/act nasal spray, 2 sprays into each nostril 2 (two) times a day as needed  , Disp: , Rfl:     fluticasone-vilanterol (Breo Ellipta) 100-25 mcg/inh inhaler, , Disp: , Rfl:     hydrochlorothiazide (HYDRODIURIL) 25 mg tablet, Take 1 tablet by mouth daily in the early morning  , Disp: , Rfl: 3    ketoconazole (NIZORAL) 2 % cream, Apply 1 application topically 2 (two) times a day To affected area, Disp: 60 g, Rfl: 0    levETIRAcetam (KEPPRA) 500 mg tablet, Take 1 tablet (500 mg total) by mouth 2 (two) times a day, Disp: 180 tablet, Rfl: 3    losartan (COZAAR) 25 mg tablet, Take 25 mg by mouth daily, Disp: , Rfl:     metFORMIN (GLUCOPHAGE) 1000 MG tablet, Take 1 tablet (1,000 mg total) by mouth 2 (two) times a day with meals, Disp: 180 tablet, Rfl: 3    montelukast (SINGULAIR) 10 mg tablet, Take 10 mg by mouth every evening, Disp: , Rfl: 3    oxybutynin (DITROPAN) 5 mg tablet, TAKE 1 TABLET BY MOUTH EVERY DAY AT NIGHT, Disp: , Rfl:     oxyCODONE-acetaminophen (PERCOCET) 7 5-325 MG per tablet, Take 1 tablet by mouth every 6 (six) hours as needed for moderate pain Max Daily Amount: 4 tablets, Disp: 120 tablet, Rfl: 0    pantoprazole (PROTONIX) 40 mg tablet, TAKE 1 TABLET BY MOUTH EVERY DAY BEFORE BREAKFAST, Disp: 90 tablet, Rfl: 1    Pseudoeph-Doxylamine-DM-APAP (NYQUIL PO), Take by mouth as needed , Disp: , Rfl:     tamsulosin (Flomax) 0 4 mg, Take 0 4 mg by mouth daily with dinner, Disp: , Rfl:     zolpidem (AMBIEN CR) 12 5 MG CR tablet, Take 1 tablet (12 5 mg total) by mouth daily at bedtime as needed for sleep, Disp: 30 tablet, Rfl: 3    atorvastatin (LIPITOR) 10 mg tablet, TAKE 1 TABLET BY MOUTH EVERY DAY AT NIGHT (Patient not taking: Reported on 4/4/2022), Disp: , Rfl: 6    methylPREDNISolone 4 MG tablet therapy pack, Use as directed on package (Patient not taking: Reported on 4/7/2022 ), Disp: 21 each, Rfl: 0    predniSONE 10 mg tablet, 3 tabs po bid x2 days, then 2 tabs po bid x2 days, then 1 tab bid x2 days, then 1 daily until done  (Patient not taking: Reported on 3/30/2022 ), Disp: 26 tablet, Rfl: 0      Review of Systems:  Have you recently had or currently have any of the following? If YES, what are you doing for the problem? · Fever, chills or unintended weight loss: No  · Sudden loss or change in your vision: No  · Nausea, vomiting or blood in your stool: No  · Painful or swollen joints: No  · Wheezing or cough: YES, asmtha  · Changing mole or non-healing wound: YES, nose and back  · Nosebleeds: No  · Excessive sweating: No  · Easy or prolonged bleeding? No  · Over the last 2 weeks, how often have you been bothered by the following problems? · Taking little interest or pleasure in doing things: 1 - Not at All  · Feeling down, depressed, or hopeless: 1 - Not at All  · Rapid heartbeat with epinephrine:  No    ·   · Any known allergies? Allergies   Allergen Reactions    Iv Dye [Iodinated Diagnostic Agents] Hives    Other      Environmental    Penicillins Other (See Comments)     ?  Had as a child-Unknown reaction   ·       Physical Exam:     Was a chaperone (Derm Clinical Assistant) present throughout the entire Physical Exam? Yes     Did the Dermatology Team specifically  the patient on the importance of a Full Skin Exam to be sure that nothing is missed clinically?  Yes}  o Did the patient ultimately request or accept a Full Skin Exam?  NO  o Did the patient specifically refuse to have the areas "under-the-bra" examined by the Dermatologist? No  o Did the patient specifically refuse to have the areas "under-the-underwear" examined by the Dermatologist? No    CONSTITUTIONAL:   Vitals:    04/07/22 1314   Temp: (!) 97 °F (36 1 °C)   TempSrc: Temporal   Weight: 127 kg (279 lb)   Height: 5' 10" (1 778 m)       PSYCH: Normal mood and affect  EYES: Normal conjunctiva  ENT: Normal lips and oral mucosa  CARDIOVASCULAR: No edema  RESPIRATORY: Normal respirations  HEME/LYMPH/IMMUNO:  No regional lymphadenopathy except as noted below in "ASSESSMENT AND PLAN BY DIAGNOSIS"    SKIN:  FULL ORGAN SYSTEM EXAM  Hair, Scalp, Ears, Face Normal except as noted below in Assessment   Neck, Cervical Chain Nodes Normal except as noted below in Assessment   Right Arm/Hand/Fingers Normal except as noted below in Assessment   Left Arm/Hand/Fingers Normal except as noted below in Assessment   Chest/Breasts/Axillae Viewed areas Normal except as noted below in Assessment   Abdomen, Umbilicus Normal except as noted below in Assessment   Back/Spine Normal except as noted below in Assessment   Groin/Genitalia/Buttocks NOT EXAMINED   Right Leg, Foot, Toes Normal except as noted below in Assessment   Left Leg, Foot, Toes Normal except as noted below in Assessment        Assessment and Plan by Diagnosis:    History of Present Condition:     Duration:  How long has this been an issue for you?    o  about 1 year on back   Location Affected:  Where on the body is this affecting you?    o  back, nose   Quality:  Is there any bleeding, pain, itch, burning/irritation, or redness associated with the skin lesion? o  denies   Severity:  Describe any bleeding, pain, itch, burning/irritation, or redness on a scale of 1 to 10 (with 10 being the worst)  o  0   Timing:  Does this condition seem to be there pretty constantly or do you notice it more at specific times throughout the day? o  comes and goes   Context:  Have you ever noticed that this condition seems to be associated with specific activities you do?    o  denies   Modifying Factors:    o Anything that seems to make the condition worse?    -  denies  o What have you tried to do to make the condition better?    -  denies   Associated Signs and Symptoms:  Does this skin lesion seem to be associated with any of the following:  o  SL AMB DERM SIGNS AND SYMPTOMS: Itching and Scratching       EPIDERMAL INCLUSION CYST    Physical Exam:   Anatomic Location Affected:  back   Morphological Description:  Cystic nodule   Pertinent Positives:   Pertinent Negatives: Additional History of Present Condition:  Patient advised that cyst popped twice in past and puss came out    Assessment and Plan:  Based on a thorough discussion of this condition and the management approach to it (including a comprehensive discussion of the known risks, side effects and potential benefits of treatment), the patient (family) agrees to implement the following specific plan:     Scheduled for excision on 6/01 @ 1: 45    What are epidermal inclusion cysts? Epidermal inclusion cysts are the most common, benign cutaneous cysts  There are many different names for epidermal inclusion cysts, including epidermoid cyst, epidermal cyst, infundibular cyst, inclusion cyst, and keratin cyst  These cysts can occur anywhere on the body and typically present as nodules directly underneath the skin  There is often a visible pore or opening in the center   The cysts are freely moveable and can range from a few millimeters to several centimeters in diameter  The center of epidermoid cysts almost always contains keratin, which has a cheesy appearance, and not sebum  They also do not originate from sebaceous glands  Therefore, epidermal inclusion cysts are not the same as sebaceous cysts  Cysts may remain stable or progressively enlarge over time  There are no reliable predictive factors to tell if an epidermal inclusion cyst will enlarge, become inflamed, or remain quiescent  Infected cysts tend to become larger, turn red, and are more noticeable to the patient  There may be accompanying pain and discomfort  What causes epidermal inclusion cysts? Epidermal inclusion cysts often appear out of the blue and are not contagious  They are due to a proliferation of epidermal cells within the dermis and are more common in men than women  They occur more frequently in patients in their 20s to 45s  Epidermal inclusion cysts by themselves are usually not inherited, but they can be hereditary in rare syndromes such as Keene syndrome, nodular elastosis with cysts and comedones (Favre-Racouchot syndrome), and basal cell nevus syndrome (Gorlin syndrome)  Elderly patients with chronic sun-damaged skin areas have a higher likelihood of developing epidermoid cysts  They often occur in areas where hair follicles have been inflamed or repeatedly irritated are more frequent in patients with acne vulgaris  In the  period, they are called milia  Patients on BRAF inhibitors such as imiquimod and cyclosporine have a higher incidence of epidermoid cysts of the face  How do we diagnose an epidermal inclusion cyst?  Epidermoid inclusion cysts are often diagnosed by history and physical exam  There is usually no need for biopsy prior to removal   Radiographic and laboratory exams, such as ultrasound studies, are unnecessary and not typically ordered unless the practitioner suspects a genetic condition      What is the treatment for an epidermal inclusion cyst?  Inflamed, uninfected epidermal inclusion cysts rarely resolve spontaneously without therapy or surgical intervention  Treatment is not emergent unless desired by the patient  Definitive treatment is via surgical excision with walls intact  This method will prevent recurrence  This is best done when the cyst is not inflamed, to decrease the probability of rupture during surgery  - A local anesthetic will be injected around the cyst  - A small incision is made in the skin overlying the cyst, and contents are expressed  - The incision is repaired with sutures    Another option is to use a 4mm punch biopsy with cyst extraction through the defect  Incision and drainage is often needed if the cyst is infected or inflamed  If there is surrounding cellulitis, oral antibiotic therapy may be necessary  The common agents used target methicillin sensitive Staphylococcal aureus and methicillin resistant S aureus in areas of high prevalence  NEOPLASM OF UNCERTAIN BEHAVIOR OF SKIN    Physical Exam:   (Anatomic Location); (Size and Morphological Description); (Differential Diagnosis):    SPECIMEN A; Skin; Anatomic Location: tip of nose; Procedure/Protocol: Skin Specimen (submit in FORMALIN):Tangential Biopsy (includes shave, scoop, saucerization, curette) (CPT 61756; each additional tangential biopsy is CPT 52359)   78y o  year old  Male with a Morphological Description: 2 mm pink scaly plaque  Differential Diagnosis and/or Specific Clinical Question: squamous cell carcinoma versus basal cell carcinoma     Pertinent Positives:   Pertinent Negatives: Additional History of Present Condition:  Patient noted that had parra and popped and spot never went away    Assessment and Plan:   I have discussed with the patient that a sample of skin via a "skin biopsy would be potentially helpful to further make a specific diagnosis under the microscope     Based on a thorough discussion of this condition and the management approach to it (including a comprehensive discussion of the known risks, side effects and potential benefits of treatment), the patient (family) agrees to implement the following specific plan:    o Procedure:  Skin Biopsy  After a thorough discussion of treatment options and risk/benefits/alternatives (including but not limited to local pain, scarring, dyspigmentation, blistering, possible superinfection, and inability to confirm a diagnosis via histopathology), verbal and written consent were obtained and portion of the rash was biopsied for tissue sample  See below for consent that was obtained from patient and subsequent Procedure Note  PROCEDURE TANGENTIAL (SHAVE) BIOPSY NOTE:     Performing Physician: Abhishek Adames Anatomic Location; Clinical Description with size (cm); Pre-Op Diagnosis:     SPECIMEN A; Skin; Anatomic Location: tip of nose; Procedure/Protocol: Skin Specimen (submit in FORMALIN):Tangential Biopsy (includes shave, scoop, saucerization, curette) (CPT 53920; each additional tangential biopsy is CPT 75108)   78y o  year old  Male with a Morphological Description: 2 mm pink scaly plaque  Differential Diagnosis and/or Specific Clinical Question: squamous cell carcinoma versus basal cell carcinoma     Post-op diagnosis: Same      Local anesthesia: 1% xylocaine with epi       Topical anesthesia: None     Hemostasis: Aluminum chloride       After obtaining informed consent  at which time there was a discussion about the purpose of biopsy  and low risks of infection and bleeding  The area was prepped and draped in the usual fashion  Anesthesia was obtained with 1% lidocaine with epinephrine  A shave biopsy to an appropriate sampling depth was obtained by Shave (Dermablade or 15 blade) The resulting wound was covered with surgical ointment and bandaged appropriately       The patient tolerated the procedure well without complications and was without signs of functional compromise  Specimen has been sent for review by Dermatopathology  Standard post-procedure care has been explained and has been included in written form within the patient's copy of Informed Consent  INFORMED CONSENT DISCUSSION AND POST-OPERATIVE INSTRUCTIONS FOR PATIENT    I   RATIONALE FOR PROCEDURE  I understand that a skin biopsy allows the Dermatologist to test a lesion or rash under the microscope to obtain a diagnosis  It usually involves numbing the area with numbing medication and removing a small piece of skin; sometimes the area will be closed with sutures  In this specific procedure, sutures are not usually needed  If any sutures are placed, then they are usually need to be removed in 2 weeks or less  I understand that my Dermatologist recommends that a skin "shave" biopsy be performed today  A local anesthetic, similar to the kind that a dentist uses when filling a cavity, will be injected with a very small needle into the skin area to be sampled  The injected skin and tissue underneath "will go to sleep and become numb so no pain should be felt afterwards  An instrument shaped like a tiny "razor blade" (shave biopsy instrument) will be used to cut a small piece of tissue and skin from the area so that a sample of tissue can be taken and examined more closely under the microscope  A slight amount of bleeding will occur, but it will be stopped with direct pressure and a pressure bandage and any other appropriate methods  I understands that a scar will form where the wound was created  Surgical ointment will be applied to help protect the wound  Sutures are not usually needed      II   RISKS AND POTENTIAL COMPLICATIONS   I understand the risks and potential complications of a skin biopsy include but are not limited to the following:   Bleeding   Infection   Pain   Scar/keloid   Skin discoloration   Incomplete Removal   Recurrence   Nerve Damage/Numbness/Loss of Function   Allergic Reaction to Anesthesia   Biopsies are diagnostic procedures and based on findings additional treatment or evaluation may be required   Loss or destruction of specimen resulting in no additional findings    My Dermatologist has explained to me the nature of the condition, the nature of the procedure, and the benefits to be reasonably expected compared with alternative approaches  My Dermatologist has discussed the likelihood of major risks or complications of this procedure including the specific risks listed above, such as bleeding, infection, and scarring/keloid  I understand that a scar is expected after this procedure  I understand that my physician cannot predict if the scar will form a "keloid," which extends beyond the borders of the wound that is created  A keloid is a thick, painful, and bumpy scar  A keloid can be difficult to treat, as it does not always respond well to therapy, which includes injecting cortisone directly into the keloid every few weeks  While this usually reduces the pain and size of the scar, it does not eliminate it  I understand that photographs may be taken before and after the procedure  These will be maintained as part of the medical providers confidential records and may not be made available to me  I further authorize the medical provider to use the photographs for teaching purposes or to illustrate scientific papers, books, or lectures if in his/her judgment, medical research, education, or science may benefit from its use  I have had an opportunity to fully inquire about the risks and benefits of this procedure and its alternatives  I have been given ample time and opportunity to ask questions and to seek a second opinion if I wished to do so  I acknowledge that there have specifically been no guarantees as to the cosmetic results from the procedure    I am aware that with any procedure there is always the possibility of an unexpected complication  III  POST-PROCEDURAL CARE (WHAT YOU WILL NEED TO DO "AFTER THE BIOPSY" TO OPTIMIZE HEALING)     Keep the area clean and dry  Try NOT to remove the bandage or get it wet for the first 24 hours   Gently clean the area and apply surgical ointment (such as Vaseline petrolatum ointment, which is available "over the counter" and not a prescription) to the biopsy site for up to 2 weeks straight  This acts to protect the wound from the outside world   Sutures are not usually placed in this procedure  If any sutures were placed, return for suture removal as instructed (generally 1 week for the face, 2 weeks for the body)   Take Acetaminophen (Tylenol) for discomfort, if no contraindications  Ibuprofen or aspirin could make bleeding worse   Call our office immediately for signs of infection: fever, chills, increased redness, warmth, tenderness, discomfort/pain, or pus or foul smell coming from the wound  WHAT TO DO IF THERE IS ANY BLEEDING? If a small amount of bleeding is noticed, place a clean cloth over the area and apply firm pressure for ten minutes  Check the wound after 10 minutes of direct pressure  If bleeding persists, try one more time for an additional 10 minutes of direct pressure on the area  If the bleeding becomes heavier or does not stop after the second attempt, or if you have any other questions about this procedure, then please call your [facility] Dermatologist by calling [phone] (SKIN)  I hereby acknowledge that I have reviewed and verified the site with my Dermatologist and have requested and authorized my Dermatologist to proceed with the procedure                Scribe Attestation    I,:  Caitlin Morataya am acting as a scribe while in the presence of the attending physician :       I,:  Yamilet Guerrero MD personally performed the services described in this documentation    as scribed in my presence :

## 2022-04-13 DIAGNOSIS — F41.9 ANXIETY: ICD-10-CM

## 2022-04-13 RX ORDER — ALPRAZOLAM 0.25 MG/1
0.25 TABLET ORAL 3 TIMES DAILY PRN
Qty: 90 TABLET | Refills: 0 | Status: SHIPPED | OUTPATIENT
Start: 2022-04-17 | End: 2022-05-20

## 2022-04-13 NOTE — TELEPHONE ENCOUNTER
1  [id number] 03/28/2022 03/28/2022 oxyCODONE HCL-ACETAMINOPHEN (Tablet)  120 0 30 325 MG-7 5 MG 45 0 PARAS STEARNS  [facility], [facility] Us 00 / 0 [place]    1  [id number] 03/28/2022 02/28/2022 Zolpidem Tartrate (Tablet, Extended Release)  30 0 30 12 5 MG NA BUSHRA VENEGAS  [facility], [facility] 00 / 3 [place]    1  [id number] 03/20/2022 03/17/2022 ALPRAZolam (Tablet)  90 0 30 0 25 MG NA BUSHRA VENEGAS  [facility], [facility] 00 / 0 [place]

## 2022-04-14 ENCOUNTER — TELEPHONE (OUTPATIENT)
Dept: DERMATOLOGY | Facility: CLINIC | Age: 67
End: 2022-04-14

## 2022-04-14 NOTE — TELEPHONE ENCOUNTER
Pre- operative Mohs Telephone Scheduling Note    Do you have a pacemaker or defibrillator? no    Do you take antibiotics before skin or dental procedures? yes: Gil Tsang only for Dental procedures  If yes, will likely require pre-operative antibiotics  Ask  the patient why they take the antibiotics (usually because of joint replacement)  Do you have a history of a joint replacements within the past 2 years? yes: knee placed 6 years ago    If yes, will likely require pre-operative antibiotics  Ask if orthopaedic surgeon has prescribed pre-operative antibiotics to take before procedures/dental work? Do you take any OTC medications that thin your blood (Aspirin, Aleve, Ibuprofen) or supplements that thin your blood (fish oil, garlic, vitamin E, Ginko Biloba)? no    Do you take any prescribed medications that thin your blood (Coumadin, Plavix, Xarelto, Eliquis or another prescribed blood thinner)? no    Do you have an allergy to lidocaine or epinephrine? no    Do you have an allergy to shellfish? no    Do you smoke? no      If yes,  patient to try and stop 2 days before surgery and 7 days after the surgery  Minimizing smoking as much as possible during this time will improve healing and the cosmetic result after surgery  Date scheduled: 06/15/2022 @ 10:00 am with Dr David Worthington of Care with other provider ([place] [facility], ENT) required? no, per Jorge    IF YES, PLEASE FORWARD TO [address]  Are there remaining tumors to be scheduled?  no

## 2022-04-14 NOTE — RESULT ENCOUNTER NOTE
DERMATOPATHOLOGY RESULT NOTE    Results reviewed by ordering physician  Called patient to personally discuss results  Discussed results with patient  Instructions for Clinical Derm Team:   (remember to route Result Note to appropriate staff):    Call patient and schedule for MOHS    Result & Plan by Specimen:    Specimen A: malignant  Plan: [facility]      Tissue Exam: [id number]  Order: [id number]  Visible to patient: Yes (seen)    Dx: Neoplasm of uncertain behavior of skin    1 Result Note    Component   Case Report  Surgical Pathology Report                         Case: [id number]                                   Authorizing Provider: Laila Rueda MD            Collected:           04/07/2022 [facility]              Ordering Location:     [facility]      Received:            04/07/2022 [address] [place] [address]                                                                       Pathologist:           Simeon Rodrigues MD                                                           Specimen:    Skin, Other, SPecimen A: nose                                                            Final Diagnosis  A  Skin, nose, shave biopsy:     BASAL CELL CARCINOMA, NODULAR TYPE; transected  Electronically signed by Simeon Rodrigues MD on 4/12/2022 at 12:05 PM  Additional Information   All reported additional testing was performed with appropriately reactive controls   These tests were developed and their performance characteristics determined by [facility] or appropriate performing facility, though some tests may be performed on tissues which have not been validated for performance characteristics (such as staining performed on alcohol exposed cell blocks and decalcified tissues)   Results should be interpreted with caution and in the context of the patients' clinical condition  These tests may not be cleared or approved by the [place]   Food and Drug Administration, though the FDA has determined that such clearance or approval is not necessary  These tests are used for clinical purposes and they should not be regarded as investigational or for research  This laboratory has been approved by [place] [id number], designated as a high-complexity laboratory and is qualified to perform these tests  Charlie Nice Description     A  The specimen is received in formalin, labeled with the patient's name and hospital number, and is designated "nose"  The specimen consists of a shave biopsy of tan roughened skin measuring 0 4 x 0 4 x 0 1 cm  The apparent margin of resection is inked green  The specimen is bisected and entirely submitted in between sponges in 1 cassette      Note: The estimated total formalin fixation time based upon information provided by the submitting clinician and the standard processing schedule is under 72 hours  [facility]       Clinical Information   SPECIMEN A; Skin;  Anatomic Location: tip of nose; Procedure/Protocol: Skin Specimen (submit in FORMALIN):Tangential Biopsy (includes shave, scoop, saucerization, curette) (CPT 65070; each additional tangential biopsy is CPT 24345)   78y o  year old  Male with a Morphological Description: 2 mm pink scaly plaque   Differential Diagnosis and/or Specific Clinical Question: squamous cell carcinoma versus basal cell carcinoma     Attn: derm path  Resulting Agency [id number]          Specimen Collected: 04/07/22  1:52 PM Last Resulted: 04/12/22 12:05 PM    Order Details    View Encounter    Lab and Collection Details    Routing    Result History          Scans on Order [id number]    Lab Result Document - Document on 4/12/2022 12:05 PM

## 2022-04-24 DIAGNOSIS — M54.50 CHRONIC LOW BACK PAIN WITHOUT SCIATICA, UNSPECIFIED BACK PAIN LATERALITY: ICD-10-CM

## 2022-04-24 DIAGNOSIS — G89.29 CHRONIC LOW BACK PAIN WITHOUT SCIATICA, UNSPECIFIED BACK PAIN LATERALITY: ICD-10-CM

## 2022-04-24 NOTE — DISCHARGE INSTRUCTIONS
Post Operative Instructions    You have had surgery on your arm today, please read and follow the information below:  · Elevate your hand above your elbow during the next 24-48 hours to help with swelling  · Place your hand and arm over your head with motion at your shoulder three times a day  · Do not apply any cream/ointment/oil to your incisions including antibiotics  · Do not soak your hands in standing water (dishwater, tubs, Jacuzzi's, pools, etc ) until given permission (typically 2-3 weeks after injury)    Call the office if you notice any:  · Increased numbness or tingling of your hand or fingers that is not relieved with elevation  · Increasing pain that is not controlled with medication  · Difficulty chewing, breathing, swallowing  · Chest pains or shortness of breath  · Fever over 101 4 degrees  Bandage: Please keep bandages clean and dry  Remove bandage after 5 days  Once bandages are removed you may wash hands with soap and water  Short showers are okay as well, but please avoid soaking the hand as described above (ie no pools, baths, dirty dish water, hot tubs, ocean/lake water, etc)  Sutures will be removed in the office at your first follow up visit, please do not remove them yourself  Please do NOT put any topical agents on the surgical wound including neosporin, peroxide, tea tree oil, vitamin E, etc  as these can delay wound healing  Motion: Move fingers into a fist 5 times a day, DO NOT move any splinted fingers  Weight bearing status: Avoid heavy lifting (>5 pounds) with the extremity that was operated on until follow up appointment  Normal activities of daily living are OK  Ice: Ice for 10 minutes every hour as needed for swelling x 24 hours  Sling: No sling necessary      Pain medication:   Naproxen 220 mg two time a day (do not take this medication if you were told by your doctor that you cannot take anti-inflammatories or NSAIDS)  Tylenol Extended Release 650 mg every 8 hours  Norco/Hydrocodone one tab every 6 hours ONLY AS NEEDED for severe pain         Follow-up Appointment: 10-14 days with Dr Radha Noel        Please call the office if you have any questions or concerns regarding your post-operative care

## 2022-04-25 RX ORDER — PSEUDOEPHEDRINE HYDROCHLORIDE 30 MG/1
60 TABLET ORAL EVERY 4 HOURS PRN
COMMUNITY

## 2022-04-25 RX ORDER — OXYCODONE AND ACETAMINOPHEN 7.5; 325 MG/1; MG/1
1 TABLET ORAL EVERY 6 HOURS PRN
Qty: 120 TABLET | Refills: 0 | Status: SHIPPED | OUTPATIENT
Start: 2022-04-25 | End: 2022-05-23 | Stop reason: SDUPTHER

## 2022-04-25 NOTE — PRE-PROCEDURE INSTRUCTIONS
Pre-Surgery Instructions:   Medication Instructions    albuterol (PROVENTIL HFA,VENTOLIN HFA) 90 mcg/act inhaler Instructed to take per normal schedule including DOS    ALPRAZolam (XANAX) 0 25 mg tablet Take day of surgery   atorvastatin (LIPITOR) 20 mg tablet Instructed to take per normal schedule-evening med    Benralizumab (FASENRA SC) Instructed to take per normal schedule    celecoxib (CeleBREX) 200 mg capsule Stop taking 3 days prior to surgery   cetirizine (ZyrTEC) 10 mg tablet Take day of surgery   Cholecalciferol 2000 units CAPS Instructed to avoid all ASA/NSAIDs and OTC Vit/Supp from now until after surgery per anesthesia guidelines  Tylenol ok prn    clobetasol (TEMOVATE) 0 05 % cream Hold day of surgery   Cyanocobalamin (B-12) 1000 MCG CAPS Instructed to avoid all ASA/NSAIDs and OTC Vit/Supp from now until after surgery per anesthesia guidelines  Tylenol ok prn    fluticasone (FLONASE) 50 mcg/act nasal spray Instructed to take per normal schedule including DOS    fluticasone-vilanterol (Breo Ellipta) 100-25 mcg/inh inhaler Take day of surgery   hydrochlorothiazide (HYDRODIURIL) 25 mg tablet Hold day of surgery   ketoconazole (NIZORAL) 2 % cream Hold day of surgery   levETIRAcetam (KEPPRA) 500 mg tablet Take day of surgery   losartan (COZAAR) 25 mg tablet evening med-inst to take night before sx as he typically does    metFORMIN (GLUCOPHAGE) 1000 MG tablet Hold day of surgery   montelukast (SINGULAIR) 10 mg tablet Take day of surgery   oxybutynin (DITROPAN) 5 mg tablet evening med, will take night before sx    oxyCODONE-acetaminophen (PERCOCET) 7 5-325 MG per tablet Instructed to take per normal schedule    pantoprazole (PROTONIX) 40 mg tablet Hold day of surgery      Pseudoeph-Doxylamine-DM-APAP (NYQUIL PO) evening med    tamsulosin (Flomax) 0 4 mg evening med, will take night before sx    zolpidem (AMBIEN CR) 12 5 MG CR tablet evening med, will take night before sx    All pre-op instructions reviewed as per [facility] Experience w/ pt verb understanding  Inst NPO post MN night before sx except sips water w/ xanax, keppra & protonix on am of sx  Inst to use inhalers as he typically does   Inst no hctz or metformin morning of sx  Instructed to avoid all ASA/NSAIDs and OTC Vit/Supp from now until after surgery per anesthesia guidelines  Tylenol ok prn  Received pre-op showering instructions & CHG  from surgeon office, reviewed process at time of call  Current hospital visitor & masking policy reviewed  Inst will receive phone call w/ time to report on DOS btwn 2-8 pm afternoon/evening before surgery from hospital nursing staff  Pt  Verbalized an understanding of all instructions reviewed and offers no concerns at this time

## 2022-04-25 NOTE — TELEPHONE ENCOUNTER
03/28/2022 03/28/2022 oxyCODONE HCL-ACETAMINOPHEN (Tablet)  120 0 30 325 MG-7 5 MG 45 0 [address] [facility] [address]

## 2022-04-29 ENCOUNTER — ANESTHESIA EVENT (OUTPATIENT)
Dept: PERIOP | Facility: HOSPITAL | Age: 67
End: 2022-04-29
Payer: MEDICARE

## 2022-04-29 ENCOUNTER — HOSPITAL ENCOUNTER (OUTPATIENT)
Facility: HOSPITAL | Age: 67
Setting detail: OUTPATIENT SURGERY
Discharge: HOME | End: 2022-04-29
Attending: SURGERY | Admitting: SURGERY
Payer: MEDICARE

## 2022-04-29 ENCOUNTER — ANESTHESIA (OUTPATIENT)
Dept: PERIOP | Facility: HOSPITAL | Age: 67
End: 2022-04-29
Payer: MEDICARE

## 2022-04-29 VITALS
TEMPERATURE: 97 F | OXYGEN SATURATION: 98 % | DIASTOLIC BLOOD PRESSURE: 80 MMHG | WEIGHT: 270 LBS | HEART RATE: 67 BPM | RESPIRATION RATE: 16 BRPM | SYSTOLIC BLOOD PRESSURE: 149 MMHG | BODY MASS INDEX: 38.65 KG/M2 | HEIGHT: 70 IN

## 2022-04-29 DIAGNOSIS — M65.30 TRIGGER FINGER, UNSPECIFIED FINGER, UNSPECIFIED LATERALITY: Primary | ICD-10-CM

## 2022-04-29 LAB — GLUCOSE SERPL-MCNC: 135 MG/DL (ref 65–140)

## 2022-04-29 PROCEDURE — 82948 REAGENT STRIP/BLOOD GLUCOSE: CPT

## 2022-04-29 PROCEDURE — NC001 PR NO CHARGE: Performed by: SURGERY

## 2022-04-29 PROCEDURE — 26055 INCISE FINGER TENDON SHEATH: CPT | Performed by: SURGERY

## 2022-04-29 RX ORDER — FENTANYL CITRATE/PF 50 MCG/ML
25 SYRINGE (ML) INJECTION
Status: DISCONTINUED | OUTPATIENT
Start: 2022-04-29 | End: 2022-04-29 | Stop reason: HOSPADM

## 2022-04-29 RX ORDER — CEFAZOLIN SODIUM 1 G/3ML
INJECTION, POWDER, FOR SOLUTION INTRAMUSCULAR; INTRAVENOUS AS NEEDED
Status: DISCONTINUED | OUTPATIENT
Start: 2022-04-29 | End: 2022-04-29

## 2022-04-29 RX ORDER — PROPOFOL 10 MG/ML
INJECTION, EMULSION INTRAVENOUS AS NEEDED
Status: DISCONTINUED | OUTPATIENT
Start: 2022-04-29 | End: 2022-04-29

## 2022-04-29 RX ORDER — ONDANSETRON 2 MG/ML
4 INJECTION INTRAMUSCULAR; INTRAVENOUS ONCE AS NEEDED
Status: DISCONTINUED | OUTPATIENT
Start: 2022-04-29 | End: 2022-04-29 | Stop reason: HOSPADM

## 2022-04-29 RX ORDER — MIDAZOLAM HYDROCHLORIDE 2 MG/2ML
INJECTION, SOLUTION INTRAMUSCULAR; INTRAVENOUS AS NEEDED
Status: DISCONTINUED | OUTPATIENT
Start: 2022-04-29 | End: 2022-04-29

## 2022-04-29 RX ORDER — FENTANYL CITRATE 50 UG/ML
INJECTION, SOLUTION INTRAMUSCULAR; INTRAVENOUS AS NEEDED
Status: DISCONTINUED | OUTPATIENT
Start: 2022-04-29 | End: 2022-04-29

## 2022-04-29 RX ORDER — HYDROCODONE BITARTRATE AND ACETAMINOPHEN 5; 325 MG/1; MG/1
1 TABLET ORAL EVERY 6 HOURS PRN
Qty: 6 TABLET | Refills: 0 | Status: SHIPPED | OUTPATIENT
Start: 2022-04-29 | End: 2022-05-09

## 2022-04-29 RX ORDER — SODIUM CHLORIDE, SODIUM LACTATE, POTASSIUM CHLORIDE, CALCIUM CHLORIDE 600; 310; 30; 20 MG/100ML; MG/100ML; MG/100ML; MG/100ML
125 INJECTION, SOLUTION INTRAVENOUS CONTINUOUS
Status: DISCONTINUED | OUTPATIENT
Start: 2022-04-29 | End: 2022-04-29 | Stop reason: HOSPADM

## 2022-04-29 RX ORDER — PROPOFOL 10 MG/ML
INJECTION, EMULSION INTRAVENOUS CONTINUOUS PRN
Status: DISCONTINUED | OUTPATIENT
Start: 2022-04-29 | End: 2022-04-29

## 2022-04-29 RX ORDER — HYDROMORPHONE HCL IN WATER/PF 6 MG/30 ML
0.2 PATIENT CONTROLLED ANALGESIA SYRINGE INTRAVENOUS
Status: DISCONTINUED | OUTPATIENT
Start: 2022-04-29 | End: 2022-04-29 | Stop reason: HOSPADM

## 2022-04-29 RX ADMIN — FENTANYL CITRATE 50 MCG: 50 INJECTION INTRAMUSCULAR; INTRAVENOUS at 10:40

## 2022-04-29 RX ADMIN — FENTANYL CITRATE 50 MCG: 50 INJECTION INTRAMUSCULAR; INTRAVENOUS at 11:03

## 2022-04-29 RX ADMIN — PROPOFOL 50 MG: 10 INJECTION, EMULSION INTRAVENOUS at 10:42

## 2022-04-29 RX ADMIN — MIDAZOLAM 2 MG: 1 INJECTION INTRAMUSCULAR; INTRAVENOUS at 10:38

## 2022-04-29 RX ADMIN — CEFAZOLIN SODIUM 3000 MG: 1 INJECTION, POWDER, FOR SOLUTION INTRAMUSCULAR; INTRAVENOUS at 10:40

## 2022-04-29 RX ADMIN — SODIUM CHLORIDE, SODIUM LACTATE, POTASSIUM CHLORIDE, AND CALCIUM CHLORIDE 125 ML/HR: .6; .31; .03; .02 INJECTION, SOLUTION INTRAVENOUS at 09:27

## 2022-04-29 RX ADMIN — Medication 25 MCG: at 12:36

## 2022-04-29 RX ADMIN — PROPOFOL 100 MCG/KG/MIN: 10 INJECTION, EMULSION INTRAVENOUS at 10:40

## 2022-04-29 NOTE — ANESTHESIA POSTPROCEDURE EVALUATION
Post-Op Assessment Note    CV Status:  Stable  Pain Score: 0    Pain management: adequate     Mental Status:  Alert and awake   Hydration Status:  Euvolemic   PONV Controlled:  Controlled   Airway Patency:  Patent      Post Op Vitals Reviewed: Yes      Staff: Anesthesiologist, CRNA         No complications documented      BP   115/73   Temp (!) (P) 97 1 °F (36 2 °C) (04/29/22 1111)    Pulse (P) 60 (04/29/22 1111)   Resp (P) 16 (04/29/22 1111)    SpO2 (P) 95 % (04/29/22 1111)

## 2022-04-29 NOTE — H&P
H&P              Show:Clear all  []Manual[x]Template[]Copied    Added by:  Renee German MD      []Jolene for details    ASSESSMENT/PLAN:       14-year-old male here for his right long and ring trigger fingers  Patient has treated non operatively with  Cortisone injections; 2 in the ring finger and 1 injection to the long finger  A right ring trigger finger release with possible right long trigger release  The anatomy and physiology of trigger finger was discussed with the patient today in the office  Edema and increased contact pressure within the flexor tendons at the A1 pulley can cause pain, crepitation, and limitation of function  Treatment options include resting MP blocking splints to decrease edema, oral anti-inflammatory medications, home or formal therapy exercises, up to 2 steroid injections within the tendon sheath, or surgical release  While majority of patients do respond to conservative treatment, up to 20% may require surgical release  This is an outpatient procedure where his hand will be wrapped up for 5 days  Using hand for small tasks to keep moving  After 5 days, wrap can come off and can wash, use mild soap and pat dry  Do not submerge in bath, dirty dishwater, pools, ocean, lakes hot tubs  Sutures come out 10-14 days  We will follow up post op       Risks of surgery include bleeding infection damage to surrounding structures recurrence or incomplete resolution of symptoms, need for further surgery          The patient verbalized understanding of exam findings and treatment plan  We engaged in the shared decision-making process and treatment options were discussed at length with the patient   Surgical and conservative management discussed today along with risks and benefits      Diagnoses and all orders for this visit:     Trigger finger, right middle finger  -     Case request operating room: RELEASE TRIGGER FINGER RING WITH POSSIBLE LONG; Standing  -     Case request operating room: RELEASE TRIGGER FINGER RING WITH POSSIBLE LONG     Trigger ring finger of right hand  -     Case request operating room: RELEASE TRIGGER FINGER RING WITH POSSIBLE LONG; Standing  -     Case request operating room: RELEASE TRIGGER FINGER RING WITH POSSIBLE LONG     Type 2 diabetes mellitus without complication, without long-term current use of insulin (HCC)  -     HEMOGLOBIN A1C W/ EAG ESTIMATION; Future     Pre-op testing  -     Basic metabolic panel; Future  -     EKG 12 lead; Future        Trigger Finger Release: The anatomy and physiology of trigger finger was discussed with the patient today in the office  Edema and increased contact pressure within the flexor tendons at the A1 pulley can cause pain, crepitation, and limitation of function  Treatment options include resting MP blocking splints to decrease edema, oral anti-inflammatory medications, home or formal therapy exercises, up to 2 steroid injections or surgical release  While majority of patients do respond to conservative treatment, up to 20% may require surgical release  The patient has elected release of the trigger finger  The patient has elected to undergo a release of the A1 pulley (trigger finger)  A small incision will be made over the palmar aspect of the hand, the tendon sheath holding the flexor tendons will be released  In the postoperative period, light activities are allowed immediately, driving is allowed when narcotic medication has stopped, and the incision may get wet after 2 days  Heavy activities (lifting more than approximately 10 pounds) will be allowed after the follow up appointment in 1-2 weeks  While the pain and discomfort within the wrist typically improves rapidly, some residual discomfort may be present for up to 6 weeks    The nodule that is typically palpable in the palmar aspect of the hand will not be removed, as this would necessitate removal of a portion of the flexor tendon, however the catching, clicking, and locking should resolve  Approximate success rate is 98%  The risks and benefits of the procedure were explained to the patient, which include, but are not limited to: Bleeding, infection, recurrence, pain, scar, damage to tendons, damage to nerves, and damage to blood vessels, need for future surgery and complications related to anesthesia  If bony work is done, risks also include malunion and nonunion  These risks, along with alternative conservative treatment options, and postoperative protocols were voiced back and understood by the patient  All questions were answered to the patient's satisfaction  The patient agrees to comply with a standard postoperative protocol, and is willing to proceed  Education was provided via written and auditory forms  There were no barriers to learning  Written handouts regarding wound care, incision and scar care, and general preoperative information, as well as risks and benefits were provided to the patient      Follow Up:  Return for Follow up post- op         To Do Next Visit:  Re-evaluation of current issue     ____________________________________________________________________________________________________________________________________________        CHIEF COMPLAINT:      Chief Complaint   Patient presents with    Right Ring Finger - Follow-up         SUBJECTIVE:  Arianne Sawyer is a 78y o  year old  RHD male who presents today for 6 week follow-up for his right ring and long trigger fingers  At his last visit the 2nd injection for right ring trigger finger was administered  The right long finger was asymptomatic after the 1st injection and did not want a 2nd    Patient is a diabetic with his last hemoglobin A1c of 6 8 on 03/21/2022         I have personally reviewed all the relevant PMH, PSH, SH, FH, Medications and allergies       PAST MEDICAL HISTORY:       Past Medical History:   Diagnosis Date    Allergic rhinitis      Asthma      Disease of thyroid gland      GERD (gastroesophageal reflux disease)      HL (hearing loss)      Hypertension      Memory loss, short term       R/O Seizures but placed on Keppra    Tinnitus           PAST SURGICAL HISTORY:        Past Surgical History:   Procedure Laterality Date    ABDOMINOPLASTY        ADENOIDECTOMY        BACK SURGERY         lower back surgery    CARPAL TUNNEL RELEASE Bilateral      CHOLECYSTECTOMY        COLONOSCOPY        GASTRIC BYPASS        HERNIA REPAIR Right      JOINT REPLACEMENT Bilateral       Knee    KNEE ARTHROSCOPY Bilateral       X3    FL REPAIR ING HERNIA,5+Y/O,REDUCIBL Left 11/26/2018     Procedure: INGUINAL HERNIA REPAIR;  Surgeon: Pravin Adams DO;  [facility];  Service: General    TONSILLECTOMY             FAMILY HISTORY:        Family History   Problem Relation Age of Onset    Heart disease Mother      Kidney cancer Mother      Hypertension Father      Bipolar disorder Sister           bipolar disorder NOS         SOCIAL HISTORY:  Social History            Tobacco Use    Smoking status: Never Smoker    Smokeless tobacco: Never Used   Vaping Use    Vaping Use: Not on file   Substance Use Topics    Alcohol use: Yes       Alcohol/week: 0 0 standard drinks       Comment: 1 or 2 month    Drug use:  No         MEDICATIONS:     Current Outpatient Medications:     albuterol (PROVENTIL HFA,VENTOLIN HFA) 90 mcg/act inhaler, , Disp: , Rfl:     ALPRAZolam (XANAX) 0 25 mg tablet, Take 1 tablet (0 25 mg total) by mouth 3 (three) times a day as needed for anxiety, Disp: 90 tablet, Rfl: 0    atorvastatin (LIPITOR) 10 mg tablet, TAKE 1 TABLET BY MOUTH EVERY DAY AT NIGHT, Disp: , Rfl: 6    atorvastatin (LIPITOR) 20 mg tablet, Take 1 tablet by mouth in the morning, Disp: , Rfl:     Benralizumab (FASENRA SC), Inject under the skin EVERY OTHER MONTH, Disp: , Rfl:     cefuroxime (CEFTIN) 500 mg tablet, Take 1 tablet (500 mg total) by mouth every 12 (twelve) hours for 10 days, Disp: 20 tablet, Rfl: 0    celecoxib (CeleBREX) 200 mg capsule, Take 1 capsule (200 mg total) by mouth daily, Disp: 90 capsule, Rfl: 0    cetirizine (ZyrTEC) 10 mg tablet, Take 10 mg by mouth daily after dinner  , Disp: , Rfl:     Cholecalciferol 2000 units CAPS, Take 1 capsule by mouth daily after dinner  , Disp: , Rfl:     clobetasol (TEMOVATE) 0 05 % cream, APPLY TO AFFECTED AREA TWICE A DAY, Disp: 60 g, Rfl: 0    Cyanocobalamin (B-12) 1000 MCG CAPS, Take 1 tablet by mouth daily after dinner  , Disp: , Rfl:     fluticasone (FLONASE) 50 mcg/act nasal spray, 2 sprays into each nostril 2 (two) times a day as needed  , Disp: , Rfl:     fluticasone-vilanterol (Breo Ellipta) 100-25 mcg/inh inhaler, , Disp: , Rfl:     hydrochlorothiazide (HYDRODIURIL) 25 mg tablet, Take 1 tablet by mouth daily in the early morning  , Disp: , Rfl: 3    ketoconazole (NIZORAL) 2 % cream, Apply 1 application topically 2 (two) times a day To affected area, Disp: 60 g, Rfl: 0    levETIRAcetam (KEPPRA) 500 mg tablet, Take 1 tablet (500 mg total) by mouth 2 (two) times a day, Disp: 180 tablet, Rfl: 3    losartan (COZAAR) 25 mg tablet, Take 25 mg by mouth daily, Disp: , Rfl:     metFORMIN (GLUCOPHAGE) 500 mg tablet, Take 1 tablet (500 mg total) by mouth 2 (two) times a day with meals, Disp: 180 tablet, Rfl: 0    montelukast (SINGULAIR) 10 mg tablet, Take 10 mg by mouth every evening, Disp: , Rfl: 3    oxybutynin (DITROPAN) 5 mg tablet, TAKE 1 TABLET BY MOUTH EVERY DAY AT NIGHT, Disp: , Rfl:     oxyCODONE-acetaminophen (PERCOCET) 7 5-325 MG per tablet, Take 1 tablet by mouth every 6 (six) hours as needed for moderate pain Max Daily Amount: 4 tablets, Disp: 120 tablet, Rfl: 0    pantoprazole (PROTONIX) 40 mg tablet, TAKE 1 TABLET BY MOUTH EVERY DAY BEFORE BREAKFAST, Disp: 90 tablet, Rfl: 1    Pseudoeph-Doxylamine-DM-APAP (NYQUIL PO), Take by mouth as needed , Disp: , Rfl:     tamsulosin (Flomax) 0 4 mg, Take 0 4 mg by mouth daily with dinner, Disp: , Rfl:     zolpidem (AMBIEN CR) 12 5 MG CR tablet, Take 1 tablet (12 5 mg total) by mouth daily at bedtime as needed for sleep, Disp: 30 tablet, Rfl: 3    methylPREDNISolone 4 MG tablet therapy pack, Use as directed on package, Disp: 21 each, Rfl: 0    predniSONE 10 mg tablet, 3 tabs po bid x2 days, then 2 tabs po bid x2 days, then 1 tab bid x2 days, then 1 daily until done  (Patient not taking: Reported on 3/30/2022 ), Disp: 26 tablet, Rfl: 0     ALLERGIES:        Allergies   Allergen Reactions    Iv Dye [Iodinated Diagnostic Agents] Hives    Other         Environmental    Penicillins Other (See Comments)       ? Had as a child-Unknown reaction         REVIEW OF SYSTEMS:  Review of Systems   Constitutional: Negative for chills, fever and unexpected weight change  HENT: Negative for hearing loss, nosebleeds and sore throat  Eyes: Negative for pain, redness and visual disturbance  Respiratory: Negative for cough, shortness of breath and wheezing  Cardiovascular: Negative for chest pain, palpitations and leg swelling  Gastrointestinal: Negative for abdominal pain, nausea and vomiting  Endocrine: Negative for polydipsia and polyuria  Genitourinary: Negative for dysuria and hematuria  Skin: Negative for rash and wound  Neurological: Negative for dizziness, light-headedness and headaches  Psychiatric/Behavioral: Negative for decreased concentration, dysphoric mood and suicidal ideas   The patient is not nervous/anxious           VITALS:      Vitals:     03/30/22 1128   BP: 128/80   Pulse: 73         LABS:  HgA1c:         Lab Results   Component Value Date     HGBA1C 6 8 (A) 03/21/2022      BMP:         Lab Results   Component Value Date     CALCIUM 9 8 06/12/2021     K 4 6 06/12/2021     CO2 32 06/12/2021      06/12/2021     BUN 25 06/12/2021     CREATININE 1 01 06/12/2021         _____________________________________________________  PHYSICAL EXAMINATION:  General: well developed and well nourished, alert, oriented times 3 and appears comfortable  Psychiatric: Normal  HEENT: Normocephalic, Atraumatic Trachea Midline, No torticollis  Pulmonary: No audible wheezing or respiratory distress   Cardiovascular: No pitting edema, 2+ radial pulse   Abdominal/GI: abdomen non tender, non distended   Skin: No masses, erythema, lacerations, fluctation, ulcerations  Neurovascular: Sensation Intact to the Median, Ulnar, Radial Nerve, Motor Intact to the Median, Ulnar, Radial Nerve and Pulses Intact  Musculoskeletal: Normal, except as noted in detailed exam and in HPI         MUSCULOSKELETAL EXAMINATION:     right ring finger:  Positive palpable nodule over the A1 pulley  Positive tenderness to palpation over A1 pulley  Positive catching  Positive clicking        right long finger:  Negative palpable nodule over the A1 pulley  Negative tenderness to palpation over A1 pulley  Negative catching   Positive clicking           ___________________________________________________  STUDIES REVIEWED:  No images reviewed at today's visit        PROCEDURES PERFORMED:  Procedures  No Procedures performed today

## 2022-04-29 NOTE — OP NOTE
OPERATIVE REPORT  PATIENT NAME: Darvin Angel  :  1955  MRN: [id number]  Pt Location: [facility]    SURGERY DATE: 22    Surgeon(s) and Role:     * Tory Armenta MD - Primary     * Kayla Salgado MD - Assisting    Pre-Op Diagnosis:  Trigger finger, right middle finger [M65 331]  Trigger ring finger of right hand [M65 341]    Post-Op Diagnosis Codes:     * Trigger finger, right middle finger [M65 331]     * Trigger ring finger of right hand [M65 341]    Procedure(s):  RELEASE TRIGGER FINGER RING WITH POSSIBLE LONG (Right)    Specimen(s):  * No orders in the log *    Estimated Blood Loss:   Minimal    Anesthesia Type:   Conscious Sedation     IMPLANTS:  * No implants in log *    PERIOPERATIVE ANTIBIOTICS:    cefazolin, 3 grams    Tourniquet Time: 6 min  at 250 mmHg          Operative Indications: The patient has a history of Trigger Finger  right  long finger, ring finger that was recalcitrant to conservative management  The decision was made to bring the patient to the operating room for Trigger Finger Release  right  long finger, ring finger  Risks of the procedure were explained which include, but are not limited to bleeding; infection; damage to nerves, arteries,veins, tendons; scar; pain; need for reoperation; failure to give desired result; and risks of anaesthesia  All questions were answered to satisfaction and they were willing to proceed  Operative Findings:  Hypertrophy A 1 pulley ring > long   Early dupuytren's nodule in line with ring     Complications:   None    Procedure and Technique:  After the patient, site, and procedure were identified, the patient was brought into the operating room in a supine position  Local anaesthesia and sedation were provided  A well padded tourniquet was applied to the extremity, set at 250 mmHg  The  right upper extremity was then prepped and drapped in a normal, sterile, orthopedic fashion         A  longitudinal  incision is made in line with the ring  finger overlying the A1 pulley    Dissection was  carried down under loupe magnification  Skin and subcutaneous tissues were sharply incised  The tissue was elevated off the A1 pulley  The A1 pulley was  identified and incised  There was no retinacular cyst noted  A proximal thickening was noted superficially and explored, c/w dupuytren's nodule  The FDS and FDP were noted to have independent glide after release   A  longitudinal  incision is made in line with the lonh finger overlying the A1 pulley    Dissection was  carried down under loupe magnification  Skin and subcutaneous tissues were sharply incised  The tissue was elevated off the A1 pulley  The A1 pulley was  identified and incised  There was no retinacular cyst noted    The FDS and FDP were noted to have independent glide after release  The patient was able to fully flex and extend without any triggering  At the completion of the procedure, hemostasis was obtained with cautery and direct pressure  The wounds were copiously irrigated with sterile solution  The wounds were closed with Prolene  Sterile dressings were applied, including Xeroform, gauze, tweeners, webril, ACE  Please note, all sponge, needle, and instrument counts were correct prior to closure  Loupe magnification was utilized  The patient tolerated the procedure well       I was present for the entire procedure    Patient Disposition:  PACU     SIGNATURE: Neela Perez MD  DATE: 04/29/22  TIME: 10:57 AM

## 2022-04-29 NOTE — ANESTHESIA PREPROCEDURE EVALUATION
Procedure:  RELEASE TRIGGER FINGER RING WITH LONG (Right Hand)    Relevant Problems   CARDIO   (+) Essential hypertension   (+) Hyperlipidemia      ENDO   (+) Type 2 diabetes mellitus without complication, without long-term current use of insulin (HCC)      /RENAL   (+) BPH (benign prostatic hyperplasia)      HEMATOLOGY   (+) Absolute anemia   (+) Anemia      MUSCULOSKELETAL   (+) Disc degeneration, lumbar   (+) Impingement syndrome of right shoulder   (+) Primary osteoarthritis of right shoulder      NEURO/PSYCH   (+) Anxiety   (+) Temporal lobe epilepsy (HCC)      PULMONARY   (+) Moderate persistent asthma without complication        Physical Exam    Airway    Mallampati score: II  TM Distance: >3 FB  Neck ROM: full     Dental       Cardiovascular      Pulmonary      Other Findings        Anesthesia Plan  ASA Score- 3     Anesthesia Type- IV sedation with anesthesia with ASA Monitors  Additional Monitors:   Airway Plan:           Plan Factors-    Chart reviewed  Patient summary reviewed  Patient is not a current smoker  Patient did not smoke on day of surgery  Induction- intravenous  Postoperative Plan-     Informed Consent- Anesthetic plan and risks discussed with patient  I personally reviewed this patient with the CRNA  Discussed and agreed on the Anesthesia Plan with the CRNA  Abdoul Miller

## 2022-05-12 ENCOUNTER — OFFICE VISIT (OUTPATIENT)
Dept: OBGYN CLINIC | Facility: CLINIC | Age: 67
End: 2022-05-12

## 2022-05-12 VITALS
HEART RATE: 71 BPM | SYSTOLIC BLOOD PRESSURE: 119 MMHG | WEIGHT: 278 LBS | RESPIRATION RATE: 17 BRPM | HEIGHT: 71 IN | DIASTOLIC BLOOD PRESSURE: 80 MMHG | BODY MASS INDEX: 38.92 KG/M2

## 2022-05-12 DIAGNOSIS — Z98.890 S/P TRIGGER FINGER RELEASE: Primary | ICD-10-CM

## 2022-05-12 PROCEDURE — 1124F ACP DISCUSS-NO DSCNMKR DOCD: CPT | Performed by: SURGERY

## 2022-05-12 PROCEDURE — 99024 POSTOP FOLLOW-UP VISIT: CPT | Performed by: SURGERY

## 2022-05-12 NOTE — PROGRESS NOTES
Assessment/Plan:  Patient ID: Maggie Truong 78 y o  male   Surgery: Release Trigger Finger Ring With Long - Right  Date of Surgery: 4/29/2022    13 days s/p right ring/long trigger finger releases  Sutures were removed today  He was advised to perform scar massage  May shower normally, avoid standing water for 3 more days to allow suture holes to close  May resume activities to tolerance  Discussed findings of dupuytrens, he will keep an eye on the hand and finger     Follow Up:  PRN    To Do Next Visit:         CHIEF COMPLAINT:  Chief Complaint   Patient presents with    Right Ring Finger - Post-op    Right Middle Finger - Post-op         SUBJECTIVE:  Maggie Truong is a 78y o  year old male who presents for follow up after Release Trigger Finger Ring With Long - Right  Today patient has some mild soreness with hyperextension but otherwise no complaints  He has been using the hand for daily activities without issue, no residual clicking or locking         PHYSICAL EXAMINATION:  General: well developed and well nourished, alert, oriented times 3 and appears comfortable  Psychiatric: Normal    MUSCULOSKELETAL EXAMINATION:  Incision: clean, dry and suture(s) intact   Surgery Site: normal, no evidence of infection   Range of Motion: opposition intact and full composite fist possible  Neurovascular status: Neuro intact, good cap refill  Activity Restrictions: avoid standing water for 3 more days   Done today: Sutures out      STUDIES REVIEWED:  No Studies to review      PROCEDURES PERFORMED:  Procedures  No Procedures performed today      Jovita Owens PA-C

## 2022-05-19 DIAGNOSIS — F41.9 ANXIETY: ICD-10-CM

## 2022-05-20 DIAGNOSIS — F41.9 ANXIETY: ICD-10-CM

## 2022-05-20 RX ORDER — ALPRAZOLAM 0.25 MG/1
TABLET ORAL
Qty: 90 TABLET | Refills: 0 | Status: SHIPPED | OUTPATIENT
Start: 2022-05-20 | End: 2022-07-26 | Stop reason: ALTCHOICE

## 2022-05-20 RX ORDER — ALPRAZOLAM 0.25 MG/1
0.25 TABLET ORAL 3 TIMES DAILY PRN
Qty: 90 TABLET | Refills: 0 | Status: SHIPPED | OUTPATIENT
Start: 2022-05-20 | End: 2022-07-15 | Stop reason: SDUPTHER

## 2022-05-23 DIAGNOSIS — G47.00 INSOMNIA, UNSPECIFIED TYPE: ICD-10-CM

## 2022-05-23 DIAGNOSIS — G89.29 CHRONIC LOW BACK PAIN WITHOUT SCIATICA, UNSPECIFIED BACK PAIN LATERALITY: ICD-10-CM

## 2022-05-23 DIAGNOSIS — M54.50 CHRONIC LOW BACK PAIN WITHOUT SCIATICA, UNSPECIFIED BACK PAIN LATERALITY: ICD-10-CM

## 2022-05-24 RX ORDER — ZOLPIDEM TARTRATE 12.5 MG/1
12.5 TABLET, FILM COATED, EXTENDED RELEASE ORAL
Qty: 30 TABLET | Refills: 0 | Status: SHIPPED | OUTPATIENT
Start: 2022-05-24 | End: 2022-06-19 | Stop reason: SDUPTHER

## 2022-05-24 RX ORDER — OXYCODONE AND ACETAMINOPHEN 7.5; 325 MG/1; MG/1
1 TABLET ORAL EVERY 6 HOURS PRN
Qty: 120 TABLET | Refills: 0 | Status: SHIPPED | OUTPATIENT
Start: 2022-05-24 | End: 2022-06-19 | Stop reason: SDUPTHER

## 2022-05-24 NOTE — TELEPHONE ENCOUNTER
04/25/2022 04/25/2022 oxyCODONE HCL-ACETAMINOPHEN (Tablet)  120 0 30 325 MG-7 5 MG 45 0 BUSHRA VENEGAS  [facility], [facility] [place] [facility] 00 / 0 [id number]    1  [id number] 04/24/2022 02/28/2022 Zolpidem Tartrate (Tablet, Extended Release)  30 0 30 12 5 MG NA BUSHRA VENEGAS

## 2022-05-27 DIAGNOSIS — M25.50 ARTHRALGIA, UNSPECIFIED JOINT: ICD-10-CM

## 2022-05-27 RX ORDER — CELECOXIB 200 MG/1
CAPSULE ORAL
Qty: 90 CAPSULE | Refills: 0 | Status: SHIPPED | OUTPATIENT
Start: 2022-05-27

## 2022-06-15 ENCOUNTER — PROCEDURE VISIT (OUTPATIENT)
Dept: DERMATOLOGY | Facility: CLINIC | Age: 67
End: 2022-06-15
Payer: MEDICARE

## 2022-06-15 VITALS
HEIGHT: 71 IN | DIASTOLIC BLOOD PRESSURE: 75 MMHG | OXYGEN SATURATION: 95 % | SYSTOLIC BLOOD PRESSURE: 136 MMHG | BODY MASS INDEX: 38.39 KG/M2 | RESPIRATION RATE: 18 BRPM | WEIGHT: 274.2 LBS | HEART RATE: 85 BPM | TEMPERATURE: 98.4 F

## 2022-06-15 DIAGNOSIS — C44.91 NODULAR BASAL CELL CARCINOMA: Primary | ICD-10-CM

## 2022-06-15 PROCEDURE — 17311 MOHS 1 STAGE H/N/HF/G: CPT | Performed by: DERMATOLOGY

## 2022-06-15 PROCEDURE — 12051 INTMD RPR FACE/MM 2.5 CM/<: CPT | Performed by: DERMATOLOGY

## 2022-06-15 NOTE — PATIENT INSTRUCTIONS
Mohs Microscopic Surgery After Care    What You Will Need to Do After the Procedure  Keep the area clean and dry the first day  Try NOT to remove the bandage for the first day  Gently clean the area with soap and water and apply Vaseline ointment (this is over the counter and not a prescription) to the excision site for up to 2 weeks  Apply a clean appropriately sized bandage to area  Gauze and paper tape are recommended for sensitive skin  Return for suture removal as instructed (generally 1 week for the face, 2 weeks for the body)  Take Acetaminophen (Tylenol) for discomfort, if no contraindications  Do NOT take Ibuprofen or aspirin unless specifically told to do so by your Dermatologist because these medications can make bleeding worse  Call our office immediately for signs of infection: fever, chills, increased redness, warmth, tenderness, discomfort/pain, or pus or foul smell coming from the wound  If bleeding is noticed, place a clean cloth over the area and apply firm pressure for thirty minutes  Check the wound ONLY after 30 minutes of direct pressure; do not cheat and sneak a peak, as that does not count  If bleeding persists after 30 minutes of legitimate direct pressure, then try one more round of direct pressure for an additional 10 minutes to the area  Should the bleeding become heavier or not stop after the second attempt, call [facility] directly at [phone] (SKIN) or, if after hours, go to your nearest Emergency Room or Urgent Care

## 2022-06-15 NOTE — PROGRESS NOTES
MOHS Procedure Note    Patient: Patricia Clemons  : 1955  MRN: [id number]  Date: 06/15/2022    History of Present Illness: The patient is a 78 y o  male who presents with complaints of BCC of the nose      Past Medical History:   Diagnosis Date    Allergic rhinitis     Anxiety     Asthma     Basal cell carcinoma 2022    nose    Diabetes mellitus ([address] )     Disease of thyroid gland     GERD (gastroesophageal reflux disease)     Hayfever     History of skin cancer     HL (hearing loss)     wears hearing aides on occasion    Hyperlipidemia     Hypertension     Memory loss, short term     R/O Seizures but placed on Keppra    Tinnitus        Past Surgical History:   Procedure Laterality Date    ABDOMINOPLASTY      ADENOIDECTOMY      BACK SURGERY      lower back surgery    CARPAL TUNNEL RELEASE Bilateral     CHOLECYSTECTOMY      COLONOSCOPY      GASTRIC BYPASS      HERNIA REPAIR Right     JOINT REPLACEMENT Bilateral     Knee    KNEE ARTHROSCOPY Bilateral     X3    MOHS SURGERY  06/15/2022    nose-Dr Harman Birch    NH INCISE FINGER TENDON SHEATH Right 2022    Procedure: RELEASE TRIGGER FINGER RING WITH LONG;  Surgeon: Falguni Cavazos MD;  Location: [facility];  Service: Orthopedics    NH REPAIR ING HERNIA,5+Y/O,REDUCIBL Left 2018    Procedure: INGUINAL HERNIA REPAIR;  Surgeon: Shaq Arauz DO;  Location: [facility];  Service: General    TONSILLECTOMY      TRIGGER FINGER RELEASE Right 2022         Current Outpatient Medications:     ALPRAZolam (XANAX) 0 25 mg tablet, TAKE 1 TABLET BY MOUTH 3 TIMES A DAY AS NEEDED FOR ANXIETY, Disp: 90 tablet, Rfl: 0    oxyCODONE-acetaminophen (PERCOCET) 7 5-325 MG per tablet, Take 1 tablet by mouth every 6 (six) hours as needed for moderate pain Max Daily Amount: 4 tablets, Disp: 120 tablet, Rfl: 0    zolpidem (AMBIEN CR) 12 5 MG CR tablet, Take 1 tablet (12 5 mg total) by mouth daily at bedtime as needed for sleep, Disp: 30 tablet, Rfl: 0    albuterol (PROVENTIL HFA,VENTOLIN HFA) 90 mcg/act inhaler, , Disp: , Rfl:     ALPRAZolam (XANAX) 0 25 mg tablet, Take 1 tablet (0 25 mg total) by mouth as needed in the morning and 1 tablet (0 25 mg total) as needed at noon and 1 tablet (0 25 mg total) as needed in the evening for anxiety  , Disp: 90 tablet, Rfl: 0    atorvastatin (LIPITOR) 20 mg tablet, Take 1 tablet by mouth in the morning, Disp: , Rfl:     Benralizumab (FASENRA SC), Inject under the skin EVERY OTHER MONTH, Disp: , Rfl:     celecoxib (CeleBREX) 200 mg capsule, TAKE 1 CAPSULE BY MOUTH EVERY DAY, Disp: 90 capsule, Rfl: 0    cetirizine (ZyrTEC) 10 mg tablet, Take 10 mg by mouth daily after dinner  , Disp: , Rfl:     Cholecalciferol 2000 units CAPS, Take 1 capsule by mouth daily after dinner  , Disp: , Rfl:     clobetasol (TEMOVATE) 0 05 % cream, APPLY TO AFFECTED AREA TWICE A DAY, Disp: 60 g, Rfl: 0    Cyanocobalamin (B-12) 1000 MCG CAPS, Take 1 tablet by mouth daily after dinner  , Disp: , Rfl:     fluticasone (FLONASE) 50 mcg/act nasal spray, 2 sprays into each nostril 2 (two) times a day as needed  , Disp: , Rfl:     fluticasone-vilanterol (Breo Ellipta) 100-25 mcg/inh inhaler, Inhale 1 puff daily States using only every couple days , Disp: , Rfl:     hydrochlorothiazide (HYDRODIURIL) 25 mg tablet, Take 1 tablet by mouth daily in the early morning  , Disp: , Rfl: 3    ketoconazole (NIZORAL) 2 % cream, Apply 1 application topically 2 (two) times a day To affected area, Disp: 60 g, Rfl: 0    levETIRAcetam (KEPPRA) 500 mg tablet, Take 1 tablet (500 mg total) by mouth 2 (two) times a day, Disp: 180 tablet, Rfl: 3    losartan (COZAAR) 25 mg tablet, Take 25 mg by mouth daily, Disp: , Rfl:     metFORMIN (GLUCOPHAGE) 1000 MG tablet, Take 1 tablet (1,000 mg total) by mouth 2 (two) times a day with meals, Disp: 180 tablet, Rfl: 3    montelukast (SINGULAIR) 10 mg tablet, Take 10 mg by mouth every evening, Disp: , Rfl: 3   oxybutynin (DITROPAN) 5 mg tablet, TAKE 1 TABLET BY MOUTH EVERY DAY AT NIGHT, Disp: , Rfl:     pantoprazole (PROTONIX) 40 mg tablet, TAKE 1 TABLET BY MOUTH EVERY DAY BEFORE BREAKFAST, Disp: 90 tablet, Rfl: 1    Pseudoeph-Doxylamine-DM-APAP (NYQUIL PO), Take by mouth as needed , Disp: , Rfl:     pseudoephedrine (SUDAFED) 30 mg tablet, Take 60 mg by mouth every 4 (four) hours as needed for congestion, Disp: , Rfl:     tamsulosin (FLOMAX) 0 4 mg, Take 0 4 mg by mouth daily with dinner, Disp: , Rfl:     Allergies   Allergen Reactions    Iv Dye [Iodinated Diagnostic Agents] Hives    Penicillins Other (See Comments)     ?  Had as a child-Unknown reaction       Physical Exam:   Vitals:    06/15/22 0954   BP: 136/75   Pulse: 85   Resp: 18   Temp: 98 4 °F (36 9 °C)   SpO2: 95%     General: Awake, Alert, Oriented x 3, Mood and affect appropriate  Respiratory: Respirations even and unlabored  Cardiovascular: Peripheral pulses intact; no edema  Musculoskeletal Exam: n/a    Assessment: 0 4 cm x 0 4 cm pink scar, biopsy confirmed basal cell carcinoma; nodular type on nose     Plan: MOHS    MOHS Procedure Timeout    Flowsheet Row Most Recent Value   Patient Identity Verified: Yes   Correct Site Verified: Yes   Correct Procedure Verified: Yes          MOHS Diagnosis/Indication/Location/ID    Flowsheet Row Most Recent Value   Pathology Type Basal cell carcinoma   Anatomic Site mid tip of nose   Indications for MOHS tumor location   MOHS ID [id number]          MOHS Site/Accession/Pre-Post    Flowsheet Row Most Recent Value   Original Site Identified (as submitted by referring clinician) Photo   Biopsy Accession/Specimen # (as submitted by referring clincian) [id number]   Pre-MOHS Size Length (cm) 0 4   Pre-MOHS Size Width (cm) 0 4   Post-MOHS Size-Length (cm) 0 8   Post MOHS Size-Width (cm) 0 8   Repair Type Intermediate layered closure   Suture Type Vicryl, Prolene   Prolene Suture Size 5   Vicryl Suture Size 5   Final repair length (cm): 1 5   Anesthetic Used 1% Lidocaine with epinephrine          MOHS Tumor Stage 1 Information    Flowsheet Row Most Recent Value   Tissue Sections (blocks) 2   Microscopic Exam Section 1: No tumor identified in section  Microscopic Exam Section 2: No tumor identified in section  Tumor Clear After Stage I? Yes                                                         Patient identified, procedure verified, site identified and verified  Time out completed  Surgical removal of the lesion discussed with the patient (risks and benefits, including possibility of scarring, infection, recurrence or potential for further treatment)  I have specifically identified the site with the patient  I have discussed the fact that the patient will have a scar after the procedure regardless of granulation or repair with sutures  I have discussed that the repair options can range from granulation in some cases to linear or curvilinear closures to larger flaps or grafts  There are sometimes flaps or grafts used that require multiples stages of surgery and will not be completed today, rather be completed over a series of appointments  I have discussed that occasionally due to location, size or depth of the lesion I may recommend consultation with and transfer of care for further removal or the reconstruction to another provider such as ophthalmology surgery, plastic surgery, ENT surgery, or surgical oncology  There are cases in which other testing such as imaging with MRI or CT scan or testing of lymph nodes is recommended because of the nature/depth/location of tumor seen during the removal  There is a risk of injury to nerves causing temporary or permanent numbness or the inability to move muscles full such as the inability to lift eyebrows  Questions answered and verbal and written consent was obtained  The tumor qualifies for Mohs based on AUC criteria   Dr Sheldon Plascencia served as the surgeon and pathologist during the procedure  With the patient in the supine position and under adequate local anesthesia with 1% lidocaine with epinephrine 1:100,000, the defect was scrubbed with Hibiclens  Sterile drapes were placed from the sterile tray  Because of the location of the surgical defect, an intermediate closure was judged to give the best possible cosmetic and functional result  The edges of the defect were carefully debrided removing any dead or coagulated tissue  Hemostasis was obtained by pinpoint electrocoagulation  Careful planning of removal of redundant tissue at either end of the defect was drawn out so that the suture lines would fall in the optimal orientation with regard to the relaxed skin tension lines  These were then removed with a #15 blade scalpel  The wound was then approximated by a deep layer of buried vertical mattress sutures and the cutaneous margins were approximated and closed by superficial sutures as noted above  Estimated blood loss was less than 5 mL  The patient tolerated the procedure well  The wound was dressed with petrolatum, a non-stick pad, and a compression dressing  Savanna Mancini MD served as the surgeon and pathologist during the procedure  Postoperative care: Wound care discussed at length  I urged the patient to call us if any problems or question should arise  Complications: none  Post-op medications: none  Patient condition after procedure: stable  Discharge plans: Plan for suture removal 7 days, at next scheduled appointment  BCC cleared with 1 stage of mohs and repaired with 1 5cm closure  Well tolerated  S/R in 1 week      Scribe Attestation    I,:  Cody Alejandre am acting as a scribe while in the presence of the attending physician :       I,:  Savanna Mancini MD personally performed the services described in this documentation    as scribed in my presence :

## 2022-06-19 DIAGNOSIS — M54.50 CHRONIC LOW BACK PAIN WITHOUT SCIATICA, UNSPECIFIED BACK PAIN LATERALITY: ICD-10-CM

## 2022-06-19 DIAGNOSIS — G47.00 INSOMNIA, UNSPECIFIED TYPE: ICD-10-CM

## 2022-06-19 DIAGNOSIS — G89.29 CHRONIC LOW BACK PAIN WITHOUT SCIATICA, UNSPECIFIED BACK PAIN LATERALITY: ICD-10-CM

## 2022-06-20 RX ORDER — ZOLPIDEM TARTRATE 12.5 MG/1
12.5 TABLET, FILM COATED, EXTENDED RELEASE ORAL
Qty: 30 TABLET | Refills: 0 | Status: SHIPPED | OUTPATIENT
Start: 2022-06-20 | End: 2022-07-15 | Stop reason: SDUPTHER

## 2022-06-20 RX ORDER — OXYCODONE AND ACETAMINOPHEN 7.5; 325 MG/1; MG/1
1 TABLET ORAL EVERY 6 HOURS PRN
Qty: 120 TABLET | Refills: 0 | Status: SHIPPED | OUTPATIENT
Start: 2022-06-20 | End: 2022-07-15 | Stop reason: SDUPTHER

## 2022-06-20 NOTE — TELEPHONE ENCOUNTER
1  [id number] 05/24/2022 05/24/2022 oxyCODONE HCL-ACETAMINOPHEN (Tablet)  120 0 30 325 MG-7 5 MG 45 0 BUSHRA VENEGAS  [facility], [facility]  0 / 0 [place]    1  [id number] 05/24/2022 05/24/2022 Zolpidem Tartrate (Tablet, Extended Release)  30 0 30 12 5 MG NA BUSHRA [facility], [facility]ARTY  Private Pay 0 / 0 [place]    1  [id number] 05/21/2022 05/20/2022 ALPRAZolam (Tablet)  90 0 30 0 25 MG NA [facility]  0 / 0 [place]

## 2022-06-22 ENCOUNTER — PROCEDURE VISIT (OUTPATIENT)
Dept: DERMATOLOGY | Facility: CLINIC | Age: 67
End: 2022-06-22
Payer: MEDICARE

## 2022-06-22 DIAGNOSIS — Z48.02 ENCOUNTER FOR REMOVAL OF SUTURES: ICD-10-CM

## 2022-06-22 DIAGNOSIS — L72.0 EPIDERMAL INCLUSION CYST: Primary | ICD-10-CM

## 2022-06-22 PROCEDURE — 12031 INTMD RPR S/A/T/EXT 2.5 CM/<: CPT | Performed by: DERMATOLOGY

## 2022-06-22 PROCEDURE — 88304 TISSUE EXAM BY PATHOLOGIST: CPT | Performed by: STUDENT IN AN ORGANIZED HEALTH CARE EDUCATION/TRAINING PROGRAM

## 2022-06-22 PROCEDURE — 11402 EXC TR-EXT B9+MARG 1.1-2 CM: CPT | Performed by: DERMATOLOGY

## 2022-06-22 NOTE — PROGRESS NOTES
PROCEDURE:  EXCISION WITH INTERMEDIATE LAYERED CLOSURE     Attending: Enio Thompson  Assistant: Parag Bradford    Pre-Op Diagnosis: Epidermal inclusion cyst  Post-Op Diagnosis: Same   Benign versus Malignant Benign      Lesion Anatomic Location: Back  Pre-op size: 1 4 cm x 1 3 cm  Size of defect:  1 4 cm x 1 3 cm  Final repaired wound length:  2 5 cm    Written and verbal, witnessed informed consent was obtained  I discussed that excision is a method of removing lesions both benign and malignant lesions  A portion of normal skin is often taken to ensure completeness of removal   I reviewed that procedure will include numbing the area,  cutting around and under defect, undermining tissue, and closing the wound with sutures both inside and out  These sutures are usually removed in 7 to 14 days  Risks (bleeding, pain, infection, scarring, recurrence) and benefits discussed  It was discussed with patient that every effort is made to minimize scar, but scarring is influenced also by extrinsic factor such as location, age and genetics  Time Out: performed:  yes  Correct patient: yes  Correct site per Clinic Report: yes  Correct site per Patient Report: yes    LOCAL ANESTHESIA: 1% xylocaine with epi     DESCRIPTION OF PROCEDURE: The patient was brought back into the procedure room, anesthetized locally, prepped and draped in the usual fashion  Using a #15 blade with a scalpel, the lesion was excised in elliptical fashion  The wound was  undermined in the  fascial plane  Hemostasis was achieved with light electrocoagulation  Purpose of undermining was to decrease wound tension and facilitate closure  The wound was closed with subcutaneous sutures as follows:    Deep suture:3-0 Vicryl      Epidermal edge closure was accomplished with superficial sutures as follows:    Superficial suture: 4-0 Prolene  Superficial suture type: interrupted    Estimated blood loss less than 3ml      The patient tolerated the procedure well without any complications  The wound was cleaned with sterile saline, dried off, surgical vaseline ointment was applied, and the wound was covered  A pressure dressing was applied for stabilization and light pressure  The patient was given detailed oral and written instructions on postoperative care as detailed in consent  The patient left in good medical condition  POSTOP DISCUSSION DISCUSSION AND INSTRUCTIONS FOR PATIENT      Rationale for Procedure  A skin excision allows the dermatologist to remove a lesion  The procedure involves a local numbing medication and removing the entire lesion  Typically, the lesion is being removed because it is atypical, traumatized, or for significant appearance reasons  The area will be open like a brush burn and allowed to heal    There will be no sutures  Tissue is sent to pathologist who will reconfirm diagnosis and verify completeness of lesion removal     Description of Procedure  We would like to perform a skin excision today  A local anesthetic, similar to the kind that a dentist uses when filling a cavity, will be injected with a very small needle into the skin area to be sampled  The injected skin and tissue underneath should go to sleep and become numbed so that no further pain should be felt  A scalpel will be used to cut around the lesion and tissue will be submitted to pathology for examination  Depending on the diagnosis the lesion will be excised with a certain amount of normal skin to help assure completeness of lesion removal   The physician will discuss in advance the anticipated size and extent of removal    Bleeding will occur, but it will stopped with direct pressure, sutures, and electrocautery  Surgical Vaseline-type ointment will also applied after the procedure to help create a barrier between the wound and the outside world        Risks and Potential Complications  The advantage of a skin excision is that it allows us to remove a problem lesion quickly  Although this usually permits the lesion to heal as soon as possible with the least scarring, there are some risks and potential complications that include but are not limited to the following:  - Some bleeding is normal at time of procedure and some bleeding on gauze is normal  the first few days after surgery  Profuse bleeding and bleeding with swelling and pain should be reported as detailed  below  - Infection is uncommon in skin surgery  Infection should be reported and is indicated by pain, redness, and discharge of purulent material   - Some dull to at time sharp pain could occur initially the day after surgery  Persistent pain or increasing pain days after surgery is not expected and should be reported  - Every effort is made to minimize scar, but location, size, and genetics do play a role in scar appearance  A surgical wound does not achieve its optimal appearance until 6 months  There are several treatments available if scarring would be problematic including scar creams, silicone pad, laser and scar revision   - Skin discoloration can occur especially in people of color  Its important to avoid sun on wound in first 6 months after surgery  Treatment is available if pigment is problematic   - Incomplete removal of the lesion or recurrence of lesion can occur and this would then require further treatment and more surgery   - Nerve Damage/Numbness/Loss of Function is very rare, but is most likely to occur if lesion is large or if it is in a high risk location  - Allergic Reaction to lidocaine is rare  More commonly,  epinephrine is used  with the lidocaine  Occasionally, epinephrine (aka adrenalin) may cause a brief  feeling of anxiety or jitteriness  - The person at the microscope  (pathologist) may provide additional information that was unexpected  This unexpected finding could provoke the need for additional treatment or evaluation      What You Will Need to Do After the Procedure  1  Keep the area clean and dry the first day  Try NOT to remove the bandage for the first day  2  Gently clean the area with soap and water and apply Vaseline ointment (this is over the counter and not a prescription) to the excision  site for up to 2 weeks  3  Apply a clean appropriately sized bandage to area  Gauze and paper tape are recommended for sensitive skin  4  Return for suture removal as instructed (generally 1 week for the face, 2 weeks for the body)  5  Take Acetaminophen (Tylenol) for discomfort, if no contraindications  Do NOT take Ibuprofen or aspirin unless specifically told to do so by your Dermatologist because these medications can make bleeding worse  6  Call our office immediately for signs of infection: fever, chills, increased redness, warmth, tenderness, discomfort/pain, or pus or foul smell coming from the wound  If bleeding is noticed, place a clean cloth over the area and apply firm pressure for thirty minutes  Check the wound ONLY after 30 minutes of direct pressure; do not cheat and sneak a peak, as that does not count  If bleeding persists after 30 minutes of legitimate direct pressure, then try one more round of direct pressure for an additional 10 minutes to the area  Should the bleeding become heavier or not stop after the second attempt, call [facility] directly at [phone] (SKIN) or, if after hours, go to your local Emergency Room/Emergency Department  Suture removal    Date/Time: 6/22/2022 2:25 PM  Performed by: Kaity Maria  Authorized by: Hanane Blue MD   Universal Protocol:  Consent: Verbal consent obtained  Written consent not obtained    Consent given by: patient  Timeout called at: 6/22/2022 2:25 PM   Patient understanding: patient states understanding of the procedure being performed  Patient consent: the patient's understanding of the procedure matches consent given  Procedure consent: procedure consent matches procedure scheduled  Relevant documents: relevant documents present and verified  Test results: test results not available  Site marked: the operative site was not marked  Radiology Images displayed and confirmed  If images not available, report reviewed: imaging studies not available  Patient identity confirmed: verbally with patient        Patient location:  Clinic  Location:     Location:  [facility] location:  Nose  Procedure details: Tools used:  Suture removal kit    Wound appearance:  Pink and no sign(s) of infection    Number of sutures removed:  1    Number of staples removed:  0  Post-procedure details:     Post-removal:  No dressing applied    Patient tolerance of procedure: Tolerated well, no immediate complications              1 4cm cyst excised with 2 5cm closure  Well tolerated  S/R in 2 weeks  Well healing scar, sutures removed    Scribe Attestation    I,:  Mat Leo am acting as a scribe while in the presence of the attending physician :       I,:  Samra Rader MD personally performed the services described in this documentation    as scribed in my presence :

## 2022-07-06 ENCOUNTER — OFFICE VISIT (OUTPATIENT)
Dept: DERMATOLOGY | Facility: CLINIC | Age: 67
End: 2022-07-06

## 2022-07-06 DIAGNOSIS — Z48.02 ENCOUNTER FOR REMOVAL OF SUTURES: Primary | ICD-10-CM

## 2022-07-06 PROCEDURE — RECHECK: Performed by: DERMATOLOGY

## 2022-07-06 NOTE — PROGRESS NOTES
Suture removal    Date/Time: 7/6/2022 9:38 AM  Performed by: Brenna Hawk RN  Authorized by: Gary Llamas MD   Universal Protocol:  Consent: Verbal consent obtained  Written consent not obtained  Consent given by: patient  Time out: Immediately prior to procedure a "time out" was called to verify the correct patient, procedure, equipment, support staff and site/side marked as required  Timeout called at: 7/6/2022 9:38 AM   Patient understanding: patient states understanding of the procedure being performed  Patient consent: the patient's understanding of the procedure matches consent given  Procedure consent: procedure consent matches procedure scheduled  Relevant documents: relevant documents present and verified  Test results: test results available and properly labeled  Site marked: the operative site was marked  Radiology Images displayed and confirmed  If images not available, report reviewed: imaging studies not available  Patient identity confirmed: verbally with patient        Patient location:  Clinic  Location:     Location:  Trunk    Trunk location:  Back  Procedure details: Tools used:  Suture removal kit    Wound appearance:  No sign(s) of infection, good wound healing, nontender and pink    Number of sutures removed:  6  Post-procedure details:     Post-removal:  Band-Aid applied (Vaseline applied)    Patient tolerance of procedure: Tolerated well, no immediate complications  Comments:      Patient instructed to follow up as needed

## 2022-07-15 DIAGNOSIS — F41.9 ANXIETY: ICD-10-CM

## 2022-07-15 DIAGNOSIS — G47.00 INSOMNIA, UNSPECIFIED TYPE: ICD-10-CM

## 2022-07-15 DIAGNOSIS — G89.29 CHRONIC LOW BACK PAIN WITHOUT SCIATICA, UNSPECIFIED BACK PAIN LATERALITY: ICD-10-CM

## 2022-07-15 DIAGNOSIS — M54.50 CHRONIC LOW BACK PAIN WITHOUT SCIATICA, UNSPECIFIED BACK PAIN LATERALITY: ICD-10-CM

## 2022-07-15 RX ORDER — ALPRAZOLAM 0.25 MG/1
0.25 TABLET ORAL 3 TIMES DAILY PRN
Qty: 90 TABLET | Refills: 0 | Status: SHIPPED | OUTPATIENT
Start: 2022-07-15 | End: 2022-08-12 | Stop reason: SDUPTHER

## 2022-07-15 RX ORDER — ZOLPIDEM TARTRATE 12.5 MG/1
12.5 TABLET, FILM COATED, EXTENDED RELEASE ORAL
Qty: 30 TABLET | Refills: 0 | Status: SHIPPED | OUTPATIENT
Start: 2022-07-15 | End: 2022-08-12 | Stop reason: SDUPTHER

## 2022-07-15 RX ORDER — OXYCODONE AND ACETAMINOPHEN 7.5; 325 MG/1; MG/1
1 TABLET ORAL EVERY 6 HOURS PRN
Qty: 120 TABLET | Refills: 0 | Status: SHIPPED | OUTPATIENT
Start: 2022-07-15 | End: 2022-08-12 | Stop reason: SDUPTHER

## 2022-07-15 NOTE — TELEPHONE ENCOUNTER
06/20/2022 06/20/2022 oxyCODONE HCL-ACETAMINOPHEN (Tablet)  120 0 30 325 MG-7 5 MG 45 0 BUSHRA VENEGAS  [facility], [facility]  Private Pay 0 / 0 PA      1  [id number] 06/20/2022 06/20/2022 Zolpidem Tartrate (Tablet, Extended Release)  30 0 30 12 5 MG NA BUSHRA VENEGAS  [facility], [facility]    Private Pay 0 / 0 PA    1  [id number] 06/17/2022 05/20/2022 ALPRAZolam (Tablet)  90 0 30 0 25 MG NA BUSHRA VENEGAS

## 2022-07-19 ENCOUNTER — RA CDI HCC (OUTPATIENT)
Dept: OTHER | Facility: HOSPITAL | Age: 67
End: 2022-07-19

## 2022-07-19 NOTE — PROGRESS NOTES
Hannah RUST 74  coding opportunities          Chart Reviewed number of suggestions sent to Provider: 3     Patients Insurance     Medicare Insurance: Medicare        [id number]  [id number]  [id number]

## 2022-07-20 ENCOUNTER — APPOINTMENT (OUTPATIENT)
Dept: LAB | Facility: CLINIC | Age: 67
End: 2022-07-20
Payer: MEDICARE

## 2022-07-20 DIAGNOSIS — R35.1 NOCTURIA: ICD-10-CM

## 2022-07-20 LAB — PSA SERPL-MCNC: 0.9 NG/ML (ref 0–4)

## 2022-07-20 PROCEDURE — 84153 ASSAY OF PSA TOTAL: CPT

## 2022-07-26 ENCOUNTER — OFFICE VISIT (OUTPATIENT)
Dept: FAMILY MEDICINE CLINIC | Facility: CLINIC | Age: 67
End: 2022-07-26
Payer: MEDICARE

## 2022-07-26 VITALS
DIASTOLIC BLOOD PRESSURE: 82 MMHG | SYSTOLIC BLOOD PRESSURE: 120 MMHG | BODY MASS INDEX: 39.28 KG/M2 | WEIGHT: 280.6 LBS | OXYGEN SATURATION: 96 % | TEMPERATURE: 97.6 F | HEIGHT: 71 IN | HEART RATE: 93 BPM

## 2022-07-26 DIAGNOSIS — Z00.00 MEDICARE ANNUAL WELLNESS VISIT, SUBSEQUENT: Primary | ICD-10-CM

## 2022-07-26 DIAGNOSIS — F11.20 CONTINUOUS OPIOID DEPENDENCE (HCC): ICD-10-CM

## 2022-07-26 DIAGNOSIS — J45.30 UNCONTROLLED MILD PERSISTENT ALLERGIC ASTHMA: ICD-10-CM

## 2022-07-26 DIAGNOSIS — Z23 NEED FOR VACCINATION: ICD-10-CM

## 2022-07-26 DIAGNOSIS — K21.00 GASTROESOPHAGEAL REFLUX DISEASE WITH ESOPHAGITIS WITHOUT HEMORRHAGE: ICD-10-CM

## 2022-07-26 DIAGNOSIS — E11.9 TYPE 2 DIABETES MELLITUS WITHOUT COMPLICATION, WITHOUT LONG-TERM CURRENT USE OF INSULIN (HCC): ICD-10-CM

## 2022-07-26 DIAGNOSIS — I10 ESSENTIAL HYPERTENSION: ICD-10-CM

## 2022-07-26 DIAGNOSIS — E78.5 HYPERLIPIDEMIA, UNSPECIFIED HYPERLIPIDEMIA TYPE: ICD-10-CM

## 2022-07-26 LAB — SL AMB POCT HEMOGLOBIN AIC: 6.9 (ref ?–6.5)

## 2022-07-26 PROCEDURE — 90677 PCV20 VACCINE IM: CPT

## 2022-07-26 PROCEDURE — 83036 HEMOGLOBIN GLYCOSYLATED A1C: CPT | Performed by: FAMILY MEDICINE

## 2022-07-26 PROCEDURE — G0009 ADMIN PNEUMOCOCCAL VACCINE: HCPCS

## 2022-07-26 PROCEDURE — G0438 PPPS, INITIAL VISIT: HCPCS | Performed by: FAMILY MEDICINE

## 2022-07-26 PROCEDURE — 99214 OFFICE O/P EST MOD 30 MIN: CPT | Performed by: FAMILY MEDICINE

## 2022-07-26 RX ORDER — AMOXICILLIN 500 MG/1
500 CAPSULE ORAL
COMMUNITY

## 2022-07-26 NOTE — PROGRESS NOTES
Assessment and Plan:     Problem List Items Addressed This Visit    None          Preventive health issues were discussed with patient, and age appropriate screening tests were ordered as noted in patient's After Visit Summary  Personalized health advice and appropriate referrals for health education or preventive services given if needed, as noted in patient's After Visit Summary  BMI Counseling: Body mass index is 39 69 kg/m²  The BMI is above normal  Nutrition recommendations include reducing portion sizes, decreasing overall calorie intake and moderation in carbohydrate intake  History of Present Illness:     Patient presents for a Medicare Wellness Visit  He is also here for diabetes, hypertension, hyperlipidemia  He is having issues with his asthma not being well controlled  He is doing a lot of coughing and wheezing and we discussed the fact that he stopped his PPI and can't afford a steroid inhaler    HPI   Patient Care Team:  Ryan Encarnacion DO as PCP - General  MD Shahnaz De Paz MD Rue Merles, MD Juventino Carrow, PA-C     Review of Systems:     Review of Systems   Constitutional: Positive for unexpected weight change  Negative for appetite change, chills and fever  + weight gain   HENT: Negative for ear pain, facial swelling, rhinorrhea, sinus pain, sore throat and trouble swallowing  Eyes: Negative for discharge and redness  Respiratory: Positive for cough and wheezing  Negative for chest tightness and shortness of breath  Cardiovascular: Negative for chest pain and palpitations  Gastrointestinal: Negative for abdominal pain, diarrhea, nausea and vomiting  Endocrine: Negative for polyuria  Genitourinary: Negative for dysuria and urgency  Musculoskeletal: Negative for arthralgias and back pain  Skin: Negative for rash  Neurological: Negative for dizziness, weakness and headaches  Hematological: Negative for adenopathy     Psychiatric/Behavioral: Negative for behavioral problems, confusion and sleep disturbance  All other systems reviewed and are negative         Problem List:     Patient Active Problem List   Diagnosis    Temporal lobe epilepsy ([facility] [address] )    Moderate persistent asthma without complication    Allergic rhinitis    Amnesia, global, transient    Anemia    Anxiety    Bilateral chronic knee pain    BPH (benign prostatic hyperplasia)    Disc degeneration, lumbar    Other complications of other bariatric procedure    Hyperlipidemia    Essential hypertension    Insomnia    Lumbar radiculopathy    Absolute anemia    Thyromegaly    Vitamin B12 deficiency    Vitamin D deficiency    Trigger finger    Impingement syndrome of right shoulder    Primary osteoarthritis of right shoulder    Type 2 diabetes mellitus without complication, without long-term current use of insulin (HCC)    Tremor, essential      Past Medical and Surgical History:     Past Medical History:   Diagnosis Date    Allergic rhinitis     Anxiety     Asthma     Basal cell carcinoma 04/07/2022    nose    Diabetes mellitus ([facility] [address] )     Disease of thyroid gland     EIC (epidermal inclusion cyst)     GERD (gastroesophageal reflux disease)     Hayfever     History of skin cancer     HL (hearing loss)     wears hearing aides on occasion    Hyperlipidemia     Hypertension     Memory loss, short term     R/O Seizures but placed on Keppra    Tinnitus      Past Surgical History:   Procedure Laterality Date    ABDOMINOPLASTY      ADENOIDECTOMY      BACK SURGERY      lower back surgery    CARPAL TUNNEL RELEASE Bilateral     CHOLECYSTECTOMY      COLONOSCOPY      GASTRIC BYPASS      HERNIA REPAIR Right     JOINT REPLACEMENT Bilateral     Knee    KNEE ARTHROSCOPY Bilateral     X3    MOHS SURGERY  06/15/2022    nose-Dr Zonia Amezquita    MT INCISE FINGER TENDON SHEATH Right 04/29/2022    Procedure: RELEASE TRIGGER FINGER RING WITH LONG;  Surgeon: Jacqueline Collins MD;  [facility];  Service: Orthopedics    SC REPAIR Shabana Sousa 57 HERNIA,5+Y/O,REDUCIBL Left 11/26/2018    Procedure: INGUINAL HERNIA REPAIR;  Surgeon: Shad Augustin DO;  [facility];  Service: General    TONSILLECTOMY      TRIGGER FINGER RELEASE Right 04/29/2022      Family History:     Family History   Problem Relation Age of Onset    Heart disease Mother     Kidney cancer Mother     Hypertension Father     Bipolar disorder Sister         bipolar disorder NOS    Diabetes Maternal Grandmother       Social History:     Social History     Socioeconomic History    Marital status: /Civil Union     Spouse name: Not on file    Number of children: 2    Years of education: trade school    Highest education level: Not on file   Occupational History     Comment: employed   Tobacco Use    Smoking status: Never Smoker    Smokeless tobacco: Never Used   Vaping Use    Vaping Use: Never used   Substance and Sexual Activity    Alcohol use:  Yes     Alcohol/week: 0 0 standard drinks     Comment: maybe one beer  per month    Drug use: No    Sexual activity: Not Currently     Partners: Female   Other Topics Concern    Not on file   Social History Narrative    Always uses seat belt    Caffeine use    Daily coffee consumption    Daily cola consumption    Dental care, regularly    DENIED exercise frequency    Guns in the home:stored in locked cabinet    Multiple organ donor    Patient has living will    DENIED pets/animals    Power of  in existence    Water intake, adequate (per day)     Social Determinants of Health     Financial Resource Strain: Not on file   Food Insecurity: Not on file   Transportation Needs: Not on file   Physical Activity: Not on file   Stress: Not on file   Social Connections: Not on file   Intimate Partner Violence: Not on file   Housing Stability: Not on file      Medications and Allergies:     Current Outpatient Medications   Medication Sig Dispense Refill    ALPRAZolam (XANAX) 0 25 mg tablet Take 1 tablet (0 25 mg total) by mouth 3 (three) times a day as needed for anxiety 90 tablet 0    oxyCODONE-acetaminophen (PERCOCET) 7 5-325 MG per tablet Take 1 tablet by mouth every 6 (six) hours as needed for moderate pain Max Daily Amount: 4 tablets 120 tablet 0    zolpidem (AMBIEN CR) 12 5 MG CR tablet Take 1 tablet (12 5 mg total) by mouth daily at bedtime as needed for sleep 30 tablet 0    albuterol (PROVENTIL HFA,VENTOLIN HFA) 90 mcg/act inhaler       ALPRAZolam (XANAX) 0 25 mg tablet TAKE 1 TABLET BY MOUTH 3 TIMES A DAY AS NEEDED FOR ANXIETY 90 tablet 0    atorvastatin (LIPITOR) 20 mg tablet Take 1 tablet by mouth in the morning      Benralizumab (FASENRA SC) Inject under the skin EVERY OTHER MONTH      celecoxib (CeleBREX) 200 mg capsule TAKE 1 CAPSULE BY MOUTH EVERY DAY 90 capsule 0    cetirizine (ZyrTEC) 10 mg tablet Take 10 mg by mouth daily after dinner        Cholecalciferol 2000 units CAPS Take 1 capsule by mouth daily after dinner        clobetasol (TEMOVATE) 0 05 % cream APPLY TO AFFECTED AREA TWICE A DAY 60 g 0    Cyanocobalamin (B-12) 1000 MCG CAPS Take 1 tablet by mouth daily after dinner        fluticasone (FLONASE) 50 mcg/act nasal spray 2 sprays into each nostril 2 (two) times a day as needed        fluticasone-vilanterol (Breo Ellipta) 100-25 mcg/inh inhaler Inhale 1 puff daily States using only every couple days       hydrochlorothiazide (HYDRODIURIL) 25 mg tablet Take 1 tablet by mouth daily in the early morning    3    ketoconazole (NIZORAL) 2 % cream Apply 1 application topically 2 (two) times a day To affected area 60 g 0    levETIRAcetam (KEPPRA) 500 mg tablet Take 1 tablet (500 mg total) by mouth 2 (two) times a day 180 tablet 3    losartan (COZAAR) 25 mg tablet Take 25 mg by mouth daily      metFORMIN (GLUCOPHAGE) 1000 MG tablet Take 1 tablet (1,000 mg total) by mouth 2 (two) times a day with meals 180 tablet 3    montelukast (SINGULAIR) 10 mg tablet Take 10 mg by mouth every evening  3    oxybutynin (DITROPAN) 5 mg tablet TAKE 1 TABLET BY MOUTH EVERY DAY AT NIGHT      pantoprazole (PROTONIX) 40 mg tablet TAKE 1 TABLET BY MOUTH EVERY DAY BEFORE BREAKFAST 90 tablet 1    Pseudoeph-Doxylamine-DM-APAP (NYQUIL PO) Take by mouth as needed       pseudoephedrine (SUDAFED) 30 mg tablet Take 60 mg by mouth every 4 (four) hours as needed for congestion      tamsulosin (FLOMAX) 0 4 mg Take 0 4 mg by mouth daily with dinner       No current facility-administered medications for this visit  Allergies   Allergen Reactions    Iv Dye [Iodinated Diagnostic Agents] Hives    Penicillins Other (See Comments)     ? Had as a child-Unknown reaction      Immunizations:     Immunization History   Administered Date(s) Administered    COVID-19 PFIZER VACCINE 0 3 ML IM 02/17/2021, 03/08/2021, 12/11/2021    INFLUENZA 09/22/2018, 09/04/2019    Influenza Quadrivalent, 6-35 Months IM 10/13/2015    Influenza, high dose seasonal 0 7 mL 10/02/2020, 11/15/2021    Influenza, seasonal, injectable 11/03/2011, 09/14/2017    Pneumococcal Conjugate 13-Valent 10/13/2015      Health Maintenance:         Topic Date Due    Hepatitis C Screening  12/10/2022 (Originally 1955)    Colorectal Cancer Screening  10/02/2023         Topic Date Due    Pneumococcal Vaccine: 65+ Years (2 - PPSV23 or PCV20) 10/13/2016    COVID-19 Vaccine (4 - Booster for BringMeTheNews series) 04/11/2022    Influenza Vaccine (1) 09/01/2022      Medicare Screening Tests and Risk Assessments:     Babs Leon is here for his Subsequent Wellness visit  Last Medicare Wellness visit information reviewed, patient interviewed, no change since last AWV  Health Risk Assessment:   Patient rates overall health as fair  Patient feels that their physical health rating is slightly worse  Patient is satisfied with their life  Eyesight was rated as same  Hearing was rated as same   Patient feels that their emotional and mental health rating is same  Patients states they are sometimes angry  Patient states they are sometimes unusually tired/fatigued  Pain experienced in the last 7 days has been none  Patient states that he has experienced no weight loss or gain in last 6 months  Depression Screening:   PHQ-2 Score: 1      Fall Risk Screening: In the past year, patient has experienced: no history of falling in past year      Home Safety:  Patient has trouble with stairs inside or outside of their home  Patient has working smoke alarms and has no working carbon monoxide detector  Home safety hazards include: none  Nutrition:   Current diet is Unhealthy  Medications:   Patient is currently taking over-the-counter supplements  OTC medications include: see medication list  Patient is able to manage medications  Activities of Daily Living (ADLs)/Instrumental Activities of Daily Living (IADLs):   Walk and transfer into and out of bed and chair?: Yes  Dress and groom yourself?: Yes    Bathe or shower yourself?: Yes    Feed yourself? Yes  Do your laundry/housekeeping?: Yes  Manage your money, pay your bills and track your expenses?: Yes  Make your own meals?: Yes    Do your own shopping?: Yes    Previous Hospitalizations:   Any hospitalizations or ED visits within the last 12 months?: Yes    How many hospitalizations have you had in the last year?: 1-2    Advance Care Planning:   Living will: Yes    Durable POA for healthcare:  Yes    Advanced directive: Yes    Advanced directive counseling given: Yes    Five wishes given: Yes    Patient declined ACP directive: No    End of Life Decisions reviewed with patient: Yes    Provider agrees with end of life decisions: Yes      Cognitive Screening:   Provider or family/friend/caregiver concerned regarding cognition?: No    PREVENTIVE SCREENINGS      Cardiovascular Screening:    General: Screening Not Indicated and History Lipid Disorder      Diabetes Screening: General: Screening Not Indicated and History Diabetes      Colorectal Cancer Screening:     General: Screening Current      Prostate Cancer Screening:    General: Screening Current      Osteoporosis Screening:    General: Screening Current      Abdominal Aortic Aneurysm (AAA) Screening:    Risk factors include: age between 73-67 yo        General: Screening Current and Screening Not Indicated      Lung Cancer Screening:     General: Screening Not Indicated      Hepatitis C Screening:    General: Screening Current    Screening, Brief Intervention, and Referral to Treatment (SBIRT)    Screening    Typical number of drinks in a week: 0    Single Item Drug Screening:  How often have you used an illegal drug (including marijuana) or a prescription medication for non-medical reasons in the past year? never    Single Item Drug Screen Score: 0  Interpretation: Negative screen for possible drug use disorder    Review of Current Opioid Use    Opioid Risk Tool (ORT) Interpretation: Complete Opioid Risk Tool (ORT)    No exam data present     Physical Exam:     Ht 5' 10 5" (1 791 m)   BMI 38 79 kg/m²     Physical Exam  Vitals and nursing note reviewed  Constitutional:       General: He is not in acute distress  Appearance: Normal appearance  He is not ill-appearing or diaphoretic  HENT:      Head: Normocephalic and atraumatic  Right Ear: Tympanic membrane, ear canal and external ear normal       Left Ear: Tympanic membrane, ear canal and external ear normal       Nose: Nose normal  No congestion or rhinorrhea  Mouth/Throat:      Mouth: Mucous membranes are moist       Pharynx: Oropharynx is clear  No posterior oropharyngeal erythema  Eyes:      General:         Right eye: No discharge  Left eye: No discharge  Extraocular Movements: Extraocular movements intact  Conjunctiva/sclera: Conjunctivae normal       Pupils: Pupils are equal, round, and reactive to light     Neck:      Vascular: No carotid bruit  Cardiovascular:      Rate and Rhythm: Normal rate and regular rhythm  Pulses: Normal pulses  Heart sounds: Normal heart sounds  No murmur heard  Pulmonary:      Effort: Pulmonary effort is normal  No respiratory distress  Breath sounds: Normal breath sounds  No wheezing or rhonchi  Abdominal:      General: Abdomen is flat  Bowel sounds are normal  There is no distension  Palpations: There is no mass  Tenderness: There is no abdominal tenderness  Musculoskeletal:         General: No swelling or deformity  Normal range of motion  Cervical back: Normal range of motion and neck supple  No rigidity  Right lower leg: No edema  Left lower leg: No edema  Lymphadenopathy:      Cervical: No cervical adenopathy  Skin:     General: Skin is warm and dry  Capillary Refill: Capillary refill takes less than 2 seconds  Coloration: Skin is not jaundiced  Findings: No bruising, erythema or rash  Neurological:      General: No focal deficit present  Mental Status: He is alert and oriented to person, place, and time  Cranial Nerves: No cranial nerve deficit  Sensory: No sensory deficit  Gait: Gait normal       Deep Tendon Reflexes: Reflexes normal    Psychiatric:         Mood and Affect: Mood normal          Behavior: Behavior normal          Thought Content:  Thought content normal          Judgment: Judgment normal           Manny Said, DO

## 2022-07-26 NOTE — PATIENT INSTRUCTIONS
Medicare Preventive Visit Patient Instructions  Thank you for completing your Welcome to Medicare Visit or Medicare Annual Wellness Visit today  Your next wellness visit will be due in one year (7/27/2023)  The screening/preventive services that you may require over the next 5-10 years are detailed below  Some tests may not apply to you based off risk factors and/or age  Screening tests ordered at today's visit but not completed yet may show as past due  Also, please note that scanned in results may not display below  Preventive Screenings:  Service Recommendations Previous Testing/Comments   Colorectal Cancer Screening  · Colonoscopy    · Fecal Occult Blood Test (FOBT)/Fecal Immunochemical Test (FIT)  · Fecal DNA/Cologuard Test  · Flexible Sigmoidoscopy Age: 54-65 years old   Colonoscopy: every 10 years (May be performed more frequently if at higher risk)  OR  FOBT/FIT: every 1 year  OR  Cologuard: every 3 years  OR  Sigmoidoscopy: every 5 years  Screening may be recommended earlier than age 48 if at higher risk for colorectal cancer  Also, an individualized decision between you and your healthcare provider will decide whether screening between the ages of 74-80 would be appropriate   Colonoscopy: 10/02/2018  FOBT/FIT: Not on file  Cologuard: Not on file  Sigmoidoscopy: Not on file    Screening Current     Prostate Cancer Screening Individualized decision between patient and health care provider in men between ages of 53-78   Medicare will cover every 12 months beginning on the day after your 50th birthday PSA: 0 9 ng/mL     Screening Current     Hepatitis C Screening Once for adults born between 1945 and 1965  More frequently in patients at high risk for Hepatitis C Hep C Antibody: Not on file    Screening Current   Diabetes Screening 1-2 times per year if you're at risk for diabetes or have pre-diabetes Fasting glucose: 253 mg/dL   A1C: 7 6 %    Screening Not Indicated  History Diabetes   Cholesterol Screening Once every 5 years if you don't have a lipid disorder  May order more often based on risk factors  Lipid panel: 03/31/2022    Screening Not Indicated  History Lipid Disorder      Other Preventive Screenings Covered by Medicare:  1  Abdominal Aortic Aneurysm (AAA) Screening: covered once if your at risk  You're considered to be at risk if you have a family history of AAA or a male between the age of 73-68 who smoking at least 100 cigarettes in your lifetime  2  Lung Cancer Screening: covers low dose CT scan once per year if you meet all of the following conditions: (1) Age 50-69; (2) No signs or symptoms of lung cancer; (3) Current smoker or have quit smoking within the last 15 years; (4) You have a tobacco smoking history of at least 30 pack years (packs per day x number of years you smoked); (5) You get a written order from a healthcare provider  3  Glaucoma Screening: covered annually if you're considered high risk: (1) You have diabetes OR (2) Family history of glaucoma OR (3)  aged 48 and older OR (3)  American aged 72 and older  3  Osteoporosis Screening: covered every 2 years if you meet one of the following conditions: (1) Have a vertebral abnormality; (2) On glucocorticoid therapy for more than 3 months; (3) Have primary hyperparathyroidism; (4) On osteoporosis medications and need to assess response to drug therapy  5  HIV Screening: covered annually if you're between the age of 12-76  Also covered annually if you are younger than 13 and older than 72 with risk factors for HIV infection  For pregnant patients, it is covered up to 3 times per pregnancy      Immunizations:  Immunization Recommendations   Influenza Vaccine Annual influenza vaccination during flu season is recommended for all persons aged >= 6 months who do not have contraindications   Pneumococcal Vaccine (Prevnar and Pneumovax)  * Prevnar = PCV13  * Pneumovax = PPSV23 Adults 25-60 years old: 1-3 doses may be recommended based on certain risk factors  Adults 72 years old: Prevnar (PCV13) vaccine recommended followed by Pneumovax (PPSV23) vaccine  If already received PPSV23 since turning 65, then PCV13 recommended at least one year after PPSV23 dose  Hepatitis B Vaccine 3 dose series if at intermediate or high risk (ex: diabetes, end stage renal disease, liver disease)   Tetanus (Td) Vaccine - COST NOT COVERED BY MEDICARE PART B Following completion of primary series, a booster dose should be given every 10 years to maintain immunity against tetanus  Td may also be given as tetanus wound prophylaxis  Tdap Vaccine - COST NOT COVERED BY MEDICARE PART B Recommended at least once for all adults  For pregnant patients, recommended with each pregnancy  Shingles Vaccine (Shingrix) - COST NOT COVERED BY MEDICARE PART B  2 shot series recommended in those aged 48 and above     Health Maintenance Due:      Topic Date Due    Hepatitis C Screening  12/10/2022 (Originally 1955)    Colorectal Cancer Screening  10/02/2023     Immunizations Due:      Topic Date Due    Pneumococcal Vaccine: 65+ Years (2 - PPSV23 or PCV20) 10/13/2016    COVID-19 Vaccine (4 - Booster for Pfizer series) 04/11/2022    Influenza Vaccine (1) 09/01/2022     Advance Directives   What are advance directives? Advance directives are legal documents that state your wishes and plans for medical care  These plans are made ahead of time in case you lose your ability to make decisions for yourself  Advance directives can apply to any medical decision, such as the treatments you want, and if you want to donate organs  What are the types of advance directives? There are many types of advance directives, and each state has rules about how to use them  You may choose a combination of any of the following:  · Living will: This is a written record of the treatment you want   You can also choose which treatments you do not want, which to limit, and which to stop at a certain time  This includes surgery, medicine, IV fluid, and tube feedings  · Durable power of  for healthcare [facility]): This is a written record that states who you want to make healthcare choices for you when you are unable to make them for yourself  This person, called a proxy, is usually a family member or a friend  You may choose more than 1 proxy  · Do not resuscitate (DNR) order:  A DNR order is used in case your heart stops beating or you stop breathing  It is a request not to have certain forms of treatment, such as CPR  A DNR order may be included in other types of advance directives  · Medical directive: This covers the care that you want if you are in a coma, near death, or unable to make decisions for yourself  You can list the treatments you want for each condition  Treatment may include pain medicine, surgery, blood transfusions, dialysis, IV or tube feedings, and a ventilator (breathing machine)  · Values history: This document has questions about your views, beliefs, and how you feel and think about life  This information can help others choose the care that you would choose  Why are advance directives important? An advance directive helps you control your care  Although spoken wishes may be used, it is better to have your wishes written down  Spoken wishes can be misunderstood, or not followed  Treatments may be given even if you do not want them  An advance directive may make it easier for your family to make difficult choices about your care  Weight Management   Why it is important to manage your weight:  Being overweight increases your risk of health conditions such as heart disease, high blood pressure, type 2 diabetes, and certain types of cancer  It can also increase your risk for osteoarthritis, sleep apnea, and other respiratory problems  Aim for a slow, steady weight loss  Even a small amount of weight loss can lower your risk of health problems    How to lose weight safely:  A safe and healthy way to lose weight is to eat fewer calories and get regular exercise  You can lose up about 1 pound a week by decreasing the number of calories you eat by 500 calories each day  Healthy meal plan for weight management:  A healthy meal plan includes a variety of foods, contains fewer calories, and helps you stay healthy  A healthy meal plan includes the following:  · Eat whole-grain foods more often  A healthy meal plan should contain fiber  Fiber is the part of grains, fruits, and vegetables that is not broken down by your body  Whole-grain foods are healthy and provide extra fiber in your diet  Some examples of whole-grain foods are whole-wheat breads and pastas, oatmeal, brown rice, and bulgur  · Eat a variety of vegetables every day  Include dark, leafy greens such as spinach, kale, estela greens, and mustard greens  Eat yellow and orange vegetables such as carrots, sweet potatoes, and winter squash  · Eat a variety of fruits every day  Choose fresh or canned fruit (canned in its own juice or light syrup) instead of juice  Fruit juice has very little or no fiber  · Eat low-fat dairy foods  Drink fat-free (skim) milk or 1% milk  Eat fat-free yogurt and low-fat cottage cheese  Try low-fat cheeses such as mozzarella and other reduced-fat cheeses  · Choose meat and other protein foods that are low in fat  Choose beans or other legumes such as split peas or lentils  Choose fish, skinless poultry (chicken or turkey), or lean cuts of red meat (beef or pork)  Before you cook meat or poultry, cut off any visible fat  · Use less fat and oil  Try baking foods instead of frying them  Add less fat, such as margarine, sour cream, regular salad dressing and mayonnaise to foods  Eat fewer high-fat foods  Some examples of high-fat foods include french fries, doughnuts, ice cream, and cakes  · Eat fewer sweets  Limit foods and drinks that are high in sugar   This includes candy, cookies, regular soda, and sweetened drinks  Exercise:  Exercise at least 30 minutes per day on most days of the week  Some examples of exercise include walking, biking, dancing, and swimming  You can also fit in more physical activity by taking the stairs instead of the elevator or parking farther away from stores  Ask your healthcare provider about the best exercise plan for you  Narcotic (Opioid) Safety    Use narcotics safely:  · Take prescribed narcotics exactly as directed  · Do not give narcotics to others or take narcotics that belong to someone else  · Do not mix narcotics without medicines or alcohol  · Do not drive or operate heavy machinery after you take the narcotic  · Monitor for side effects and notify your healthcare provider if you experienced side effects such as nausea, sleepiness, itching, or trouble thinking clearly  Manage constipation:    Constipation is the most common side effect of narcotic medicine  Constipation is when you have hard, dry bowel movements, or you go longer than usual between bowel movements  Tell your healthcare provider about all changes in your bowel movements while you are taking narcotics  He or she may recommend laxative medicine to help you have a bowel movement  He or she may also change the kind of narcotic you are taking, or change when you take it  The following are more ways you can prevent or relieve constipation:    · Drink liquids as directed  You may need to drink extra liquids to help soften and move your bowels  Ask how much liquid to drink each day and which liquids are best for you  · Eat high-fiber foods  This may help decrease constipation by adding bulk to your bowel movements  High-fiber foods include fruits, vegetables, whole-grain breads and cereals, and beans  Your healthcare provider or dietitian can help you create a high-fiber meal plan   Your provider may also recommend a fiber supplement if you cannot get enough fiber from food   · Exercise regularly  Regular physical activity can help stimulate your intestines  Walking is a good exercise to prevent or relieve constipation  Ask which exercises are best for you  · Schedule a time each day to have a bowel movement  This may help train your body to have regular bowel movements  Bend forward while you are on the toilet to help move the bowel movement out  Sit on the toilet for at least 10 minutes, even if you do not have a bowel movement  Store narcotics safely:   · Store narcotics where others cannot easily get them  Keep them in a locked cabinet or secure area  Do not  keep them in a purse or other bag you carry with you  A person may be looking for something else and find the narcotics  · Make sure narcotics are stored out of the reach of children  A child can easily overdose on narcotics  Narcotics may look like candy to a small child  The best way to dispose of narcotics: The laws vary by country and area  In the [place], the best way is to return the narcotics through a take-back program  This program is offered by the [employer]  The following are options for using the program:  · Take the narcotics to a YOVANI collection site  The site is often a law enforcement center  Call your local law enforcement center for scheduled take-back days in your area  You will be given information on where to go if the collection site is in a different location  · Take the narcotics to an approved pharmacy or hospital   A pharmacy or hospital may be set up as a collection site  You will need to ask if it is a YOVANI collection site if you were not directed there  A pharmacy or doctor's office may not be able to take back narcotics unless it is a YOVANI site  · Use a mail-back system  This means you are given containers to put the narcotics into  You will then mail them in the containers  · Use a take-back drop box    This is a place to leave the narcotics at any time  People and animals will not be able to get into the box  Your local law enforcement agency can tell you where to find a drop box in your area  Other ways to manage pain:   · Ask your healthcare provider about non-narcotic medicines to control pain  Nonprescription medicines include NSAIDs (such as ibuprofen) and acetaminophen  Prescription medicines include muscle relaxers, antidepressants, and steroids  · Pain may be managed without any medicines  Some ways to relieve pain include massage, aromatherapy, or meditation  Physical or occupational therapy may also help  For more information:   · Drug Enforcement Administration  [facility] [address]  Phone: 9- [phone] - [phone]  Web Address: [facility]/drug_[email or web address]    · [address]  and Drug Administration  [facility] , [address] [place] [address]  Phone: 9- 203 - [phone]  Web Address: http://[employer]/     © Copyright [employer] 2018 Information is for End User's use only and may not be sold, redistributed or otherwise used for commercial purposes   All illustrations and images included in CareNotes® are the copyrighted property of A D A M , Inc  or [address] [place] [address] Make My platepape

## 2022-07-27 PROBLEM — F11.20 CONTINUOUS OPIOID DEPENDENCE (HCC): Status: ACTIVE | Noted: 2022-07-27

## 2022-07-27 PROBLEM — K21.00 GASTROESOPHAGEAL REFLUX DISEASE WITH ESOPHAGITIS WITHOUT HEMORRHAGE: Status: ACTIVE | Noted: 2022-07-27

## 2022-07-27 RX ORDER — BUDESONIDE, GLYCOPYRROLATE, AND FORMOTEROL FUMARATE 160; 9; 4.8 UG/1; UG/1; UG/1
2 AEROSOL, METERED RESPIRATORY (INHALATION) 2 TIMES DAILY
Qty: 10.7 G | Refills: 2
Start: 2022-07-27

## 2022-08-12 DIAGNOSIS — F41.9 ANXIETY: ICD-10-CM

## 2022-08-12 DIAGNOSIS — G89.29 CHRONIC LOW BACK PAIN WITHOUT SCIATICA, UNSPECIFIED BACK PAIN LATERALITY: ICD-10-CM

## 2022-08-12 DIAGNOSIS — M54.50 CHRONIC LOW BACK PAIN WITHOUT SCIATICA, UNSPECIFIED BACK PAIN LATERALITY: ICD-10-CM

## 2022-08-12 DIAGNOSIS — G47.00 INSOMNIA, UNSPECIFIED TYPE: ICD-10-CM

## 2022-08-12 RX ORDER — OXYCODONE AND ACETAMINOPHEN 7.5; 325 MG/1; MG/1
1 TABLET ORAL EVERY 6 HOURS PRN
Qty: 120 TABLET | Refills: 0 | Status: SHIPPED | OUTPATIENT
Start: 2022-08-12 | End: 2022-09-08 | Stop reason: SDUPTHER

## 2022-08-12 RX ORDER — ALPRAZOLAM 0.25 MG/1
0.25 TABLET ORAL 3 TIMES DAILY PRN
Qty: 90 TABLET | Refills: 0 | Status: SHIPPED | OUTPATIENT
Start: 2022-08-12 | End: 2022-09-08 | Stop reason: SDUPTHER

## 2022-08-12 RX ORDER — ZOLPIDEM TARTRATE 12.5 MG/1
12.5 TABLET, FILM COATED, EXTENDED RELEASE ORAL
Qty: 30 TABLET | Refills: 0 | Status: SHIPPED | OUTPATIENT
Start: 2022-08-12 | End: 2022-09-08 | Stop reason: SDUPTHER

## 2022-08-12 NOTE — TELEPHONE ENCOUNTER
1 [id number] 07/19/2022 07/15/2022 oxyCODONE HCL-ACETAMINOPHEN (Tablet) 120 0 30 325 MG-7 5 MG 45 0 [facility]  [place] 0 / 0 [place]    1 [id number] 07/19/2022 07/15/2022 Zolpidem Tartrate (Tablet, Extended Release) 30 0 30 12 5 MG NA [facility]  Private Pay 0 / 0 [place]    1 [id number] 07/16/2022 07/15/2022 ALPRAZolam (Tablet) 90 0 30 0 25 MG NA [facility]   Medicare 0 / 0 [place]

## 2022-09-08 ENCOUNTER — TELEPHONE (OUTPATIENT)
Dept: NEUROLOGY | Facility: CLINIC | Age: 67
End: 2022-09-08

## 2022-09-08 DIAGNOSIS — F41.9 ANXIETY: ICD-10-CM

## 2022-09-08 DIAGNOSIS — G40.009 PARTIAL IDIOPATHIC EPILEPSY WITH SEIZURES OF LOCALIZED ONSET, NOT INTRACTABLE, WITHOUT STATUS EPILEPTICUS (HCC): ICD-10-CM

## 2022-09-08 DIAGNOSIS — M54.50 CHRONIC LOW BACK PAIN WITHOUT SCIATICA, UNSPECIFIED BACK PAIN LATERALITY: ICD-10-CM

## 2022-09-08 DIAGNOSIS — G47.00 INSOMNIA, UNSPECIFIED TYPE: ICD-10-CM

## 2022-09-08 DIAGNOSIS — G89.29 CHRONIC LOW BACK PAIN WITHOUT SCIATICA, UNSPECIFIED BACK PAIN LATERALITY: ICD-10-CM

## 2022-09-08 RX ORDER — NALOXONE HYDROCHLORIDE 4 MG/.1ML
SPRAY NASAL
Qty: 1 EACH | Refills: 1 | Status: SHIPPED | OUTPATIENT
Start: 2022-09-08

## 2022-09-08 RX ORDER — OXYCODONE AND ACETAMINOPHEN 7.5; 325 MG/1; MG/1
1 TABLET ORAL EVERY 6 HOURS PRN
Qty: 120 TABLET | Refills: 0 | Status: SHIPPED | OUTPATIENT
Start: 2022-09-12 | End: 2022-10-07 | Stop reason: SDUPTHER

## 2022-09-08 RX ORDER — LEVETIRACETAM 500 MG/1
500 TABLET ORAL 2 TIMES DAILY
Qty: 180 TABLET | Refills: 0 | Status: CANCELLED | OUTPATIENT
Start: 2022-09-08

## 2022-09-08 RX ORDER — ALPRAZOLAM 0.25 MG/1
0.25 TABLET ORAL 3 TIMES DAILY PRN
Qty: 90 TABLET | Refills: 0 | Status: SHIPPED | OUTPATIENT
Start: 2022-09-12 | End: 2022-09-09 | Stop reason: SDUPTHER

## 2022-09-08 RX ORDER — ZOLPIDEM TARTRATE 12.5 MG/1
12.5 TABLET, FILM COATED, EXTENDED RELEASE ORAL
Qty: 30 TABLET | Refills: 0 | Status: SHIPPED | OUTPATIENT
Start: 2022-09-12 | End: 2022-10-07 | Stop reason: CLARIF

## 2022-09-08 NOTE — TELEPHONE ENCOUNTER
[place] PDMP verified  Patient follows with Leida England DO for this controlled substance  Last refills:    08/15/2022  1   08/12/2022  Oxycodone-Acetaminophn 7 5-325    120 00  30 Ca Bro   [id number]   Pen (3000)    45 00 MME  [place]   [place]   08/15/2022  1   08/12/2022  Zolpidem Tart Er 12 5 MG Tab    30 00  30 Ca Bro   [id number]   Pen (3109)     [place]   [place]   08/15/2022  1   08/12/2022  Alprazolam 0 25 MG Tablet    90 00  30 Ca Bro   [id number]   Pen (4586)             I will refill as ordered

## 2022-09-09 ENCOUNTER — TELEPHONE (OUTPATIENT)
Dept: FAMILY MEDICINE CLINIC | Facility: CLINIC | Age: 67
End: 2022-09-09

## 2022-09-09 DIAGNOSIS — F41.9 ANXIETY: ICD-10-CM

## 2022-09-09 RX ORDER — ALPRAZOLAM 0.25 MG/1
0.25 TABLET ORAL 4 TIMES DAILY PRN
Qty: 120 TABLET | Refills: 0 | Status: SHIPPED | OUTPATIENT
Start: 2022-09-09 | End: 2022-10-11 | Stop reason: SDUPTHER

## 2022-09-09 NOTE — TELEPHONE ENCOUNTER
It normally comes up and I cancel it however, I was out of the office yesterday  Tell him to refuse it at the pharmacy  I will resend in a different script for xanax  I will call him when we get samples of the inhaler in

## 2022-09-09 NOTE — TELEPHONE ENCOUNTER
Patient called in stating that he was alerted by pharmacy that there was a script for Narcan sent in for him  He is confused as to why this was sent, stating he has never had it before     Also, patient states you and he discussed increasing his dose of Xanax to 4 tablets a day   He sent in a refill request through [address] already so he is asking for you to cancel that and send in a new script for the new amount    Patient is also asking if he can get more samples of Brestri if we have them

## 2022-10-07 ENCOUNTER — TELEPHONE (OUTPATIENT)
Dept: FAMILY MEDICINE CLINIC | Facility: CLINIC | Age: 67
End: 2022-10-07

## 2022-10-07 DIAGNOSIS — G47.00 INSOMNIA, UNSPECIFIED TYPE: Primary | ICD-10-CM

## 2022-10-07 DIAGNOSIS — G89.29 CHRONIC LOW BACK PAIN WITHOUT SCIATICA, UNSPECIFIED BACK PAIN LATERALITY: ICD-10-CM

## 2022-10-07 DIAGNOSIS — M54.50 CHRONIC LOW BACK PAIN WITHOUT SCIATICA, UNSPECIFIED BACK PAIN LATERALITY: ICD-10-CM

## 2022-10-07 RX ORDER — ZOLPIDEM TARTRATE 10 MG/1
10 TABLET ORAL
Qty: 30 TABLET | Refills: 0 | Status: SHIPPED | OUTPATIENT
Start: 2022-10-07

## 2022-10-07 RX ORDER — OXYCODONE AND ACETAMINOPHEN 7.5; 325 MG/1; MG/1
1 TABLET ORAL EVERY 6 HOURS PRN
Qty: 120 TABLET | Refills: 0 | Status: SHIPPED | OUTPATIENT
Start: 2022-10-07 | End: 2022-11-06

## 2022-10-07 NOTE — TELEPHONE ENCOUNTER
I sent his oxycodone refill and the ambien   Not sure what toe fungus treatment she was going to start, ask him to call back next week about that

## 2022-10-10 DIAGNOSIS — F41.9 ANXIETY: ICD-10-CM

## 2022-10-11 RX ORDER — ALPRAZOLAM 0.25 MG/1
0.25 TABLET ORAL 4 TIMES DAILY PRN
Qty: 120 TABLET | Refills: 0 | Status: SHIPPED | OUTPATIENT
Start: 2022-10-11

## 2022-10-13 ENCOUNTER — APPOINTMENT (OUTPATIENT)
Dept: LAB | Facility: CLINIC | Age: 67
End: 2022-10-13
Payer: MEDICARE

## 2022-10-13 DIAGNOSIS — E78.5 HYPERLIPIDEMIA, UNSPECIFIED HYPERLIPIDEMIA TYPE: ICD-10-CM

## 2022-10-13 DIAGNOSIS — I10 ESSENTIAL HYPERTENSION: ICD-10-CM

## 2022-10-13 LAB
ALBUMIN SERPL BCP-MCNC: 3.9 G/DL (ref 3.5–5)
ALP SERPL-CCNC: 93 U/L (ref 46–116)
ALT SERPL W P-5'-P-CCNC: 22 U/L (ref 12–78)
ANION GAP SERPL CALCULATED.3IONS-SCNC: 8 MMOL/L (ref 4–13)
AST SERPL W P-5'-P-CCNC: 13 U/L (ref 5–45)
BILIRUB SERPL-MCNC: 0.54 MG/DL (ref 0.2–1)
BUN SERPL-MCNC: 16 MG/DL (ref 5–25)
CALCIUM SERPL-MCNC: 9.8 MG/DL (ref 8.3–10.1)
CHLORIDE SERPL-SCNC: 102 MMOL/L (ref 96–108)
CHOLEST SERPL-MCNC: 125 MG/DL
CO2 SERPL-SCNC: 28 MMOL/L (ref 21–32)
CREAT SERPL-MCNC: 0.92 MG/DL (ref 0.6–1.3)
GFR SERPL CREATININE-BSD FRML MDRD: 85 ML/MIN/1.73SQ M
GLUCOSE P FAST SERPL-MCNC: 109 MG/DL (ref 65–99)
HDLC SERPL-MCNC: 48 MG/DL
LDLC SERPL CALC-MCNC: 49 MG/DL (ref 0–100)
POTASSIUM SERPL-SCNC: 4.4 MMOL/L (ref 3.5–5.3)
PROT SERPL-MCNC: 7.4 G/DL (ref 6.4–8.4)
SODIUM SERPL-SCNC: 138 MMOL/L (ref 135–147)
TRIGL SERPL-MCNC: 138 MG/DL

## 2022-10-13 PROCEDURE — 36415 COLL VENOUS BLD VENIPUNCTURE: CPT

## 2022-10-13 PROCEDURE — 80053 COMPREHEN METABOLIC PANEL: CPT

## 2022-10-13 PROCEDURE — 80061 LIPID PANEL: CPT

## 2022-11-03 DIAGNOSIS — G47.00 INSOMNIA, UNSPECIFIED TYPE: ICD-10-CM

## 2022-11-03 DIAGNOSIS — G89.29 CHRONIC LOW BACK PAIN WITHOUT SCIATICA, UNSPECIFIED BACK PAIN LATERALITY: ICD-10-CM

## 2022-11-03 DIAGNOSIS — M54.50 CHRONIC LOW BACK PAIN WITHOUT SCIATICA, UNSPECIFIED BACK PAIN LATERALITY: ICD-10-CM

## 2022-11-03 DIAGNOSIS — F41.9 ANXIETY: ICD-10-CM

## 2022-11-03 RX ORDER — ZOLPIDEM TARTRATE 10 MG/1
10 TABLET ORAL
Qty: 30 TABLET | Refills: 1 | Status: SHIPPED | OUTPATIENT
Start: 2022-11-03

## 2022-11-03 RX ORDER — OXYCODONE AND ACETAMINOPHEN 7.5; 325 MG/1; MG/1
1 TABLET ORAL EVERY 6 HOURS PRN
Qty: 120 TABLET | Refills: 0 | Status: SHIPPED | OUTPATIENT
Start: 2022-11-03 | End: 2022-12-03

## 2022-11-03 RX ORDER — ALPRAZOLAM 0.25 MG/1
0.25 TABLET ORAL 4 TIMES DAILY PRN
Qty: 120 TABLET | Refills: 0 | Status: SHIPPED | OUTPATIENT
Start: 2022-11-03

## 2022-11-03 NOTE — TELEPHONE ENCOUNTER
1  [id number] 10/11/2022  10/11/2022 ALPRAZolam (Tablet)  120 0 30 0 25 MG NA [facility]  Medicare 0 / 0 [place]    1  [id number] 10/11/2022  10/07/2022 Zolpidem Tartrate (Tablet)  30 0 30 10 MG NA [facility]  Medicare 0 / 0 [place]    1  [id number] 10/10/2022  10/07/2022 oxyCODONE HCL-ACETAMINOPHEN (Tablet)  120 0 30 325 MG-7 5 MG 45 0 [facility]    Medicare 0 / 0 [place]

## 2022-11-03 NOTE — TELEPHONE ENCOUNTER
1  [id number] 10/11/2022  10/11/2022 ALPRAZolam (Tablet)  120 0 30 0 25 MG NA [facility]  Medicare 0 / 0 [place]    1  [id number] 10/11/2022  10/07/2022 Zolpidem Tartrate (Tablet)  30 0 30 10 MG NA [facility]  Medicare 0 / 0 [place]    1  [id number] 10/10/2022  10/07/2022 oxyCODONE HCL-ACETAMINOPHEN (Tablet)  120 0 30 325 MG-7 5 MG 45 0 [facility]    Medicare 0 / 0 [place]

## 2022-11-10 ENCOUNTER — TELEPHONE (OUTPATIENT)
Dept: FAMILY MEDICINE CLINIC | Facility: CLINIC | Age: 67
End: 2022-11-10

## 2022-12-02 DIAGNOSIS — G47.00 INSOMNIA, UNSPECIFIED TYPE: ICD-10-CM

## 2022-12-02 DIAGNOSIS — M54.50 CHRONIC LOW BACK PAIN WITHOUT SCIATICA, UNSPECIFIED BACK PAIN LATERALITY: ICD-10-CM

## 2022-12-02 DIAGNOSIS — F41.9 ANXIETY: ICD-10-CM

## 2022-12-02 DIAGNOSIS — G89.29 CHRONIC LOW BACK PAIN WITHOUT SCIATICA, UNSPECIFIED BACK PAIN LATERALITY: ICD-10-CM

## 2022-12-05 RX ORDER — ALPRAZOLAM 0.25 MG/1
0.25 TABLET ORAL 4 TIMES DAILY PRN
Qty: 120 TABLET | Refills: 0 | Status: SHIPPED | OUTPATIENT
Start: 2022-12-05

## 2022-12-05 RX ORDER — ZOLPIDEM TARTRATE 10 MG/1
10 TABLET ORAL
Qty: 30 TABLET | Refills: 0 | Status: SHIPPED | OUTPATIENT
Start: 2022-12-05

## 2022-12-05 RX ORDER — OXYCODONE AND ACETAMINOPHEN 7.5; 325 MG/1; MG/1
1 TABLET ORAL EVERY 6 HOURS PRN
Qty: 120 TABLET | Refills: 0 | Status: SHIPPED | OUTPATIENT
Start: 2022-12-05 | End: 2023-01-04

## 2022-12-08 ENCOUNTER — OFFICE VISIT (OUTPATIENT)
Dept: FAMILY MEDICINE CLINIC | Facility: CLINIC | Age: 67
End: 2022-12-08

## 2022-12-08 VITALS
HEIGHT: 70 IN | DIASTOLIC BLOOD PRESSURE: 72 MMHG | BODY MASS INDEX: 40.01 KG/M2 | SYSTOLIC BLOOD PRESSURE: 128 MMHG | HEART RATE: 78 BPM | OXYGEN SATURATION: 94 % | TEMPERATURE: 98 F | WEIGHT: 279.5 LBS

## 2022-12-08 DIAGNOSIS — E11.9 TYPE 2 DIABETES MELLITUS WITHOUT COMPLICATION, WITHOUT LONG-TERM CURRENT USE OF INSULIN (HCC): ICD-10-CM

## 2022-12-08 DIAGNOSIS — G89.4 CHRONIC PAIN SYNDROME: Primary | ICD-10-CM

## 2022-12-08 DIAGNOSIS — B35.1 ONYCHOMYCOSIS OF GREAT TOE: ICD-10-CM

## 2022-12-08 LAB — SL AMB POCT HEMOGLOBIN AIC: 7.1 (ref ?–6.5)

## 2022-12-08 RX ORDER — DILTIAZEM HYDROCHLORIDE 120 MG/1
120 CAPSULE, EXTENDED RELEASE ORAL DAILY
COMMUNITY
Start: 2022-10-05 | End: 2023-10-05

## 2022-12-08 RX ORDER — AZELASTINE 1 MG/ML
1-2 SPRAY, METERED NASAL 2 TIMES DAILY PRN
COMMUNITY
Start: 2022-12-07

## 2022-12-08 RX ORDER — FLUTICASONE PROPIONATE AND SALMETEROL XINAFOATE 230; 21 UG/1; UG/1
2 AEROSOL, METERED RESPIRATORY (INHALATION) 2 TIMES DAILY
COMMUNITY
Start: 2022-12-07

## 2022-12-08 RX ORDER — ITRACONAZOLE 100 MG/1
CAPSULE ORAL
Qty: 42 CAPSULE | Refills: 0 | Status: SHIPPED | OUTPATIENT
Start: 2022-12-08 | End: 2023-03-10

## 2022-12-08 RX ORDER — CELECOXIB 200 MG/1
200 CAPSULE ORAL 2 TIMES DAILY
Qty: 30 CAPSULE | Refills: 3 | Status: SHIPPED | OUTPATIENT
Start: 2022-12-08 | End: 2023-01-07

## 2022-12-08 RX ORDER — ATORVASTATIN CALCIUM 40 MG/1
TABLET, FILM COATED ORAL
COMMUNITY
Start: 2022-10-05

## 2022-12-08 RX ORDER — LEVALBUTEROL TARTRATE 45 UG/1
AEROSOL, METERED ORAL
COMMUNITY
Start: 2022-12-07

## 2022-12-08 NOTE — PROGRESS NOTES
Assessment/Plan     Problem List Items Addressed This Visit        Endocrine    Type 2 diabetes mellitus without complication, without long-term current use of insulin ([facility] [address] ) - Primary    Relevant Orders    POCT hemoglobin A1c   Other Visit Diagnoses     Onychomycosis of great toe        Relevant Medications    itraconazole (SPORANOX) 100 mg capsule    Chronic pain syndrome             Treatment Plan: To be able to ease up pain enough to complete ADLS and have a quality of life that would not be possible without meds  Plan is to continue with present meds; pt limited because of gastric bypass  Treatment Goals: See above     Opiate risks  There are risks associated with opioid medications, including dependence, addiction and tolerance  The patient understands and agrees to use these medications only as prescribed  Potential side effects of the medications include, but are not limited to, constipation, drowsiness, addiction, impaired judgment and risk of fatal overdose if not taken as prescribed  The patient was warned against driving while taking sedation medications  Sharing medications is a felony  At this point in time, the patient is showing no signs of addiction, abuse, diversion or suicidal ideation  Pateint is taking concurrent benzodiazepines  Has been counseled on the risks of taking opioids and benzodiazepines including sedation, increased fall risk, dizziness, addictive potential and death  Patient is taking concurrent muscle relaxers  Has been counseled on the risks of taking opioids and muscle relaxers including sedation, increased fall risk, dizziness, addictive potential and death  Opioid agreement  Pain management agreement was reviewed with patient and signed/updated during visit      Drug screen  Drug screen collected during today's visit      [place]  [place] or [place]  reviewed   No red flags were identified; safe to proceed with prescription      I have spent 30 minutes directly with the patient  Greater than 50% of this time was spent in counseling/coordination of care regarding: diagnostic results, prognosis, risks and benefits of treatment options, instructions for management, importance of treatment compliance, risk factor reductions and impressions  Subjective     Opioid Management:   Type of visit: Follow-up    Pain related diagnoses: DJD spine, knees and hips, shoulderss hands    Interval history: No new medical conditions    Aberrant behavior?: No      Adverse effects from medication?: No      Screening Tools/Assessments:    PHQ-2/9:  Last PHQ-2 score: 1 (Last PHQ-2 date: 7/26/2022)    Brief Pain Inventory (BPI):  1) Throughout our lives, most of us have had pain from time to time (such as minor headaches, sprains, and toothaches)  Have you had pain other than these everyday kinds of pain today? Yes  2) Where is your pain located? Knees,back,hand shoulder  3) Rate your pain at its worst in the last 24 hours: 7  4) Rate your pain at its least in the last 24 hours: 6  5) Rate your average level of pain: 5  6) Rate your pain right now: 4  7) What treatments or medications are you receiving for your pain? Meds  8) In the past 24 hours, how much relief have pain treatments or medication provided?  80%  9) During the past 24 hours, pain has interfered with your:     A) General activity: 4     B) Mood: 4     C) Walking ability: 5     D) Normal work (work outside the home & housework): 5     E) Relations with other people: 4     F) Sleep: 6     G) Enjoyment of life: 0    COMM:  Current COMM Score: 9 (positive, at risk patient)    Old records  Old records received and reviewed from: 2014 and up     Drug Screen:  Date of last drug screen: 12/13/2022Last drug screen was performed more than 365 days ago  Drug screen result based off current prescriptions: consistent    Opioid agreement:  Active Opioid agreement on file?: Yes    Opioid agreement signed date: 3/31/2022  Opioid agreement expiration date: 3/31/2023    Naloxone:  Currently prescribed Naloxone (Narcan): Yes      High risk medications  High risk meds taken in last 72 hours: oxycodone    Other treatments tried/failed:   Trigger point injection, epidural steroid injection, acetaminophen, prescription NSAIDs, aspirin, heat, muscle relaxants, ibuprofen (OTC), physical therapy, home exercises, prior surgery, chiropractic manipulation, TENS unit, massage, rest, cold compresses and topical creams (OTC)    HPI  Pain Medications             celecoxib (CeleBREX) 200 mg capsule TAKE 1 CAPSULE BY MOUTH EVERY DAY    levETIRAcetam (KEPPRA) 500 mg tablet Take 1 tablet (500 mg total) by mouth 2 (two) times a day    oxyCODONE-acetaminophen (PERCOCET) 7 5-325 MG per tablet Take 1 tablet by mouth every 6 (six) hours as needed for moderate pain Max Daily Amount: 4 tablets         Outpatient Morphine Milligram Equivalents Per Day     12/8/22 - 1/4/23 45 MME/Day    Order Name Dose Route Frequency Maximum MME/Day     oxyCODONE-acetaminophen (PERCOCET) 7 5-325 MG per tablet 1 tablet Oral Every 6 hours PRN 45 MME/Day    Total Potential Morphine Milligram Equivalents Per Day 45 MME/Day    Calculation Information        oxyCODONE-acetaminophen (PERCOCET) 7 5-325 MG per tablet    oxyCODONE-acetaminophen 7 5-325 MG Tabs: maximum daily dose of 30 mg of opioid in combo * morphine equivalence factor of 1 5 = 45 MME/Day                     1/5/23 and after None              PDMP Review       Value Time User    PDMP Reviewed  Yes 12/5/2022 11:54 AM Leonid Meza DO         Review of Systems   Musculoskeletal: Positive for arthralgias, back pain, gait problem and joint swelling  All other systems reviewed and are negative  Objective     /72   Pulse 78   Temp 98 °F (36 7 °C)   Ht 5' 10" (1 778 m)   Wt 127 kg (279 lb 8 oz)   SpO2 94%   BMI 40 10 kg/m²     Physical Exam  Vitals and nursing note reviewed  Constitutional:       General: He is not in acute distress  Appearance: Normal appearance  He is not ill-appearing or diaphoretic  HENT:      Head: Normocephalic and atraumatic  Right Ear: Tympanic membrane, ear canal and external ear normal       Left Ear: Tympanic membrane, ear canal and external ear normal       Nose: Nose normal  No congestion or rhinorrhea  Mouth/Throat:      Mouth: Mucous membranes are moist       Pharynx: Oropharynx is clear  No posterior oropharyngeal erythema  Eyes:      General:         Right eye: No discharge  Left eye: No discharge  Extraocular Movements: Extraocular movements intact  Conjunctiva/sclera: Conjunctivae normal       Pupils: Pupils are equal, round, and reactive to light  Neck:      Vascular: No carotid bruit  Cardiovascular:      Rate and Rhythm: Normal rate and regular rhythm  Pulses: Normal pulses  Heart sounds: Normal heart sounds  No murmur heard  Pulmonary:      Effort: Pulmonary effort is normal  No respiratory distress  Breath sounds: Normal breath sounds  No wheezing or rhonchi  Abdominal:      General: Abdomen is flat  Bowel sounds are normal  There is no distension  Palpations: There is no mass  Tenderness: There is no abdominal tenderness  Musculoskeletal:         General: Swelling and tenderness present  No deformity  Normal range of motion  Cervical back: Normal range of motion and neck supple  No rigidity  Right lower leg: No edema  Left lower leg: No edema  Comments: Swelling of fingers, stiffness of fingers and crepitance of knees  Lymphadenopathy:      Cervical: No cervical adenopathy  Skin:     General: Skin is warm and dry  Capillary Refill: Capillary refill takes less than 2 seconds  Coloration: Skin is not jaundiced  Findings: No bruising, erythema or rash  Neurological:      General: No focal deficit present        Mental Status: He is alert and oriented to person, place, and time  Cranial Nerves: No cranial nerve deficit  Sensory: No sensory deficit  Gait: Gait normal       Deep Tendon Reflexes: Reflexes normal    Psychiatric:         Mood and Affect: Mood normal          Behavior: Behavior normal          Thought Content:  Thought content normal          Judgment: Judgment normal          Heidi Nunez DO

## 2022-12-08 NOTE — PATIENT INSTRUCTIONS

## 2022-12-15 ENCOUNTER — CLINICAL SUPPORT (OUTPATIENT)
Dept: FAMILY MEDICINE CLINIC | Facility: CLINIC | Age: 67
End: 2022-12-15

## 2022-12-15 DIAGNOSIS — G89.4 CHRONIC PAIN SYNDROME: Primary | ICD-10-CM

## 2022-12-29 LAB
NOROXYCODONE UR QL CFM: NORMAL NG/ML
OXYCODONE UR QL CFM: NORMAL NG/ML
OXYMORPHONE UR QL CFM: NORMAL NG/ML
RESULT ALL_PRESCRIBED MEDS AND SPECIAL INSTRUCTIONS: NORMAL

## 2023-01-04 ENCOUNTER — TELEPHONE (OUTPATIENT)
Dept: FAMILY MEDICINE CLINIC | Facility: CLINIC | Age: 68
End: 2023-01-04

## 2023-01-04 DIAGNOSIS — M25.50 ARTHRALGIA, UNSPECIFIED JOINT: ICD-10-CM

## 2023-01-04 RX ORDER — CELECOXIB 200 MG/1
200 CAPSULE ORAL DAILY
Qty: 90 CAPSULE | Refills: 0 | Status: SHIPPED | OUTPATIENT
Start: 2023-01-04

## 2023-01-04 NOTE — TELEPHONE ENCOUNTER
Patient is asking for the script for Celebrex be resent to [facility] in [place]   He states that will cost him a lot less there using [facility] than getting it at [facility] through his insurance    He is also asking for another script for Itraconazole be sent to [facility]   He said that the quantity he received the last time it was filled will only last him half the time

## 2023-01-04 NOTE — TELEPHONE ENCOUNTER
Patient is returning a call from the office regarding the medication Celebrex  He states the pharmacy told him to take 2 tablets twice daily for a week     Please call patient back to discuss

## 2023-01-05 DIAGNOSIS — B35.1 ONYCHOMYCOSIS OF GREAT TOE: ICD-10-CM

## 2023-01-05 RX ORDER — ITRACONAZOLE 100 MG/1
CAPSULE ORAL
Qty: 14 CAPSULE | Refills: 0 | Status: SHIPPED | OUTPATIENT
Start: 2023-01-05 | End: 2023-04-07

## 2023-01-05 NOTE — TELEPHONE ENCOUNTER
Patient called back looking to speak to someone about his medication instructions    Please return his call when you can

## 2023-01-05 NOTE — TELEPHONE ENCOUNTER
I spoke with patient, he took the prescription as it states  in the prescription, 2 caps po bid for 1 week  He even confirmed the directions with his pharmacy  So if he took too much, does he need blood work to check liver enzymes? Also, he states he will need an additional 14 capsules because he took too much the first time

## 2023-01-05 NOTE — TELEPHONE ENCOUNTER
Per our conversation, I was able to send the remaining 14 capsules to patients pharmacy, patient notified

## 2023-01-11 NOTE — TELEPHONE ENCOUNTER
Pt called regarding itraconazole 100mg capsules  Patient states that the prescription recently called in was to go to [facility] in [place]  Instead it had gone to [facility]  Pt would like the prescription to be sent over to [facility]  He is also requesting a full prescription of 42 capsules  [address]    PT# [phone]

## 2023-01-13 ENCOUNTER — TELEPHONE (OUTPATIENT)
Dept: DERMATOLOGY | Facility: CLINIC | Age: 68
End: 2023-01-13

## 2023-01-13 NOTE — TELEPHONE ENCOUNTER
Pt walked in with [employer] forms to fill out and obtain MR  I faxed forms to [id number] and give Pt phone number to call [facility] to follow up        Scanned docs into chart

## 2023-01-13 NOTE — TELEPHONE ENCOUNTER
Pt stopped by the office stating he just checked with [facility] pharmacy and they still hadn't received the script for Itraconazole  Please resend script to the [facility] pharmacy here in town

## 2023-01-16 ENCOUNTER — TELEPHONE (OUTPATIENT)
Dept: FAMILY MEDICINE CLINIC | Facility: CLINIC | Age: 68
End: 2023-01-16

## 2023-01-16 NOTE — TELEPHONE ENCOUNTER
Patient's Itraconazole needs an appeal letter  Is there a reason he cannot use otc meds or alternative treatments?

## 2023-01-26 DIAGNOSIS — F41.9 ANXIETY: ICD-10-CM

## 2023-01-26 DIAGNOSIS — M54.50 CHRONIC LOW BACK PAIN WITHOUT SCIATICA, UNSPECIFIED BACK PAIN LATERALITY: ICD-10-CM

## 2023-01-26 DIAGNOSIS — G47.00 INSOMNIA, UNSPECIFIED TYPE: ICD-10-CM

## 2023-01-26 DIAGNOSIS — G89.29 CHRONIC LOW BACK PAIN WITHOUT SCIATICA, UNSPECIFIED BACK PAIN LATERALITY: ICD-10-CM

## 2023-01-26 RX ORDER — ALPRAZOLAM 0.25 MG/1
0.25 TABLET ORAL 4 TIMES DAILY PRN
Qty: 120 TABLET | Refills: 0 | Status: SHIPPED | OUTPATIENT
Start: 2023-01-26

## 2023-01-26 RX ORDER — OXYCODONE AND ACETAMINOPHEN 7.5; 325 MG/1; MG/1
1 TABLET ORAL EVERY 6 HOURS PRN
Qty: 120 TABLET | Refills: 0 | Status: SHIPPED | OUTPATIENT
Start: 2023-01-26 | End: 2023-02-25

## 2023-01-26 RX ORDER — ZOLPIDEM TARTRATE 10 MG/1
10 TABLET ORAL
Qty: 30 TABLET | Refills: 0 | Status: SHIPPED | OUTPATIENT
Start: 2023-01-26

## 2023-01-26 NOTE — TELEPHONE ENCOUNTER
[id number] 01/02/2023 12/30/2022 ALPRAZolam (Tablet)  120 0 30 0 25 MG NA [facility]  [place] / [place]     1  [id number] 01/01/2023 12/30/2022 oxyCODONE HCL-ACETAMINOPHEN (Tablet)  120 0 30 325 MG-7 5 MG 45 0 [facility]  [place] / [place]    1  [id number] 01/01/2023 12/30/2022 Zolpidem Tartrate (Tablet)  30 0 30 10 MG NA [facility]  [place] / [place]    1  [id number] 12/06/2022 12/05/2022 ALPRAZolam (Tablet)  120 0 30 0 25 MG NA [facility]  [place] / [place]    1  [id number] 12/05/2022 12/05/2022 oxyCODONE HCL-ACETAMINOPHEN (Tablet)  120 0 30 325 MG-7 5 MG 45 0 [facility]    [place] / [place]

## 2023-01-31 ENCOUNTER — TELEPHONE (OUTPATIENT)
Dept: OTHER | Facility: OTHER | Age: 68
End: 2023-01-31

## 2023-01-31 DIAGNOSIS — B35.1 ONYCHOMYCOSIS OF GREAT TOE: ICD-10-CM

## 2023-01-31 RX ORDER — ITRACONAZOLE 100 MG/1
CAPSULE ORAL
Qty: 14 CAPSULE | Refills: 0 | Status: SHIPPED | OUTPATIENT
Start: 2023-01-31 | End: 2023-05-03

## 2023-01-31 NOTE — TELEPHONE ENCOUNTER
Pt called stating he was notified by [facility] last week that they did not have any Oxycodone available  Pt called today asking if you send the script to the [facility] [address]  instead

## 2023-02-02 NOTE — TELEPHONE ENCOUNTER
Patient called and is requesting that the Oxy Codone be sent to [address] [place] [address]        Pt # [phone]

## 2023-02-23 DIAGNOSIS — G89.29 CHRONIC LOW BACK PAIN WITHOUT SCIATICA, UNSPECIFIED BACK PAIN LATERALITY: ICD-10-CM

## 2023-02-23 DIAGNOSIS — F41.9 ANXIETY: ICD-10-CM

## 2023-02-23 DIAGNOSIS — G47.00 INSOMNIA, UNSPECIFIED TYPE: ICD-10-CM

## 2023-02-23 DIAGNOSIS — M54.50 CHRONIC LOW BACK PAIN WITHOUT SCIATICA, UNSPECIFIED BACK PAIN LATERALITY: ICD-10-CM

## 2023-02-23 RX ORDER — ZOLPIDEM TARTRATE 10 MG/1
10 TABLET ORAL
Qty: 30 TABLET | Refills: 3 | Status: SHIPPED | OUTPATIENT
Start: 2023-02-23

## 2023-02-23 RX ORDER — OXYCODONE AND ACETAMINOPHEN 7.5; 325 MG/1; MG/1
1 TABLET ORAL EVERY 6 HOURS PRN
Qty: 120 TABLET | Refills: 0 | Status: SHIPPED | OUTPATIENT
Start: 2023-02-23 | End: 2023-03-25

## 2023-02-23 RX ORDER — ALPRAZOLAM 0.25 MG/1
0.25 TABLET ORAL 4 TIMES DAILY PRN
Qty: 120 TABLET | Refills: 0 | Status: SHIPPED | OUTPATIENT
Start: 2023-02-23

## 2023-03-15 ENCOUNTER — TELEPHONE (OUTPATIENT)
Dept: FAMILY MEDICINE CLINIC | Facility: CLINIC | Age: 68
End: 2023-03-15

## 2023-03-15 NOTE — TELEPHONE ENCOUNTER
Patient states that he took a hard fall about 2 months ago while walking his dog     He states he fell on his L elbow and it caused his L shoulder to jam     He has not gotten it looked at, he thought it would feel better over time   He says that his range of motion has improved, but he is still in considerable pain in his L shoulder    He is asking if you could put an order in for him to get an X ray

## 2023-03-17 DIAGNOSIS — M25.512 ACUTE PAIN OF LEFT SHOULDER: Primary | ICD-10-CM

## 2023-03-23 DIAGNOSIS — G89.29 CHRONIC LEFT SHOULDER PAIN: Primary | ICD-10-CM

## 2023-03-23 DIAGNOSIS — M25.512 CHRONIC LEFT SHOULDER PAIN: Primary | ICD-10-CM

## 2023-03-23 NOTE — TELEPHONE ENCOUNTER
Patient called and is still waiting on left shoulder xray to be placed in chart      Please call when done   [phone]

## 2023-03-24 ENCOUNTER — APPOINTMENT (OUTPATIENT)
Dept: RADIOLOGY | Facility: MEDICAL CENTER | Age: 68
End: 2023-03-24

## 2023-03-24 DIAGNOSIS — G89.29 CHRONIC LEFT SHOULDER PAIN: ICD-10-CM

## 2023-03-24 DIAGNOSIS — M25.512 CHRONIC LEFT SHOULDER PAIN: ICD-10-CM

## 2023-03-28 ENCOUNTER — TELEPHONE (OUTPATIENT)
Dept: FAMILY MEDICINE CLINIC | Facility: CLINIC | Age: 68
End: 2023-03-28

## 2023-03-28 DIAGNOSIS — G89.29 CHRONIC LEFT SHOULDER PAIN: Primary | ICD-10-CM

## 2023-03-28 DIAGNOSIS — M25.512 CHRONIC LEFT SHOULDER PAIN: Primary | ICD-10-CM

## 2023-03-28 RX ORDER — OXYCODONE AND ACETAMINOPHEN 7.5; 325 MG/1; MG/1
1 TABLET ORAL EVERY 6 HOURS PRN
Qty: 60 TABLET | Refills: 0 | Status: SHIPPED | OUTPATIENT
Start: 2023-03-28

## 2023-03-28 NOTE — TELEPHONE ENCOUNTER
Patient called and wanted xray results  Patient also was looking for a refill oxycodone for chronic pain  States he was taking but no longer on his chart      Pharmacy : [facility]    Pt # [phone]

## 2023-03-31 DIAGNOSIS — F41.9 ANXIETY: ICD-10-CM

## 2023-03-31 RX ORDER — ALPRAZOLAM 0.25 MG/1
0.25 TABLET ORAL 4 TIMES DAILY PRN
Qty: 120 TABLET | Refills: 0 | Status: SHIPPED | OUTPATIENT
Start: 2023-03-31

## 2023-03-31 NOTE — TELEPHONE ENCOUNTER
1  [id number] 03/06/2023 02/23/2023 ACETAMINOPHEN 325 MG / oxyCODONE HYDROCHLORIDE 7 5 MG ORAL TABLET (Tablet)  120 0 30 7 5 MG/325 0 MG 45 0 [facility]  [place] / [place]    1  [id number] 03/02/2023 02/23/2023 ALPRAZolam (Tablet)  120 0 30 0 25 MG NA [facility]  [place] / [place]    1  [id number] 02/26/2023 02/23/2023 Zolpidem Tartrate (Tablet)  30 0 30 10 MG NA [facility]  [place] 0 / 3 [place]    1  [id number] 02/06/2023 01/26/2023 ACETAMINOPHEN 325 MG / oxyCODONE HYDROCHLORIDE 7 5 MG ORAL TABLET (Tablet)  120 0 30 7 5 MG/325 0 MG 45 0 [facility]  [place] 0 / [place]    1  [id number] 02/01/2023 01/26/2023 ALPRAZolam (Tablet)  120 0 30 0 25 MG NA [facility]  [place] / 0 [place]    1  [id number] 01/28/2023 01/26/2023 Zolpidem Tartrate (Tablet)  30 0 30 10 MG [facility]  [place] 0 / [place]    1  [id number] 01/02/2023 12/30/2022 ALPRAZolam (Tablet)  120 0 30 0 25 MG [facility]  [place] 0 / [place]    1  [id number] 01/01/2023 12/30/2022 ACETAMINOPHEN 325 MG / oxyCODONE HYDROCHLORIDE 7 5 MG ORAL TABLET (Tablet)  120 0 30 7 5 MG/325 0 MG 45 0 [facility]  [place] 0 / [place]    1  [id number] 01/01/2023 12/30/2022 Zolpidem Tartrate (Tablet)  30 0 30 10 MG NA [facility]  [place] / [place]    1  [id number] 12/06/2022 12/05/2022 ALPRAZolam (Tablet)  120 0 30 0 25 MG [facility]    Medicare 0 / 0

## 2023-04-10 PROBLEM — M24.812 INTERNAL DERANGEMENT OF LEFT SHOULDER: Status: ACTIVE | Noted: 2023-04-10

## 2023-04-24 ENCOUNTER — HOSPITAL ENCOUNTER (OUTPATIENT)
Dept: MRI IMAGING | Facility: HOSPITAL | Age: 68
Discharge: HOME | End: 2023-04-24
Attending: ORTHOPAEDIC SURGERY

## 2023-04-24 DIAGNOSIS — M24.812 INTERNAL DERANGEMENT OF LEFT SHOULDER: ICD-10-CM

## 2023-04-26 DIAGNOSIS — G47.00 INSOMNIA, UNSPECIFIED TYPE: ICD-10-CM

## 2023-04-26 NOTE — TELEPHONE ENCOUNTER
[id number] 04/24/2023 04/14/2023 ACETAMINOPHEN 325 MG / oxyCODONE HYDROCHLORIDE 7 5 MG ORAL TABLET (Tablet)  120 0 30 7 5 MG/325 0 MG 45 0 [facility]  [place] / [place]    1  [id number] 04/18/2023 04/14/2023 diazePAM (Tablet)  1 0 1 10 MG NA [facility]  [place] 0 / 0 [place]    1  [id number] 04/02/2023 03/28/2023 ACETAMINOPHEN 325 MG / oxyCODONE HYDROCHLORIDE 7 5 MG ORAL TABLET (Tablet)  60 0 15 7 5 MG/325 0 MG 45 0 [facility]  Med[place] 0 / 0 [place]    1  [id number] 04/01/2023 03/31/2023 ALPRAZolam (Tablet)  120 0 30 0 25 MG [facility]    [place] 0 / [place]    1  [id number] 03/30/2023 02/23/2023 Zolpidem Tartrate (Tablet)  30 0 30 10 MG [facility]

## 2023-04-27 RX ORDER — ZOLPIDEM TARTRATE 10 MG/1
10 TABLET ORAL
Qty: 30 TABLET | Refills: 0 | Status: SHIPPED | OUTPATIENT
Start: 2023-04-27

## 2023-05-01 ENCOUNTER — OFFICE VISIT (OUTPATIENT)
Dept: OBGYN CLINIC | Facility: OTHER | Age: 68
End: 2023-05-01

## 2023-05-01 VITALS
HEIGHT: 70 IN | SYSTOLIC BLOOD PRESSURE: 135 MMHG | HEART RATE: 80 BPM | DIASTOLIC BLOOD PRESSURE: 78 MMHG | BODY MASS INDEX: 39.08 KG/M2 | WEIGHT: 273 LBS

## 2023-05-01 DIAGNOSIS — F41.9 ANXIETY: ICD-10-CM

## 2023-05-01 DIAGNOSIS — B35.4 TINEA CORPORIS: ICD-10-CM

## 2023-05-01 DIAGNOSIS — M75.102 TEAR OF LEFT SUPRASPINATUS TENDON: Primary | ICD-10-CM

## 2023-05-01 NOTE — PATIENT INSTRUCTIONS
You are being scheduled for a shoulder arthroscopy to treat your symptoms  Below are some instructions and information on what to expect before and after your surgery  Pre-Surgical Preparation for Arthroscopic Shoulder Surgery: You will be contacted the evening prior to your surgery to confirm the scheduled time of the procedure and when to arrive at the hospital    Do not eat or drink anything after midnight the night before your surgery  Since you are having out-patient surgery, make sure that you have someone who can drive you home later in the day  Also, prepare that person for a long day, as the process of safely preparing for and recovering from the procedure is more time consuming than the actual procedure! As you will be in a sling after surgery, please wear or bring a loose fitting button-down shirt so that you can easily place this over the sling when you leave the surgical suite  This avoids having to place the operative arm in a sleeve  Most patients find that this is the easiest outfit to wear for the first week or so after surgery so you may want to plan accordingly  Most patients find that lying down in bed after shoulder surgery accentuates their discomfort  This is likely related to the effect of gravity on the swelling in the shoulder  As a result, most patients sleep better in a recliner or in bed with pillows propped up behind their back for the first few days or weeks after surgery  It is a good idea to plan for this ahead of time so there will be less hassle getting things set up the night after surgery  What to Expect After Arthroscopic Shoulder Surgery: It is normal to have swelling and discomfort in the shoulder for several days or a week after surgery  It is also normal to have a small amount of drainage from the surgical wounds (especially the first few days after surgery), as we put fluid into the shoulder to visualize the structures during surgery  "It is NOT normal to have foul smelling, purulent drainage and if this is noted, please contact the office immediately or proceed to the emergency room for evaluation as this may indicate an infection  Applying ice bags to the shoulder may help with pain that is not controlled by the regional block  Ice should be applied 20-30 minutes at a time, every hour or two  Make sure to put a thin towel or T-shirt next to your skin to avoid direct contact of the ice with the skin  Icing is most helpful in the first 48 hours, although many people find that continuing past this time frame lessens their postoperative pain  Please note that your post-operative dressing is not conductive to ice, so if you need to, it is okay to remove that dressing even the night after surgery and place band-aids over the wounds in order for the ice to take effect  Pain Control    Most patients will receive a nerve block, the local anesthetic may keep your whole arm numb for up to 4 days  You will be given a prescription for narcotic pain medication when you are discharged from the hospital   With the newer nerve block that is being utilized, patients are rarely requiring the use of this narcotic pain medication  If you find you do not tolerate that type of pain medicine well, call our office and we will try another one  In addition to the narcotic pain medication, it is safe to use an anti-inflammatory (unless the patient has a medical condition that would not allow safe use of this mediation)  This includes the Advil, Motrin, Ibuprofen and Alleve category of medications  Simply follow the over the counter dosing on the package and use as indicated as another adjunct  Importantly since these medications are all very similar, use only one of them  Tylenol is a separate medication that can be utilized as well and can be taken at the same time as the other medication or given in a \"staggered\" manner    Just make sure that you " follow the dosing on the over the counter bottle instructions  Also make sure that the pain medication prescribed by Dr Cleopatra Austin team does not contain acetaminophen (this is found in Percocet and Vicodin)  Typically we do not prescribe those types of pain medications but if for some reason that has been prescribed DO NOT add more Tylenol (acetaminophen) as you could end up taking too much of that medication  As mentioned above, most patients find that lying down accentuates their discomfort  You might sleep better in a recliner, or propped up in bed  Dr Moses Fu encourages patients to safely ambulate around the house as much as possible in the first few days after the procedure as this can help with blood circulation in the legs  While the incidence of blood clots is very rare following shoulder surgery, early ambulation is a great way to help decrease the already low rate  24 hours after the surgery you may remove the bandage and cover incisions with Band-Aids if needed  At that time you may shower, the wounds will have a surgical glue that will protect them from shower water but do not submerge your incisions directly (bathing or swimming) until at least 2 weeks post-operatively  It is safe to let the arm hang at the side and take a shower and put on a shirt without the sling on  Just make sure that you do not use the operative are to reach out and grab anything as that may damage the repair  When you are done showering and getting dressed please return the operative arm to the sling  Unless noted otherwise in your discharge paperwork, Dr Moses Fu uses absorbable sutures so they do not need to be removed  Dr Reyes Aus physician assistant ([place]) will see you in the office a few days after the procedure to review the intra-operative findings and to initiate physical therapy if appropriate    A post-operative appointment should have been scheduled for you already, but if for some reason this did not happen, please call the office to make one  Physical therapy is important after nearly all shoulder surgeries and a detailed rehabilitation plan based on the specific intra-operative findings and procedures will be provided to your therapist at the first post-operative office visit  Most patients have post-operative therapy appointments scheduled pre-operatively, but if you do not, that will be handled at the first post-operative office visit  Unless expressly directed otherwise it is safe to remove the sling even the first day after the surgery and let the arm hang by the side  This allows patients to shower and even put a shirt on (bad arm in the sleeve first)  It is also safe to flex and extend their wrist, hand and fingers as much as possible when the block wears off  These simple motions can serve to pump fluid out of the forearm and decrease swelling in the arm

## 2023-05-01 NOTE — PROGRESS NOTES
"  Assessment  Diagnoses and all orders for this visit:    Tear of left supraspinatus tendon      Discussion and Plan:    The patient does indeed have a full-thickness complete supraspinatus tendon tear that does appear to be acute on imaging and is consistent with his acute injury from January of this year  Given the acute nature of this full-thickness complete supraspinatus tendon tear he is indicated for surgical repair and does wish to proceed forward with scheduling for left arthroscopic rotator cuff repair    A thorough discussion was performed with the patient regarding the risks and benefit of operative and nonoperative treatment of their rotator cuff tear  Risks discussed include but were not limited to infection, neurovascular injury, recurrent tear, nonhealing of the repair, need for further surgery, need for biceps tenodesis or tenotomy, stiffness, need for prolonged rehabilitation, as well as the risk of anesthesia  After this discussion all questions were answered and informed consent was obtained in the office for arthroscopic rotator cuff repair of the left shoulder  The patient will be scheduled for this procedure accordingly  Subjective:   Patient ID: Shankar Goncalves is a 75 y o  male      HPI  Patient presents today to discuss the findings of his left shoulder MRI  The pain began 3 month(s) ago and is associated with an acute injury  Patient reports a fall on 1/07/23 landing on the left shoulder while walking his dog        The following portions of the patient's history were reviewed and updated as appropriate: allergies, current medications, past family history, past medical history, past social history, past surgical history and problem list       Objective:  /78   Pulse 80   Ht 5' 10\" (1 778 m)   Wt 124 kg (273 lb)   BMI 39 17 kg/m²     Left Shoulder Exam      Tenderness   The patient is experiencing no tenderness       Range of Motion   External rotation: 30   Forward " flexion: 160   Internal rotation 0 degrees: Sacrum      Muscle Strength   Abduction: 3/5   External rotation: 4/5      Tests   Giles test: positive  Impingement: positive     Other   Erythema: absent  Sensation: normal  Pulse: present     Physical Exam  Vitals and nursing note reviewed  Constitutional:       Appearance: He is well-developed  HENT:      Head: Normocephalic and atraumatic  Eyes:      Pupils: Pupils are equal, round, and reactive to light  Cardiovascular:      Rate and Rhythm: Normal rate and regular rhythm  Pulses: Normal pulses  Heart sounds: Normal heart sounds  Pulmonary:      Effort: Pulmonary effort is normal  No respiratory distress  Breath sounds: Normal breath sounds  Abdominal:      General: Abdomen is flat  There is no distension  Palpations: Abdomen is soft  Musculoskeletal:      Cervical back: Normal range of motion and neck supple  Skin:     General: Skin is warm and dry  Neurological:      Mental Status: He is alert and oriented to person, place, and time  Psychiatric:         Behavior: Behavior normal            I have personally reviewed pertinent films in PACS and my interpretation is as follows    MRI left shoulder demonstrates full thickness supraspinatus tear, no atrophy or retraction, infraspinatus and supraspinatus tendinosis without tear    Scribe Attestation    I,:  Jaclyn Figueroa am acting as a scribe while in the presence of the attending physician :       I,:  Mukund Marie MD personally performed the services described in this documentation    as scribed in my presence :

## 2023-05-01 NOTE — H&P (VIEW-ONLY)
"  Assessment  Diagnoses and all orders for this visit:    Tear of left supraspinatus tendon      Discussion and Plan:    The patient does indeed have a full-thickness complete supraspinatus tendon tear that does appear to be acute on imaging and is consistent with his acute injury from January of this year  Given the acute nature of this full-thickness complete supraspinatus tendon tear he is indicated for surgical repair and does wish to proceed forward with scheduling for left arthroscopic rotator cuff repair    A thorough discussion was performed with the patient regarding the risks and benefit of operative and nonoperative treatment of their rotator cuff tear  Risks discussed include but were not limited to infection, neurovascular injury, recurrent tear, nonhealing of the repair, need for further surgery, need for biceps tenodesis or tenotomy, stiffness, need for prolonged rehabilitation, as well as the risk of anesthesia  After this discussion all questions were answered and informed consent was obtained in the office for arthroscopic rotator cuff repair of the left shoulder  The patient will be scheduled for this procedure accordingly  Subjective:   Patient ID: Joao Jacques is a 75 y o  male      HPI  Patient presents today to discuss the findings of his left shoulder MRI  The pain began 3 month(s) ago and is associated with an acute injury  Patient reports a fall on 1/07/23 landing on the left shoulder while walking his dog        The following portions of the patient's history were reviewed and updated as appropriate: allergies, current medications, past family history, past medical history, past social history, past surgical history and problem list       Objective:  /78   Pulse 80   Ht 5' 10\" (1 778 m)   Wt 124 kg (273 lb)   BMI 39 17 kg/m²     Left Shoulder Exam      Tenderness   The patient is experiencing no tenderness       Range of Motion   External rotation: 30   Forward " flexion: 160   Internal rotation 0 degrees: Sacrum      Muscle Strength   Abduction: 3/5   External rotation: 4/5      Tests   Giles test: positive  Impingement: positive     Other   Erythema: absent  Sensation: normal  Pulse: present     Physical Exam  Vitals and nursing note reviewed  Constitutional:       Appearance: He is well-developed  HENT:      Head: Normocephalic and atraumatic  Eyes:      Pupils: Pupils are equal, round, and reactive to light  Cardiovascular:      Rate and Rhythm: Normal rate and regular rhythm  Pulses: Normal pulses  Heart sounds: Normal heart sounds  Pulmonary:      Effort: Pulmonary effort is normal  No respiratory distress  Breath sounds: Normal breath sounds  Abdominal:      General: Abdomen is flat  There is no distension  Palpations: Abdomen is soft  Musculoskeletal:      Cervical back: Normal range of motion and neck supple  Skin:     General: Skin is warm and dry  Neurological:      Mental Status: He is alert and oriented to person, place, and time  Psychiatric:         Behavior: Behavior normal            I have personally reviewed pertinent films in PACS and my interpretation is as follows    MRI left shoulder demonstrates full thickness supraspinatus tear, no atrophy or retraction, infraspinatus and supraspinatus tendinosis without tear    Scribe Attestation    I,:  Maurizio Gomez am acting as a scribe while in the presence of the attending physician :       I,:  Sury Purdy MD personally performed the services described in this documentation    as scribed in my presence :

## 2023-05-02 ENCOUNTER — TELEPHONE (OUTPATIENT)
Dept: OBGYN CLINIC | Facility: HOSPITAL | Age: 68
End: 2023-05-02

## 2023-05-02 ENCOUNTER — ANESTHESIA EVENT (OUTPATIENT)
Dept: PERIOP | Facility: AMBULARY SURGERY CENTER | Age: 68
End: 2023-05-02

## 2023-05-02 RX ORDER — ALPRAZOLAM 0.25 MG/1
0.25 TABLET ORAL 4 TIMES DAILY PRN
Qty: 120 TABLET | Refills: 0 | Status: SHIPPED | OUTPATIENT
Start: 2023-05-02

## 2023-05-02 RX ORDER — KETOCONAZOLE 20 MG/G
1 CREAM TOPICAL 2 TIMES DAILY
Qty: 60 G | Refills: 0 | Status: SHIPPED | OUTPATIENT
Start: 2023-05-02

## 2023-05-02 NOTE — TELEPHONE ENCOUNTER
1  [id number] 04/27/2023 04/27/2023 Zolpidem Tartrate (Tablet)  30 0 30 10 MG NA [facility]  [place] / 0 [place]    1  [id number] 04/24/2023 04/14/2023 ACETAMINOPHEN 325 MG / oxyCODONE HYDROCHLORIDE 7 5 MG ORAL TABLET (Tablet)  120 0 30 7 5 MG/325 0 MG 45 0 [facility]  [place] / 0 [place]    1  [id number] 04/18/2023 04/14/2023 diazePAM (Tablet)  1 0 1 10 MG NA [facility]  Private [place] 0 / 0 [place]    1  [id number] 04/02/2023 03/28/2023 ACETAMINOPHEN 325 MG / oxyCODONE HYDROCHLORIDE 7 5 MG ORAL TABLET (Tablet)  60 0 15 7 5 MG/325 0 MG 45 0 [facility]    [place] / [place]    1  [id number] 04/01/2023 03/31/2023 ALPRAZolam (Tablet)  120 0 30 0 25 MG NA [facility]

## 2023-05-02 NOTE — TELEPHONE ENCOUNTER
Caller: Patient    Doctor: Dr Noman Barriga    Reason for call: Patient is scheduled for surgery on 5/10/23 and He was in to see Dr Noman Barriga on 5/1/23  Patient states that he was provided pre-op soap to wash  He got home and he realized that he left the soap at the [facility] office  Patient asking if he can go to the [facility] office and  the Pre-op soap or if his Daughter in [place] who is a nurse at [facility] can pick it up    Patient asking to speak to clinical team       Call back#: [phone]

## 2023-05-04 NOTE — PROGRESS NOTES
PT Evaluation     Today's date: 2023  Patient name: Del Mondragon  : 1955  MRN: [id number]  Referring provider: Daniel Roberson  Dx:   Encounter Diagnosis     ICD-10-CM    1  Tear of left supraspinatus tendon  M75 102 Ambulatory Referral to Physical Therapy          Start Time: 0900  Stop Time: 0940  Total time in clinic (min): 40 minutes    Assessment  Assessment details: Del Mondragon is a 75 y o  male who presents to PT for pre-op evaluation for left RTC scheduled tomorrow, 5/10/23, with Dr Jaycob Vela  Pt's shoulder ROM and strength was assessed today  I reviewed the protocol with patient and gave him a HEP he may begin after surgery consisting of cervical ROM and /wrist/hand ROM  We reviewed bathing/dressing recommendations  Agnieszka Mcelroy would benefit from skilled physical therapy 3-5 days after his surgery to improve function, reduce pain, increase ROM, increase strength, and return to premorbid function         Impairments: abnormal muscle firing, abnormal or restricted ROM, abnormal movement, activity intolerance, impaired physical strength, lacks appropriate home exercise program, pain with function and weight-bearing intolerance  Understanding of Dx/Px/POC: good   Prognosis: good    Goals  Short Term Goals: to be achieved by 4 weeks- AFTER SURGERY  1) Patient to be independent with basic HEP  2) Decrease pain to 2/10 at its worst   3) Increase shoulder PROM by 5-10 degrees     Long Term Goals: to be achieved by discharge-AFTER SURGERY  1) FOTO equal to or greater than expected   2) Patient to be independent with comprehensive HEP  3) Abolish pain for improved quality of life  4) Increase shoulder ROM to within functional limits to improve a/iadls  5) Increase shoulder strength to 5/5 MMT grade in all planes to improve a/iadls          Plan  Patient would benefit from: PT eval and skilled physical therapy  Planned modality interventions: cryotherapy, TENS and thermotherapy: hydrocollator packs  Planned therapy interventions: joint mobilization, manual therapy, neuromuscular re-education, patient education, therapeutic activities, therapeutic exercise, transfer training, home exercise program, self care and balance  Frequency: 2x per week for 4-6 weeks-AFTER SURGERY  Treatment plan discussed with: patient        Subjective Evaluation    History of Present Illness  Mechanism of injury: surgery  Mechanism of injury: History of Current Injury: Pt fell in January on his left shoulder while walking with his left dog  Pt had MRI of L shoulder which identified full thickness RTC tear of supraspinatus  Pt is scheduled with Dr Myah Pittman on 5/10/23 for left rotator cuff repair  He is here for pre-op examination  Pt denies any numbness or tingling but does have some radiation of pain  Pt lives at home with his wife  He will have the assistance of his wife during the recovery process  Pain location/Descriptors: Globally around left shoulder  Aggravating factors: Sleeping, lifting, reaching  Easing factors: Oxycodone for pain  Imaging: MRI of the L shoulder: Full thickness supraspinatus tear  Patient goals: Improve function in left shoulder  Hobbies/Interest: Playing with [employer], [employer] hunting  Pt has 3 grandchildren and 1 on the way     Occupation: Retired  Pain  Current pain ratin  At worst pain ratin    Hand dominance: right      Diagnostic Tests  MRI studies: abnormal  Treatments  No previous or current treatments  Patient Goals  Patient goals for therapy: increased strength, increased motion, independence with ADLs/IADLs and decreased pain          Objective     Active Range of Motion   Left Shoulder   Flexion: 110 degrees   Extension: 60 degrees   Abduction: 105 degrees   External rotation 0°: 65 degrees   Internal rotation BTB: sacrum (Left buttock )     Passive Range of Motion   Left Shoulder   Flexion: 160 degrees   Abduction: 150 degrees   External rotation 45°: 65 degrees   Internal rotation 45°: 65 degrees     Strength/Myotome Testing     Left Shoulder     Planes of Motion   Flexion: 4   Abduction: 4-   External rotation at 0°: 4   Internal rotation at 0°: 4+     Isolated Muscles   Biceps: 4+   Triceps: 4+              Precautions: Surgery scheduled with Dr Ramos Console on 5/10/23 (left  RTC repair)       Manuals             PROM of the left shoulder c/ protocol limitations                                                    Neuro Re-Ed             Scap retraction             Cervical retraction                                                                              Ther Ex             Pendulums             Elbow AAROM             Wrist AROM             Gripping             UT stretch             TS extension over foam roller- sitting                                       Ther Activity                                       Gait Training                                       Modalities                                         [email or web address]  Access Code: [email or web address]

## 2023-05-09 ENCOUNTER — EVALUATION (OUTPATIENT)
Dept: PHYSICAL THERAPY | Facility: CLINIC | Age: 68
End: 2023-05-09

## 2023-05-09 DIAGNOSIS — M75.102 TEAR OF LEFT SUPRASPINATUS TENDON: Primary | ICD-10-CM

## 2023-05-09 NOTE — PRE-PROCEDURE INSTRUCTIONS
Pre-Surgery Instructions:   Medication Instructions   • ALPRAZolam (XANAX) 0 25 mg tablet Take Day of Surgery; Continue to take as prescribed including DOS with a small sip of water, unless usually taken at night   • amoxicillin (AMOXIL) 500 mg capsule conitnue as prescribed   • atorvastatin (LIPITOR) 40 mg tablet Take Day of Surgery; Continue to take as prescribed including DOS with a small sip of water, unless usually taken at night   • azelastine (ASTELIN) 0 1 % nasal spray continue as prescriebd   • cetirizine (ZyrTEC) 10 mg tablet Take Day of Surgery; Continue to take as prescribed including DOS with a small sip of water, unless usually taken at night   • Cholecalciferol 2000 units CAPS Avoid 1 week prior to surgery    • Cyanocobalamin (B-12) 1000 MCG CAPS Avoid 1 week prior to surgery    • diltiazem (DILACOR XR) 240 MG 24 hr capsule Take Day of Surgery; Continue to take as prescribed including DOS with a small sip of water, unless usually taken at night   • fluticasone (FLONASE) 50 mcg/act nasal spray continue as prescriebd   • fluticasone-salmeterol (Advair HFA) 230-21 MCG/ACT inhaler continue as prescriebd   • hydrochlorothiazide (HYDRODIURIL) 25 mg tablet Do not take day of surgery; continue as prescribed excluding DOS   • levalbuterol (XOPENEX HFA) 45 mcg/act inhaler continue as prescriebd   • levETIRAcetam (KEPPRA) 500 mg tablet Take Day of Surgery; Continue to take as prescribed including DOS with a small sip of water, unless usually taken at night   • losartan (COZAAR) 25 mg tablet Do not take day of surgery; continue as prescribed excluding DOS   • metFORMIN (GLUCOPHAGE) 1000 MG tablet Do not take day of surgery; continue as prescribed excluding DOS   • montelukast (SINGULAIR) 10 mg tablet Take Day of Surgery; Continue to take as prescribed including DOS with a small sip of water, unless usually taken at night   • oxybutynin (DITROPAN) 5 mg tablet Do not take day of surgery; continue as prescribed excluding DOS   • oxyCODONE-acetaminophen (Percocet) 7 5-325 MG per tablet Take Day of Surgery; Continue to take as prescribed including DOS with a small sip of water, unless usually taken at night   • Pseudoeph-Doxylamine-DM-APAP (NYQUIL PO) Do not take day of surgery; continue as prescribed excluding DOS   • pseudoephedrine (SUDAFED) 30 mg tablet Do not take day of surgery; continue as prescribed excluding DOS   • tamsulosin (FLOMAX) 0 4 mg Do not take day of surgery; continue as prescribed excluding DOS   • zolpidem (AMBIEN) 10 mg tablet Do not take day of surgery; continue as prescribed excluding DOS    Medication instructions for day surgery reviewed  Please use only a sip of water to take your instructed medications  Avoid all over the counter vitamins, supplements and NSAIDS for one week prior to surgery per anesthesia guidelines  Tylenol is ok to take as needed  You will receive a call one business day prior to surgery with an arrival time and hospital directions  If your surgery is scheduled on a Monday, the hospital will be calling you on the Friday prior to your surgery  If you have not heard from anyone by 8pm, please call the hospital supervisor through the hospital  at [phone]  Nicole [facility] [phone])  Do not eat or drink anything after midnight the night before your surgery, including candy, mints, lifesavers, or chewing gum  Do not drink alcohol 24hrs before your surgery  Try not to smoke at least 24hrs before your surgery  Follow the pre surgery showering instructions as listed in the [facility] Surgical Experience Booklet” or otherwise provided by your surgeon's office  Do not shave the surgical area 24 hours before surgery  Do not apply any lotions, creams, including makeup, cologne, deodorant, or perfumes after showering on the day of your surgery  No contact lenses, eye make-up, or artificial eyelashes   Remove nail polish, including gel polish, and any artificial, gel, or acrylic nails if possible  Remove all jewelry including rings and body piercing jewelry  Wear causal clothing that is easy to take on and off  Consider your type of surgery  Keep any valuables, jewelry, piercings at home  Please bring any specially ordered equipment (sling, braces) if indicated  Arrange for a responsible person to drive you to and from the hospital on the day of your surgery  Visitor Guidelines discussed  Call the surgeon's office with any new illnesses, exposures, or additional questions prior to surgery  Please reference your [facility] Surgical Experience Booklet” for additional information to prepare for your upcoming surgery

## 2023-05-09 NOTE — ANESTHESIA PREPROCEDURE EVALUATION
Procedure:  SHOULDER ARTHROSCOPIC ROTATOR CUFF REPAIR (Left: Shoulder)    Relevant Problems   CARDIO   (+) Essential hypertension   (+) Hyperlipidemia      ENDO   (+) Type 2 diabetes mellitus without complication, without long-term current use of insulin (HCC)      GI/HEPATIC   (+) Gastroesophageal reflux disease with esophagitis without hemorrhage      /RENAL   (+) BPH (benign prostatic hyperplasia)      HEMATOLOGY   (+) Absolute anemia   (+) Anemia      MUSCULOSKELETAL   (+) Disc degeneration, lumbar   (+) Impingement syndrome of right shoulder   (+) Primary osteoarthritis of right shoulder      NEURO/PSYCH   (+) Anxiety   (+) Continuous opioid dependence (HCC)   (+) Temporal lobe epilepsy (HCC)      PULMONARY   (+) Moderate persistent asthma without complication        Physical Exam    Airway    Mallampati score: II  TM Distance: >3 FB  Neck ROM: full     Dental       Cardiovascular      Pulmonary      Other Findings        Anesthesia Plan  ASA Score- 2     Anesthesia Type- general with ASA Monitors  Additional Monitors:   Airway Plan:     Comment: IS Block for post op pain per surgeon request          Plan Factors-    Chart reviewed  Induction- intravenous  Postoperative Plan-     Informed Consent- Anesthetic plan and risks discussed with patient  I personally reviewed this patient with the CRNA  Discussed and agreed on the Anesthesia Plan with the CRNA  Radha Washburn

## 2023-05-10 ENCOUNTER — ANESTHESIA (OUTPATIENT)
Dept: PERIOP | Facility: AMBULARY SURGERY CENTER | Age: 68
End: 2023-05-10

## 2023-05-10 ENCOUNTER — HOSPITAL ENCOUNTER (OUTPATIENT)
Facility: AMBULARY SURGERY CENTER | Age: 68
Setting detail: OUTPATIENT SURGERY
Discharge: HOME | End: 2023-05-10
Attending: ORTHOPAEDIC SURGERY | Admitting: ORTHOPAEDIC SURGERY

## 2023-05-10 VITALS
BODY MASS INDEX: 40.09 KG/M2 | TEMPERATURE: 97.5 F | HEART RATE: 85 BPM | RESPIRATION RATE: 16 BRPM | SYSTOLIC BLOOD PRESSURE: 132 MMHG | HEIGHT: 70 IN | DIASTOLIC BLOOD PRESSURE: 68 MMHG | WEIGHT: 280 LBS | OXYGEN SATURATION: 94 %

## 2023-05-10 DIAGNOSIS — M75.102 TEAR OF LEFT SUPRASPINATUS TENDON: ICD-10-CM

## 2023-05-10 LAB
GLUCOSE SERPL-MCNC: 138 MG/DL (ref 65–140)
GLUCOSE SERPL-MCNC: 163 MG/DL (ref 65–140)

## 2023-05-10 DEVICE — SELF-PUNCHING TRIPLE LOADED FIBERTAK
Type: IMPLANTABLE DEVICE | Site: SHOULDER | Status: FUNCTIONAL
Brand: ARTHREX®

## 2023-05-10 RX ORDER — FENTANYL CITRATE 50 UG/ML
INJECTION, SOLUTION INTRAMUSCULAR; INTRAVENOUS AS NEEDED
Status: DISCONTINUED | OUTPATIENT
Start: 2023-05-10 | End: 2023-05-10

## 2023-05-10 RX ORDER — BUPIVACAINE HYDROCHLORIDE 5 MG/ML
INJECTION, SOLUTION EPIDURAL; INTRACAUDAL AS NEEDED
Status: DISCONTINUED | OUTPATIENT
Start: 2023-05-10 | End: 2023-05-10

## 2023-05-10 RX ORDER — ACETAMINOPHEN 325 MG/1
650 TABLET ORAL EVERY 6 HOURS PRN
Status: CANCELLED | OUTPATIENT
Start: 2023-05-10

## 2023-05-10 RX ORDER — PROPOFOL 10 MG/ML
INJECTION, EMULSION INTRAVENOUS AS NEEDED
Status: DISCONTINUED | OUTPATIENT
Start: 2023-05-10 | End: 2023-05-10

## 2023-05-10 RX ORDER — SODIUM CHLORIDE, SODIUM LACTATE, POTASSIUM CHLORIDE, CALCIUM CHLORIDE 600; 310; 30; 20 MG/100ML; MG/100ML; MG/100ML; MG/100ML
50 INJECTION, SOLUTION INTRAVENOUS CONTINUOUS
Status: DISCONTINUED | OUTPATIENT
Start: 2023-05-10 | End: 2023-05-10 | Stop reason: HOSPADM

## 2023-05-10 RX ORDER — CEFAZOLIN SODIUM 1 G/50ML
1000 SOLUTION INTRAVENOUS ONCE
Status: COMPLETED | OUTPATIENT
Start: 2023-05-10 | End: 2023-05-10

## 2023-05-10 RX ORDER — DEXAMETHASONE SODIUM PHOSPHATE 10 MG/ML
INJECTION, SOLUTION INTRAMUSCULAR; INTRAVENOUS AS NEEDED
Status: DISCONTINUED | OUTPATIENT
Start: 2023-05-10 | End: 2023-05-10

## 2023-05-10 RX ORDER — MIDAZOLAM HYDROCHLORIDE 2 MG/2ML
INJECTION, SOLUTION INTRAMUSCULAR; INTRAVENOUS AS NEEDED
Status: DISCONTINUED | OUTPATIENT
Start: 2023-05-10 | End: 2023-05-10

## 2023-05-10 RX ORDER — FENTANYL CITRATE/PF 50 MCG/ML
50 SYRINGE (ML) INJECTION
Status: DISCONTINUED | OUTPATIENT
Start: 2023-05-10 | End: 2023-05-10 | Stop reason: HOSPADM

## 2023-05-10 RX ORDER — ONDANSETRON 2 MG/ML
4 INJECTION INTRAMUSCULAR; INTRAVENOUS EVERY 6 HOURS PRN
Status: CANCELLED | OUTPATIENT
Start: 2023-05-10

## 2023-05-10 RX ORDER — OXYCODONE HYDROCHLORIDE 5 MG/1
5 TABLET ORAL EVERY 4 HOURS PRN
Status: DISCONTINUED | OUTPATIENT
Start: 2023-05-10 | End: 2023-05-10 | Stop reason: HOSPADM

## 2023-05-10 RX ORDER — CEFAZOLIN SODIUM 2 G/50ML
2000 SOLUTION INTRAVENOUS ONCE
Status: COMPLETED | OUTPATIENT
Start: 2023-05-10 | End: 2023-05-10

## 2023-05-10 RX ORDER — ONDANSETRON 2 MG/ML
INJECTION INTRAMUSCULAR; INTRAVENOUS AS NEEDED
Status: DISCONTINUED | OUTPATIENT
Start: 2023-05-10 | End: 2023-05-10

## 2023-05-10 RX ORDER — METOCLOPRAMIDE HYDROCHLORIDE 5 MG/ML
10 INJECTION INTRAMUSCULAR; INTRAVENOUS ONCE AS NEEDED
Status: DISCONTINUED | OUTPATIENT
Start: 2023-05-10 | End: 2023-05-10 | Stop reason: HOSPADM

## 2023-05-10 RX ORDER — ONDANSETRON 2 MG/ML
4 INJECTION INTRAMUSCULAR; INTRAVENOUS ONCE AS NEEDED
Status: DISCONTINUED | OUTPATIENT
Start: 2023-05-10 | End: 2023-05-10 | Stop reason: HOSPADM

## 2023-05-10 RX ORDER — LIDOCAINE HCL/PF 100 MG/5ML
SYRINGE (ML) INJECTION AS NEEDED
Status: DISCONTINUED | OUTPATIENT
Start: 2023-05-10 | End: 2023-05-10

## 2023-05-10 RX ADMIN — FENTANYL CITRATE 50 MCG: 50 INJECTION, SOLUTION INTRAMUSCULAR; INTRAVENOUS at 08:31

## 2023-05-10 RX ADMIN — LIDOCAINE HYDROCHLORIDE 40 MG: 20 INJECTION, SOLUTION INTRAVENOUS at 08:31

## 2023-05-10 RX ADMIN — MIDAZOLAM HYDROCHLORIDE 2 MG: 1 INJECTION, SOLUTION INTRAMUSCULAR; INTRAVENOUS at 08:03

## 2023-05-10 RX ADMIN — BUPIVACAINE HYDROCHLORIDE 7.5 ML: 5 INJECTION, SOLUTION EPIDURAL; INTRACAUDAL; PERINEURAL at 08:05

## 2023-05-10 RX ADMIN — CEFAZOLIN SODIUM 2000 MG: 2 SOLUTION INTRAVENOUS at 08:29

## 2023-05-10 RX ADMIN — CEFAZOLIN SODIUM 1000 MG: 1 SOLUTION INTRAVENOUS at 08:29

## 2023-05-10 RX ADMIN — DEXAMETHASONE SODIUM PHOSPHATE 10 MG: 10 INJECTION, SOLUTION INTRAMUSCULAR; INTRAVENOUS at 08:35

## 2023-05-10 RX ADMIN — PROPOFOL 160 MG: 10 INJECTION, EMULSION INTRAVENOUS at 08:31

## 2023-05-10 RX ADMIN — BUPIVACAINE 20 ML: 13.3 INJECTION, SUSPENSION, LIPOSOMAL INFILTRATION at 08:05

## 2023-05-10 RX ADMIN — ONDANSETRON 4 MG: 2 INJECTION INTRAMUSCULAR; INTRAVENOUS at 08:35

## 2023-05-10 RX ADMIN — SODIUM CHLORIDE, SODIUM LACTATE, POTASSIUM CHLORIDE, AND CALCIUM CHLORIDE: .6; .31; .03; .02 INJECTION, SOLUTION INTRAVENOUS at 08:29

## 2023-05-10 NOTE — ANESTHESIA POSTPROCEDURE EVALUATION
Post-Op Assessment Note    CV Status:  Stable    Pain management: adequate     Mental Status:  Alert and awake   Hydration Status:  Euvolemic   PONV Controlled:  Controlled   Airway Patency:  Patent      Post Op Vitals Reviewed: Yes      Staff: Anesthesiologist, CRNA         No notable events documented      BP   121/78   Temp 98   Pulse 90   Resp 17   SpO2 97

## 2023-05-10 NOTE — OP NOTE
OPERATIVE REPORT  PATIENT NAME: Maricarmen Baez    :  1955  MRN: [id number]  Pt Location: [facility] OR [facility] 06    SURGERY DATE: 5/10/2023     SURGEON: Aris Baig MD     ASSISTANT: Ariel Hart PA-C     NOTE: Ariel Hart PA-C was present throughout the entire procedure and performed essential assistance with patient prepping, draping, positioning, suture management, wound closure, sterile dressing application and sling application, all under my direct supervision  NOTE: No qualified resident physician was available for assistance    PREOPERATIVE DIAGNOSIS:  Left Shoulder Supraspinatus Tear    POSTOPERATIVE DIAGNOSIS: Left Shoulder Supraspinatus Tear and Long Head Biceps Tendon Subluxation    PROCEDURES: Surgical Arthroscopy Left Shoulder with Rotator Cuff Repair, Long Head Biceps Tenodesis and Subacromial Decompression    ANESTHESIA STAFF: Alexa Guo MD     ANESTHESIA TYPE: General LMA with ultrasound guided interscalene block (Exparel)  The interscalene block was provided by the anesthesia staff per my request for postoperative pain control and to decrease the use of postoperative narcotic medication for pain control  COMPLICATIONS: None    FINDINGS: Supraspinatus Tear and Long Head Biceps Tendon Subluxation    SPECIMEN(S):  None    ESTIMATED BLOOD LOSS: Minimal    INDICATION:  Briefly, the patient is a 75 y o   male with left shoulder pain  MRI scan confirmed a full thickness supraspinatus tear  The patient elected for arthroscopic treatment  Informed consent was obtained after a thorough discussion of the risks and benefits of the procedure, as well as alternatives to the procedure  OPERATIVE TECHNIQUE:  On the day of surgery, I identified the patient’s left shoulder and marked it with my initials  The patient was taken to the operating room where anesthesia was induced and 3 grams of IV Cefazolin were given   The patient was examined in the supine position and was found to have full range of motion of the left shoulder with no instability   The patient was then positioned in the [address] [place] [address] position  All bony prominences were padded  The shoulder was prepped and draped in normal sterile fashion  After a time-out for safety, a standard posterolateral arthroscopic portal was made  Glenohumeral evaluation revealed intact glenohumeral articular cartilage with no loose bodies  There was a full thickness supraspinatus tear which had 2 components, the anterior aspect was detached from the tuberosity and the posterior aspect had a longitudinal split tear  The anterior tear did disrupt the bicipital pulley rendering the long head biceps unstable  The superior border subscapularis had fraying but was not detached from the tuberosity and the infraspinatus was intact  After the intra-articular evaluation was completed, the scope was then placed in the subacromial space through the same portal where a thorough bursectomy was performed  The full thickness supraspinatus tear was found to measure 1 0 cm from anterior to posterior for the anterior detachment and the longitudinal split tear was another 1 cm behind this  The tendon itself was severely degenerative but was able to except sutures  The tuberosity was prepared in routine fashion and a repair of the anterior supraspinatus tear with one 2 6 mm double loaded Arthrex All-Suture anchor using four stiches through the tendon in horizontal mattress grasping fashion was performed achieving anatomic reduction of the anterior rotator cuff tendon to the tuberosity  The posterior longitudinal split tear was repaired using a single horizontal mattress with a free suture spanning the tear and tied down    The long head of biceps tendon was indicated for tenodesis and it was incorporated into the rotator cuff repair by using the anterior limb of the most anterior anchor through and around the long head of biceps in a loop whipstitch fashion; the long head of biceps was then released  When the medial row of the rotator cuff repair was tied down, this incorporated the long head biceps tenodesis into our repair  The CA ligament was frayed so it was released off the anterolateral edge of acromion and a gentle acromioplasty was performed  The area was then irrigated  Scope was withdrawn  Wounds were closed with 4-0 Monocryl and Histoacryl  Sterile dressings and a sling with an abduction pillow was placed  The patient was awoken without complication and returned to the recovery room in good condition  We will see the patient back in the office next week to initiate therapy following standard rotator cuff repair rehabilitation protocol  At the end of procedure, the counts were correct       PATIENT DISPOSITION:  Stable to PACU      SIGNATURE: Rosalia Langston MD  DATE: May 10, 2023  TIME: 9:35 AM

## 2023-05-10 NOTE — DISCHARGE INSTR - AVS FIRST PAGE
You are being scheduled for a shoulder arthroscopy to treat your symptoms  Below are some instructions and information on what to expect before and after your surgery  Pre-Surgical Preparation for Arthroscopic Shoulder Surgery: You will be contacted the evening prior to your surgery to confirm the scheduled time of the procedure and when to arrive at the hospital    Do not eat or drink anything after midnight the night before your surgery  Since you are having out-patient surgery, make sure that you have someone who can drive you home later in the day  Also, prepare that person for a long day, as the process of safely preparing for and recovering from the procedure is more time consuming than the actual procedure! As you will be in a sling after surgery, please wear or bring a loose fitting button-down shirt so that you can easily place this over the sling when you leave the surgical suite  This avoids having to place the operative arm in a sleeve  Most patients find that this is the easiest outfit to wear for the first week or so after surgery so you may want to plan accordingly  Most patients find that lying down in bed after shoulder surgery accentuates their discomfort  This is likely related to the effect of gravity on the swelling in the shoulder  As a result, most patients sleep better in a recliner or in bed with pillows propped up behind their back for the first few days or weeks after surgery  It is a good idea to plan for this ahead of time so there will be less hassle getting things set up the night after surgery  What to Expect After Arthroscopic Shoulder Surgery: It is normal to have swelling and discomfort in the shoulder for several days or a week after surgery  It is also normal to have a small amount of drainage from the surgical wounds (especially the first few days after surgery), as we put fluid into the shoulder to visualize the structures during surgery  "It is NOT normal to have foul smelling, purulent drainage and if this is noted, please contact the office immediately or proceed to the emergency room for evaluation as this may indicate an infection  Applying ice bags to the shoulder may help with pain that is not controlled by the regional block  Ice should be applied 20-30 minutes at a time, every hour or two  Make sure to put a thin towel or T-shirt next to your skin to avoid direct contact of the ice with the skin  Icing is most helpful in the first 48 hours, although many people find that continuing past this time frame lessens their postoperative pain  Please note that your post-operative dressing is not conductive to ice, so if you need to, it is okay to remove that dressing even the night after surgery and place band-aids over the wounds in order for the ice to take effect  Pain Control    Most patients will receive a nerve block, the local anesthetic may keep your whole arm numb for up to 4 days  You will be given a prescription for narcotic pain medication when you are discharged from the hospital   With the newer nerve block that is being utilized, patients are rarely requiring the use of this narcotic pain medication  If you find you do not tolerate that type of pain medicine well, call our office and we will try another one  In addition to the narcotic pain medication, it is safe to use an anti-inflammatory (unless the patient has a medical condition that would not allow safe use of this mediation)  This includes the Advil, Motrin, Ibuprofen and Alleve category of medications  Simply follow the over the counter dosing on the package and use as indicated as another adjunct  Importantly since these medications are all very similar, use only one of them  Tylenol is a separate medication that can be utilized as well and can be taken at the same time as the other medication or given in a \"staggered\" manner    Just make sure that you " follow the dosing on the over the counter bottle instructions  Also make sure that the pain medication prescribed by Dr Sharon Tubbs team does not contain acetaminophen (this is found in Percocet and Vicodin)  Typically we do not prescribe those types of pain medications but if for some reason that has been prescribed DO NOT add more Tylenol (acetaminophen) as you could end up taking too much of that medication  As mentioned above, most patients find that lying down accentuates their discomfort  You might sleep better in a recliner, or propped up in bed  Dr Jacob Lott encourages patients to safely ambulate around the house as much as possible in the first few days after the procedure as this can help with blood circulation in the legs  While the incidence of blood clots is very rare following shoulder surgery, early ambulation is a great way to help decrease the already low rate  24 hours after the surgery you may remove the bandage and cover incisions with Band-Aids if needed  At that time you may shower, the wounds will have a surgical glue that will protect them from shower water but do not submerge your incisions directly (bathing or swimming) until at least 2 weeks post-operatively  It is safe to let the arm hang at the side and take a shower and put on a shirt without the sling on  Just make sure that you do not use the operative are to reach out and grab anything as that may damage the repair  When you are done showering and getting dressed please return the operative arm to the sling  Unless noted otherwise in your discharge paperwork, Dr Jacob Lott uses absorbable sutures so they do not need to be removed  Dr Burgess Alvares physician assistant (PA) will see you in the office a few days after the procedure to review the intra-operative findings and to initiate physical therapy if appropriate    A post-operative appointment should have been scheduled for you already, but if for some reason this did not happen, please call the office to make one  Physical therapy is important after nearly all shoulder surgeries and a detailed rehabilitation plan based on the specific intra-operative findings and procedures will be provided to your therapist at the first post-operative office visit  Most patients have post-operative therapy appointments scheduled pre-operatively, but if you do not, that will be handled at the first post-operative office visit  Unless expressly directed otherwise it is safe to remove the sling even the first day after the surgery and let the arm hang by the side  This allows patients to shower and even put a shirt on (bad arm in the sleeve first)  It is also safe to flex and extend their wrist, hand and fingers as much as possible when the block wears off  These simple motions can serve to pump fluid out of the forearm and decrease swelling in the arm

## 2023-05-10 NOTE — INTERVAL H&P NOTE
H&P reviewed  After examining the patient I find no changes in the patients condition since the H&P had been written      Vitals:    05/10/23 0754   BP: 148/68   Pulse: 91   Resp: 18   Temp: (!) 97 °F (36 1 °C)   SpO2: 97%

## 2023-05-10 NOTE — ANESTHESIA PROCEDURE NOTES
Peripheral Block    Patient location during procedure: holding area  Start time: 5/10/2023 8:05 AM  Reason for block: at surgeon's request and post-op pain management  Staffing  Performed: Anesthesiologist   Anesthesiologist: Brooks Anderson MD  Preanesthetic Checklist  Completed: patient identified, IV checked, site marked, risks and benefits discussed, surgical consent, monitors and equipment checked, pre-op evaluation and timeout performed  Peripheral Block  Patient position: supine  Prep: ChloraPrep  Patient monitoring: continuous pulse ox and frequent blood pressure checks  Block type: interscalene  Laterality: left  Injection technique: single-shot  Procedures: ultrasound guided, Ultrasound guidance required for the procedure to increase accuracy and safety of medication placement and decrease risk of complications    Ultrasound permanent image saved  Needle  Needle type: Stimuplex   Needle gauge: 22 G  Needle length: 5 cm  Needle localization: ultrasound guidance  Test dose: negative  Assessment  Injection assessment: incremental injection, local visualized surrounding nerve on ultrasound, no paresthesia on injection and negative aspiration for heme  Paresthesia pain: none  Heart rate change: no  Slow fractionated injection: yes  Post-procedure:  site cleaned  patient tolerated the procedure well with no immediate complications

## 2023-05-13 NOTE — PROGRESS NOTES
PT-1st post op appointment    Today's date: 5/15/2023  Patient name: Nitish Patel  : 1955  MRN: [id number]  Referring provider: Goran Shield  Dx:   Encounter Diagnosis     ICD-10-CM    1  Tear of left supraspinatus tendon  M75 102       2  Orthopedic aftercare  Z47 89                      Assessment  Assessment details: Nitish Patel is a 75 y o  male who presents 5 days s/p L RTC repairs, biceps tenodesis, and subacromial decompression with Dr Lidia Perkins on 5/10/23  Pt had 1st follow up with Dr Lidia Perkins today  Pt is correctly fitted in sling without abduction pillow  ROM is reduced as expected at left shoulder and elbow  Moderate amount of edema present in LUE  Incisions are intact without observable sign of infection  Pain is well controlled with meds  Pt has been performing his HEP as instructed during pre-op examination  Pt is neurovascularly intact with good distal pulses, sensation, and capillary refill tests  Patient would benefit from skilled physical therapy to address the impairments, improve their level of function, and to improve their overall quality of life  Impairments: abnormal muscle firing, abnormal or restricted ROM, abnormal movement, activity intolerance, impaired physical strength, lacks appropriate home exercise program, pain with function and weight-bearing intolerance  Understanding of Dx/Px/POC: good   Prognosis: good    Goals  Short Term Goals: to be achieved by 4 weeks- AFTER SURGERY  1) Patient to be independent with basic HEP  2) Decrease pain to 2/10 at its worst   3) Increase shoulder PROM by 5-10 degrees     Long Term Goals: to be achieved by discharge-AFTER SURGERY  1) FOTO equal to or greater than expected   2) Patient to be independent with comprehensive HEP  3) Abolish pain for improved quality of life  4) Increase shoulder ROM to within functional limits to improve a/iadls    5) Increase shoulder strength to 5/5 MMT grade in all planes to improve a/iadls  Plan  Patient would benefit from: PT eval and skilled physical therapy  Planned modality interventions: cryotherapy, TENS and thermotherapy: hydrocollator packs  Planned therapy interventions: joint mobilization, manual therapy, neuromuscular re-education, patient education, therapeutic activities, therapeutic exercise, transfer training, home exercise program, self care and balance  Frequency: 2x per week for 4-6 weeks-AFTER SURGERY  Treatment plan discussed with: patient        Subjective Evaluation    History of Present Illness  Pt arrives to PT 5 days post op L RTC repair and biceps tenodesis  Pt arrives in sling without abduction pillow  Pain is well controlled  Pt is taking oxycodone for pain  Pt is sleeping well  Pt denies any numbness/tingling  Pt is independent with bathing and dressing  He has his wife for assistance    _____________________________________  Mechanism of injury: surgery  Mechanism of injury: History of Current Injury: Pt fell in January on his left shoulder while walking with his left dog  Pt had MRI of L shoulder which identified full thickness RTC tear of supraspinatus  Pt is scheduled with Dr Debora Muñoz on 5/10/23 for left rotator cuff repair  He is here for pre-op examination  Pt denies any numbness or tingling but does have some radiation of pain  Pt lives at home with his wife  He will have the assistance of his wife during the recovery process  Pain location/Descriptors: Globally around left shoulder  Aggravating factors: Sleeping, lifting, reaching  Easing factors: Oxycodone for pain  Imaging: MRI of the L shoulder: Full thickness supraspinatus tear  Patient goals: Improve function in left shoulder  Hobbies/Interest: Playing with [employer], archery hunting  Pt has 3 grandchildren and 1 on the way     Occupation: Retired  Pain  Current pain ratin  At worst pain ratin    Hand dominance: right      Diagnostic Tests  MRI studies: "abnormal  Treatments  No previous or current treatments  Patient Goals  Patient goals for therapy: increased strength, increased motion, independence with ADLs/IADLs and decreased pain          Objective     Incision: Intact with steri-strips     Active Range of Motion   Left Shoulder   Flexion: NT   Extension: NT  Abduction:NT  External rotation 0°:NT  Internal rotation BTB: NT    Passive Range of Motion   Left Shoulder   Flexion: 90 degrees   Abduction: 60 degrees   External rotation 0°: 35 degrees   Internal rotation 0°: to body     Strength/Myotome Testing     Left Shoulder     Planes of Motion   Flexion: NT   Abduction: NT   External rotation at 0°: NT   Internal rotation at 0°: NT    Isolated Muscles   Biceps: NT   Triceps: NT     Elbow PROM:   Full extension, full flexion      Sensation intact  Distal pulses intact  Moderate swelling in biceps      strength:   Left: 45 #  Right: 70 #      Precautions: Surgery scheduled with Dr Tuan Ward on 5/10/23 (left  RTC repair)       Manuals 5/15            PROM of the left shoulder c/ protocol limitations 10'                                                   Neuro Re-Ed             Scap retraction 20x 5\"            Cervical retraction 20x                                                                             Ther Ex             Pendulums 20x A/P + ML            Elbow AAROM 2x10            Wrist AROM 30x flexion/extension            Gripping             UT stretch             TS extension over foam roller- sitting 20x                                      Ther Activity                                       Gait Training                                       Modalities                                       1:1 with PT from 450-530PM     "

## 2023-05-15 ENCOUNTER — OFFICE VISIT (OUTPATIENT)
Dept: OBGYN CLINIC | Facility: OTHER | Age: 68
End: 2023-05-15

## 2023-05-15 ENCOUNTER — OFFICE VISIT (OUTPATIENT)
Dept: PHYSICAL THERAPY | Facility: CLINIC | Age: 68
End: 2023-05-15

## 2023-05-15 VITALS
HEIGHT: 70 IN | BODY MASS INDEX: 40.37 KG/M2 | WEIGHT: 282 LBS | DIASTOLIC BLOOD PRESSURE: 81 MMHG | HEART RATE: 77 BPM | SYSTOLIC BLOOD PRESSURE: 123 MMHG

## 2023-05-15 DIAGNOSIS — Z47.89 AFTERCARE FOLLOWING SURGERY OF THE MUSCULOSKELETAL SYSTEM: Primary | ICD-10-CM

## 2023-05-15 DIAGNOSIS — M75.102 TEAR OF LEFT SUPRASPINATUS TENDON: Primary | ICD-10-CM

## 2023-05-15 DIAGNOSIS — Z47.89 ORTHOPEDIC AFTERCARE: ICD-10-CM

## 2023-05-15 NOTE — PROGRESS NOTES
Assessment:       1  Aftercare following surgery of the musculoskeletal system          Plan:        Patient is doing well postoperatively  Operative note, images, and standard rotator cuff repair rehab protocol were discussed  All questions were addressed to the patient's satisfaction  Patient has an appointment with PT  Follow-up will be in 2 months to assess patient's progress  Subjective:     Patient ID: Christopher Navarrete is a 75 y o  male  Chief Complaint:    HPI    Patient presents to the office for follow-up status post left shoulder arthroscopy with rotator cuff repair on 5/10/2023  He reports his pain is controlled and that he has no residual paresthesia following anesthetic block  Social History     Occupational History     Comment: employed   Tobacco Use   • Smoking status: Never   • Smokeless tobacco: Never   Vaping Use   • Vaping Use: Never used   Substance and Sexual Activity   • Alcohol use: Yes     Alcohol/week: 1 0 standard drink     Types: 1 Standard drinks or equivalent per week     Comment: maybe one beer  per month   • Drug use: No   • Sexual activity: Not Currently     Partners: Female      Review of Systems   Constitutional: Negative  Respiratory: Negative  Cardiovascular: Negative  Gastrointestinal: Negative  Musculoskeletal: Positive for myalgias and neck pain  Negative for arthralgias  Skin: Positive for wound  Neurological: Positive for weakness  Negative for numbness  Hematological: Negative  Psychiatric/Behavioral: Negative  Objective:     Ortho ExamPhysical Exam  HENT:      Head: Atraumatic  Cardiovascular:      Pulses: Normal pulses  Pulmonary:      Effort: Pulmonary effort is normal    Musculoskeletal:      Comments: Left shoulder range of motion not tested  Skin:     General: Skin is warm and dry  Capillary Refill: Capillary refill takes less than 2 seconds  Comments: Surgical incisions dry and clean     Neurological: Mental Status: He is alert and oriented to person, place, and time  Sensory: No sensory deficit     Psychiatric:         Mood and Affect: Mood normal          Behavior: Behavior normal

## 2023-05-15 NOTE — PATIENT INSTRUCTIONS
1  Sling as per protocol  2  PT per protocol  3  Follow-up in 2 months      Rotator Cuff Repair Rehabilitation Protocol  (adapted from [facility]-RAMÓN MONROE et al  Aleks [place] of the Rotator Cuff: An Evaluation Based Approach”  DA 2006; 40:905-693)  Updated 10 14  Jose E Olivas MD  [facility]  [address] [place] [address]  [phone]  Phase 1 (Weeks 0-6)   Immediate Postoperative Period   Goals:    Diminish Pain and Inflammation  Maintain and Protect Integrity of the Repair    Gradually Increase Passive ROM (NO Active or Active Assist until Week 6)    Become Independent with Modified ADLs   Precautions:  Maintain Arm in Abduction Sling, Remove Only for Directed Exercises (may remove Abduction Pillow after Day 21 for comfort)    Keep Incisions Clean & Dry (okay to shower in 48 hours, band-aids over incisions)  No Immersion (pool) until Wounds Totally Sealed (usually not prior to day #10)  Passive Shoulder Motion ONLY, No Lifting/Holding Objects, Reaching Behind Back  Okay to Type/Write at Desktop with Arm in Sling   Day 0-6    Elbow, Wrist, Hand AROM Exercises     Start Cervical AROM and Scapula Isometrics    Cryotherapy/Ice for Pain and Inflammation    Instruct in Hygiene, Posture, and Positioning    Day 7-28    Continue Above  May Start Pendulum Exercises    May Start Supine, Pain Free, PT assisted PROM  Forward Flexion to 90°, External Rotation to 35° (Elbow at side), Internal Rotation to Body, Abduction to 60°    Can Introduce light Cardio (Walking, Stationary Bike)    Aqua Therapy may begin at week 3 (day 21) as long as no wound problems   Day 29-42    Continue Above  Progress PROM to Goal of full PROM by Week 6  May add Gentle Mobilizations [facility] and Scapulothoracic) to Regain full PROM if Needed    May add Heat prior to PROM Exercises, Ice after Exercises  May Begin AAROM at Day 29 if ROM is Appropriate in Anticipation of AROM Starting at Week 6   Criteria to Progress to Phase 2    Reasonable Passive Forward Flexion, Abduction , IR/ER    Time  Phase 2 (Weeks 6-12)   Protection and Active Motion   Goals of Phase: Allow Healing of Soft Tissues    Decrease Pain and Inflammation  Add ADLs and Regain AROM by End of Phase   Precautions:    NO STRENGTHENING until Phase 3    Repair is Most Prone to Failure during this Phase!     No Lifting Objects > 2 lbs (Coffee Cup OK), no Sudden Motions    Avoid Upper Extremity Bike and Ergometer   Day 43-56    Discontinue Sling  Initiate AROM Exercises (forward flexion, ER, IR and abduction), Rotator Cuff Isometrics    Continue Periscapular Exercises, add Stretching if PROM Lacking   No Strengthening until Week 12 (Minimum Time Needed for Cuff Healing Sufficient to withstand Strengthening)     Phase 3 (Weeks 12-16)   Early Strengthening   Goal of Phase:    Gain full AROM, Maintain PROM    Gradual return of Shoulder Strength, Power and Endurance    Gradual return to Functional Activities   Precautions:    No Lifting > 10lbs, Sudden Lifting or Pushing activities, Overhead Lifting    No Upper Extremity Bike or Ergometer   Week 12    Initiate Strengthening Program (10 lb Maximum until Phase 4)      ER/IR with Bands (Standing)      ER in Lateral Decubitius Position      Lateral Raises      Full Can in Scapular Plane      Prone Rowing, Horizontal Abduction, Extension      Elbow Flexion/Extension   Week 14-16    Initiate light Functional Activities as Permitted  Progress to Fundamental Shoulder Exercises  Phase 4 (Variable but Weeks 16-24)   Aggressive Rehab  Sport Specific or Activity Specific    Goals of Phase:    Maintain Full Pain-free AROM    Advanced Conditioning Exercises for enhanced Functional use    Continue regaining Shoulder Strength, Power and Endurance    Eventual return to full Functional Activities   Precautions:    None   Week 16    Continue ROM and stretching if appropriate    Progress Strengthening, Proprioceptive and Neuromuscular Training    Light Sports if Progressing Well (Chipping/Putting, easy ground strokes etc )   Week 20    Continue Strengthening and Stretching    Initiate Interval Sports Program as Appropriate    Note:   This is a general program which may be modified based on intra-operative findings, additional procedures preformed, repair stability, and patient biological factors  If in doubt, please check with my office for individual patient specifics  The ultimate goal of the surgery and rehabilitation is to get the rotator cuff to heal with the least residual functional deficit due to stiffness  That being said, I would rather have a stiff shoulder with a healed rotator cuff repair, than a loose shoulder with a failed repair!

## 2023-05-18 ENCOUNTER — OFFICE VISIT (OUTPATIENT)
Dept: PHYSICAL THERAPY | Facility: CLINIC | Age: 68
End: 2023-05-18

## 2023-05-18 DIAGNOSIS — Z47.89 ORTHOPEDIC AFTERCARE: ICD-10-CM

## 2023-05-18 DIAGNOSIS — G89.29 CHRONIC LEFT SHOULDER PAIN: ICD-10-CM

## 2023-05-18 DIAGNOSIS — M25.512 CHRONIC LEFT SHOULDER PAIN: ICD-10-CM

## 2023-05-18 DIAGNOSIS — G47.00 INSOMNIA, UNSPECIFIED TYPE: ICD-10-CM

## 2023-05-18 DIAGNOSIS — M75.102 TEAR OF LEFT SUPRASPINATUS TENDON: Primary | ICD-10-CM

## 2023-05-18 NOTE — PROGRESS NOTES
"Daily Note     Today's date: 2023  Patient name: Bhavana Nguyễn  : 1955  MRN: [id number]  Referring provider: Gloria Gama  Dx:   Encounter Diagnosis     ICD-10-CM    1  Tear of left supraspinatus tendon  M75 102       2  Orthopedic aftercare  Z47 89           Start Time: 0815  Stop Time: 2865  Total time in clinic (min): 40 minutes    Subjective: Pt reports doing OK this morning  He hasn't been able to do all of the prescribed exercises as he reports a busy schedule at home  Objective: See treatment diary below      Assessment: Pt encouraged to complete all prescribed exercises on a regular basis  Pt is 8 days s/p RTC repair and progressing well  ROM performed within protocol guidelines  Mild guarding present during PROM  Cueing provided to decrease guarding  Tolerated treatment well  Patient exhibited good technique with therapeutic exercises and would benefit from continued PT  Plan: Continue per plan of care        Precautions: Surgery scheduled with Dr Myah Pittman on 5/10/23 (left  RTC repair)       Manuals 5/15 5/18           PROM of the left shoulder c/ protocol limitations 10' 15' c/ Elbow PROM                                                  Neuro Re-Ed             Scap retraction 20x 5\" 30x5\"           Cervical retraction 20x 30x                                                                            Ther Ex             Pendulums 20x A/P + ML 50x AP, ML           Elbow AAROM 2x10 30x           Wrist AROM 30x flexion/extension 30x flexion/extension           Gripping  30x           UT stretch  10x5\"           TS extension over foam roller- sitting 20x 30x                                     Ther Activity                                       Gait Training                                       Modalities                                       1:1 with PT from [phone]       "

## 2023-05-19 NOTE — TELEPHONE ENCOUNTER
[id number] 05/07/2023 05/02/2023 ALPRAZolam (Tablet)  120 0 30 0 25 MG NA [facility]  Medicare 0 / 0 [place]     1  [id number] 04/27/2023 04/27/2023 Zolpidem Tartrate (Tablet)  30 0 30 10 MG NA [facility]  Medicare 0 / 0 [place]    1  [id number] 04/24/2023 04/14/2023 ACETAMINOPHEN 325 MG / oxyCODONE HYDROCHLORIDE 7 5 MG ORAL TABLET (Tablet)  120 0 30 7 5 MG/325 0 [facility] 45 [facility]    Medicare 0 / 0 [place]    1  [id number] 04/18/2023 04/14/2023 diazePAM (Tablet)  1 0 1 10 MG NA [facility]

## 2023-05-22 RX ORDER — OXYCODONE AND ACETAMINOPHEN 7.5; 325 MG/1; MG/1
1 TABLET ORAL EVERY 6 HOURS PRN
Qty: 120 TABLET | Refills: 0 | Status: SHIPPED | OUTPATIENT
Start: 2023-05-22

## 2023-05-22 RX ORDER — ZOLPIDEM TARTRATE 10 MG/1
10 TABLET ORAL
Qty: 30 TABLET | Refills: 0 | Status: SHIPPED | OUTPATIENT
Start: 2023-05-22

## 2023-05-23 ENCOUNTER — OFFICE VISIT (OUTPATIENT)
Dept: PHYSICAL THERAPY | Facility: CLINIC | Age: 68
End: 2023-05-23

## 2023-05-23 DIAGNOSIS — M75.102 TEAR OF LEFT SUPRASPINATUS TENDON: ICD-10-CM

## 2023-05-23 DIAGNOSIS — Z47.89 ORTHOPEDIC AFTERCARE: Primary | ICD-10-CM

## 2023-05-23 NOTE — PROGRESS NOTES
"Daily Note     Today's date: 2023  Patient name: Chanel Ibrahim  : 1955  MRN: [id number]  Referring provider: Harshad Vogel  Dx:   Encounter Diagnosis     ICD-10-CM    1  Orthopedic aftercare  Z47 89       2  Tear of left supraspinatus tendon  M75 102                      Subjective: Pt reports intermittent sharp discomfort when bathing and dressing  Objective: See treatment diary below      Assessment: I educated patient on proper form to take when cleaning under the arm and dressing  Pt is 2 weeks post op and progressing well  Treatment focused on PROM  Tolerated treatment well  Patient would benefit from continued PT      Plan: Continue per plan of care        Precautions: Surgery scheduled with Dr Nikkie Carmona on 5/10/23 (left  RTC repair)       Manuals 5/15 5/18 5/23          PROM of the left shoulder c/ protocol limitations 10' 15' c/ Elbow PROM 15' c/ Elbow PROM                                                 Neuro Re-Ed             Scap retraction 20x 5\" 30x5\" 30x5\"          Cervical retraction 20x 30x 30x                                                                           Ther Ex             Pendulums 20x A/P + ML 50x AP, ML 50x AP, ML          Elbow AAROM 2x10 30x 30x          Wrist AROM 30x flexion/extension 30x flexion/extension 30x flexion/extension          Gripping  30x 30x          UT stretch  10x5\" 10x5\"          TS extension over foam roller- sitting 20x 30x 30x                                    Ther Activity                                       Gait Training                                       Modalities                                       1:1 with PT from [id number]       "

## 2023-05-25 ENCOUNTER — OFFICE VISIT (OUTPATIENT)
Dept: PHYSICAL THERAPY | Facility: CLINIC | Age: 68
End: 2023-05-25

## 2023-05-25 DIAGNOSIS — M75.102 TEAR OF LEFT SUPRASPINATUS TENDON: Primary | ICD-10-CM

## 2023-05-25 DIAGNOSIS — Z47.89 ORTHOPEDIC AFTERCARE: ICD-10-CM

## 2023-05-25 NOTE — PROGRESS NOTES
"Daily Note     Today's date: 2023  Patient name: Virginie Fontanez  : 1955  MRN: [id number]  Referring provider: Doreen Up  Dx:   Encounter Diagnosis     ICD-10-CM    1  Tear of left supraspinatus tendon  M75 102       2  Orthopedic aftercare  Z47 89           Start Time: 1143  Stop Time: 1236  Total time in clinic (min): 53 minutes    Subjective: Pt reports felt good after last session and no changes since  Objective: See treatment diary below      Assessment: Pt is 2 weeks post op and progressing well  Treatment focused on PROM  Tolerated treatment well  Patient would benefit from continued PT      Plan: Continue per plan of care  Precautions: Surgery scheduled with Dr Raul Fuller on 5/10/23 (left  RTC repair)       Manuals 5/15 5/18 5/23 5/25         PROM of the left shoulder c/ protocol limitations 10' 15' c/ Elbow PROM 15' c/ Elbow PROM 15'c PROM & elbow         STM L pec in supine    RE                                   Neuro Re-Ed             Scap retraction 20x 5\" 30x5\" 30x5\" 30x5s         Cervical retraction 20x 30x 30x 30x                                                                          Ther Ex             Pendulums 20x A/P + ML 50x AP, ML 50x AP, ML 50x AP, ML         Elbow AAROM 2x10 30x 30x 30x         Wrist AROM 30x flexion/extension 30x flexion/extension 30x flexion/extension 30x flex  , ext           Gripping  30x 30x 30x         UT stretch  10x5\" 10x5\" 10sx5         TS extension over foam roller- sitting 20x 30x 30x 5sx30         Levator stretch                          Ther Activity                                       Gait Training                                       Modalities                                              "

## 2023-05-30 ENCOUNTER — OFFICE VISIT (OUTPATIENT)
Dept: PHYSICAL THERAPY | Facility: CLINIC | Age: 68
End: 2023-05-30

## 2023-05-30 DIAGNOSIS — M75.102 TEAR OF LEFT SUPRASPINATUS TENDON: Primary | ICD-10-CM

## 2023-05-30 DIAGNOSIS — Z47.89 ORTHOPEDIC AFTERCARE: ICD-10-CM

## 2023-05-30 NOTE — PROGRESS NOTES
"Daily Note     Today's date: 2023  Patient name: Marta Kahn  : 1955  MRN: [id number]  Referring provider: Maegan Duran  Dx:   Encounter Diagnosis     ICD-10-CM    1  Tear of left supraspinatus tendon  M75 102       2  Orthopedic aftercare  Z47 89           Start Time: 1446  Stop Time: 1525  Total time in clinic (min): 39 minutes    Subjective: Pt reports felt good after last session and no changes since  Objective: See treatment diary below      Assessment:  Treatment focused on PROM  Tolerated treatment well  Patient would benefit from continued PT      Plan: Continue per plan of care  Precautions: Surgery scheduled with Dr Yong Rodriguez on 5/10/23 (left  RTC repair)       Manuals 5/15 5/18 5/23 5/25 5/30        PROM of the left shoulder c/ protocol limitations 10' 15' c/ Elbow PROM 15' c/ Elbow PROM 15'c PROM & elbow 15' PROM c elbow        STM L pec in supine    RE RE                                  Neuro Re-Ed             Scap retraction 20x 5\" 30x5\" 30x5\" 30x5s 30x5s        Cervical retraction 20x 30x 30x 30x 30x                                                                         Ther Ex             Pendulums 20x A/P + ML 50x AP, ML 50x AP, ML 50x AP, ML 20x AP, ML        Elbow AAROM 2x10 30x 30x 30x 30x        Wrist AROM 30x flexion/extension 30x flexion/extension 30x flexion/extension 30x flex  , ext  30x flex & ext          Gripping  30x 30x 30x 30x        UT stretch  10x5\" 10x5\" 10sx5 10sx5        TS extension over foam roller- sitting 20x 30x 30x 5sx30 5sx30        Levator stretch    10sx5 10sx5                     Ther Activity                                       Gait Training                                       Modalities                                              "

## 2023-06-01 ENCOUNTER — OFFICE VISIT (OUTPATIENT)
Dept: PHYSICAL THERAPY | Facility: CLINIC | Age: 68
End: 2023-06-01

## 2023-06-01 DIAGNOSIS — Z47.89 ORTHOPEDIC AFTERCARE: ICD-10-CM

## 2023-06-01 DIAGNOSIS — M75.102 TEAR OF LEFT SUPRASPINATUS TENDON: Primary | ICD-10-CM

## 2023-06-01 NOTE — PROGRESS NOTES
"Daily Note     Today's date: 2023  Patient name: Katelynn Connors  : 1955  MRN: [id number]  Referring provider: Angus Sims  Dx:   Encounter Diagnosis     ICD-10-CM    1  Tear of left supraspinatus tendon  M75 102       2  Orthopedic aftercare  Z47 89                      Subjective: Patient stated moderate soreness after last treatment session  Objective: See treatment diary below      Assessment: Patient performed exercises without c/o pain; minimal pain with manual shoulder PROM  Plan: Continue per plan of care  Precautions: Surgery scheduled with Dr Candise Schlatter on 5/10/23 (left  RTC repair)       Manuals 5/15 5/18 5/23 5/25 5/30 6/1       PROM of the left shoulder c/ protocol limitations 10' 15' c/ Elbow PROM 15' c/ Elbow PROM 15'c PROM & elbow 15' PROM c elbow KK       STM L pec in supine    RE RE                                  Neuro Re-Ed             Scap retraction 20x 5\" 30x5\" 30x5\" 30x5s 30x5s 30x5s       Cervical retraction 20x 30x 30x 30x 30x 30x                                                                        Ther Ex             Pendulums 20x A/P + ML 50x AP, ML 50x AP, ML 50x AP, ML 20x AP, ML 30x AP, ML       Elbow AAROM 2x10 30x 30x 30x 30x 30x       Wrist AROM 30x flexion/extension 30x flexion/extension 30x flexion/extension 30x flex  , ext  30x flex & ext  30x flex & ext         Gripping  30x 30x 30x 30x digiflex blue 30x       UT stretch  10x5\" 10x5\" 10sx5 10sx5 10sx5       TS extension over foam roller- sitting 20x 30x 30x 5sx30 5sx30 5sx30       Levator stretch    10sx5 10sx5 10sx5                    Ther Activity                                       Gait Training                                       Modalities                                              "

## 2023-06-02 ENCOUNTER — APPOINTMENT (OUTPATIENT)
Dept: PHYSICAL THERAPY | Facility: CLINIC | Age: 68
End: 2023-06-02
Payer: MEDICARE

## 2023-06-05 ENCOUNTER — OFFICE VISIT (OUTPATIENT)
Dept: PHYSICAL THERAPY | Facility: CLINIC | Age: 68
End: 2023-06-05
Payer: MEDICARE

## 2023-06-05 DIAGNOSIS — M75.102 TEAR OF LEFT SUPRASPINATUS TENDON: Primary | ICD-10-CM

## 2023-06-05 DIAGNOSIS — Z47.89 ORTHOPEDIC AFTERCARE: ICD-10-CM

## 2023-06-05 PROCEDURE — 97110 THERAPEUTIC EXERCISES: CPT | Performed by: PHYSICAL THERAPIST

## 2023-06-05 PROCEDURE — 97140 MANUAL THERAPY 1/> REGIONS: CPT | Performed by: PHYSICAL THERAPIST

## 2023-06-05 NOTE — PROGRESS NOTES
"Daily Note     Today's date: 2023  Patient name: Damien Roland  : 1955  MRN: [id number]  Referring provider: Juana Rivera  Dx:   Encounter Diagnosis     ICD-10-CM    1  Tear of left supraspinatus tendon  M75 102       2  Orthopedic aftercare  Z47 89           Start Time: 1110  Stop Time: 1150  Total time in clinic (min): 40 minutes    Subjective: Pt offers no new complaints this morning  Pt does have mild discomfort throughout the day  Objective: See treatment diary below      Assessment: Pt is nearly 4 weeks post op and progressing well  Tolerated treatment well  Will progress patient according to protocol next session  Patient exhibited good technique with therapeutic exercises and would benefit from continued PT  Plan: Continue per plan of care  Precautions: Surgery scheduled with Dr Misty Mirza on 5/10/23 (left  RTC repair)       Manuals 5/15 5/18 5/23 5/25 5/30 6/1 6/5      PROM of the left shoulder c/ protocol limitations 10' 15' c/ Elbow PROM 15' c/ Elbow PROM 15'c PROM & elbow 15' PROM c elbow KK 15' PROM       STM L pec in supine    RE RE                                  Neuro Re-Ed             Scap retraction 20x 5\" 30x5\" 30x5\" 30x5s 30x5s 30x5s 30x5\"      Cervical retraction 20x 30x 30x 30x 30x 30x 30x                                                                       Ther Ex             Pendulums 20x A/P + ML 50x AP, ML 50x AP, ML 50x AP, ML 20x AP, ML 30x AP, ML 30x AP, ML      Elbow AAROM 2x10 30x 30x 30x 30x 30x 30x      Wrist AROM 30x flexion/extension 30x flexion/extension 30x flexion/extension 30x flex  , ext  30x flex & ext  30x flex & ext   30x 3#      Gripping  30x 30x 30x 30x digiflex blue 30x digiflex blue 30x      UT stretch  10x5\" 10x5\" 10sx5 10sx5 10sx5 10x5\"       TS extension over foam roller- sitting 20x 30x 30x 5sx30 5sx30 5sx30 5sx30      Levator stretch    10sx5 10sx5 10sx5 10x5\"      Supine ER with dowel       nv       SL ER AAROM        nv   " Supine shoulder flexion- AAROM       nv      SL shoulder abduction- AAROM       nv      Elbow AROM       3x10      Ther Activity                                       Gait Training                                       Modalities                                       1:1 with PT from 1110-1150am

## 2023-06-08 ENCOUNTER — OFFICE VISIT (OUTPATIENT)
Dept: PHYSICAL THERAPY | Facility: CLINIC | Age: 68
End: 2023-06-08
Payer: MEDICARE

## 2023-06-08 DIAGNOSIS — Z47.89 ORTHOPEDIC AFTERCARE: ICD-10-CM

## 2023-06-08 DIAGNOSIS — M75.102 TEAR OF LEFT SUPRASPINATUS TENDON: Primary | ICD-10-CM

## 2023-06-08 PROCEDURE — 97140 MANUAL THERAPY 1/> REGIONS: CPT | Performed by: PHYSICAL THERAPIST

## 2023-06-08 PROCEDURE — 97110 THERAPEUTIC EXERCISES: CPT | Performed by: PHYSICAL THERAPIST

## 2023-06-08 NOTE — PROGRESS NOTES
"Daily Note     Today's date: 2023  Patient name: Rivas Go  : 1955  MRN: [id number]  Referring provider: Shaun Winston  Dx:   Encounter Diagnosis     ICD-10-CM    1  Tear of left supraspinatus tendon  M75 102       2  Orthopedic aftercare  Z47 89           Start Time: 1115  Stop Time: 1200  Total time in clinic (min): 45 minutes    Subjective: Pt reports having discomfort in left elbow today  Objective: See treatment diary below      Assessment: Pt is 4 weeks post op  Implemented gentle and controlled active assisted ROM with the help of myself  Tolerated treatment well  PROM slowly improving; however, patient is still in all directions  Patient exhibited good technique with therapeutic exercises and would benefit from continued PT  Plan: Continue per plan of care  Precautions: Surgery scheduled with Dr Miller Jenkins on 5/10/23 (left  RTC repair)       Manuals 5/15 5/18 5/23 5/25 5/30 6/1 6/5 6/8     PROM of the left shoulder c/ protocol limitations 10' 15' c/ Elbow PROM 15' c/ Elbow PROM 15'c PROM & elbow 15' PROM c elbow KK 15' PROM  10' PROM     STM L pec in supine    RE RE                                  Neuro Re-Ed             Scap retraction 20x 5\" 30x5\" 30x5\" 30x5s 30x5s 30x5s 30x5\" 30x5\"     Cervical retraction 20x 30x 30x 30x 30x 30x 30x                                                                       Ther Ex             Pendulums 20x A/P + ML 50x AP, ML 50x AP, ML 50x AP, ML 20x AP, ML 30x AP, ML 30x AP, ML 30x AP, ML     Elbow AAROM 2x10 30x 30x 30x 30x 30x 30x DC     Wrist AROM 30x flexion/extension 30x flexion/extension 30x flexion/extension 30x flex  , ext  30x flex & ext  30x flex & ext   30x 3#      Gripping  30x 30x 30x 30x digiflex blue 30x digiflex blue 30x      UT stretch  10x5\" 10x5\" 10sx5 10sx5 10sx5 10x5\"  10x5\"     TS extension over foam roller- sitting 20x 30x 30x 5sx30 5sx30 5sx30 5sx30 30x5\"     Levator stretch    10sx5 10sx5 10sx5 10x5\"    " "  Supine ER with dowel       nv  10x5\"      SL ER AAROM        nv 10x PT assist     Supine shoulder flexion- AAROM       nv      SL shoulder abduction- AAROM       nv 10x PT assist      Elbow AROM       3x10 3x10     Table slide with PB -AAROM             Hands clasp shoulder flexion in supine        10x     Ther Activity                                       Gait Training                                       Modalities                                       1:1 with PT from 1115-12PM           "

## 2023-06-12 ENCOUNTER — OFFICE VISIT (OUTPATIENT)
Dept: PHYSICAL THERAPY | Facility: CLINIC | Age: 68
End: 2023-06-12
Payer: MEDICARE

## 2023-06-12 DIAGNOSIS — M75.102 TEAR OF LEFT SUPRASPINATUS TENDON: Primary | ICD-10-CM

## 2023-06-12 DIAGNOSIS — Z47.89 ORTHOPEDIC AFTERCARE: ICD-10-CM

## 2023-06-12 PROCEDURE — 97110 THERAPEUTIC EXERCISES: CPT | Performed by: PHYSICAL THERAPIST

## 2023-06-12 PROCEDURE — 97140 MANUAL THERAPY 1/> REGIONS: CPT | Performed by: PHYSICAL THERAPIST

## 2023-06-12 NOTE — PROGRESS NOTES
"Daily Note     Today's date: 2023  Patient name: Dami Salvador  : 1955  MRN: [id number]  Referring provider: Stone Vazquez  Dx:   Encounter Diagnosis     ICD-10-CM    1  Tear of left supraspinatus tendon  M75 102       2  Orthopedic aftercare  Z47 89           Start Time: 1100  Stop Time: 1146  Total time in clinic (min): 46 minutes    Subjective: Pt reports having to use more ice at night time  He does admit to trying to use involved arm putting milk in the   Objective: See treatment diary below      Assessment: Pt will be 5 weeks post op on Wednesday  Pt advised to not perform and active ROM at this time  AAROM and PROM allowed in order to maintain integrity of the repair  Tolerated treatment well  Continued with AAROM  Patient exhibited good technique with therapeutic exercises and would benefit from continued PT  Plan: Continue per plan of care  Precautions: Surgery scheduled with Dr Ingris Champion on 5/10/23 (left  RTC repair)       Manuals 5/15 5/18 5/23 5/25 5/30 6/1 6/5 6/8 6/12    PROM of the left shoulder c/ protocol limitations 10' 15' c/ Elbow PROM 15' c/ Elbow PROM 15'c PROM & elbow 15' PROM c elbow KK 15' PROM  10' PROM 10' PROM    STM L pec in supine    RE RE                                  Neuro Re-Ed             Scap retraction 20x 5\" 30x5\" 30x5\" 30x5s 30x5s 30x5s 30x5\" 30x5\" 30x5\"    Cervical retraction 20x 30x 30x 30x 30x 30x 30x                                                                       Ther Ex             Pendulums 20x A/P + ML 50x AP, ML 50x AP, ML 50x AP, ML 20x AP, ML 30x AP, ML 30x AP, ML 30x AP, ML 30x AP, ML    Elbow AAROM 2x10 30x 30x 30x 30x 30x 30x DC     Wrist AROM 30x flexion/extension 30x flexion/extension 30x flexion/extension 30x flex  , ext  30x flex & ext  30x flex & ext   30x 3#      Gripping  30x 30x 30x 30x digiflex blue 30x digiflex blue 30x      UT stretch  10x5\" 10x5\" 10sx5 10sx5 10sx5 10x5\"  10x5\"     TS extension over " "foam roller- sitting 20x 30x 30x 5sx30 5sx30 5sx30 5sx30 30x5\"     Levator stretch    10sx5 10sx5 10sx5 10x5\"      Supine ER with dowel       nv  10x5\"  20x5\"    SL ER AAROM        nv 10x PT assist 2x10 PT assist    Supine shoulder flexion- AAROM       nv      SL shoulder abduction- AAROM       nv 10x PT assist  2x10 PT assist    Elbow AROM       3x10 3x10 3x10    Table slide with PB -AAROM             Hands clasp shoulder flexion in supine        10x 10x     Ther Activity                                       Gait Training                                       Modalities                                       1:1 with PT from [id number]             "

## 2023-06-15 ENCOUNTER — OFFICE VISIT (OUTPATIENT)
Dept: PHYSICAL THERAPY | Facility: CLINIC | Age: 68
End: 2023-06-15
Payer: MEDICARE

## 2023-06-15 DIAGNOSIS — M25.512 CHRONIC LEFT SHOULDER PAIN: ICD-10-CM

## 2023-06-15 DIAGNOSIS — M75.102 TEAR OF LEFT SUPRASPINATUS TENDON: Primary | ICD-10-CM

## 2023-06-15 DIAGNOSIS — Z47.89 ORTHOPEDIC AFTERCARE: ICD-10-CM

## 2023-06-15 DIAGNOSIS — G89.29 CHRONIC LEFT SHOULDER PAIN: ICD-10-CM

## 2023-06-15 DIAGNOSIS — G47.00 INSOMNIA, UNSPECIFIED TYPE: ICD-10-CM

## 2023-06-15 PROCEDURE — 97110 THERAPEUTIC EXERCISES: CPT | Performed by: PHYSICAL THERAPIST

## 2023-06-15 PROCEDURE — 97140 MANUAL THERAPY 1/> REGIONS: CPT | Performed by: PHYSICAL THERAPIST

## 2023-06-15 RX ORDER — ZOLPIDEM TARTRATE 10 MG/1
10 TABLET ORAL
Qty: 30 TABLET | Refills: 0 | Status: SHIPPED | OUTPATIENT
Start: 2023-06-15

## 2023-06-15 RX ORDER — OXYCODONE AND ACETAMINOPHEN 7.5; 325 MG/1; MG/1
1 TABLET ORAL EVERY 6 HOURS PRN
Qty: 120 TABLET | Refills: 0 | Status: SHIPPED | OUTPATIENT
Start: 2023-06-15 | End: 2023-06-19 | Stop reason: SDUPTHER

## 2023-06-15 NOTE — PROGRESS NOTES
"Daily Note     Today's date: 6/15/2023  Patient name: Maria Dolores Horan  : 1955  MRN: [id number]  Referring provider: Jacqueline Robbins  Dx:   Encounter Diagnosis     ICD-10-CM    1  Tear of left supraspinatus tendon  M75 102       2  Orthopedic aftercare  Z47 89           Start Time: 1100  Stop Time: 1145  Total time in clinic (min): 45 minutes    Subjective: Pt reports frequent application of ice secondary to shoulder discomfort  Pt admits to using his shoulder from time to time  He also mowed the grass this week but used a self propelled   Objective: See treatment diary below      Assessment: I again reiterated to patient that he should not be doing AROM of his involved shoulder  Continued with PROM, stretching, and AAROM  Tolerated treatment well without much symptoms  Patient exhibited good technique with therapeutic exercises and would benefit from continued PT  Plan: Continue per plan of care  Precautions: Surgery scheduled with Dr Joselyn Soto on 5/10/23 (left  RTC repair)       Manuals 5/15 5/18 5/23 5/25 5/30 6/1 6/5 6/8 6/12 6/15   PROM of the left shoulder c/ protocol limitations 10' 15' c/ Elbow PROM 15' c/ Elbow PROM 15'c PROM & elbow 15' PROM c elbow KK 15' PROM  10' PROM 10' PROM 15' PROM   STM L pec in supine    RE RE                                  Neuro Re-Ed             Scap retraction 20x 5\" 30x5\" 30x5\" 30x5s 30x5s 30x5s 30x5\" 30x5\" 30x5\" 30x5\"   Cervical retraction 20x 30x 30x 30x 30x 30x 30x                                                                       Ther Ex             Pendulums 20x A/P + ML 50x AP, ML 50x AP, ML 50x AP, ML 20x AP, ML 30x AP, ML 30x AP, ML 30x AP, ML 30x AP, ML 30x AP, ML   Elbow AAROM 2x10 30x 30x 30x 30x 30x 30x DC     Wrist AROM 30x flexion/extension 30x flexion/extension 30x flexion/extension 30x flex  , ext  30x flex & ext  30x flex & ext   30x 3#      Gripping  30x 30x 30x 30x digiflex blue 30x digiflex blue 30x      UT " "stretch  10x5\" 10x5\" 10sx5 10sx5 10sx5 10x5\"  10x5\"     TS extension over foam roller- sitting 20x 30x 30x 5sx30 5sx30 5sx30 5sx30 30x5\"     Levator stretch    10sx5 10sx5 10sx5 10x5\"      Supine ER with dowel       nv  10x5\"  20x5\" 10x10\"   SL ER AAROM        nv 10x PT assist 2x10 PT assist 2x10 PT assist   Supine shoulder flexion- AAROM       nv      SL shoulder abduction- AAROM       nv 10x PT assist  2x10 PT assist 2x10 PT assist   Elbow AROM       3x10 3x10 3x10 3x10   Table slide with PB -AAROM          20x   Hands clasp shoulder flexion in supine        10x 10x  10x   Ther Activity                                       Gait Training                                       Modalities                                       1:1 with PT from 34-9011m               "

## 2023-06-15 NOTE — TELEPHONE ENCOUNTER
1 [id number] 06/07/2023 06/07/2023 ALPRAZolam (Tablet) 120 0 30 0 25 MG NA PARAS STEARNS [facility]  Medicare 0 / 0 [place]    1 [id number] 05/24/2023 05/22/2023 Zolpidem Tartrate (Tablet) 30 0 30 10 MG NA [facility]  Medicare 0 / 0 [place]    1 [id number] 05/22/2023 05/22/2023 ACETAMINOPHEN 325 MG / oxyCODONE HYDROCHLORIDE 7 5 MG ORAL TABLET (Tablet) 120 0 30 7 5 MG/325 0 MG 45 0 [facility]  Medicare 0 / 0 [place]    1 [id number] 05/07/2023 05/02/2023 ALPRAZolam (Tablet) 120 0 30 0 25 MG NA [facility]  Medicare 0 / 0 [place]    1 [id number] 04/27/2023 04/27/2023 Zolpidem Tartrate (Tablet) 30 0 30 10 MG NA [facility]  Medicare 0 / 0 [place]    1 [id number] 04/24/2023 04/14/2023 ACETAMINOPHEN 325 MG / oxyCODONE HYDROCHLORIDE 7 5 MG ORAL TABLET (Tablet) 120 0 30 7 5 MG/325 0 MG 45 0 [facility]  Medicare 0 / 0 [place]    1 [id number] 04/18/2023 04/14/2023 diazePAM (Tablet) 1 0 1 10 MG NA [facility]  Private Pay 0 / 0 PA    1 [id number] 04/02/2023 03/28/2023 ACETAMINOPHEN 325 MG / oxyCODONE HYDROCHLORIDE 7 5 MG ORAL TABLET (Tablet) 60 0 15 7 5 MG/325 0 MG 45 0 [facility]  Medicare 0 / 0 PA    1 [id number] 04/01/2023 03/31/2023 ALPRAZolam (Tablet) 120 0 30 0 25 MG NA [facility]  Medicare 0 / 0 [place]    1 [id number] 03/30/2023 02/23/2023 Zolpidem Tartrate (Tablet) 30 0 30 10 MG [facility]   Medicare 1 / 3

## 2023-06-19 ENCOUNTER — OFFICE VISIT (OUTPATIENT)
Dept: PHYSICAL THERAPY | Facility: CLINIC | Age: 68
End: 2023-06-19
Payer: MEDICARE

## 2023-06-19 ENCOUNTER — RA CDI HCC (OUTPATIENT)
Dept: OTHER | Facility: HOSPITAL | Age: 68
End: 2023-06-19

## 2023-06-19 ENCOUNTER — TELEPHONE (OUTPATIENT)
Dept: FAMILY MEDICINE CLINIC | Facility: CLINIC | Age: 68
End: 2023-06-19

## 2023-06-19 DIAGNOSIS — M75.102 TEAR OF LEFT SUPRASPINATUS TENDON: Primary | ICD-10-CM

## 2023-06-19 DIAGNOSIS — E55.9 VITAMIN D DEFICIENCY: Primary | ICD-10-CM

## 2023-06-19 DIAGNOSIS — I10 ESSENTIAL HYPERTENSION: ICD-10-CM

## 2023-06-19 DIAGNOSIS — Z47.89 ORTHOPEDIC AFTERCARE: ICD-10-CM

## 2023-06-19 DIAGNOSIS — G89.29 CHRONIC LEFT SHOULDER PAIN: ICD-10-CM

## 2023-06-19 DIAGNOSIS — M25.512 CHRONIC LEFT SHOULDER PAIN: ICD-10-CM

## 2023-06-19 DIAGNOSIS — E78.5 HYPERLIPIDEMIA, UNSPECIFIED HYPERLIPIDEMIA TYPE: ICD-10-CM

## 2023-06-19 DIAGNOSIS — E11.9 TYPE 2 DIABETES MELLITUS WITHOUT COMPLICATION, WITHOUT LONG-TERM CURRENT USE OF INSULIN (HCC): ICD-10-CM

## 2023-06-19 PROCEDURE — 97140 MANUAL THERAPY 1/> REGIONS: CPT | Performed by: PHYSICAL THERAPIST

## 2023-06-19 PROCEDURE — 97110 THERAPEUTIC EXERCISES: CPT | Performed by: PHYSICAL THERAPIST

## 2023-06-19 PROCEDURE — 97112 NEUROMUSCULAR REEDUCATION: CPT | Performed by: PHYSICAL THERAPIST

## 2023-06-19 RX ORDER — OXYCODONE AND ACETAMINOPHEN 7.5; 325 MG/1; MG/1
1 TABLET ORAL EVERY 6 HOURS PRN
Qty: 120 TABLET | Refills: 0 | Status: SHIPPED | OUTPATIENT
Start: 2023-06-19

## 2023-06-19 NOTE — TELEPHONE ENCOUNTER
Patient states that [facility] is unable to fill his scription for Oxycodone because it is on back order   He is requesting that the script sent to [facility] be canceled and then resent to [facility] in [place]     He states that he confirmed with [facility] that they do have it in stock    Patient is also asking if you would like him to obtain any blood work before his f/u appointment on 6/26     Please advise

## 2023-06-19 NOTE — PROGRESS NOTES
[facility] 75  coding opportunities          Chart Reviewed number of suggestions sent to Provider: 3     Patients Insurance     Medicare Insurance: Medicare        E66 [id number]  [id number]  [id number]

## 2023-06-19 NOTE — PROGRESS NOTES
"Daily Note     Today's date: 2023  Patient name: Adalid Wheatley  : 1955  MRN: [id number]  Referring provider: Asha Corral  Dx:   Encounter Diagnosis     ICD-10-CM    1  Tear of left supraspinatus tendon  M75 102       2  Orthopedic aftercare  Z47 89           Start Time: 1040  Stop Time: 1133  Total time in clinic (min): 53 minutes    Subjective: Pt reports getting minimal sleep secondary to pain  However, the pain is not only in his shoulder but right elbow, right hand, lower back, and both knees  Objective: See treatment diary below      Assessment: PROM gradually improving in all planes of motion  However, patient is stiff in all planes of motion, especially ABD and ER  Implemented pulleys for self stretching today  Tolerated treatment well  Pt required cueing throughout program  Patient would benefit from continued PT  Plan: Continue per plan of care  Precautions: Surgery scheduled with Dr Carlos Holden on 5/10/23 (left  RTC repair)       Manuals 6/19    5/30 6/1 6/5 6/8 6/12 6/15   PROM of the left shoulder c/ protocol limitations 15' PROM to tolerance    15' PROM c elbow KK 15' PROM  10' PROM 10' PROM 15' PROM   STM L pec in supine     RE                                  Neuro Re-Ed             Scap retraction 30x    30x5s 30x5s 30x5\" 30x5\" 30x5\" 30x5\"   Cervical retraction     30x 30x 30x      Serratus press AAROM 20x                                                                 Ther Ex             Pendulums 30x AP/ML    20x AP, ML 30x AP, ML 30x AP, ML 30x AP, ML 30x AP, ML 30x AP, ML   Shamar Scaption 5'             Cane ER in neutral 10x10\"            Elbow AAROM     30x 30x 30x DC     Wrist AROM     30x flex & ext  30x flex & ext   30x 3#      Gripping     30x digiflex blue 30x digiflex blue 30x      UT stretch     10sx5 10sx5 10x5\"  10x5\"     TS extension over foam roller- sitting     5sx30 5sx30 5sx30 30x5\"     Levator stretch     10sx5 10sx5 10x5\"      Supine ER " "with dowel 10x10\"      nv  10x5\"  20x5\" 10x10\"   SL ER AAROM  x10 PT assist      nv 10x PT assist 2x10 PT assist 2x10 PT assist   Supine shoulder flexion- AAROM       nv      SL shoulder abduction- AAROM       nv 10x PT assist  2x10 PT assist 2x10 PT assist   Elbow AROM 3x10      3x10 3x10 3x10 3x10   Table slide with PB -AAROM 20x5\"         20x   Hands clasp shoulder flexion in supine 10x10\"       10x 10x  10x   Ther Activity                                       Gait Training                                       Modalities                                       1:1 with PT from 1040-1133am                 "

## 2023-06-22 ENCOUNTER — OFFICE VISIT (OUTPATIENT)
Dept: PHYSICAL THERAPY | Facility: CLINIC | Age: 68
End: 2023-06-22
Payer: MEDICARE

## 2023-06-22 DIAGNOSIS — Z47.89 ORTHOPEDIC AFTERCARE: ICD-10-CM

## 2023-06-22 DIAGNOSIS — M75.102 TEAR OF LEFT SUPRASPINATUS TENDON: Primary | ICD-10-CM

## 2023-06-22 PROCEDURE — 97140 MANUAL THERAPY 1/> REGIONS: CPT | Performed by: PHYSICAL THERAPIST

## 2023-06-22 PROCEDURE — 97110 THERAPEUTIC EXERCISES: CPT | Performed by: PHYSICAL THERAPIST

## 2023-06-22 NOTE — PROGRESS NOTES
"Daily Note     Today's date: 2023  Patient name: Diana Damon  : 1955  MRN: [id number]  Referring provider: Keturah Rojas  Dx:   Encounter Diagnosis     ICD-10-CM    1  Tear of left supraspinatus tendon  M75 102       2  Orthopedic aftercare  Z47 89                      Subjective: Pt reports that he was carrying a ladder and using his surgical arm as a supporting arm  Objective: See treatment diary below      Assessment: Tolerated treatment well  Patient demonstrated fatigue post treatment and would benefit from continued PT  Pt was educated on post operative lifting instructions and that he should not be utilizing his arm to lift and carry  Pt verbalized and demonstrated understanding  He did required tactile cues to facilitate ER at the shoulder with S/L ER, he demonstrated elbow flexion as compared to shoulder ER  Plan: Continue per plan of care  Progress treament per protocol  Precautions: Surgery scheduled with Dr Nai Wynn on 5/10/23 (left  RTC repair)       Manuals 6/19 6/22   5/30 6/1 6/5 6/8 6/12 6/15   PROM of the left shoulder c/ protocol limitations 15' PROM to tolerance 15' PROM to tolerance   15' PROM c elbow KK 15' PROM  10' PROM 10' PROM 15' PROM   STM L pec in supine     RE                                  Neuro Re-Ed             Scap retraction 30x 30x   30x5s 30x5s 30x5\" 30x5\" 30x5\" 30x5\"   Cervical retraction     30x 30x 30x      Serratus press AAROM 20x  AAROM 20x                                                                Ther Ex             Pendulums 30x AP/ML 30x AP/ML   20x AP, ML 30x AP, ML 30x AP, ML 30x AP, ML 30x AP, ML 30x AP, ML   Shamar Scaption 5'  Scaption 5'            Cane ER in neutral 10x10\" 10x10\"           Elbow AAROM     30x 30x 30x DC     Wrist AROM     30x flex & ext  30x flex & ext   30x 3#      Gripping     30x digiflex blue 30x digiflex blue 30x      UT stretch     10sx5 10sx5 10x5\"  10x5\"     TS extension over foam roller- " "sitting     5sx30 5sx30 5sx30 30x5\"     Levator stretch     10sx5 10sx5 10x5\"      Supine ER with dowel 10x10\" 10x10\"     nv  10x5\"  20x5\" 10x10\"   SL ER AAROM  x10 PT assist x15 PT assist     nv 10x PT assist 2x10 PT assist 2x10 PT assist   Supine shoulder flexion- AAROM       nv      SL shoulder abduction- AAROM       nv 10x PT assist  2x10 PT assist 2x10 PT assist   Elbow AROM 3x10 3x10     3x10 3x10 3x10 3x10   Table slide with PB -AAROM 20x5\" 20x5\"        20x   Hands clasp shoulder flexion in supine 10x10\" 10x10\"      10x 10x  10x   Ther Activity                                       Gait Training                                       Modalities                                                          "

## 2023-06-23 ENCOUNTER — APPOINTMENT (OUTPATIENT)
Dept: LAB | Facility: CLINIC | Age: 68
End: 2023-06-23
Payer: MEDICARE

## 2023-06-23 DIAGNOSIS — I10 ESSENTIAL HYPERTENSION: ICD-10-CM

## 2023-06-23 DIAGNOSIS — E55.9 VITAMIN D DEFICIENCY: ICD-10-CM

## 2023-06-23 DIAGNOSIS — E78.5 HYPERLIPIDEMIA, UNSPECIFIED HYPERLIPIDEMIA TYPE: ICD-10-CM

## 2023-06-23 DIAGNOSIS — E11.9 TYPE 2 DIABETES MELLITUS WITHOUT COMPLICATION, WITHOUT LONG-TERM CURRENT USE OF INSULIN (HCC): ICD-10-CM

## 2023-06-23 LAB
25(OH)D3 SERPL-MCNC: 21.9 NG/ML (ref 30–100)
ALBUMIN SERPL BCP-MCNC: 4 G/DL (ref 3.5–5)
ALP SERPL-CCNC: 90 U/L (ref 46–116)
ALT SERPL W P-5'-P-CCNC: 20 U/L (ref 12–78)
ANION GAP SERPL CALCULATED.3IONS-SCNC: 5 MMOL/L
AST SERPL W P-5'-P-CCNC: 7 U/L (ref 5–45)
BILIRUB SERPL-MCNC: 0.44 MG/DL (ref 0.2–1)
BUN SERPL-MCNC: 22 MG/DL (ref 5–25)
CALCIUM SERPL-MCNC: 9.5 MG/DL (ref 8.3–10.1)
CHLORIDE SERPL-SCNC: 105 MMOL/L (ref 96–108)
CHOLEST SERPL-MCNC: 123 MG/DL
CO2 SERPL-SCNC: 27 MMOL/L (ref 21–32)
CREAT SERPL-MCNC: 1.15 MG/DL (ref 0.6–1.3)
EST. AVERAGE GLUCOSE BLD GHB EST-MCNC: 169 MG/DL
GFR SERPL CREATININE-BSD FRML MDRD: 65 ML/MIN/1.73SQ M
GLUCOSE P FAST SERPL-MCNC: 138 MG/DL (ref 65–99)
HBA1C MFR BLD: 7.5 %
HDLC SERPL-MCNC: 49 MG/DL
LDLC SERPL CALC-MCNC: 40 MG/DL (ref 0–100)
NONHDLC SERPL-MCNC: 74 MG/DL
POTASSIUM SERPL-SCNC: 4.1 MMOL/L (ref 3.5–5.3)
PROT SERPL-MCNC: 7.2 G/DL (ref 6.4–8.4)
SODIUM SERPL-SCNC: 137 MMOL/L (ref 135–147)
TRIGL SERPL-MCNC: 168 MG/DL

## 2023-06-23 PROCEDURE — 82306 VITAMIN D 25 HYDROXY: CPT

## 2023-06-23 PROCEDURE — 83036 HEMOGLOBIN GLYCOSYLATED A1C: CPT

## 2023-06-23 PROCEDURE — 80053 COMPREHEN METABOLIC PANEL: CPT

## 2023-06-23 PROCEDURE — 80061 LIPID PANEL: CPT

## 2023-06-23 PROCEDURE — 36415 COLL VENOUS BLD VENIPUNCTURE: CPT

## 2023-06-26 ENCOUNTER — OFFICE VISIT (OUTPATIENT)
Dept: FAMILY MEDICINE CLINIC | Facility: CLINIC | Age: 68
End: 2023-06-26
Payer: MEDICARE

## 2023-06-26 ENCOUNTER — OFFICE VISIT (OUTPATIENT)
Dept: PHYSICAL THERAPY | Facility: CLINIC | Age: 68
End: 2023-06-26
Payer: MEDICARE

## 2023-06-26 VITALS
WEIGHT: 284.5 LBS | BODY MASS INDEX: 40.73 KG/M2 | OXYGEN SATURATION: 96 % | DIASTOLIC BLOOD PRESSURE: 74 MMHG | HEIGHT: 70 IN | TEMPERATURE: 97.6 F | SYSTOLIC BLOOD PRESSURE: 124 MMHG | HEART RATE: 83 BPM

## 2023-06-26 DIAGNOSIS — I10 ESSENTIAL HYPERTENSION: ICD-10-CM

## 2023-06-26 DIAGNOSIS — E78.5 HYPERLIPIDEMIA, UNSPECIFIED HYPERLIPIDEMIA TYPE: ICD-10-CM

## 2023-06-26 DIAGNOSIS — M75.102 TEAR OF LEFT SUPRASPINATUS TENDON: Primary | ICD-10-CM

## 2023-06-26 DIAGNOSIS — E11.9 TYPE 2 DIABETES MELLITUS WITHOUT COMPLICATION, WITHOUT LONG-TERM CURRENT USE OF INSULIN (HCC): Primary | ICD-10-CM

## 2023-06-26 DIAGNOSIS — Z47.89 ORTHOPEDIC AFTERCARE: ICD-10-CM

## 2023-06-26 DIAGNOSIS — G47.00 INSOMNIA, UNSPECIFIED TYPE: ICD-10-CM

## 2023-06-26 LAB
CREAT UR-MCNC: 62 MG/DL
MICROALBUMIN UR-MCNC: 25.4 MG/L (ref 0–20)
MICROALBUMIN/CREAT 24H UR: 41 MG/G CREATININE (ref 0–30)

## 2023-06-26 PROCEDURE — 99214 OFFICE O/P EST MOD 30 MIN: CPT | Performed by: FAMILY MEDICINE

## 2023-06-26 PROCEDURE — 82570 ASSAY OF URINE CREATININE: CPT | Performed by: FAMILY MEDICINE

## 2023-06-26 PROCEDURE — 97140 MANUAL THERAPY 1/> REGIONS: CPT | Performed by: PHYSICAL THERAPIST

## 2023-06-26 PROCEDURE — 97110 THERAPEUTIC EXERCISES: CPT | Performed by: PHYSICAL THERAPIST

## 2023-06-26 PROCEDURE — 82043 UR ALBUMIN QUANTITATIVE: CPT | Performed by: FAMILY MEDICINE

## 2023-06-26 PROCEDURE — 97112 NEUROMUSCULAR REEDUCATION: CPT | Performed by: PHYSICAL THERAPIST

## 2023-06-26 NOTE — PROGRESS NOTES
"Daily Note     Today's date: 2023  Patient name: Natalia Conway  : 1955  MRN: [id number]  Referring provider: Eliane Avilez  Dx:   Encounter Diagnosis     ICD-10-CM    1  Tear of left supraspinatus tendon  M75 102       2  Orthopedic aftercare  Z47 89           Start Time: 1115  Stop Time: 1155  Total time in clinic (min): 40 minutes    Subjective: Pt offers no new complaints today  He reports the ability to use his arm more now that is sling is off  Objective: See treatment diary below      Assessment: Pt is now 6 5 weeks post op  Pt is not longer wearing sling as instructed in protocol  Initiated AROM in single planes with good mechanics  PROM is still limited in all planes but improving  Tolerated treatment fair  Patient would benefit from continued PT  Plan: Continue per plan of care        Precautions: Surgery scheduled with Dr Adriana Garrido on 5/10/23 (left  RTC repair)       Manuals           PROM of the left shoulder c/ protocol limitations 15' PROM to tolerance 15' PROM to tolerance 15' PROM to tolerance          STM L pec in supine                                       Neuro Re-Ed             Scap retraction 30x 30x 30x          Cervical retraction             Serratus press AAROM 20x  AAROM 20x            Shoulder isometrics   Flexion, ABD, IR, ER 20x ea          Row   AROM x 20           Shoulder extension   AROM 20x                        Ther Ex             Pendulums 30x AP/ML 30x AP/ML 30x AP/MP          Shamar Scaption 5'  Scaption 5'  Scaption 5'           Cane ER in neutral 10x10\" 10x10\" 10x10\"          Elbow AAROM             Wrist AROM             Gripping             UT stretch             TS extension over foam roller- sitting             Levator stretch             Supine ER with dowel 10x10\" 10x10\" 10x10\"          SL ER AROM   AROM 2x10          Supine shoulder flexion- AAROM             SL shoulder abduction- AAROM             Elbow AROM 3x10 " "3x10           Table slide with PB -AAROM 20x5\" 20x5\"           Hands clasp shoulder flexion in supine 10x10\" 10x10\"           Shoulder flexion    To 90 with feedback 2x10          Ther Activity                                       Gait Training                                       Modalities                                         1:1 with PT from [id number]                    "

## 2023-06-27 NOTE — PROGRESS NOTES
Patient ID: Luke Joel is a 75 y o  male  HPI: 75 y o male presents for follow up of niddm, hypertension, insomnia and hypercholesterolemia  Hgba1c is  7 5  and patient's issues are well controlled  Patient deneis any dyspnea, chest pain or palpitations        SUBJECTIVE    Family History   Problem Relation Age of Onset   • Heart disease Mother    • Kidney cancer Mother    • Cancer Mother    • Hypertension Father    • Bipolar disorder Sister         bipolar disorder NOS   • Diabetes Maternal Grandmother      Social History     Socioeconomic History   • Marital status: /Civil Union     Spouse name: Not on file   • Number of children: 2   • Years of education: trade school   • Highest education level: Not on file   Occupational History     Comment: employed   Tobacco Use   • Smoking status: Never     Passive exposure: Past   • Smokeless tobacco: Never   Vaping Use   • Vaping Use: Never used   Substance and Sexual Activity   • Alcohol use: Not Currently     Comment: 1 or 2 month   • Drug use: No   • Sexual activity: Not Currently     Partners: Female     Birth control/protection: Male Sterilization   Other Topics Concern   • Not on file   Social History Narrative    Always uses seat belt    Caffeine use    Daily coffee consumption    Daily cola consumption    Dental care, regularly    DENIED exercise frequency    Guns in the home:stored in locked cabinet    Multiple organ donor    Patient has living will    DENIED pets/animals    Power of  in existence    Water intake, adequate (per day)     Social Determinants of Health     Financial Resource Strain: Not on file   Food Insecurity: Not on file   Transportation Needs: Not on file   Physical Activity: Not on file   Stress: Not on file   Social Connections: Not on file   Intimate Partner Violence: Not on file   Housing Stability: Not on file     Past Medical History:   Diagnosis Date   • Allergic    • Allergic rhinitis    • Anxiety    • Asthma    • Basal cell carcinoma 04/07/2022    nose   • Diabetes mellitus ([facility] [address] )    • Disease of thyroid gland    • EIC (epidermal inclusion cyst)    • GERD (gastroesophageal reflux disease)    • Hayfever    • History of skin cancer    • HL (hearing loss)     wears hearing aides on occasion   • Hyperlipidemia    • Hypertension    • Memory loss, short term     R/O Seizures but placed on Keppra   • PONV (postoperative nausea and vomiting)     x1 occurence   • Sleep apnea     s/p gastric bypass   • Tinnitus      Past Surgical History:   Procedure Laterality Date   • ABDOMINOPLASTY     • ADENOIDECTOMY     • BACK SURGERY      lower back surgery   • CARPAL TUNNEL RELEASE Bilateral    • CHOLECYSTECTOMY     • COLONOSCOPY     • GASTRIC BYPASS     • HERNIA REPAIR Right    • JOINT REPLACEMENT Bilateral     Knee   • KNEE ARTHROSCOPY Bilateral     X3   • MOHS SURGERY  06/15/2022    nose-Dr Mathieu Jacobs   • AZ RPR 1ST INGUN HRNA AGE 5 YRS/> REDUCIBLE Left 11/26/2018    Procedure: INGUINAL HERNIA REPAIR;  Surgeon: Autumn Zhang DO;  Location: [facility];  Service: General   • AZ SURGICAL ARTHROSCOPY SHOULDER W/ROTATOR CUFF RPR Left 5/10/2023    Procedure: SHOULDER ARTHROSCOPIC ROTATOR CUFF REPAIR, SUBACROMIAL DECOMPRESSION, BICEP TENODESIS;  Surgeon: Adeola Cortes MD;  [facility];  Service: Orthopedics   • AZ TENDON SHEATH INCISION Right 04/29/2022    Procedure: RELEASE TRIGGER FINGER RING WITH LONG;  Surgeon: Raghav Mejia MD;  Location: [facility];  Service: Orthopedics   • TONSILLECTOMY     • TRIGGER FINGER RELEASE Right 04/29/2022     Allergies   Allergen Reactions   • Iv Dye [Iodinated Contrast Media] Hives   • Penicillins Other (See Comments)     ?  Had as a child-Unknown reaction       Current Outpatient Medications:   •  ALPRAZolam (XANAX) 0 25 mg tablet, Take 1 tablet (0 25 mg total) by mouth 4 (four) times a day as needed for anxiety, Disp: 120 tablet, Rfl: 0  •  amoxicillin (AMOXIL) 500 mg capsule, Take 500 mg by mouth 4 tablets 1 hour prior to procedure, Disp: , Rfl:   •  atorvastatin (LIPITOR) 40 mg tablet, , Disp: , Rfl:   •  azelastine (ASTELIN) 0 1 % nasal spray, 1-2 sprays into each nostril 2 (two) times a day as needed, Disp: , Rfl:   •  cetirizine (ZyrTEC) 10 mg tablet, Take 10 mg by mouth daily after dinner  , Disp: , Rfl:   •  clobetasol (TEMOVATE) 0 05 % cream, APPLY TO AFFECTED AREA TWICE A DAY, Disp: 60 g, Rfl: 0  •  diltiazem (DILACOR XR) 240 MG 24 hr capsule, Take 240 mg by mouth daily, Disp: , Rfl:   •  fluticasone (FLONASE) 50 mcg/act nasal spray, 2 sprays into each nostril 2 (two) times a day as needed  , Disp: , Rfl:   •  fluticasone-salmeterol (Advair HFA) 230-21 MCG/ACT inhaler, Inhale 2 puffs 2 (two) times a day, Disp: , Rfl:   •  hydrochlorothiazide (HYDRODIURIL) 25 mg tablet, Take 1 tablet by mouth daily in the early morning  , Disp: , Rfl: 3  •  ketoconazole (NIZORAL) 2 % cream, Apply 1 application   topically 2 (two) times a day To affected area, Disp: 60 g, Rfl: 0  •  levalbuterol (XOPENEX HFA) 45 mcg/act inhaler, , Disp: , Rfl:   •  levETIRAcetam (KEPPRA) 500 mg tablet, TAKE 1 TABLET BY MOUTH TWICE A DAY, Disp: 180 tablet, Rfl: 1  •  losartan (COZAAR) 25 mg tablet, Take 25 mg by mouth daily, Disp: , Rfl:   •  metFORMIN (GLUCOPHAGE) 1000 MG tablet, TAKE 1 TABLET BY MOUTH TWICE A DAY WITH MEALS, Disp: 180 tablet, Rfl: 3  •  montelukast (SINGULAIR) 10 mg tablet, Take 10 mg by mouth every evening, Disp: , Rfl: 3  •  oxybutynin (DITROPAN) 5 mg tablet, TAKE 1 TABLET BY MOUTH EVERY DAY AT NIGHT, Disp: , Rfl:   •  oxyCODONE-acetaminophen (Percocet) 7 5-325 MG per tablet, Take 1 tablet by mouth every 6 (six) hours as needed for moderate pain Max Daily Amount: 4 tablets, Disp: 120 tablet, Rfl: 0  •  Pseudoeph-Doxylamine-DM-APAP (NYQUIL PO), Take by mouth as needed , Disp: , Rfl:   •  pseudoephedrine (SUDAFED) 30 mg tablet, Take 60 mg by mouth every 4 (four) hours as needed for congestion, Disp: , "Rfl:   •  tamsulosin (FLOMAX) 0 4 mg, Take 0 4 mg by mouth daily with dinner, Disp: , Rfl:   •  zolpidem (AMBIEN) 10 mg tablet, Take 1 tablet (10 mg total) by mouth daily at bedtime as needed for sleep, Disp: 30 tablet, Rfl: 0  •  Cholecalciferol 2000 units CAPS, Take 1 capsule by mouth daily after dinner   (Patient not taking: Reported on 6/26/2023), Disp: , Rfl:   •  Cyanocobalamin (B-12) 1000 MCG CAPS, Take 1 tablet by mouth daily after dinner   (Patient not taking: Reported on 6/26/2023), Disp: , Rfl:     Review of Systems  Constitutional:     Denies fever, chills ,fatigue ,weakness ,weight loss, weight gain     ENT: Denies earache ,loss of hearing ,nosebleed, nasal discharge,nasal congestion ,sore throat ,hoarseness  Pulmonary: Denies shortness of breath ,cough  ,dyspnea on exertion, orthopnea  ,PND   Cardiovascular:  Denies bradycardia , tachycardia  ,palpations, lower extremity edema leg, claudication  Breast:  Denies new or changing breast lumps ,nipple discharge ,nipple changes  Abdomen:  Denies abdominal pain , anorexia , indigestion, nausea, vomiting, constipation, diarrhea  Musculoskeletal: Denies myalgias, arthralgias, joint swelling, joint stiffness , limb pain, limb swelling  Gu: denies dysuria, polyuria  Skin: Denies skin rash, skin lesion, skin wound, itching, dry skin  Neuro: Denies headache, numbness, tingling, confusion, loss of consciousness, dizziness, vertigo  Psychiatric: Denies feelings of depression, suicidal ideation, anxiety, sleep disturbances    OBJECTIVE  /74   Pulse 83   Temp 97 6 °F (36 4 °C)   Ht 5' 10\" (1 778 m)   Wt 129 kg (284 lb 8 oz)   SpO2 96%   BMI 40 82 kg/m²   Constitutional:   NAD, well appearing and well nourished      ENT:   Conjunctiva and lids: no injection, edema, or discharge     Pupils and iris: MARY bilaterally    External inspection of ears and nose: normal without deformities or discharge  Otoscopic exam: Canals patent without erythema       " Nasal mucosa, septum and turbinates: Normal or edema or discharge         Oropharynx:  Moist mucosa, normal tongue and tonsils without lesions  No erythema        Pulmonary:Respiratory effort normal rate and rhythm, no increased work of breathing  Auscultation of lungs:  Clear bilaterally with no adventitious breath sounds       Cardiovascular: regular rate and rhythm, S1 and S2, no murmur, no edema and/or varicosities of LE      Abdomen: Soft and non-distended     Positive bowel sounds      No heptomegaly or splenomegaly      Gu: no suprapubic tenderness or CVA tenderness, no urethral discharge  Lymphatic:  No anterior or posterior cervical lymphadenopathy         Musculoskeletal:  Gait and station: Normal gait    Digits and nails normal without clubbing or cyanosis       Inspection/palpation of joints, bones, and muscles:  No joint tenderness, swelling, full active and passive range of motion       Skin: Normal skin turgor and no rashes      Neuro:    Normal reflexes     Psych:   alert and oriented to person, place and time     normal mood and affect   Patient's shoes and socks removed  Right Foot/Ankle   Right Foot Inspection  Skin Exam: skin normal and skin intact  No dry skin, no warmth, no callus, no erythema, no maceration, no abnormal color, no pre-ulcer, no ulcer and no callus  Toe Exam: ROM and strength within normal limits  No swelling, no tenderness and erythema    Sensory   Vibration: intact  Proprioception: intact  Monofilament testing: intact    Vascular  Capillary refills: < 3 seconds  The right DP pulse is 2+  The right PT pulse is 2+  Left Foot/Ankle  Left Foot Inspection  Skin Exam: skin normal and skin intact  No dry skin, no warmth, no erythema, no maceration, normal color, no pre-ulcer, no ulcer and no callus  Toe Exam: ROM and strength within normal limits  No swelling, no tenderness, no erythema and no left toe deformity       Sensory   Vibration: intact  Proprioception: intact  Monofilament testing: intact    Vascular  Capillary refills: < 3 seconds  The left DP pulse is 2+  The left PT pulse is 2+  Assign Risk Category  No deformity present  No loss of protective sensation  No weak pulses  Risk: 0      Assessment/Plan:Diagnoses and all orders for this visit:    Type 2 diabetes mellitus without complication, without long-term current use of insulin (HCC)  -     Albumin / creatinine urine ratio; Future  -     Ambulatory Referral to Ophthalmology; Future  -     Hemoglobin A1C; Future  -     Albumin / creatinine urine ratio    Essential hypertension  -     Comprehensive metabolic panel; Future    Hyperlipidemia, unspecified hyperlipidemia type  -     Lipid Panel with Direct LDL reflex; Future    Insomnia, unspecified type        I will see patient back in 4 mos or sooner prn

## 2023-06-29 ENCOUNTER — OFFICE VISIT (OUTPATIENT)
Dept: PHYSICAL THERAPY | Facility: CLINIC | Age: 68
End: 2023-06-29
Payer: MEDICARE

## 2023-06-29 DIAGNOSIS — M75.102 TEAR OF LEFT SUPRASPINATUS TENDON: Primary | ICD-10-CM

## 2023-06-29 DIAGNOSIS — Z47.89 ORTHOPEDIC AFTERCARE: ICD-10-CM

## 2023-06-29 PROCEDURE — 97110 THERAPEUTIC EXERCISES: CPT | Performed by: PHYSICAL THERAPIST

## 2023-06-29 PROCEDURE — 97140 MANUAL THERAPY 1/> REGIONS: CPT | Performed by: PHYSICAL THERAPIST

## 2023-06-29 NOTE — PROGRESS NOTES
[id number]    Today's date: 2023  Patient name: Brianna Eagle  : 1955  MRN: [id number]  Referring provider: Cesar Ann  Dx:   Encounter Diagnosis     ICD-10-CM    1  Tear of left supraspinatus tendon  M75 102       2  Orthopedic aftercare  Z47 89           Start Time: 1050  Stop Time: 1145  Total time in clinic (min): 55 minutes    Assessment  Assessment details: Brianna Eagle is a 75 y o  male who presents 7 weeks s/p L RTC repairs, biceps tenodesis, and subacromial decompression with Dr Kendall Grider on 5/10/23  Pt is progressing well through  [address]  PROM has improved in all planes of motion  Pt continues to be limited in all planes  We initiated AROM last week with good mechanics  Pt still has superior migration of humeral head indicating decreased motor control of the RTC  However, patient is much more functional  He reports only mild pain at worst in his shoulder  He notes more elbow/wrist pain over the past couple of days compared to the involved shoulder  Patient would benefit from skilled physical therapy to address the impairments, improve their level of function, and to improve their overall quality of life  Impairments: abnormal muscle firing, abnormal or restricted ROM, abnormal movement, activity intolerance, impaired physical strength, lacks appropriate home exercise program, pain with function and weight-bearing intolerance  Understanding of Dx/Px/POC: good   Prognosis: good    Goals  Short Term Goals: to be achieved by 4 weeks- AFTER SURGERY  1) Patient to be independent with basic HEP -MET  2) Decrease pain to 2/10 at its worst -Partially met  3) Increase shoulder PROM by 5-10 degrees -Partially mte    Long Term Goals: to be achieved by discharge-AFTER SURGERY  1) FOTO equal to or greater than expected   -Partially met  2) Patient to be independent with comprehensive HEP  -Partially met  3) Abolish pain for improved quality of life -Partially met  4) Increase shoulder ROM to within functional limits to improve a/iadls -Partially met  5) Increase shoulder strength to 5/5 MMT grade in all planes to improve a/iadls  -Partially met       Plan  Patient would benefit from: PT eval and skilled physical therapy  Planned modality interventions: cryotherapy, TENS and thermotherapy: hydrocollator packs  Planned therapy interventions: joint mobilization, manual therapy, neuromuscular re-education, patient education, therapeutic activities, therapeutic exercise, transfer training, home exercise program, self care and balance  Frequency: 2x per week for 4-6 weeks-AFTER SURGERY  Treatment plan discussed with: patient        Subjective Evaluation    History of Present Illness  Pt arrives to PT 5 days post op L RTC repair and biceps tenodesis  Pt arrives in sling without abduction pillow  Pain is well controlled  Pt is taking oxycodone for pain  Pt is sleeping well  Pt denies any numbness/tingling  Pt is independent with bathing and dressing  He has his wife for assistance    _____________________________________  Mechanism of injury: surgery  Mechanism of injury: History of Current Injury: Pt fell in January on his left shoulder while walking with his left dog  Pt had MRI of L shoulder which identified full thickness RTC tear of supraspinatus  Pt is scheduled with Dr Patrice Kirk on 5/10/23 for left rotator cuff repair  He is here for pre-op examination  Pt denies any numbness or tingling but does have some radiation of pain  Pt lives at home with his wife  He will have the assistance of his wife during the recovery process  Pain location/Descriptors: Globally around left shoulder  Aggravating factors: Sleeping, lifting, reaching  Easing factors: Oxycodone for pain  Imaging: MRI of the L shoulder: Full thickness supraspinatus tear  Patient goals: Improve function in left shoulder  Hobbies/Interest: Playing with puppy, archery hunting  Pt has 3 grandchildren and 1 on the way  Occupation: Retired  Pain  Current pain ratin  At worst pain ratin    Hand dominance: right      Diagnostic Tests  MRI studies: abnormal  Treatments  No previous or current treatments  Patient Goals  Patient goals for therapy: increased strength, increased motion, independence with ADLs/IADLs and decreased pain          Objective     Incision: Intact with steri-strips     Active Range of Motion   Left Shoulder   Flexion: NT   Extension: NT  Abduction:NT  External rotation 0°:NT  Internal rotation BTB: NT    Passive Range of Motion   Left Shoulder   Flexion: 150 degrees   Abduction: 155 degrees (scaption)  External rotation 45°: 55 degrees   Internal rotation 45°: 60    Active Range of Motion   Left Shoulder   Flexion: 120 degrees (sup migration of HH)   Abduction: 100 degrees (scaption)  External rotation 0°: 55 degrees   Internal rotation BTB: Left buttock     Strength/Myotome Testing     Left Shoulder     Planes of Motion   Flexion: NT   Abduction: NT   External rotation at 0°: NT   Internal rotation at 0°: NT    Isolated Muscles   Biceps: NT   Triceps: NT     Elbow PROM:   Full extension, full flexion      Sensation intact  Distal pulses intact  Moderate swelling in biceps      strength:   Left: 45 #  Right: 70 #        Precautions: Surgery scheduled with Dr Olimpia Garcia on 5/10/23 (left  RTC repair)       Manuals          PROM of the left shoulder c/ protocol limitations 15' PROM to tolerance 15' PROM to tolerance 15' PROM to tolerance 15' PROM to tolerance         STM L pec in supine                                       Neuro Re-Ed             Scap retraction 30x 30x 30x 30x         Cervical retraction             Serratus press AAROM 20x  AAROM 20x            Shoulder isometrics   Flexion, ABD, IR, ER 20x ea          Row   AROM x 20           Shoulder extension   AROM 20x                        Ther Ex             Pendulums 30x AP/ML 30x AP/ML 30x AP/MP 30x AP/MP         Pulley "Scaption 5'  Scaption 5'  Scaption 5'  Scaption 5'          Cane ER in neutral 10x10\" 10x10\" 10x10\" 10x10\"         Elbow AAROM             Wrist AROM             Gripping             UT stretch             TS extension over foam roller- sitting             Levator stretch             Supine ER with dowel 10x10\" 10x10\" 10x10\" 10x10\"         SL ER AROM   AROM 2x10 3x10         Supine shoulder flexion- AAROM             SL shoulder abduction- AAROM             Elbow AROM 3x10 3x10           Table slide with PB -AAROM 20x5\" 20x5\"           Hands clasp shoulder flexion in supine 10x10\" 10x10\"           Shoulder flexion    To 90 with feedback 2x10 2x10         IR BTB stretch    10x10\" with strap          Ther Activity                                       Gait Training                                       Modalities                                         1:1 with PT from [id number]  Pt was re-evaluated today                     "

## 2023-07-03 ENCOUNTER — OFFICE VISIT (OUTPATIENT)
Dept: PHYSICAL THERAPY | Facility: CLINIC | Age: 68
End: 2023-07-03
Payer: MEDICARE

## 2023-07-03 DIAGNOSIS — M75.102 TEAR OF LEFT SUPRASPINATUS TENDON: Primary | ICD-10-CM

## 2023-07-03 DIAGNOSIS — Z47.89 ORTHOPEDIC AFTERCARE: ICD-10-CM

## 2023-07-03 PROCEDURE — 97140 MANUAL THERAPY 1/> REGIONS: CPT | Performed by: PHYSICAL THERAPIST

## 2023-07-03 PROCEDURE — 97110 THERAPEUTIC EXERCISES: CPT | Performed by: PHYSICAL THERAPIST

## 2023-07-03 NOTE — PROGRESS NOTES
Daily Note     Today's date: 7/3/2023  Patient name: Yumiko Barrientos  : 1955  MRN: [id number]  Referring provider: Sydnee Arenas  Dx:   Encounter Diagnosis     ICD-10-CM    1. Tear of left supraspinatus tendon  M75.102       2. Orthopedic aftercare  Z47.89           Start Time: 1115  Stop Time: 1200  Total time in clinic (min): 45 minutes    Subjective: Pt offers no new complaints this morning. Objective: See treatment diary below      Assessment: Pt is 8 weeks post op and progressing well. Tolerated treatment well. PROM and controlled AROM improving nicely in a pain free manner. Patient exhibited good technique with therapeutic exercises and would benefit from continued PT. Plan: Continue per plan of care.       Precautions: Surgery scheduled with Dr. Gerardo Suazo on 5/10/23 (left  RTC repair)       Manuals  7/3        PROM of the left shoulder c/ protocol limitations 15' PROM to tolerance 15' PROM to tolerance 15' PROM to tolerance 15' PROM to tolerance 15' PROM to tolerance                                  Neuro Re-Ed             Scap retraction 30x 30x 30x 30x         Serratus press AAROM 20x  AAROM 20x            Shoulder isometrics   Flexion, ABD, IR, ER 20x ea          Row   AROM x 20   20x        Shoulder extension   AROM 20x   20x                     Ther Ex             Pendulums 30x AP/ML 30x AP/ML 30x AP/MP 30x AP/MP         Shamar Scaption 5'  Scaption 5'  Scaption 5'  Scaption 5'  Scaption 5'         Cane ER in neutral 10x10" 10x10" 10x10" 10x10" 10x10"        Supine ER with dowel 10x10" 10x10" 10x10" 10x10" 10x10"        SL ER AROM   AROM 2x10 3x10 3x10        SL shoulder abduction     2x10        Sleeper stretch     10x10"        Elbow AROM 3x10 3x10           Table slide with PB -AAROM 20x5" 20x5"           Hands clasp shoulder flexion in supine 10x10" 10x10"           Shoulder flexion    To 90 with feedback 2x10 2x10 2x10        IR BTB stretch    10x10" with strap  10x10" with strap         Ther Activity                                       Gait Training                                       Modalities                                         1:1 with PT from 1115-12PM

## 2023-07-07 ENCOUNTER — OFFICE VISIT (OUTPATIENT)
Dept: PHYSICAL THERAPY | Facility: CLINIC | Age: 68
End: 2023-07-07
Payer: MEDICARE

## 2023-07-07 DIAGNOSIS — Z47.89 ORTHOPEDIC AFTERCARE: ICD-10-CM

## 2023-07-07 DIAGNOSIS — M75.102 TEAR OF LEFT SUPRASPINATUS TENDON: Primary | ICD-10-CM

## 2023-07-07 PROCEDURE — 97110 THERAPEUTIC EXERCISES: CPT | Performed by: PHYSICAL THERAPIST

## 2023-07-07 PROCEDURE — 97140 MANUAL THERAPY 1/> REGIONS: CPT | Performed by: PHYSICAL THERAPIST

## 2023-07-07 NOTE — PROGRESS NOTES
Daily Note     Today's date: 2023  Patient name: Mario Maria  : 1955  MRN: [id number]  Referring provider: Yessi Lindsey  Dx:   Encounter Diagnosis     ICD-10-CM    1. Tear of left supraspinatus tendon  M75.102       2. Orthopedic aftercare  Z47.89                      Subjective: Pt reports having terrible left hip pain after cutting his grass. Objective: See treatment diary below      Assessment: Pt is 8 weeks post op and ROM is progressing both actively and passively. Pt does seem to be doing a lot at home. I cautioned him that he should not be lifting more than a 2# coffee cup as outlined in Dr. Les Singh protocol. He is feeling fine overall. Tolerated treatment well. Patient exhibited good technique with therapeutic exercises and would benefit from continued PT. Plan: Continue per plan of care.       Precautions: Surgery scheduled with Dr. Hallie Singh on 5/10/23 (left  RTC repair)       Manuals 6/19 6/22 6/26 6/29 7/3 7/7       PROM of the left shoulder c/ protocol limitations 15' PROM to tolerance 15' PROM to tolerance 15' PROM to tolerance 15' PROM to tolerance 15' PROM to tolerance 10' PROM to tolerance                                 Neuro Re-Ed             Scap retraction 30x 30x 30x 30x         Serratus press AAROM 20x  AAROM 20x            Shoulder isometrics   Flexion, ABD, IR, ER 20x ea          Row   AROM x 20   20x 30x       Shoulder extension   AROM 20x   20x 30x       Horizontal abd - bent over      2x10       Ther Ex             Pendulums 30x AP/ML 30x AP/ML 30x AP/MP 30x AP/MP         Shamar Scaption 5'  Scaption 5'  Scaption 5'  Scaption 5'  Scaption 5'  Scaption 5'        Cane ER in neutral 10x10" 10x10" 10x10" 10x10" 10x10" 10x10"       Supine ER with dowel 10x10" 10x10" 10x10" 10x10" 10x10" 10x10"       SL ER AROM   AROM 2x10 3x10 3x10 3x10       SL shoulder abduction     2x10 2x10       Sleeper stretch     10x10"        Elbow AROM 3x10 3x10           Table slide with PB -AAROM 20x5" 20x5"           Hands clasp shoulder flexion in supine 10x10" 10x10"           Shoulder flexion    To 90 with feedback 2x10 2x10 2x10 2x10       IR BTB stretch    10x10" with strap  10x10" with strap  10x10" with strap       Shoulder extension      10x10"       Ther Activity                                       Gait Training                                       Modalities                                         1:1 with PT from 1115-12PM

## 2023-07-10 ENCOUNTER — OFFICE VISIT (OUTPATIENT)
Dept: PHYSICAL THERAPY | Facility: CLINIC | Age: 68
End: 2023-07-10
Payer: MEDICARE

## 2023-07-10 DIAGNOSIS — M75.102 TEAR OF LEFT SUPRASPINATUS TENDON: Primary | ICD-10-CM

## 2023-07-10 DIAGNOSIS — Z47.89 ORTHOPEDIC AFTERCARE: ICD-10-CM

## 2023-07-10 DIAGNOSIS — F41.9 ANXIETY: ICD-10-CM

## 2023-07-10 PROCEDURE — 97140 MANUAL THERAPY 1/> REGIONS: CPT | Performed by: PHYSICAL THERAPIST

## 2023-07-10 PROCEDURE — 97110 THERAPEUTIC EXERCISES: CPT | Performed by: PHYSICAL THERAPIST

## 2023-07-10 NOTE — PROGRESS NOTES
Daily Note     Today's date: 7/10/2023  Patient name: Nickie Medina  : 1955  MRN: [id number]  Referring provider: Abril Dunne  Dx:   Encounter Diagnosis     ICD-10-CM    1. Tear of left supraspinatus tendon  M75.102       2. Orthopedic aftercare  Z47.89           Start Time: 1115  Stop Time: 1200  Total time in clinic (min): 45 minutes    Subjective: Pt offers no new complaints today. Objective: See treatment diary below      Assessment: Tolerated treatment well. Patient exhibited good technique with therapeutic exercises and would benefit from continued PT. Plan: Continue per plan of care.       Precautions: Surgery scheduled with Dr. Jonathon Mackenzie on 5/10/23 (left  RTC repair)       Manuals 6/19 6/22 6/26 6/29 7/3 7/7 7/10      PROM of the left shoulder c/ protocol limitations 15' PROM to tolerance 15' PROM to tolerance 15' PROM to tolerance 15' PROM to tolerance 15' PROM to tolerance 10' PROM to tolerance 10' PROM to tolerance                                Neuro Re-Ed             Scap retraction 30x 30x 30x 30x         Serratus press AAROM 20x  AAROM 20x            Shoulder isometrics   Flexion, ABD, IR, ER 20x ea          Row   AROM x 20   20x 30x 30x      Shoulder extension   AROM 20x   20x 30x 30x      Horizontal abd - bent over      2x10 2x10      Ther Ex             Pendulums 30x AP/ML 30x AP/ML 30x AP/MP 30x AP/MP         Shamar Scaption 5'  Scaption 5'  Scaption 5'  Scaption 5'  Scaption 5'  Scaption 5'        Cane ER in neutral 10x10" 10x10" 10x10" 10x10" 10x10" 10x10" 10x10"      Supine ER with dowel 10x10" 10x10" 10x10" 10x10" 10x10" 10x10"       SL ER AROM   AROM 2x10 3x10 3x10 3x10 3x10      SL shoulder abduction     2x10 2x10 2x10      Sleeper stretch     10x10"        Elbow AROM 3x10 3x10           Table slide with PB -AAROM 20x5" 20x5"           Hands clasp shoulder flexion in supine 10x10" 10x10"           Shoulder flexion -Standing   To 90 with feedback 2x10 2x10 2x10 2x10 2x10      IR BTB stretch    10x10" with strap  10x10" with strap  10x10" with strap 10x10" with strap      Shoulder extension      10x10" 10x10"      Ther Activity                                       Gait Training                                       Modalities                                         1:1 with PT from 1115-12pm

## 2023-07-10 NOTE — TELEPHONE ENCOUNTER
1 [id number] 06/20/2023 06/15/2023 Zolpidem Tartrate (Tablet) 30.0 30 10 MG NA BUSHRA VENEGAS [facility], L.LShrutiCShruti [place] 0 / 0 [place]    1 [id number] 06/19/2023 06/19/2023 ACETAMINOPHEN 325 MG / oxyCODONE HYDROCHLORIDE 7.5 MG ORAL TABLET (Tablet) 120.0 30 7.5 MG/325.0 MG 45.0 BUSH[address] [facility], Web[facility] 0 / 0 [place]    1 [id number] 06/07/2023 06/07/2023 ALPRAZolam (Tablet) 120.0 30 0.25 MG NA PARAS STEARNS [facility], L.LShrutiCShruti [place] 0 / 0 [place]    1 [id number] 05/24/2023 05/22/2023 Zolpidem Tartrate (Tablet) 30.0 30 10 MG [facility], L.L.CShruti [place] 0 / 0 [place]    1 [id number] 05/22/2023 05/22/2023 ACETAMINOPHEN 325 MG / oxyCODONE HYDROCHLORIDE 7.5 MG ORAL TABLET (Tablet) 120.0 30 7.5 MG/325.0 MG 45.0 BUSHRADODIE VENEGAS [facility], L.LShrutiCShruti [place] 0 / 0 [place]    1 [id number] 05/07/2023 05/02/2023 ALPRAZolam (Tablet) 120.0 30 0.25 MG NA [facility] L.L.CShruti [place] 0 / 0 [place]    1 [id number] 04/27/2023 04/27/2023 Zolpidem Tartrate (Tablet) 30.0 30 10 MG NA [facility], L.L.CShruti [place] 0 / 0 [place]    1 [id number] 04/24/2023 04/14/2023 ACETAMINOPHEN 325 MG / oxyCODONE HYDROCHLORIDE 7.5 MG ORAL TABLET (Tablet) 120.0 30 7.5 MG/325.0 MG 45.0 [facility] L.L.CShruti [place] 0 / 0 [place]    1 [id number] 04/18/2023 04/14/2023 diazePAM (Tablet) 1.0 1 10 MG NA [facility] L.LShrutiC. Private [place] 0 / 0 [place]    1 [id number] 04/02/2023 03/28/2023 ACETAMINOPHEN 325 MG / oxyCODONE HYDROCHLORIDE 7.5 MG ORAL TABLET (Tablet) 60.0 15 7.5 MG/325.0 MG 45.0 [facility]USHRA [facility], LShruti LShrutiC

## 2023-07-11 RX ORDER — ALPRAZOLAM 0.25 MG/1
0.25 TABLET ORAL 4 TIMES DAILY PRN
Qty: 120 TABLET | Refills: 0 | Status: SHIPPED | OUTPATIENT
Start: 2023-07-11

## 2023-07-12 LAB
LEFT EYE DIABETIC RETINOPATHY: NORMAL
RIGHT EYE DIABETIC RETINOPATHY: NORMAL
SEVERITY (EYE EXAM): NORMAL

## 2023-07-12 NOTE — PROGRESS NOTES
Daily Note     Today's date: 2023  Patient name: Jose Zhao  : 1955  MRN: [id number]  Referring provider: Kristopher Milian  Dx: No diagnosis found. Subjective: {Patient daily report:91145::"The patient reports no new complaints today."} The patient reports {improvement/worsenin} in symptoms since previous session. Objective: See treatment diary below      Assessment: The PT {continuation status:} *** during today's session. {SRB. program changes:82533} to promote ***. The patient tolerated manual and active treatment {Tolerated treatment :1293503401} today. The patient {would/not:89564} benefit from further skilled PT services.       Plan: {PLAN:0139469375}     Precautions: Surgery scheduled with Dr. Sid Davalos on 5/10/23 (left  RTC repair)       Manuals 6/19 6/22 6/26 6/29 7/3 7/7 7/10      PROM of the left shoulder c/ protocol limitations 15' PROM to tolerance 15' PROM to tolerance 15' PROM to tolerance 15' PROM to tolerance 15' PROM to tolerance 10' PROM to tolerance 10' PROM to tolerance                                Neuro Re-Ed             Scap retraction 30x 30x 30x 30x         Serratus press AAROM 20x  AAROM 20x            Shoulder isometrics   Flexion, ABD, IR, ER 20x ea          Row   AROM x 20   20x 30x 30x      Shoulder extension   AROM 20x   20x 30x 30x      Horizontal abd - bent over      2x10 2x10      Ther Ex             Pendulums 30x AP/ML 30x AP/ML 30x AP/MP 30x AP/MP         Shamar Scaption 5'  Scaption 5'  Scaption 5'  Scaption 5'  Scaption 5'  Scaption 5'        Cane ER in neutral 10x10" 10x10" 10x10" 10x10" 10x10" 10x10" 10x10"      Supine ER with dowel 10x10" 10x10" 10x10" 10x10" 10x10" 10x10"       SL ER AROM   AROM 2x10 3x10 3x10 3x10 3x10      SL shoulder abduction     2x10 2x10 2x10      Sleeper stretch     10x10"        Elbow AROM 3x10 3x10           Table slide with PB -AAROM 20x5" 20x5"           Hands clasp shoulder flexion in supine 10x10" 10x10"           Shoulder flexion -Standing   To 90 with feedback 2x10 2x10 2x10 2x10 2x10      IR BTB stretch    10x10" with strap  10x10" with strap  10x10" with strap 10x10" with strap      Shoulder extension      10x10" 10x10"      Ther Activity                                       Gait Training                                       Modalities                                         1:1 with PT from 1115-12pm

## 2023-07-13 ENCOUNTER — APPOINTMENT (OUTPATIENT)
Dept: PHYSICAL THERAPY | Facility: CLINIC | Age: 68
End: 2023-07-13
Payer: MEDICARE

## 2023-07-14 DIAGNOSIS — G89.29 CHRONIC LEFT SHOULDER PAIN: ICD-10-CM

## 2023-07-14 DIAGNOSIS — M25.512 CHRONIC LEFT SHOULDER PAIN: ICD-10-CM

## 2023-07-14 DIAGNOSIS — G47.00 INSOMNIA, UNSPECIFIED TYPE: ICD-10-CM

## 2023-07-14 RX ORDER — OXYCODONE AND ACETAMINOPHEN 7.5; 325 MG/1; MG/1
1 TABLET ORAL EVERY 6 HOURS PRN
Qty: 120 TABLET | Refills: 0 | Status: SHIPPED | OUTPATIENT
Start: 2023-07-14

## 2023-07-14 RX ORDER — ZOLPIDEM TARTRATE 10 MG/1
10 TABLET ORAL
Qty: 30 TABLET | Refills: 3 | Status: SHIPPED | OUTPATIENT
Start: 2023-07-14

## 2023-07-14 NOTE — TELEPHONE ENCOUNTER
1 [id number] 07/11/2023 07/11/2023 ALPRAZolam (Tablet) 120.0 30 0.25 MG NA BUSHRA VENEGAS [facility]..[facility]. [place] 0 / 0 [place]    1 [id number] 06/20/2023 06/15/2023 Zolpidem Tartrate (Tablet) 30.0 30 10 MG NA BUSHRA VENEGAS [facility], [facility].C. Medicare 0 / 0 [place]    1 [id number] 06/19/2023 06/19/2023 ACETAMINOPHEN 325 MG / oxyCODONE HYDROCHLORIDE 7.5 MG ORAL TABLET (Tablet) 120.0 30 7.5 MG/325.0 MG 45.0 BUSHRA THEO [facility], [place]. [facility] 0 / 0 [place]    1 [id number] 06/07/2023 06/07/2023 ALPRAZolam (Tablet) 120.0 30 0.25 MG NA PARAS STEARNS [facility], ..C. [place] 0 / 0 [place]    1 [id number] 05/24/2023 05/22/2023 Zolpidem Tartrate (Tablet) 30.0 30 10 MG NA BUSHRA VENEGAS [facility], ..C. [place] 0 / 0 [place]    1 [id number] 05/22/2023 05/22/2023 ACETAMINOPHEN 325 MG / oxyCODONE HYDROCHLORIDE 7.5 MG ORAL TABLET (Tablet) 120.0 30 7.5 MG/325.0 MG 45.0 BUSHRA VENEGAS [facility], ..C. [place] 0 / [place]    1 [id number] 05/07/2023 05/02/2023 ALPRAZolam (Tablet) 120.0 30 0.25 MG NA BUSHRA VENEGAS [facility], L..C. [place] / [place]    1 [id number] 04/27/2023 04/27/2023 Zolpidem Tartrate (Tablet) 30.0 30 10 MG NA BUSHRA VENEGAS [facility], ..C. [place] 0 / 0 [place]    1 [id number] 04/24/2023 04/14/2023 ACETAMINOPHEN 325 MG / oxyCODONE HYDROCHLORIDE 7.5 MG ORAL TABLET (Tablet) 120.0 30 7.5 MG/325.0 MG 45.0 BUSHRA [facility], L.L.CShruti [place] 0 / 0 [place]    1 [id number] 04/18/2023 04/14/2023 diazePAM (Tablet) 1.0 1 10 MG NA [facility], [facility].L.[facility].

## 2023-07-14 NOTE — TELEPHONE ENCOUNTER
1 [id number] 07/11/2023 07/11/2023 ALPRAZolam (Tablet) 120.0 30 0.25 MG NA BUSHRA VENEGAS [facility], [facility]..C. [place] 0 / 0 [place]    1 [id number] 06/20/2023 06/15/2023 Zolpidem Tartrate (Tablet) 30.0 30 10 MG NA BUSHRA VENEGAS [facility], [facility].C. [place] 0 / 0 [place]    1 [id number] 06/19/2023 06/19/2023 ACETAMINOPHEN 325 MG / oxyCODONE HYDROCHLORIDE 7.5 MG ORAL TABLET (Tablet) 120.0 30 7.5 MG/325.0 MG 45.0 BUSHRA THEO [facility] 0 / 0 [place]    1 [id number] 06/07/2023 06/07/2023 ALPRAZolam (Tablet) 120.0 30 0.25 MG NA PARAS STEARNS [facility], ..C. [place] 0 / 0 [place]    1 [id number] 05/24/2023 05/22/2023 Zolpidem Tartrate (Tablet) 30.0 30 10 MG NA BUSHRA VENEGAS [facility], ..C. [place] 0 / 0 [place]    1 [id number] 05/22/2023 05/22/2023 ACETAMINOPHEN 325 MG / oxyCODONE HYDROCHLORIDE 7.5 MG ORAL TABLET (Tablet) 120.0 30 7.5 MG/325.0 MG 45.0 BUSHRA VENEGAS [facility], ..C. [place] 0 / 0 [place]    1 [id number] 05/07/2023 05/02/2023 ALPRAZolam (Tablet) 120.0 30 0.25 MG NA BUSHRA VENEGAS [facility], [facility]..[facility]. [place] 0 / 0 [place]    1 [id number] 04/27/2023 04/27/2023 Zolpidem Tartrate (Tablet) 30.0 30 10 MG NA BUSHRA VENEGAS [facility], ..C. [place] 0 / 0 [place]    1 [id number] 04/24/2023 04/14/2023 ACETAMINOPHEN 325 MG / oxyCODONE HYDROCHLORIDE 7.5 MG ORAL TABLET (Tablet) 120.0 30 7.5 MG/325.0 MG 45.0 BUSHRA [facility], L.L.CShruti [place] 0 / 0 [place]    1 [id number] 04/18/2023 04/14/2023 diazePAM (Tablet) 1.0 1 10 [facility]

## 2023-07-17 ENCOUNTER — OFFICE VISIT (OUTPATIENT)
Dept: PHYSICAL THERAPY | Facility: CLINIC | Age: 68
End: 2023-07-17
Payer: MEDICARE

## 2023-07-17 ENCOUNTER — OFFICE VISIT (OUTPATIENT)
Dept: OBGYN CLINIC | Facility: OTHER | Age: 68
End: 2023-07-17

## 2023-07-17 VITALS
SYSTOLIC BLOOD PRESSURE: 110 MMHG | DIASTOLIC BLOOD PRESSURE: 72 MMHG | HEIGHT: 70 IN | BODY MASS INDEX: 40.09 KG/M2 | HEART RATE: 80 BPM | WEIGHT: 280 LBS

## 2023-07-17 DIAGNOSIS — Z47.89 AFTERCARE FOLLOWING SURGERY OF THE MUSCULOSKELETAL SYSTEM: Primary | ICD-10-CM

## 2023-07-17 DIAGNOSIS — M75.102 TEAR OF LEFT SUPRASPINATUS TENDON: Primary | ICD-10-CM

## 2023-07-17 DIAGNOSIS — Z47.89 ORTHOPEDIC AFTERCARE: ICD-10-CM

## 2023-07-17 PROCEDURE — 97110 THERAPEUTIC EXERCISES: CPT | Performed by: PHYSICAL THERAPIST

## 2023-07-17 PROCEDURE — 99024 POSTOP FOLLOW-UP VISIT: CPT | Performed by: PHYSICIAN ASSISTANT

## 2023-07-17 PROCEDURE — 97112 NEUROMUSCULAR REEDUCATION: CPT | Performed by: PHYSICAL THERAPIST

## 2023-07-17 PROCEDURE — 97140 MANUAL THERAPY 1/> REGIONS: CPT | Performed by: PHYSICAL THERAPIST

## 2023-07-17 NOTE — PROGRESS NOTES
Daily Note     Today's date: 2023  Patient name: Madison Perdomo  : 1955  MRN: [id number]  Referring provider: Marylen Niemann  Dx:   Encounter Diagnosis     ICD-10-CM    1. Tear of left supraspinatus tendon  M75.102       2. Orthopedic aftercare  Z47.89                      Subjective: Pt f/u with Dr. Akhil Shelby office today. He reports no new complaints at his involved shoulder. Objective: See treatment diary below      Assessment: Pt is nearly 10 weeks post op. PROM improving in all planes. Pt still has compensated shoulder mechanics with AROM, as anticipated at this point in his rehabilitation. Tolerated treatment well. No increase in shoulder pain with prescribed program. Patient exhibited good technique with therapeutic exercises and would benefit from continued PT. Plan: Continue per plan of care.       Precautions: Surgery scheduled with Dr. Soo Rogers on 5/10/23 (left  RTC repair)       Manuals 6/19 6/22 6/26 6/29 7/3 7/7 7/10 7/17     PROM of the left shoulder c/ protocol limitations 15' PROM to tolerance 15' PROM to tolerance 15' PROM to tolerance 15' PROM to tolerance 15' PROM to tolerance 10' PROM to tolerance 10' PROM to tolerance 10' PROM to tolerance                               Neuro Re-Ed             Scap retraction 30x 30x 30x 30x    30x     Serratus press AAROM 20x  AAROM 20x            Shoulder isometrics   Flexion, ABD, IR, ER 20x ea          Row   AROM x 20   20x 30x 30x 30x     Shoulder extension   AROM 20x   20x 30x 30x 30x     Horizontal abd - bent over      2x10 2x10 30x     Ther Ex             Pendulums 30x AP/ML 30x AP/ML 30x AP/MP 30x AP/MP         Shamar Scaption 5'  Scaption 5'  Scaption 5'  Scaption 5'  Scaption 5'  Scaption 5'   Scaption 5'      Cane ER in neutral 10x10" 10x10" 10x10" 10x10" 10x10" 10x10" 10x10" 10x10"     Supine ER with dowel 10x10" 10x10" 10x10" 10x10" 10x10" 10x10"       SL ER AROM   AROM 2x10 3x10 3x10 3x10 3x10 3x10     SL shoulder abduction     2x10 2x10 2x10 2x10     Sleeper stretch     10x10"        Elbow AROM 3x10 3x10           Table slide with PB -AAROM 20x5" 20x5"           Hands clasp shoulder flexion in supine 10x10" 10x10"           Shoulder flexion -Standing   To 90 with feedback 2x10 2x10 2x10 2x10 2x10 2x10     IR BTB stretch    10x10" with strap  10x10" with strap  10x10" with strap 10x10" with strap 10x10" with strap     Shoulder extension      10x10" 10x10" 10x10"     Ther Activity                                       Gait Training                                       Modalities                                         1:1 with PT from 1218-1PM

## 2023-07-17 NOTE — PROGRESS NOTES
Assessment:       1. Aftercare following surgery of the musculoskeletal system          Plan:        Patient is doing well postoperatively. All questions were addressed to the patient's satisfaction. I reminded him of the importance of not starting the strengthening phase until week 12 from date of surgery. Patient will continue standard rotator cuff repair PT. Follow-up will be in 2 months to assess patient's progress. Subjective:     Patient ID: Fly Cummings is a 75 y.o. male. Chief Complaint:    HPI    Patient presents to the office for 9-1/2-week follow-up status post left shoulder arthroscopy with rotator cuff repair. In his note I noted that he had an Oxy refill on his PCP. He reports that he is taking it for his knees and that h he does not need it for his shoulder pain which is much improved since prior to surgery. Social History     Occupational History     Comment: employed   Tobacco Use   • Smoking status: Never     Passive exposure: Past   • Smokeless tobacco: Never   Vaping Use   • Vaping Use: Never used   Substance and Sexual Activity   • Alcohol use: Not Currently     Comment: 1 or 2 month   • Drug use: No   • Sexual activity: Not Currently     Partners: Female     Birth control/protection: Male Sterilization      Review of Systems   Constitutional: Negative. Respiratory: Negative. Musculoskeletal: Positive for arthralgias, gait problem and myalgias. Skin: Negative for wound. Neurological: Positive for weakness. Negative for numbness. Psychiatric/Behavioral: Negative. Objective:     Ortho ExamPhysical Exam  HENT:      Head: Atraumatic. Cardiovascular:      Pulses: Normal pulses. Pulmonary:      Effort: Pulmonary effort is normal.   Musculoskeletal:      Comments: Active range of motion left shoulder: Forward flexion 120 degrees, external rotation 90 degrees   Skin:     General: Skin is warm and dry.       Capillary Refill: Capillary refill takes less than 2 seconds. Comments: Surgical incisions dry and clean, healed. Neurological:      Mental Status: He is alert and oriented to person, place, and time. Sensory: No sensory deficit.    Psychiatric:         Mood and Affect: Mood normal.         Behavior: Behavior normal.

## 2023-07-20 ENCOUNTER — OFFICE VISIT (OUTPATIENT)
Dept: PHYSICAL THERAPY | Facility: CLINIC | Age: 68
End: 2023-07-20
Payer: MEDICARE

## 2023-07-20 DIAGNOSIS — M75.102 TEAR OF LEFT SUPRASPINATUS TENDON: Primary | ICD-10-CM

## 2023-07-20 DIAGNOSIS — Z47.89 ORTHOPEDIC AFTERCARE: ICD-10-CM

## 2023-07-20 PROCEDURE — 97112 NEUROMUSCULAR REEDUCATION: CPT | Performed by: PHYSICAL THERAPIST

## 2023-07-20 PROCEDURE — 97140 MANUAL THERAPY 1/> REGIONS: CPT | Performed by: PHYSICAL THERAPIST

## 2023-07-20 NOTE — PROGRESS NOTES
Daily Note     Today's date: 2023  Patient name: Yumiko Barrientos  : 1955  MRN: [id number]  Referring provider: Sydnee Arenas  Dx:   Encounter Diagnosis     ICD-10-CM    1. Tear of left supraspinatus tendon  M75.102       2. Orthopedic aftercare  Z47.89           Start Time: 1106  Stop Time: 1153  Total time in clinic (min): 47 minutes    Subjective: Pt offers no new complaints today. Objective: See treatment diary below      Assessment: Pt is 10 weeks post op. Tolerated treatment well. AROM control improving with all planes. Pt continues to have difficulty with IR BTB stretch and horizontal abduction exercise. Patient would benefit from continued PT. Plan: Continue per plan of care.       Precautions: Surgery scheduled with Dr. Gerardo Suazo on 5/10/23 (left  RTC repair)       Manuals 7/20   6/29 7/3 7/7 7/10 7/17     PROM of the left shoulder c/ protocol limitations 10'   15' PROM to tolerance 15' PROM to tolerance 10' PROM to tolerance 10' PROM to tolerance 10' PROM to tolerance                               Neuro Re-Ed             Scap retraction    30x    30x     Serratus press             Shoulder isometrics             Row 30x    20x 30x 30x 30x     Shoulder extension 30x    20x 30x 30x 30x     Horizontal abd - bent over 30x     2x10 2x10 30x     Ther Ex             Pendulums    30x AP/MP         Shamar Scaption 5'   Scaption 5'  Scaption 5'  Scaption 5'   Scaption 5'      Cane ER in neutral    10x10" 10x10" 10x10" 10x10" 10x10"     Supine ER with dowel 10x10"   10x10" 10x10" 10x10"       SL ER AROM 3x10   3x10 3x10 3x10 3x10 3x10     SL shoulder abduction 3x10    2x10 2x10 2x10 2x10     Sleeper stretch     10x10"        Elbow AROM             Table slide with PB -AAROM             Hands clasp shoulder flexion in supine             Shoulder flexion -Standing 3x10 with mirror feedback   2x10 2x10 2x10 2x10 2x10     IR BTB stretch 10x10" with strap   10x10" with strap  10x10" with strap  10x10" with strap 10x10" with strap 10x10" with strap     Shoulder extension 10x10"     10x10" 10x10" 10x10"     Ther Activity                                       Gait Training                                       Modalities                                         1:1 with PT from [id number]

## 2023-07-24 ENCOUNTER — OFFICE VISIT (OUTPATIENT)
Dept: PHYSICAL THERAPY | Facility: CLINIC | Age: 68
End: 2023-07-24
Payer: MEDICARE

## 2023-07-24 ENCOUNTER — TELEPHONE (OUTPATIENT)
Dept: FAMILY MEDICINE CLINIC | Facility: CLINIC | Age: 68
End: 2023-07-24

## 2023-07-24 DIAGNOSIS — N40.0 BENIGN PROSTATIC HYPERPLASIA WITHOUT LOWER URINARY TRACT SYMPTOMS: Primary | ICD-10-CM

## 2023-07-24 DIAGNOSIS — M75.102 TEAR OF LEFT SUPRASPINATUS TENDON: Primary | ICD-10-CM

## 2023-07-24 DIAGNOSIS — Z47.89 ORTHOPEDIC AFTERCARE: ICD-10-CM

## 2023-07-24 PROCEDURE — 97112 NEUROMUSCULAR REEDUCATION: CPT | Performed by: PHYSICAL THERAPIST

## 2023-07-24 PROCEDURE — 97110 THERAPEUTIC EXERCISES: CPT | Performed by: PHYSICAL THERAPIST

## 2023-07-24 PROCEDURE — 97140 MANUAL THERAPY 1/> REGIONS: CPT | Performed by: PHYSICAL THERAPIST

## 2023-07-24 NOTE — PROGRESS NOTES
Daily Note     Today's date: 2023  Patient name: Bob Choi  : 1955  MRN: [id number]  Referring provider: Rashad Shaw  Dx:   Encounter Diagnosis     ICD-10-CM    1. Tear of left supraspinatus tendon  M75.102       2. Orthopedic aftercare  Z47.89           Start Time: 1110  Stop Time: 1150  Total time in clinic (min): 40 minutes    Subjective: Pt reports mild discomfort at involved shoulder this morning with some popping. However, overall doing well. Being more mindful of what he does at home with lifting restrictions      Objective: See treatment diary below      Assessment: Left shoulder girdle was more stiff today compared to last week. End range motion improved with stretching. Minimal symptoms present with AROM and scapular exercises today. Tolerated treatment well. Patient would benefit from continued PT. Plan: Continue per plan of care.       Precautions: Surgery scheduled with Dr. Edil Cruz on 5/10/23 (left  RTC repair)       Manuals 7/20 7/24  6/29 7/3 7/7 7/10 7/17 7/24    PROM of the left shoulder c/ protocol limitations 10'   15' PROM to tolerance 15' PROM to tolerance 10' PROM to tolerance 10' PROM to tolerance 10' PROM to tolerance 10' PROM to tolerance                              Neuro Re-Ed             Scap retraction    30x    30x 30x    Serratus press             Shoulder isometrics         5x10" F,E, IR,ER    Row 30x    20x 30x 30x 30x 30x    Shoulder extension 30x    20x 30x 30x 30x 30x    Horizontal abd - bent over 30x     2x10 2x10 30x 30x    TB IR/ER (23)              Ther Ex             *May Add light resistance at week 12 (23)*             Pendulums    30x AP/MP         Shamar Scaption 5'   Scaption 5'  Scaption 5'  Scaption 5'   Scaption 5'  Scaption 5'     Cane ER in neutral    10x10" 10x10" 10x10" 10x10" 10x10"     Supine ER with dowel 10x10"   10x10" 10x10" 10x10"   10x10"    SL ER AROM 3x10   3x10 3x10 3x10 3x10 3x10 3x10    SL shoulder abduction 3x10    2x10 2x10 2x10 2x10 3x10    Sleeper stretch     10x10"        Elbow AROM             Table slide with PB -AAROM             Hands clasp shoulder flexion in supine             Shoulder flexion -Standing 3x10 with mirror feedback   2x10 2x10 2x10 2x10 2x10 3x10    IR BTB stretch 10x10" with strap   10x10" with strap  10x10" with strap  10x10" with strap 10x10" with strap 10x10" with strap 10x10" with strap    Shoulder extension 10x10"     10x10" 10x10" 10x10" 10x10"    Ther Activity                                       Gait Training                                       Modalities                                         1:1 with PT from [id number]

## 2023-07-24 NOTE — TELEPHONE ENCOUNTER
Patient is requesting that you add a PSA onto his lab work before his upcoming appointment with you on 8/8    He said he thought his urologist had ordered one, but he didn't

## 2023-07-27 ENCOUNTER — OFFICE VISIT (OUTPATIENT)
Dept: PHYSICAL THERAPY | Facility: CLINIC | Age: 68
End: 2023-07-27
Payer: MEDICARE

## 2023-07-27 DIAGNOSIS — Z47.89 ORTHOPEDIC AFTERCARE: ICD-10-CM

## 2023-07-27 DIAGNOSIS — M75.102 TEAR OF LEFT SUPRASPINATUS TENDON: Primary | ICD-10-CM

## 2023-07-27 PROCEDURE — 97110 THERAPEUTIC EXERCISES: CPT | Performed by: PHYSICAL THERAPIST

## 2023-07-27 PROCEDURE — 97112 NEUROMUSCULAR REEDUCATION: CPT | Performed by: PHYSICAL THERAPIST

## 2023-07-27 PROCEDURE — 97140 MANUAL THERAPY 1/> REGIONS: CPT | Performed by: PHYSICAL THERAPIST

## 2023-07-27 NOTE — PROGRESS NOTES
Daily Note     Today's date: 2023  Patient name: Melissa Ennis  : 1955  MRN: [id number]  Referring provider: Vega Gann  Dx:   Encounter Diagnosis     ICD-10-CM    1. Tear of left supraspinatus tendon  M75.102       2. Orthopedic aftercare  Z47.89                      Subjective: Pt offers no new complaints or discomfort at involved shoulder. Objective: See treatment diary below      Assessment: Tolerated treatment well. Pt will be 12 weeks on next . At this time we could implement gentle shoulder strengthening. Overall, patient's [facility] motion is improving, including mechanics of over head motion. Reduced scapular hiking and superior migration of humeral head noted. Patient exhibited good technique with therapeutic exercises and no discomfort with active or passive ROM. Plan: Continue per plan of care.       Precautions: Surgery scheduled with Dr. Sonia Millan on 5/10/23 (left  RTC repair)       Manuals 7/20 7/24 7/27    7/10 7/17 7/24    PROM of the left shoulder c/ protocol limitations 10' 10' PROM to tolerance 10'    10' PROM to tolerance 10' PROM to tolerance                               Neuro Re-Ed             Scap retraction  30x           Serratus press             Shoulder isometrics  10x5" F,E, IR,ER 10x5" F,E, IR,ER          Row 30x 30x 30x    30x 30x     Shoulder extension 30x 30x 30x    30x 30x     Horizontal abd - bent over 30x 30x 30x    2x10 30x     TB IR/ER (23)              Ther Ex             *May Add light resistance at week 12 (23)*             Pendulums             Pulley Scaption 5' Scaption 5'  Scaption 5'     Scaption 5'      Cane ER in neutral       10x10" 10x10"     Supine ER with dowel 10x10" 10x10" 10x10"          SL ER AROM 3x10 3x10 3x10    3x10 3x10     SL shoulder abduction 3x10 3x10 3x10    2x10 2x10     Sleeper stretch             Elbow AROM             Table slide with PB -AAROM             Hands clasp shoulder flexion in supine             Shoulder flexion -Standing 3x10 with mirror feedback 3x10 3x10 with mirror feedback    2x10 2x10     IR BTB stretch 10x10" with strap 10x10" with strap 10x10" with strap    10x10" with strap 10x10" with strap     Shoulder extension 10x10" 10x10" 10x10"    10x10" 10x10"     Sleeper stretch   10x10"          Ther Activity                                       Gait Training                                       Modalities                                         1:1 with PT from 1115-12pm

## 2023-07-31 ENCOUNTER — OFFICE VISIT (OUTPATIENT)
Dept: PHYSICAL THERAPY | Facility: CLINIC | Age: 68
End: 2023-07-31
Payer: MEDICARE

## 2023-07-31 DIAGNOSIS — Z47.89 ORTHOPEDIC AFTERCARE: ICD-10-CM

## 2023-07-31 DIAGNOSIS — M75.102 TEAR OF LEFT SUPRASPINATUS TENDON: Primary | ICD-10-CM

## 2023-07-31 PROCEDURE — 97140 MANUAL THERAPY 1/> REGIONS: CPT | Performed by: PHYSICAL THERAPIST

## 2023-07-31 PROCEDURE — 97110 THERAPEUTIC EXERCISES: CPT | Performed by: PHYSICAL THERAPIST

## 2023-07-31 NOTE — PROGRESS NOTES
Daily Note     Today's date: 2023  Patient name: Ruby Weaver  : 1955  MRN: [id number]  Referring provider: Jason Davenport  Dx:   Encounter Diagnosis     ICD-10-CM    1. Tear of left supraspinatus tendon  M75.102       2. Orthopedic aftercare  Z47.89           Start Time: 1130  Stop Time: 1215  Total time in clinic (min): 45 minutes    Subjective: Pt offers no new complaints. He did state that he had to lift grandson over the weekend to help his son who had surgery. He denies any issues. Objective: See treatment diary below      Assessment: Recommended patient follow protocol and wait until 12 weeks (Wednesday) to start any strengthening. Pt verbalized agreement. Tolerated treatment well. Shoulder mechanics look good with overhead motion. End range positions also improving. Patient exhibited good technique with therapeutic exercises and would benefit from continued PT. Plan: Continue per plan of care.       Precautions: Surgery scheduled with Dr. Maldonado Spine on 5/10/23 (left  RTC repair)       Manuals 7/20 7/24 7/27 7/31   7/10 7/17 7/24    PROM of the left shoulder c/ protocol limitations 10' 10' PROM to tolerance 10' 10'   10' PROM to tolerance 10' PROM to tolerance                               Neuro Re-Ed             Scap retraction  30x           Serratus press             Shoulder isometrics  10x5" F,E, IR,ER 10x5" F,E, IR,ER 10x5" F,E, IR,ER         Row 30x 30x 30x 30x   30x 30x     Shoulder extension 30x 30x 30x 30x   30x 30x     Horizontal abd - bent over 30x 30x 30x 30x   2x10 30x     TB IR/ER (23)              Ther Ex             *May Add light resistance at week 12 (23)*             Pendulums             Pulley Scaption 5' Scaption 5'  Scaption 5' Scaption 5'    Scaption 5'      Cane ER in neutral       10x10" 10x10"     Supine ER with dowel 10x10" 10x10" 10x10" 10x10"         SL ER AROM 3x10 3x10 3x10 3x10 Weight NV  3x10 3x10     SL shoulder abduction 3x10 3x10 3x10 3x10 Weight NV  2x10 2x10     Sleeper stretch             Elbow AROM             Table slide with PB -AAROM             Hands clasp shoulder flexion in supine             Shoulder flexion -Standing 3x10 with mirror feedback 3x10 3x10 with mirror feedback 3x10 with mirror feedback Weight nv  2x10 2x10     IR BTB stretch 10x10" with strap 10x10" with strap 10x10" with strap 10x10" with strap   10x10" with strap 10x10" with strap     Shoulder extension 10x10" 10x10" 10x10" 10x10"   10x10" 10x10"     Sleeper stretch   10x10" 10x10"         Ther Activity                                       Gait Training                                       Modalities                                         1:1 with PT from [id number]

## 2023-08-03 ENCOUNTER — OFFICE VISIT (OUTPATIENT)
Dept: PHYSICAL THERAPY | Facility: CLINIC | Age: 68
End: 2023-08-03
Payer: MEDICARE

## 2023-08-03 DIAGNOSIS — Z47.89 ORTHOPEDIC AFTERCARE: ICD-10-CM

## 2023-08-03 DIAGNOSIS — M75.102 TEAR OF LEFT SUPRASPINATUS TENDON: Primary | ICD-10-CM

## 2023-08-03 PROCEDURE — 97140 MANUAL THERAPY 1/> REGIONS: CPT | Performed by: PHYSICAL MEDICINE & REHABILITATION

## 2023-08-03 PROCEDURE — 97112 NEUROMUSCULAR REEDUCATION: CPT | Performed by: PHYSICAL MEDICINE & REHABILITATION

## 2023-08-03 PROCEDURE — 97110 THERAPEUTIC EXERCISES: CPT | Performed by: PHYSICAL MEDICINE & REHABILITATION

## 2023-08-03 NOTE — PROGRESS NOTES
Daily Note     Today's date: 8/3/2023  Patient name: Ana Quach  : 1955  MRN: [id number]  Referring provider: Salty Garcia  Dx:   Encounter Diagnosis     ICD-10-CM    1. Tear of left supraspinatus tendon  M75.102       2. Orthopedic aftercare  Z47.89                      Subjective: Patient offers no new complaints to begin session. Objective: See treatment diary below    Assessment: Patient tolerated treatment well, including new TE/progressions as charted. Patient exhibited good technique with therapeutic exercises and would benefit from continued PT. Assess response nv and continue as able per pt tolerance and protocol. Plan: Continue per plan of care.       Precautions: Surgery scheduled with Dr. Lili Maza on 5/10/23 (left  RTC repair)     Manuals 7/20 7/24 7/27 7/31 8/3  7/10 7/17 7/24    PROM of the left shoulder c/ protocol limitations 10' 10' PROM to tolerance 10' 10'   10' PROM to tolerance 10' PROM to tolerance                               Neuro Re-Ed     8/3        Scap retraction  30x           Serratus press             Shoulder isometrics  10x5" F,E, IR,ER 10x5" F,E, IR,ER 10x5" F,E, IR,ER         Row 30x 30x 30x 30x   30x 30x     Shoulder extension 30x 30x 30x 30x   30x 30x     Horizontal abd - bent over 30x 30x 30x 30x   2x10 30x     TB IR/ER (23)              Ther Ex     8/3        *May Add light resistance at week 12 (23)*             Pendulums             Pulley Scaption 5' Scaption 5'  Scaption 5' Scaption 5' scaption 5'   Scaption 5'      Cane ER in neutral     ER/IRRTB 2x10 ea  10x10" 10x10"     Supine ER with dowel 10x10" 10x10" 10x10" 10x10"         SL ER AROM 3x10 3x10 3x10 3x10 1# 3x10  3x10 3x10     SL shoulder abduction 3x10 3x10 3x10 3x10 1# 3x10  2x10 2x10     Sleeper stretch             Elbow AROM             Table slide with PB -AAROM             Hands clasp shoulder flexion in supine     AROM scaption at mirror 2x10        Shoulder flexion -Standing 3x10 with mirror feedback 3x10 3x10 with mirror feedback 3x10 with mirror feedback 1# 3x10  2x10 2x10     IR BTB stretch 10x10" with strap 10x10" with strap 10x10" with strap 10x10" with strap 10x10" w/ strap  10x10" with strap 10x10" with strap     Shoulder extension 10x10" 10x10" 10x10" 10x10"   10x10" 10x10"     Sleeper stretch   10x10" 10x10"         Ther Activity                                       Gait Training                                       Modalities

## 2023-08-07 ENCOUNTER — OFFICE VISIT (OUTPATIENT)
Dept: PHYSICAL THERAPY | Facility: CLINIC | Age: 68
End: 2023-08-07
Payer: MEDICARE

## 2023-08-07 DIAGNOSIS — M75.102 TEAR OF LEFT SUPRASPINATUS TENDON: Primary | ICD-10-CM

## 2023-08-07 DIAGNOSIS — Z47.89 ORTHOPEDIC AFTERCARE: ICD-10-CM

## 2023-08-07 PROCEDURE — 97110 THERAPEUTIC EXERCISES: CPT

## 2023-08-07 PROCEDURE — 97112 NEUROMUSCULAR REEDUCATION: CPT

## 2023-08-07 PROCEDURE — 97140 MANUAL THERAPY 1/> REGIONS: CPT

## 2023-08-07 NOTE — PROGRESS NOTES
Daily Note     Today's date: 2023  Patient name: Francie Ken  : 1955  MRN: [id number]  Referring provider: Gloria Lima  Dx:   Encounter Diagnosis     ICD-10-CM    1. Tear of left supraspinatus tendon  M75.102       2. Orthopedic aftercare  Z47.89           Start Time: 1145  Stop Time: 1235  Total time in clinic (min): 50 minutes    Subjective: Patient reports that he continues to see improvements in Left shoulder ROM. He reports being able to reach behind back easier now. No adverse reactions to previous session reported. Objective: See treatment diary below      Assessment: Tolerated treatment well. Added GTB to Rows and shoulder extension as indicated below. Improved ROM noted with towel IR stretch. Cuing required to avoid UT elevation with shoulder flexion and scaption. Good motion noted with manuals; most limited into abduction and ER. Patient demonstrated fatigue post treatment, exhibited good technique with therapeutic exercises and would benefit from continued PT      Plan: Continue per plan of care. Progress treatment as tolerated.        Precautions: Surgery scheduled with Dr. Michael Vincent on 5/10/23 (left  RTC repair)     Manuals 7/20 7/24 7/27 7/31 8/3 8/7  7/17 7/24    PROM of the left shoulder c/ protocol limitations 10' 10' PROM to tolerance 10' 10'  JL PROM to tolerance  10' PROM to tolerance                               Neuro Re-Ed     8/3 8/7       Scap retraction  30x           Serratus press             Shoulder isometrics  10x5" F,E, IR,ER 10x5" F,E, IR,ER 10x5" F,E, IR,ER         Row 30x 30x 30x 30x  GTB 3" 3x10  30x     Shoulder extension 30x 30x 30x 30x  GTB 3" 3x10  30x     Horizontal abd - bent over 30x 30x 30x 30x    30x     TB IR/ER (23)              Ther Ex     8/3 8/7       *May Add light resistance at week 12 (23)*             Pendulums             Pulley Scaption 5' Scaption 5'  Scaption 5' Scaption 5' scaption 5' scaption 5'  Scaption 5' Cane ER in neutral     ER/IRRTB 2x10 ea ER/IRRTB 2x10 ea  10x10"     Supine ER with dowel 10x10" 10x10" 10x10" 10x10"         SL ER AROM 3x10 3x10 3x10 3x10 1# 3x10 1# 3x10  3x10     SL shoulder abduction 3x10 3x10 3x10 3x10 1# 3x10 1# 3x10  2x10     Sleeper stretch             Elbow AROM             Table slide with PB -AAROM             Hands clasp shoulder flexion in supine     AROM scaption at mirror 2x10 AROM scaption at mirror 2x10       Shoulder flexion -Standing 3x10 with mirror feedback 3x10 3x10 with mirror feedback 3x10 with mirror feedback 1# 3x10 1# 3x10  2x10     IR BTB stretch 10x10" with strap 10x10" with strap 10x10" with strap 10x10" with strap 10x10" w/ strap 10"x10 with strap  10x10" with strap     Shoulder extension 10x10" 10x10" 10x10" 10x10"  10"x10 with dowel  10x10"     Sleeper stretch   10x10" 10x10"         Ther Activity                                       Gait Training                                       Modalities

## 2023-08-10 ENCOUNTER — EVALUATION (OUTPATIENT)
Dept: PHYSICAL THERAPY | Facility: CLINIC | Age: 68
End: 2023-08-10
Payer: MEDICARE

## 2023-08-10 DIAGNOSIS — G47.00 INSOMNIA, UNSPECIFIED TYPE: ICD-10-CM

## 2023-08-10 DIAGNOSIS — G89.29 CHRONIC LEFT SHOULDER PAIN: ICD-10-CM

## 2023-08-10 DIAGNOSIS — M25.512 CHRONIC LEFT SHOULDER PAIN: ICD-10-CM

## 2023-08-10 DIAGNOSIS — Z47.89 ORTHOPEDIC AFTERCARE: ICD-10-CM

## 2023-08-10 DIAGNOSIS — M75.102 TEAR OF LEFT SUPRASPINATUS TENDON: Primary | ICD-10-CM

## 2023-08-10 DIAGNOSIS — F41.9 ANXIETY: ICD-10-CM

## 2023-08-10 PROCEDURE — 97140 MANUAL THERAPY 1/> REGIONS: CPT | Performed by: PHYSICAL THERAPIST

## 2023-08-10 PROCEDURE — 97110 THERAPEUTIC EXERCISES: CPT | Performed by: PHYSICAL THERAPIST

## 2023-08-10 PROCEDURE — 97112 NEUROMUSCULAR REEDUCATION: CPT | Performed by: PHYSICAL THERAPIST

## 2023-08-10 NOTE — PROGRESS NOTES
[id number]    Today's date: 8/10/2023  Patient name: Anahi Penny  : 1955  MRN: [id number]  Referring provider: Dori Jackson  Dx:   Encounter Diagnosis     ICD-10-CM    1. Tear of left supraspinatus tendon  M75.102       2. Orthopedic aftercare  Z47.89                    Assessment  Assessment details: Anahi Penny is a 75 y.o. male who presents 3 months s/p L RTC repairs, biceps tenodesis, and subacromial decompression with Dr. Janki Fritz on 5/10/23. Pt is progressing well through [facility]. We have initiated gentle strengthening and resistance exercises this week without adverse effects or soreness. Pt's glenohumeral motion is improving, although his mechanics are still slightly impaired. PROM has improved in all planes of motion, mild stiffness present at end range motions. Pt has minimal pain at this time. His over head function and mobility is gradually improving. Patient would benefit from skilled physical therapy to address the impairments, improve their level of function, and to improve their overall quality of life. Impairments: abnormal muscle firing, abnormal or restricted ROM, abnormal movement, activity intolerance, impaired physical strength, lacks appropriate home exercise program, pain with function and weight-bearing intolerance  Understanding of Dx/Px/POC: good   Prognosis: good    Goals  Short Term Goals: to be achieved by 4 weeks- AFTER SURGERY  1) Patient to be independent with basic HEP.-MET  2) Decrease pain to 2/10 at its worst.-MET  3) Increase shoulder PROM by 5-10 degrees -MET    Long Term Goals: to be achieved by discharge-AFTER SURGERY  1) FOTO equal to or greater than expected . -MET  2) Patient to be independent with comprehensive HEP. -Partially met  3) Abolish pain for improved quality of life.-Partially met  4) Increase shoulder ROM to within functional limits to improve a/iadls.-Partially met  5) Increase shoulder strength to 5/5 MMT grade in all planes to improve a/iadls. -Partially met       Plan  Patient would benefit from: PT eval and skilled physical therapy  Planned modality interventions: cryotherapy, TENS and thermotherapy: hydrocollator packs  Planned therapy interventions: joint mobilization, manual therapy, neuromuscular re-education, patient education, therapeutic activities, therapeutic exercise, transfer training, home exercise program, self care and balance  Frequency: 2x per week for 4-6 weeks-AFTER SURGERY. Treatment plan discussed with: patient        Subjective Evaluation    History of Present Illness  Pt arrives to PT 5 days post op L RTC repair and biceps tenodesis. Pt arrives in sling without abduction pillow. Pain is well controlled. Pt is taking oxycodone for pain. Pt is sleeping well. Pt denies any numbness/tingling. Pt is independent with bathing and dressing. He has his wife for assistance.   _____________________________________  Mechanism of injury: surgery  Mechanism of injury: History of Current Injury: Pt fell in January on his left shoulder while walking with his left dog. Pt had MRI of L shoulder which identified full thickness RTC tear of supraspinatus. Pt is scheduled with  [address] [place] [address] on 5/10/23 for left rotator cuff repair. He is here for pre-op examination. Pt denies any numbness or tingling but does have some radiation of pain. Pt lives at home with his wife. He will have the assistance of his wife during the recovery process. Pain location/Descriptors: Globally around left shoulder  Aggravating factors: Sleeping, lifting, reaching. Easing factors: Oxycodone for pain  Imaging: MRI of the L shoulder: Full thickness supraspinatus tear. Patient goals: Improve function in left shoulder. Hobbies/Interest: Playing with puppy, archery hunting. Pt has 3 grandchildren and 1 on the way.    Occupation: Retired  Pain  Current pain ratin  At worst pain ratin    Hand dominance: right      Diagnostic Tests  MRI studies: abnormal  Treatments  No previous or current treatments  Patient Goals  Patient goals for therapy: increased strength, increased motion, independence with ADLs/IADLs and decreased pain          Objective     Incision: Intact with steri-strips     Passive Range of Motion   Left Shoulder   Flexion: 168 degrees   Abduction: 170 degrees (scaption)  External rotation 90°: 70 degrees   Internal rotation 90°: 70 degrees    Active Range of Motion   Left Shoulder   Flexion: 145 degrees (sup migration of HH)   Abduction: 150 degrees (scaption)  External rotation 0°: 65 degrees   Internal rotation BTB: L5  Extension: 65 degrees     Strength/Myotome Testing     Left Shoulder     Planes of Motion   Flexion: 4  Abduction: 4   External rotation at 0°: 4  Internal rotation at 0°: 4+    Isolated Muscles   Biceps: 4  Triceps: 4    Elbow PROM:   Full extension, full flexion      Sensation intact  Distal pulses intact       strength:   Left: 70#  Right: 70 #               Precautions: Surgery scheduled with Dr. Fritz Quiroz on 5/10/23 (left  RTC repair)     Manuals 7/20 7/24 7/27 7/31 8/3 8/7 8/10      PROM of the left shoulder c/ protocol limitations 10' 10' PROM to tolerance 10' 10'  JL PROM to tolerance 10'                                Neuro Re-Ed     8/3 8/7       Scap retraction  30x           Serratus press             Shoulder isometrics  10x5" F,E, IR,ER 10x5" F,E, IR,ER 10x5" F,E, IR,ER         Row 30x 30x 30x 30x  GTB 3" 3x10 blk tb 3x10       Shoulder extension 30x 30x 30x 30x  GTB 3" 3x10       Horizontal abd - bent over 30x 30x 30x 30x         TB IR/ER (8/2/23)        3x10 rtb ea      Ther Ex     8/3 8/7       *May Add light resistance at week 12 (8/2/23)*             PendulumsCane ER in neutral             Pulley Scaption 5' Scaption 5'  Scaption 5' Scaption 5' scaption 5' scaption 5' scaption 5'           ER/IRRTB 2x10 ea ER/IRRTB 2x10 ea       Supine ER with dowel 10x10" 10x10" 10x10" 10x10"         SL ER AROM 3x10 3x10 3x10 3x10 1# 3x10 1# 3x10 2#3x10      SL shoulder abduction 3x10 3x10 3x10 3x10 1# 3x10 1# 3x10 2# 2x10       Sleeper stretch             Elbow AROM             Table slide with PB -AAROM             Hands clasp shoulder flexion in supine     AROM scaption at mirror 2x10 AROM scaption at mirror 2x10       Shoulder flexion -Standing 3x10 with mirror feedback 3x10 3x10 with mirror feedback 3x10 with mirror feedback 1# 3x10 1# 3x10 2# 2x10       IR BTB stretch 10x10" with strap 10x10" with strap 10x10" with strap 10x10" with strap 10x10" w/ strap 10"x10 with strap 10"x10 with strap      Shoulder extension 10x10" 10x10" 10x10" 10x10"  10"x10 with dowel       Sleeper stretch   10x10" 10x10"   10x10"      Ther Activity                                       Gait Training                                       Modalities                                         1:1 with PT from 348-430pm.   Pt was re-evaluated today x 15 minutes

## 2023-08-10 NOTE — TELEPHONE ENCOUNTER
1 [id number] 07/17/2023 07/14/2023 ACETAMINOPHEN 325 MG / oxyCODONE HYDROCHLORIDE 7.5 MG ORAL TABLET (Tablet) 120.0 30 7.5 MG/325.0 MG 45.0 BUSHRA [facility]T [facility] 0 / 0 [place]    1 [id number] 07/17/2023 07/14/2023 Zolpidem Tartrate (Tablet) 30.0 30 10 MG NA BUSHRA THEO [facility] Medicare 0 / 3 [place]    1 [id number] 07/11/2023 07/11/2023 ALPRAZolam (Tablet) 120.0 30 0.25 MG NA BUSHRA BROADT [facility] [place] 0 / 0 [place]    1 [id number] 06/20/2023 06/15/2023 Zolpidem Tartrate (Tablet) 30.0 30 10 MG NA BUSHRA VENEGAS [facility] [place] 0 / 0 [place]    1 [id number] 06/19/2023 06/19/2023 ACETAMINOPHEN 325 MG / oxyCODONE HYDROCHLORIDE 7.5 MG ORAL TABLET (Tablet) 120.0 30 7.5 MG/325.0 MG 45.0 BUSHRA BROADT [facility] 0 / 0 [place]    1 [id number] 06/07/2023 06/07/2023 ALPRAZolam (Tablet) 120.0 30 0.25 MG NA PARAS STEARNS [facility] [place] 0 / 0 [place]    1 [id number] 05/24/2023 05/22/2023 Zolpidem Tartrate (Tablet) 30.0 30 10 MG NA BUSHRA THEO [facility] [place] 0 / 0 [place]    1 [id number] 05/22/2023 05/22/2023 ACETAMINOPHEN 325 MG / oxyCODONE HYDROCHLORIDE 7.5 MG ORAL TABLET (Tablet) 120.0 30 7.5 MG/325.0 MG 45.0 BUSHRA THEO [facility] [place] 0 / 0 [place]    1 [id number] 05/07/2023 05/02/2023 ALPRAZolam (Tablet) 120.0 30 0.25 MG NA BUSHRA BROADT [facility] [place] 0 / 0 [place]    1 [id number] 04/27/2023 04/27/2023 Zolpidem Tartrate (Tablet) 30.0 30 10 MG NA [facility]  Medicare 0 / 0 [place]

## 2023-08-11 ENCOUNTER — RA CDI HCC (OUTPATIENT)
Dept: OTHER | Facility: HOSPITAL | Age: 68
End: 2023-08-11

## 2023-08-11 RX ORDER — ALPRAZOLAM 0.25 MG/1
0.25 TABLET ORAL 4 TIMES DAILY PRN
Qty: 120 TABLET | Refills: 0 | Status: SHIPPED | OUTPATIENT
Start: 2023-08-11

## 2023-08-11 RX ORDER — ZOLPIDEM TARTRATE 10 MG/1
10 TABLET ORAL
Qty: 30 TABLET | Refills: 0 | Status: SHIPPED | OUTPATIENT
Start: 2023-08-11

## 2023-08-11 RX ORDER — OXYCODONE AND ACETAMINOPHEN 7.5; 325 MG/1; MG/1
1 TABLET ORAL EVERY 6 HOURS PRN
Qty: 120 TABLET | Refills: 0 | Status: SHIPPED | OUTPATIENT
Start: 2023-08-11

## 2023-08-11 NOTE — PROGRESS NOTES
[address] [facility] coding opportunities          Chart Reviewed number of suggestions sent to Provider: 3     Patients Insurance     Medicare Insurance: Medicare        E66.01  E11.65  E11.36

## 2023-08-14 ENCOUNTER — OFFICE VISIT (OUTPATIENT)
Dept: PHYSICAL THERAPY | Facility: CLINIC | Age: 68
End: 2023-08-14
Payer: MEDICARE

## 2023-08-14 DIAGNOSIS — Z47.89 ORTHOPEDIC AFTERCARE: ICD-10-CM

## 2023-08-14 DIAGNOSIS — M75.102 TEAR OF LEFT SUPRASPINATUS TENDON: Primary | ICD-10-CM

## 2023-08-14 PROCEDURE — 97112 NEUROMUSCULAR REEDUCATION: CPT | Performed by: PHYSICAL THERAPIST

## 2023-08-14 PROCEDURE — 97110 THERAPEUTIC EXERCISES: CPT | Performed by: PHYSICAL THERAPIST

## 2023-08-14 PROCEDURE — 97140 MANUAL THERAPY 1/> REGIONS: CPT | Performed by: PHYSICAL THERAPIST

## 2023-08-14 NOTE — PROGRESS NOTES
Daily Note     Today's date: 2023  Patient name: Francie Ken  : 1955  MRN: [id number]  Referring provider: Gloria Lima  Dx:   Encounter Diagnosis     ICD-10-CM    1. Tear of left supraspinatus tendon  M75.102       2. Orthopedic aftercare  Z47.89           Start Time: 0945  Stop Time: 1030  Total time in clinic (min): 45 minutes    Subjective: Pt offers no new complaints this morning. Objective: See treatment diary below      Assessment: Tolerated treatment well. Continued with gradual strength and condition with appropriate shoulder mechanics. Initiated stand shoulder abduction to 90 degrees. Pt fatigues with prescribed program.  Patient would benefit from continued PT. Plan: Continue per plan of care.       Precautions: Surgery scheduled with  [address] [place] [address] on 5/10/23 (left  RTC repair)   Manuals 7/20 7/24 7/27 7/31 8/3 8/7 8/10 8/14     PROM of the left shoulder c/ protocol limitations 10' 10' PROM to tolerance 10' 10'  JL PROM to tolerance 10' 10'                               Neuro Re-Ed     8/3 8/7       Scap retraction  30x           Serratus press             Shoulder isometrics  10x5" F,E, IR,ER 10x5" F,E, IR,ER 10x5" F,E, IR,ER         Row 30x 30x 30x 30x  GTB 3" 3x10 blk tb 3x10  blk tb 3x10      Shoulder extension 30x 30x 30x 30x  GTB 3" 3x10       Horizontal abd - bent over 30x 30x 30x 30x    3x10 2#     TB IR/ER (23)        3x10 rtb ea 3x10 rtb ea     Ther Ex     8/3 8/7       *May Add light resistance at week 12 (23)*             PendulumsCane ER in neutral             Pulley Scaption 5' Scaption 5'  Scaption 5' Scaption 5' scaption 5' scaption 5' scaption 5' scaption 5'          ER/IRRTB 2x10 ea ER/IRRTB 2x10 ea       Supine ER with dowel 10x10" 10x10" 10x10" 10x10"         SL ER AROM 3x10 3x10 3x10 3x10 1# 3x10 1# 3x10 2#3x10 2# 3x10      SL shoulder abduction 3x10 3x10 3x10 3x10 1# 3x10 1# 3x10 2# 2x10  Standing     Sleeper stretch             Elbow AROM             Table slide with PB -AAROM             Hands clasp shoulder flexion in supine     AROM scaption at mirror 2x10 AROM scaption at mirror 2x10       Shoulder abduction- Standing         2x10 2#     Shoulder flexion -Standing 3x10 with mirror feedback 3x10 3x10 with mirror feedback 3x10 with mirror feedback 1# 3x10 1# 3x10 2# 2x10  2# 2x10      IR BTB stretch 10x10" with strap 10x10" with strap 10x10" with strap 10x10" with strap 10x10" w/ strap 10"x10 with strap 10"x10 with strap 10"x10 with strap     Shoulder extension 10x10" 10x10" 10x10" 10x10"  10"x10 with dowel  10"x10 with dowel     Sleeper stretch   10x10" 10x10"   10x10" 10x10"     Ther Activity                                       Gait Training                                       Modalities                                         1:1 with PT from [id number]

## 2023-08-17 ENCOUNTER — OFFICE VISIT (OUTPATIENT)
Dept: PHYSICAL THERAPY | Facility: CLINIC | Age: 68
End: 2023-08-17
Payer: MEDICARE

## 2023-08-17 ENCOUNTER — OFFICE VISIT (OUTPATIENT)
Dept: FAMILY MEDICINE CLINIC | Facility: CLINIC | Age: 68
End: 2023-08-17
Payer: MEDICARE

## 2023-08-17 VITALS
HEIGHT: 70 IN | SYSTOLIC BLOOD PRESSURE: 116 MMHG | WEIGHT: 286 LBS | OXYGEN SATURATION: 96 % | BODY MASS INDEX: 40.94 KG/M2 | TEMPERATURE: 97.4 F | DIASTOLIC BLOOD PRESSURE: 72 MMHG | HEART RATE: 69 BPM

## 2023-08-17 DIAGNOSIS — M75.102 TEAR OF LEFT SUPRASPINATUS TENDON: Primary | ICD-10-CM

## 2023-08-17 DIAGNOSIS — Z00.00 MEDICARE ANNUAL WELLNESS VISIT, SUBSEQUENT: Primary | ICD-10-CM

## 2023-08-17 DIAGNOSIS — E11.9 TYPE 2 DIABETES MELLITUS WITHOUT COMPLICATION, WITHOUT LONG-TERM CURRENT USE OF INSULIN (HCC): ICD-10-CM

## 2023-08-17 DIAGNOSIS — E78.5 HYPERLIPIDEMIA, UNSPECIFIED HYPERLIPIDEMIA TYPE: ICD-10-CM

## 2023-08-17 DIAGNOSIS — I10 ESSENTIAL HYPERTENSION: ICD-10-CM

## 2023-08-17 DIAGNOSIS — J45.40 MODERATE PERSISTENT ASTHMA WITHOUT COMPLICATION: ICD-10-CM

## 2023-08-17 DIAGNOSIS — Z47.89 ORTHOPEDIC AFTERCARE: ICD-10-CM

## 2023-08-17 PROCEDURE — 97110 THERAPEUTIC EXERCISES: CPT | Performed by: PHYSICAL THERAPIST

## 2023-08-17 PROCEDURE — 97112 NEUROMUSCULAR REEDUCATION: CPT | Performed by: PHYSICAL THERAPIST

## 2023-08-17 PROCEDURE — 99214 OFFICE O/P EST MOD 30 MIN: CPT | Performed by: FAMILY MEDICINE

## 2023-08-17 PROCEDURE — 97140 MANUAL THERAPY 1/> REGIONS: CPT | Performed by: PHYSICAL THERAPIST

## 2023-08-17 PROCEDURE — G0439 PPPS, SUBSEQ VISIT: HCPCS | Performed by: FAMILY MEDICINE

## 2023-08-17 RX ORDER — LEVALBUTEROL INHALATION SOLUTION 0.63 MG/3ML
0.63 SOLUTION RESPIRATORY (INHALATION) EVERY 4 HOURS PRN
COMMUNITY

## 2023-08-17 NOTE — PATIENT INSTRUCTIONS
Medicare Preventive Visit Patient Instructions  Thank you for completing your Welcome to Medicare Visit or Medicare Annual Wellness Visit today. Your next wellness visit will be due in one year (8/17/2024). The screening/preventive services that you may require over the next 5-10 years are detailed below. Some tests may not apply to you based off risk factors and/or age. Screening tests ordered at today's visit but not completed yet may show as past due. Also, please note that scanned in results may not display below. Preventive Screenings:  Service Recommendations Previous Testing/Comments   Colorectal Cancer Screening  · Colonoscopy    · Fecal Occult Blood Test (FOBT)/Fecal Immunochemical Test (FIT)  · Fecal DNA/Cologuard Test  · Flexible Sigmoidoscopy Age: 43-73 years old   Colonoscopy: every 10 years (May be performed more frequently if at higher risk)  OR  FOBT/FIT: every 1 year  OR  Cologuard: every 3 years  OR  Sigmoidoscopy: every 5 years  Screening may be recommended earlier than age 39 if at higher risk for colorectal cancer. Also, an individualized decision between you and your healthcare provider will decide whether screening between the ages of 77-80 would be appropriate.  Colonoscopy: 10/02/2018  FOBT/FIT: Not on file  Cologuard: Not on file  Sigmoidoscopy: Not on file    Screening Current     Prostate Cancer Screening Individualized decision between patient and health care provider in men between ages of 53-66   Medicare will cover every 12 months beginning on the day after your 50th birthday PSA: 0.9 ng/mL           Hepatitis C Screening Once for adults born between 1945 and 1965  More frequently in patients at high risk for Hepatitis C Hep C Antibody: Not on file        Diabetes Screening 1-2 times per year if you're at risk for diabetes or have pre-diabetes Fasting glucose: 138 mg/dL (6/23/2023)  A1C: 7.5 % (6/23/2023)  Screening Not Indicated  History Diabetes   Cholesterol Screening Once every 5 years if you don't have a lipid disorder. May order more often based on risk factors. Lipid panel: 06/23/2023  Screening Not Indicated  History Lipid Disorder      Other Preventive Screenings Covered by Medicare:  1. Abdominal Aortic Aneurysm (AAA) Screening: covered once if your at risk. You're considered to be at risk if you have a family history of AAA or a male between the age of 70-76 who smoking at least 100 cigarettes in your lifetime. 2. Lung Cancer Screening: covers low dose CT scan once per year if you meet all of the following conditions: (1) Age 48-67; (2) No signs or symptoms of lung cancer; (3) Current smoker or have quit smoking within the last 15 years; (4) You have a tobacco smoking history of at least 20 pack years (packs per day x number of years you smoked); (5) You get a written order from a healthcare provider. 3. Glaucoma Screening: covered annually if you're considered high risk: (1) You have diabetes OR (2) Family history of glaucoma OR (3)  aged 48 and older OR (3)  American aged 72 and older  3. Osteoporosis Screening: covered every 2 years if you meet one of the following conditions: (1) Have a vertebral abnormality; (2) On glucocorticoid therapy for more than 3 months; (3) Have primary hyperparathyroidism; (4) On osteoporosis medications and need to assess response to drug therapy. 5. HIV Screening: covered annually if you're between the age of 14-79. Also covered annually if you are younger than 13 and older than 72 with risk factors for HIV infection. For pregnant patients, it is covered up to 3 times per pregnancy.     Immunizations:  Immunization Recommendations   Influenza Vaccine Annual influenza vaccination during flu season is recommended for all persons aged >= 6 months who do not have contraindications   Pneumococcal Vaccine   * Pneumococcal conjugate vaccine = PCV13 (Prevnar 13), PCV15 (Vaxneuvance), PCV20 (Prevnar 20)  * Pneumococcal polysaccharide vaccine = PPSV23 (Pneumovax) Adults 2364 years old: 1-3 doses may be recommended based on certain risk factors  Adults 72 years old: 1-2 doses may be recommended based off what pneumonia vaccine you previously received   Hepatitis B Vaccine 3 dose series if at intermediate or high risk (ex: diabetes, end stage renal disease, liver disease)   Tetanus (Td) Vaccine - COST NOT COVERED BY MEDICARE PART B Following completion of primary series, a booster dose should be given every 10 years to maintain immunity against tetanus. Td may also be given as tetanus wound prophylaxis. Tdap Vaccine - COST NOT COVERED BY MEDICARE PART B Recommended at least once for all adults. For pregnant patients, recommended with each pregnancy. Shingles Vaccine (Shingrix) - COST NOT COVERED BY MEDICARE PART B  2 shot series recommended in those aged 48 and above     Health Maintenance Due:      Topic Date Due   • Hepatitis C Screening  Never done   • Colorectal Cancer Screening  10/02/2023     Immunizations Due:      Topic Date Due   • COVID-19 Vaccine (5 - Pfizer series) 02/05/2023   • Influenza Vaccine (1) 09/01/2023     Advance Directives   What are advance directives? Advance directives are legal documents that state your wishes and plans for medical care. These plans are made ahead of time in case you lose your ability to make decisions for yourself. Advance directives can apply to any medical decision, such as the treatments you want, and if you want to donate organs. What are the types of advance directives? There are many types of advance directives, and each state has rules about how to use them. You may choose a combination of any of the following:  · Living will: This is a written record of the treatment you want. You can also choose which treatments you do not want, which to limit, and which to stop at a certain time. This includes surgery, medicine, IV fluid, and tube feedings.    · Durable power of  for [facility]): This is a written record that states who you want to make healthcare choices for you when you are unable to make them for yourself. This person, called a proxy, is usually a family member or a friend. You may choose more than 1 proxy. · Do not resuscitate (DNR) order:  A DNR order is used in case your heart stops beating or you stop breathing. It is a request not to have certain forms of treatment, such as CPR. A DNR order may be included in other types of advance directives. · Medical directive: This covers the care that you want if you are in a coma, near death, or unable to make decisions for yourself. You can list the treatments you want for each condition. Treatment may include pain medicine, surgery, blood transfusions, dialysis, IV or tube feedings, and a ventilator (breathing machine). · Values history: This document has questions about your views, beliefs, and how you feel and think about life. This information can help others choose the care that you would choose. Why are advance directives important? An advance directive helps you control your care. Although spoken wishes may be used, it is better to have your wishes written down. Spoken wishes can be misunderstood, or not followed. Treatments may be given even if you do not want them. An advance directive may make it easier for your family to make difficult choices about your care. Fall Prevention    Fall prevention  includes ways to make your home and other areas safer. It also includes ways you can move more carefully to prevent a fall. Health conditions that cause changes in your blood pressure, vision, or muscle strength and coordination may increase your risk for falls. Medicines may also increase your risk for falls if they make you dizzy, weak, or sleepy. Fall prevention tips:   · Stand or sit up slowly. · Use assistive devices as directed. · Wear shoes that fit well and have soles that .     · Wear a personal alarm. · Stay active. · Manage your medical conditions. Home Safety Tips:  · Add items to prevent falls in the bathroom. · Keep paths clear. · Install bright lights in your home. · Keep items you use often on shelves within reach. · Paint or place reflective tape on the edges of your stairs. Weight Management   Why it is important to manage your weight:  Being overweight increases your risk of health conditions such as heart disease, high blood pressure, type 2 diabetes, and certain types of cancer. It can also increase your risk for osteoarthritis, sleep apnea, and other respiratory problems. Aim for a slow, steady weight loss. Even a small amount of weight loss can lower your risk of health problems. How to lose weight safely:  A safe and healthy way to lose weight is to eat fewer calories and get regular exercise. You can lose up about 1 pound a week by decreasing the number of calories you eat by 500 calories each day. Healthy meal plan for weight management:  A healthy meal plan includes a variety of foods, contains fewer calories, and helps you stay healthy. A healthy meal plan includes the following:  · Eat whole-grain foods more often. A healthy meal plan should contain fiber. Fiber is the part of grains, fruits, and vegetables that is not broken down by your body. Whole-grain foods are healthy and provide extra fiber in your diet. Some examples of whole-grain foods are whole-wheat breads and pastas, oatmeal, brown rice, and bulgur. · Eat a variety of vegetables every day. Include dark, leafy greens such as spinach, kale, estela greens, and mustard greens. Eat yellow and orange vegetables such as carrots, sweet potatoes, and winter squash. · Eat a variety of fruits every day. Choose fresh or canned fruit (canned in its own juice or light syrup) instead of juice. Fruit juice has very little or no fiber. · Eat low-fat dairy foods.   Drink fat-free (skim) milk or 1% milk. Eat fat-free yogurt and low-fat cottage cheese. Try low-fat cheeses such as mozzarella and other reduced-fat cheeses. · Choose meat and other protein foods that are low in fat. Choose beans or other legumes such as split peas or lentils. Choose fish, skinless poultry (chicken or turkey), or lean cuts of red meat (beef or pork). Before you cook meat or poultry, cut off any visible fat. · Use less fat and oil. Try baking foods instead of frying them. Add less fat, such as margarine, sour cream, regular salad dressing and mayonnaise to foods. Eat fewer high-fat foods. Some examples of high-fat foods include french fries, doughnuts, ice cream, and cakes. · Eat fewer sweets. Limit foods and drinks that are high in sugar. This includes candy, cookies, regular soda, and sweetened drinks. Exercise:  Exercise at least 30 minutes per day on most days of the week. Some examples of exercise include walking, biking, dancing, and swimming. You can also fit in more physical activity by taking the stairs instead of the elevator or parking farther away from stores. Ask your healthcare provider about the best exercise plan for you. Narcotic (Opioid) Safety    Use narcotics safely:  · Take prescribed narcotics exactly as directed  · Do not give narcotics to others or take narcotics that belong to someone else  · Do not mix narcotics without medicines or alcohol  · Do not drive or operate heavy machinery after you take the narcotic  · Monitor for side effects and notify your healthcare provider if you experienced side effects such as nausea, sleepiness, itching, or trouble thinking clearly. Manage constipation:    Constipation is the most common side effect of narcotic medicine. Constipation is when you have hard, dry bowel movements, or you go longer than usual between bowel movements. Tell your healthcare provider about all changes in your bowel movements while you are taking narcotics.  He or she may recommend laxative medicine to help you have a bowel movement. He or she may also change the kind of narcotic you are taking, or change when you take it. The following are more ways you can prevent or relieve constipation:    · Drink liquids as directed. You may need to drink extra liquids to help soften and move your bowels. Ask how much liquid to drink each day and which liquids are best for you. · Eat high-fiber foods. This may help decrease constipation by adding bulk to your bowel movements. High-fiber foods include fruits, vegetables, whole-grain breads and cereals, and beans. Your healthcare provider or dietitian can help you create a high-fiber meal plan. Your provider may also recommend a fiber supplement if you cannot get enough fiber from food. · Exercise regularly. Regular physical activity can help stimulate your intestines. Walking is a good exercise to prevent or relieve constipation. Ask which exercises are best for you. · Schedule a time each day to have a bowel movement. This may help train your body to have regular bowel movements. Bend forward while you are on the toilet to help move the bowel movement out. Sit on the toilet for at least 10 minutes, even if you do not have a bowel movement. Store narcotics safely:   · Store narcotics where others cannot easily get them. Keep them in a locked cabinet or secure area. Do not  keep them in a purse or other bag you carry with you. A person may be looking for something else and find the narcotics. · Make sure narcotics are stored out of the reach of children. A child can easily overdose on narcotics. Narcotics may look like candy to a small child. The best way to dispose of narcotics: The laws vary by country and area. In the [facility], the best way is to return the narcotics through a take-back program. This program is offered by the [employer]  The following are options for using the program:  · Take the narcotics to a [facility] collection site. The site is often a law enforcement center. Call your local law enforcement center for scheduled take-back days in your area. You will be given information on where to go if the collection site is in a different location. · Take the narcotics to an approved pharmacy or hospital.  A pharmacy or hospital may be set up as a collection site. You will need to ask if it is a [facility] collection site if you were not directed there. A pharmacy or doctor's office may not be able to take back narcotics unless it is a [facility] site. · Use a mail-back system. This means you are given containers to put the narcotics into. You will then mail them in the containers. · Use a take-back drop box. This is a place to leave the narcotics at any time. People and animals will not be able to get into the box. Your local law enforcement agency can tell you where to find a drop box in your area. Other ways to manage pain:   · Ask your healthcare provider about non-narcotic medicines to control pain. Nonprescription medicines include NSAIDs (such as ibuprofen) and acetaminophen. Prescription medicines include muscle relaxers, antidepressants, and steroids. · Pain may be managed without any medicines. Some ways to relieve pain include massage, aromatherapy, or meditation. Physical or occupational therapy may also help. For more information:   · Drug Enforcement Administration  [address] [facility] , [address] [facility]  Phone: 4- 954 - [phone]  Web Address: [employer]/drug_disposal/    · [facility] [facility] [[facility]] S and Drug Administration  [address] [facility] , [address] [place] [address]  Phone: 4- 647 - 645-0348  Web Address: http://[employer]/     © Copyright [address] 2018 Information is for End User's use only and may not be sold, redistributed or otherwise used for commercial purposes.  All illustrations and images included in CareNotes® are the copyrighted property of A.D.A.M., Inc. or [employer] [address]

## 2023-08-17 NOTE — PROGRESS NOTES
Daily Note     Today's date: 2023  Patient name: Leonides Wheat  : 1955  MRN: [id number]  Referring provider: Julio Ortega  Dx:   Encounter Diagnosis     ICD-10-CM    1. Tear of left supraspinatus tendon  M75.102       2. Orthopedic aftercare  Z47.89           Start Time: 09  Stop Time: 1010  Total time in clinic (min): 40 minutes    Subjective: Pt reports mild soreness but overall doing well. Objective: See treatment diary below      Assessment: Pt is 14 weeks post op. Progressed resistance and weights with good mechanics. Pt provided with cueing to rest between sets as weights increase. Pt fatigued appropriately with program without much discomfort. PROM continues to be limited toward the end range of motion in most planes. Tolerated treatment well. Patient would benefit from continued PT. Plan: Continue per plan of care.       Precautions: Surgery scheduled with Dr. Wagner Neely on 5/10/23 (left  RTC repair)   Manuals 7/20 7/24 7/27 7/31 8/3 8/7 8/10 8/14 8/17    PROM of the left shoulder c/ protocol limitations 10' 10' PROM to tolerance 10' 10'  JL PROM to tolerance 10' 10' 10'                              Neuro Re-Ed     8/3 8/7       Scap retraction  30x           Serratus press             Shoulder isometrics  10x5" F,E, IR,ER 10x5" F,E, IR,ER 10x5" F,E, IR,ER         Row 30x 30x 30x 30x  GTB 3" 3x10 blk tb 3x10  blk tb 3x10  blk tb 3x10    Shoulder extension 30x 30x 30x 30x  GTB 3" 3x10       Horizontal abd - bent over 30x 30x 30x 30x    3x10 2# 3x10 2#    TB IR/ER (23)        3x10 rtb ea 3x10 rtb ea 3x10 gtb ea    Ther Ex     8/3 8/7       *May Add light resistance at week 12 (23)*             PendulumsCane ER in neutral             Pulley Scaption 5' Scaption 5'  Scaption 5' Scaption 5' scaption 5' scaption 5' scaption 5' scaption 5' scaption 5'         ER/IRRTB 2x10 ea ER/IRRTB 2x10 ea       Supine ER with dowel 10x10" 10x10" 10x10" 10x10"         SL ER AROM 3x10 3x10 3x10 3x10 1# 3x10 1# 3x10 2#3x10 2# 3x10  3# 3x10     SL shoulder abduction 3x10 3x10 3x10 3x10 1# 3x10 1# 3x10 2# 2x10  Standing     Sleeper stretch             Elbow AROM             Table slide with PB -AAROM             Hands clasp shoulder flexion in supine     AROM scaption at mirror 2x10 AROM scaption at mirror 2x10       Shoulder abduction- Standing         2x10 2# 10"x10 with dowel    Shoulder flexion -Standing 3x10 with mirror feedback 3x10 3x10 with mirror feedback 3x10 with mirror feedback 1# 3x10 1# 3x10 2# 2x10  2# 2x10  3# 3x10    IR BTB stretch 10x10" with strap 10x10" with strap 10x10" with strap 10x10" with strap 10x10" w/ strap 10"x10 with strap 10"x10 with strap 10"x10 with strap 10"x10 with strap    Shoulder extension 10x10" 10x10" 10x10" 10x10"  10"x10 with dowel  10"x10 with dowel 10x10" with dowel    Sleeper stretch   10x10" 10x10"   10x10" 10x10"     Ther Activity                                       Gait Training                                       Modalities                                         1:1 with PT from [id number]

## 2023-08-17 NOTE — PROGRESS NOTES
Assessment and Plan:     Problem List Items Addressed This Visit    None       Preventive health issues were discussed with patient, and age appropriate screening tests were ordered as noted in patient's After Visit Summary. Personalized health advice and appropriate referrals for health education or preventive services given if needed, as noted in patient's After Visit Summary. History of Present Illness:     Patient presents for a Medicare Wellness Visit. His diabetes is well controlled, as is his hypertension, hyperlipidemia and asthma. HPI   Patient Care Team:  Haily George DO as PCP - General  Llewellyn Huff, MD Claudean Ober, MD Rosann Blinks, MD Chilton Scot, PA-C     Review of Systems:     Review of Systems   Constitutional: Negative for appetite change, chills and fever. HENT: Negative for ear pain, facial swelling, rhinorrhea, sinus pain, sore throat and trouble swallowing. Eyes: Negative for discharge and redness. Respiratory: Negative for chest tightness, shortness of breath and wheezing. Cardiovascular: Negative for chest pain and palpitations. Gastrointestinal: Negative for abdominal pain, diarrhea, nausea and vomiting. Endocrine: Negative for polyuria. Genitourinary: Negative for dysuria and urgency. Musculoskeletal: Negative for arthralgias and back pain. Skin: Negative for rash. Neurological: Negative for dizziness, weakness and headaches. Hematological: Negative for adenopathy. Psychiatric/Behavioral: Negative for behavioral problems, confusion and sleep disturbance. All other systems reviewed and are negative.        Problem List:     Patient Active Problem List   Diagnosis   • Temporal lobe epilepsy ([address])   • Moderate persistent asthma without complication   • Allergic rhinitis   • Amnesia, global, transient   • Anemia   • Anxiety   • Bilateral chronic knee pain   • BPH (benign prostatic hyperplasia)   • Disc degeneration, lumbar   • Other complications of other bariatric procedure   • Hyperlipidemia   • Essential hypertension   • Insomnia   • Lumbar radiculopathy   • Absolute anemia   • Thyromegaly   • Vitamin B12 deficiency   • Vitamin D deficiency   • Trigger finger   • Impingement syndrome of right shoulder   • Primary osteoarthritis of right shoulder   • Type 2 diabetes mellitus without complication, without long-term current use of insulin (HCC)   • Tremor, essential   • Gastroesophageal reflux disease with esophagitis without hemorrhage   • Continuous opioid dependence (HCC)   • Body mass index (BMI) 40.0-44.9, adult ([address])   • Internal derangement of left shoulder   • Tear of left supraspinatus tendon   • Aftercare following surgery of the musculoskeletal system      Past Medical and Surgical History:     Past Medical History:   Diagnosis Date   • Allergic    • Allergic rhinitis    • Anxiety    • Asthma    • Basal cell carcinoma 04/07/2022    nose   • Diabetes mellitus ([address])    • Disease of thyroid gland    • EIC (epidermal inclusion cyst)    • GERD (gastroesophageal reflux disease)    • Hayfever    • History of skin cancer    • HL (hearing loss)     wears hearing aides on occasion   • Hyperlipidemia    • Hypertension    • Memory loss, short term     R/O Seizures but placed on Keppra   • PONV (postoperative nausea and vomiting)     x1 occurence   • Sleep apnea     s/p gastric bypass   • Tinnitus      Past Surgical History:   Procedure Laterality Date   • ABDOMINOPLASTY     • ADENOIDECTOMY     • BACK SURGERY      lower back surgery   • CARPAL TUNNEL RELEASE Bilateral    • CHOLECYSTECTOMY     • COLONOSCOPY     • GASTRIC BYPASS     • HERNIA REPAIR Right    • JOINT REPLACEMENT Bilateral     Knee   • KNEE ARTHROSCOPY Bilateral     X3   • MOHS SURGERY  06/15/2022    nose-Dr. Catarina Mathews   • NV RPR 1ST INGUN HRNA AGE 4 YRS/> REDUCIBLE Left 11/26/2018    Procedure: INGUINAL HERNIA REPAIR;  Surgeon: Lani Crow DO;  Location: [facility];  Service: General   • MS SURGICAL ARTHROSCOPY SHOULDER W/ROTATOR CUFF RPR Left 5/10/2023    Procedure: SHOULDER ARTHROSCOPIC ROTATOR CUFF REPAIR, SUBACROMIAL DECOMPRESSION, BICEP TENODESIS;  Surgeon: Robson Amezcua MD;  Location: [facility] OR;  Service: Orthopedics   • MS TENDON SHEATH INCISION Right 04/29/2022    Procedure: RELEASE TRIGGER FINGER RING WITH LONG;  Surgeon: Linnette Kaur MD;  Location:  MAIN OR;  Service: Orthopedics   • TONSILLECTOMY     • TRIGGER FINGER RELEASE Right 04/29/2022      Family History:     Family History   Problem Relation Age of Onset   • Heart disease Mother    • Kidney cancer Mother    • Cancer Mother    • Hypertension Father    • Bipolar disorder Sister         bipolar disorder NOS   • Diabetes Maternal Grandmother       Social History:     Social History     Socioeconomic History   • Marital status: /Civil Union     Spouse name: None   • Number of children: 2   • Years of education: trade school   • Highest education level: None   Occupational History     Comment: employed   Tobacco Use   • Smoking status: Never     Passive exposure: Past   • Smokeless tobacco: Never   Vaping Use   • Vaping Use: Never used   Substance and Sexual Activity   • Alcohol use: Not Currently     Comment: 1 or 2 month   • Drug use: No   • Sexual activity: Not Currently     Partners: Female     Birth control/protection: Male Sterilization   Other Topics Concern   • None   Social History Narrative    Always uses seat belt    Caffeine use    Daily coffee consumption    Daily cola consumption    Dental care, regularly    DENIED exercise frequency    Guns in the home:stored in locked cabinet    Multiple organ donor    Patient has living will    DENIED pets/animals    Power of  in existence    Water intake, adequate (per day)     Social Determinants of Health     Financial Resource Strain: Not on file   Food Insecurity: Not on file   Transportation Needs: Not on file   Physical Activity: Not on file   Stress: Not on file   Social Connections: Not on file   Intimate Partner Violence: Not on file   Housing Stability: Not on file      Medications and Allergies:     Current Outpatient Medications   Medication Sig Dispense Refill   • ALPRAZolam (XANAX) 0.25 mg tablet Take 1 tablet (0.25 mg total) by mouth 4 (four) times a day as needed for anxiety 120 tablet 0   • amoxicillin (AMOXIL) 500 mg capsule Take 500 mg by mouth 4 tablets 1 hour prior to procedure     • atorvastatin (LIPITOR) 40 mg tablet      • azelastine (ASTELIN) 0.1 % nasal spray 1-2 sprays into each nostril 2 (two) times a day as needed     • cetirizine (ZyrTEC) 10 mg tablet Take 10 mg by mouth daily after dinner       • Cholecalciferol 2000 units CAPS Take 1 capsule by mouth daily after dinner     • clobetasol (TEMOVATE) 0.05 % cream APPLY TO AFFECTED AREA TWICE A DAY 60 g 0   • Cyanocobalamin (B-12) 1000 MCG CAPS Take 1 tablet by mouth daily after dinner     • diltiazem (DILACOR XR) 240 MG 24 hr capsule Take 240 mg by mouth daily     • fluticasone (FLONASE) 50 mcg/act nasal spray 2 sprays into each nostril 2 (two) times a day as needed       • fluticasone-salmeterol (Advair HFA) 230-21 MCG/ACT inhaler Inhale 2 puffs 2 (two) times a day     • hydrochlorothiazide (HYDRODIURIL) 25 mg tablet Take 1 tablet by mouth daily in the early morning    3   • ketoconazole (NIZORAL) 2 % cream Apply 1 application.  topically 2 (two) times a day To affected area 60 g 0   • levalbuterol (XOPENEX HFA) 45 mcg/act inhaler      • levalbuterol (XOPENEX) 0.63 mg/3 mL nebulizer solution Take 0.63 mg by nebulization every 4 (four) hours as needed for wheezing or shortness of breath     • levETIRAcetam (KEPPRA) 500 mg tablet TAKE 1 TABLET BY MOUTH TWICE A  tablet 1   • losartan (COZAAR) 25 mg tablet Take 25 mg by mouth daily     • metFORMIN (GLUCOPHAGE) 1000 MG tablet TAKE 1 TABLET BY MOUTH TWICE A DAY WITH MEALS 180 tablet 3   • montelukast (SINGULAIR) 10 mg tablet Take 10 mg by mouth every evening  3   • oxybutynin (DITROPAN) 5 mg tablet TAKE 1 TABLET BY MOUTH EVERY DAY AT NIGHT     • oxyCODONE-acetaminophen (Percocet) 7.5-325 MG per tablet Take 1 tablet by mouth every 6 (six) hours as needed for moderate pain Max Daily Amount: 4 tablets 120 tablet 0   • Pseudoeph-Doxylamine-DM-APAP (NYQUIL PO) Take by mouth as needed      • pseudoephedrine (SUDAFED) 30 mg tablet Take 60 mg by mouth every 4 (four) hours as needed for congestion     • tamsulosin (FLOMAX) 0.4 mg Take 0.4 mg by mouth daily with dinner     • zolpidem (AMBIEN) 10 mg tablet Take 1 tablet (10 mg total) by mouth daily at bedtime as needed for sleep 30 tablet 0     No current facility-administered medications for this visit. Allergies   Allergen Reactions   • Iv Dye [Iodinated Contrast Media] Hives   • Penicillins Other (See Comments)     ? Had as a child-Unknown reaction      Immunizations:     Immunization History   Administered Date(s) Administered   • COVID-19 PFIZER VACCINE 0.3 ML IM 02/17/2021, 03/08/2021, 12/11/2021   • COVID-19 Pfizer Vac BIVALENT Cory-sucrose 12 Yr+ IM (BOOSTER ONLY) 10/05/2022   • INFLUENZA 09/22/2018, 09/04/2019, 09/30/2022   • Influenza Quadrivalent, 6-35 Months IM 10/13/2015   • Influenza, high dose seasonal 0.7 mL 10/02/2020, 11/15/2021   • Influenza, seasonal, injectable 11/03/2011, 09/14/2017   • Pneumococcal Conjugate 13-Valent 10/13/2015   • Pneumococcal Conjugate Vaccine 20-valent (Pcv20), Polysace 07/26/2022      Health Maintenance:         Topic Date Due   • Hepatitis C Screening  Never done   • Colorectal Cancer Screening  10/02/2023         Topic Date Due   • COVID-19 Vaccine (5 - Pfizer series) 02/05/2023   • Influenza Vaccine (1) 09/01/2023      Medicare Screening Tests and Risk Assessments:     Terrence Curtis is here for his Subsequent Wellness visit. Last Medicare Wellness visit information reviewed, patient interviewed, no change since last AWV. Health Risk Assessment:   Patient rates overall health as good. Patient feels that their physical health rating is same. Patient is very satisfied with their life. Eyesight was rated as same. Hearing was rated as same. Patient feels that their emotional and mental health rating is same. Patients states they are never, rarely angry. Patient states they are sometimes unusually tired/fatigued. Pain experienced in the last 7 days has been none. Patient states that he has experienced no weight loss or gain in last 6 months. Depression Screening:   PHQ-2 Score: 2      Fall Risk Screening: In the past year, patient has experienced: history of falling in past year    Number of falls: 2 or more  Injured during fall?: Yes    Feels unsteady when standing or walking?: No    Worried about falling?: No      Home Safety:  Patient has trouble with stairs inside or outside of their home. Patient has working smoke alarms and has working carbon monoxide detector. Home safety hazards include: none. Nutrition:   Current diet is Regular. Medications:   Patient is currently taking over-the-counter supplements. OTC medications include: see medication list. Patient is able to manage medications. Activities of Daily Living (ADLs)/Instrumental Activities of Daily Living (IADLs):   Walk and transfer into and out of bed and chair?: Yes  Dress and groom yourself?: Yes    Bathe or shower yourself?: Yes    Feed yourself? Yes  Do your laundry/housekeeping?: Yes  Manage your money, pay your bills and track your expenses?: Yes  Make your own meals?: Yes    Do your own shopping?: Yes    Previous Hospitalizations:   Any hospitalizations or ED visits within the last 12 months?: No      Advance Care Planning:   Living will: Yes    Durable POA for healthcare:  Yes    Advanced directive: Yes    Advanced directive counseling given: Yes    Five wishes given: Yes    Patient declined ACP directive: No    End of Life Decisions reviewed with patient: Yes    Provider agrees with end of life decisions: Yes      Cognitive Screening:   Provider or family/friend/caregiver concerned regarding cognition?: No    PREVENTIVE SCREENINGS      Cardiovascular Screening:    General: Screening Not Indicated and History Lipid Disorder      Diabetes Screening:     General: Screening Not Indicated and History Diabetes      Colorectal Cancer Screening:     General: Screening Current      Prostate Cancer Screening:    General: Screening Current      Osteoporosis Screening:    General: Screening Current      Abdominal Aortic Aneurysm (AAA) Screening:    Risk factors include: age between 70-77 yo        General: Screening Current and Screening Not Indicated      Lung Cancer Screening:     General: Screening Not Indicated      Hepatitis C Screening:    General: Screening Current    Screening, Brief Intervention, and Referral to Treatment (SBIRT)    Screening    Typical number of drinks in a week: 0    Single Item Drug Screening:  How often have you used an illegal drug (including marijuana) or a prescription medication for non-medical reasons in the past year? never    Single Item Drug Screen Score: 0  Interpretation: Negative screen for possible drug use disorder    Review of Current Opioid Use    Opioid Risk Tool (ORT) Interpretation: Complete Opioid Risk Tool (ORT)    No results found. Physical Exam:     Pulse 69   Temp (!) 97.4 °F (36.3 °C)   Ht 5' 10" (1.778 m)   Wt 130 kg (286 lb)   SpO2 96%   BMI 41.04 kg/m²     Physical Exam  Vitals and nursing note reviewed. Constitutional:       General: He is not in acute distress. Appearance: Normal appearance. He is not ill-appearing or diaphoretic. HENT:      Head: Normocephalic and atraumatic. Right Ear: Tympanic membrane, ear canal and external ear normal.      Left Ear: Tympanic membrane, ear canal and external ear normal.      Nose: Nose normal. No congestion or rhinorrhea.       Mouth/Throat:      Mouth: Mucous membranes are moist.      Pharynx: Oropharynx is clear. No posterior oropharyngeal erythema. Eyes:      General:         Right eye: No discharge. Left eye: No discharge. Extraocular Movements: Extraocular movements intact. Conjunctiva/sclera: Conjunctivae normal.      Pupils: Pupils are equal, round, and reactive to light. Neck:      Vascular: No carotid bruit. Cardiovascular:      Rate and Rhythm: Normal rate and regular rhythm. Pulses: Normal pulses. Heart sounds: Normal heart sounds. No murmur heard. Pulmonary:      Effort: Pulmonary effort is normal. No respiratory distress. Breath sounds: Normal breath sounds. No wheezing or rhonchi. Abdominal:      General: Abdomen is flat. Bowel sounds are normal. There is no distension. Palpations: There is no mass. Tenderness: There is no abdominal tenderness. Musculoskeletal:         General: No swelling or deformity. Normal range of motion. Cervical back: Normal range of motion and neck supple. No rigidity. Right lower leg: No edema. Left lower leg: No edema. Lymphadenopathy:      Cervical: No cervical adenopathy. Skin:     General: Skin is warm and dry. Capillary Refill: Capillary refill takes less than 2 seconds. Coloration: Skin is not jaundiced. Findings: No bruising, erythema or rash. Neurological:      General: No focal deficit present. Mental Status: He is alert and oriented to person, place, and time. Cranial Nerves: No cranial nerve deficit. Sensory: No sensory deficit. Gait: Gait normal.      Deep Tendon Reflexes: Reflexes normal.   Psychiatric:         Mood and Affect: Mood normal.         Behavior: Behavior normal.         Thought Content:  Thought content normal.         Judgment: Judgment normal.          Pino Martini DO

## 2023-08-18 ENCOUNTER — TELEPHONE (OUTPATIENT)
Dept: ADMINISTRATIVE | Facility: OTHER | Age: 68
End: 2023-08-18

## 2023-08-18 ENCOUNTER — APPOINTMENT (OUTPATIENT)
Dept: LAB | Facility: CLINIC | Age: 68
End: 2023-08-18
Payer: MEDICARE

## 2023-08-18 DIAGNOSIS — N40.0 BENIGN PROSTATIC HYPERPLASIA WITHOUT LOWER URINARY TRACT SYMPTOMS: ICD-10-CM

## 2023-08-18 LAB — PSA SERPL-MCNC: 0.96 NG/ML (ref 0–4)

## 2023-08-18 PROCEDURE — 84153 ASSAY OF PSA TOTAL: CPT

## 2023-08-18 PROCEDURE — 36415 COLL VENOUS BLD VENIPUNCTURE: CPT

## 2023-08-18 NOTE — LETTER
Diabetic Eye Exam Form    Date Requested: 23  Patient: Patricia Darling  Patient : 1955   Referring Provider: Adri Mariscal DO      DIABETIC Eye Exam Date _______________________________      Type of Exam MUST be documented for Diabetic Eye Exams. Please CHECK ONE. Retinal Exam       Dilated Retinal Exam       OCT       Optomap-Iris Exam      Fundus Photography       Left Eye - Please check Retinopathy or No Retinopathy        Exam did show retinopathy    Exam did not show retinopathy       Right Eye - Please check Retinopathy or No Retinopathy       Exam did show retinopathy    Exam did not show retinopathy       Comments __________________________________________________________    Practice Providing Exam ______________________________________________    Exam Performed By (print name) _______________________________________      Provider Signature ___________________________________________________      These reports are needed for  compliance. Please fax this completed form and a copy of the Diabetic Eye Exam report to our office located at [address] [place] [address] as soon as possible via Fax [phone] attention [place]: Phone [phone]  We thank you for your assistance in treating our mutual patient.

## 2023-08-18 NOTE — TELEPHONE ENCOUNTER
Upon review of the In Basket request and the patient's chart, initial outreach has been made via fax to facility. Please see Contacts section for details.      Thank you  Brandee Palmer MA

## 2023-08-18 NOTE — TELEPHONE ENCOUNTER
----- Message from Gina Marshall sent at 8/17/2023 12:58 PM EDT -----  Regarding: DM EYE  08/17/23 12:59 PM    Hello, our patient Jael Dan has had Diabetic Eye Exam completed/performed. Please assist in updating the patient chart by making an External outreach to Dr. Roland Moses facility located in [facility]). The date of service is 07/2023.     Thank you,  [address]

## 2023-08-21 ENCOUNTER — OFFICE VISIT (OUTPATIENT)
Dept: PHYSICAL THERAPY | Facility: CLINIC | Age: 68
End: 2023-08-21
Payer: MEDICARE

## 2023-08-21 DIAGNOSIS — Z47.89 ORTHOPEDIC AFTERCARE: ICD-10-CM

## 2023-08-21 DIAGNOSIS — M75.102 TEAR OF LEFT SUPRASPINATUS TENDON: Primary | ICD-10-CM

## 2023-08-21 PROCEDURE — 97112 NEUROMUSCULAR REEDUCATION: CPT | Performed by: PHYSICAL THERAPIST

## 2023-08-21 PROCEDURE — 97110 THERAPEUTIC EXERCISES: CPT | Performed by: PHYSICAL THERAPIST

## 2023-08-21 PROCEDURE — 97140 MANUAL THERAPY 1/> REGIONS: CPT | Performed by: PHYSICAL THERAPIST

## 2023-08-21 NOTE — TELEPHONE ENCOUNTER
Upon review of the In Basket request we were able to locate, review, and update the patient chart as requested for Diabetic Eye Exam.    Any additional questions or concerns should be emailed to the Practice Liaisons via the appropriate education email address, please do not reply via In Basket.     Thank you  Ronal Bacon MA

## 2023-08-21 NOTE — PROGRESS NOTES
Daily Note     Today's date: 2023  Patient name: Taylor Booth  : 1955  MRN: [id number]  Referring provider: Laure Willoughby  Dx:   Encounter Diagnosis     ICD-10-CM    1. Tear of left supraspinatus tendon  M75.102       2. Orthopedic aftercare  Z47.89                      Subjective: Pt reports stiffness at shoulder with across  body movements but denies any other complaints. He had a colonoscopy last week and had to lie on his left shoulder during the medical procedure. Objective: See treatment diary below      Assessment: Pt appropriately fatigued with treatment. PROM improving in all planes. Worked on horizontal adduction due to stiffness. 3# db is challenging for patient with shoulder flexion and ER. Pt endurance with PREs are slowly improving. Tolerated treatment well. Patient exhibited good technique with therapeutic exercises. Plan: Continue per plan of care.       Precautions: Surgery scheduled with Dr. Shankar Vieyra on 5/10/23 (left  RTC repair)   Manuals 7/20 7/24 7/27 7/31 8/3 8/7 8/10 8/14 8/17 8/21   PROM of the left shoulder c/ protocol limitations 10' 10' PROM to tolerance 10' 10'  JL PROM to tolerance 10' 10' 10' 10'                              Neuro Re-Ed     8/3 8/7       Scap retraction  30x           Serratus press             Shoulder isometrics  10x5" F,E, IR,ER 10x5" F,E, IR,ER 10x5" F,E, IR,ER         Row 30x 30x 30x 30x  GTB 3" 3x10 blk tb 3x10  blk tb 3x10  blk tb 3x10 blk tb 3x10   Shoulder extension 30x 30x 30x 30x  GTB 3" 3x10    blk tb 3x10   Horizontal abd - bent over 30x 30x 30x 30x    3x10 2# 3x10 2# 3x10 2#   TB IR/ER (23)        3x10 rtb ea 3x10 rtb ea 3x10 gtb ea 3x10 gtb ea   Ther Ex     8/3 8/7       *May Add light resistance at week 12 (23)*             PendulumsCane ER in neutral             Pulley Scaption 5' Scaption 5'  Scaption 5' Scaption 5' scaption 5' scaption 5' scaption 5' scaption 5' scaption 5' scaption 5'        ER/IRRTB 2x10 ea ER/IRRTB 2x10 ea       Supine ER with dowel 10x10" 10x10" 10x10" 10x10"         SL ER AROM 3x10 3x10 3x10 3x10 1# 3x10 1# 3x10 2#3x10 2# 3x10  3# 3x10  3# 3x10    SL shoulder abduction 3x10 3x10 3x10 3x10 1# 3x10 1# 3x10 2# 2x10  Standing     Sleeper stretch             Elbow AROM             Table slide with PB -AAROM             Hands clasp shoulder flexion in supine     AROM scaption at mirror 2x10 AROM scaption at mirror 2x10       Shoulder abduction- Standing         2x10 2# 2x10 2# 2x10 2#   Shoulder flexion -Standing 3x10 with mirror feedback 3x10 3x10 with mirror feedback 3x10 with mirror feedback 1# 3x10 1# 3x10 2# 2x10  2# 2x10  3# 3x10 3#  3x10   IR BTB stretch 10x10" with strap 10x10" with strap 10x10" with strap 10x10" with strap 10x10" w/ strap 10"x10 with strap 10"x10 with strap 10"x10 with strap 10"x10 with strap 10x10" with strap   Shoulder extension 10x10" 10x10" 10x10" 10x10"  10"x10 with dowel  10"x10 with dowel 10x10" with dowel 10x10" with dowel   Sleeper stretch   10x10" 10x10"   10x10" 10x10"     Ther Activity                                       Gait Training                                       Modalities                                         1:1 with PT from [id number]

## 2023-08-24 ENCOUNTER — OFFICE VISIT (OUTPATIENT)
Dept: PHYSICAL THERAPY | Facility: CLINIC | Age: 68
End: 2023-08-24
Payer: MEDICARE

## 2023-08-24 DIAGNOSIS — M75.102 TEAR OF LEFT SUPRASPINATUS TENDON: Primary | ICD-10-CM

## 2023-08-24 DIAGNOSIS — Z47.89 ORTHOPEDIC AFTERCARE: ICD-10-CM

## 2023-08-24 PROCEDURE — 97140 MANUAL THERAPY 1/> REGIONS: CPT | Performed by: PHYSICAL THERAPIST

## 2023-08-24 PROCEDURE — 97110 THERAPEUTIC EXERCISES: CPT | Performed by: PHYSICAL THERAPIST

## 2023-08-24 PROCEDURE — 97112 NEUROMUSCULAR REEDUCATION: CPT | Performed by: PHYSICAL THERAPIST

## 2023-08-24 NOTE — PROGRESS NOTES
Daily Note     Today's date: 2023  Patient name: Serenity Jain  : 1955  MRN: [id number]  Referring provider: Nic George  Dx:   Encounter Diagnosis     ICD-10-CM    1. Tear of left supraspinatus tendon  M75.102       2. Orthopedic aftercare  Z47.89           Start Time: 46  Stop Time: 0830  Total time in clinic (min): 45 minutes    Subjective: Pt offers no new complaints at left shoulder. Objective: See treatment diary below      Assessment: Pt is 15 weeks post op. Continued with PREs and shoulder strengthening. Pt fatigues appropriately with prescribed weight and resistance. Mild cueing to improve form with new horizontal adduction stretching and shoulder abduction to 90 deg. Tolerated treatment well. Patient would benefit from continued PT. Plan: Continue per plan of care. After next week, patient's POC will change to 1x per week.       Precautions: Surgery scheduled with Dr. Carlos Millan on 5/10/23 (left  RTC repair)   Manuals 8/24    8/3 8/7 8/10 8/14 8/17 8/21   PROM of the left shoulder c/ protocol limitations 10'     JL PROM to tolerance 10' 10' 10' 10'                              Neuro Re-Ed     8/3 8/7       Scap retraction             Serratus press             Shoulder isometrics             Row blk tb 30x     GTB 3" 3x10 blk tb 3x10  blk tb 3x10  blk tb 3x10 blk tb 3x10   Shoulder extension blk tb 30x     GTB 3" 3x10    blk tb 3x10   Horizontal abd - bent over 3x10 2#       3x10 2# 3x10 2# 3x10 2#   TB IR/ER (23)  3x10 gtb       3x10 rtb ea 3x10 rtb ea 3x10 gtb ea 3x10 gtb ea   Ther Ex     8/3 8/7       *May Add light resistance at week 12 (23)*             PendulumsCane ER in neutral             Pulley Scaption 5'    scaption 5' scaption 5' scaption 5' scaption 5' scaption 5' scaption 5'        ER/IRRTB 2x10 ea ER/IRRTB 2x10 ea       WB horizontal add stretch 10x10"            SL ER  3x10 3#    1# 3x10 1# 3x10 2#3x10 2# 3x10  3# 3x10  3# 3x10    SL shoulder abduction     1# 3x10 1# 3x10 2# 2x10  Standing     Sleeper stretch             Elbow AROM             Table slide with PB -AAROM             Hands clasp shoulder flexion in supine     AROM scaption at mirror 2x10 AROM scaption at mirror 2x10       Shoulder abduction- Standing  3x10 2#       2x10 2# 2x10 2# 2x10 2#   Shoulder flexion -Standing 3x10 3#    1# 3x10 1# 3x10 2# 2x10  2# 2x10  3# 3x10 3#  3x10   IR BTB stretch 10x10" with strap    10x10" w/ strap 10"x10 with strap 10"x10 with strap 10"x10 with strap 10"x10 with strap 10x10" with strap   Shoulder extension      10"x10 with dowel  10"x10 with dowel 10x10" with dowel 10x10" with dowel   Sleeper stretch       10x10" 10x10"     Ther Activity                                       Gait Training                                       Modalities                                         1:1 with PT from [id number]

## 2023-08-28 ENCOUNTER — OFFICE VISIT (OUTPATIENT)
Dept: PHYSICAL THERAPY | Facility: CLINIC | Age: 68
End: 2023-08-28
Payer: MEDICARE

## 2023-08-28 DIAGNOSIS — Z47.89 ORTHOPEDIC AFTERCARE: ICD-10-CM

## 2023-08-28 DIAGNOSIS — M75.102 TEAR OF LEFT SUPRASPINATUS TENDON: Primary | ICD-10-CM

## 2023-08-28 PROCEDURE — 97140 MANUAL THERAPY 1/> REGIONS: CPT | Performed by: PHYSICAL THERAPIST

## 2023-08-28 PROCEDURE — 97112 NEUROMUSCULAR REEDUCATION: CPT | Performed by: PHYSICAL THERAPIST

## 2023-08-28 PROCEDURE — 97110 THERAPEUTIC EXERCISES: CPT | Performed by: PHYSICAL THERAPIST

## 2023-08-28 NOTE — PROGRESS NOTES
Daily Note     Today's date: 2023  Patient name: Javed Pitts  : 1955  MRN: [id number]  Referring provider: Vickie Fay  Dx:   Encounter Diagnosis     ICD-10-CM    1. Tear of left supraspinatus tendon  M75.102       2. Orthopedic aftercare  Z47.89           Start Time: 1055  Stop Time: 1140  Total time in clinic (min): 45 minutes    Subjective: Pt offers no new complaints at involved shoulder. He states that his breathing is bothersome today. He had a pulmonary test on Friday and awaiting results. Objective: See treatment diary below      Assessment: Tolerated treatment well. Pt continues to appropriately fatigued with prescirbed exercises. Minimal compensatory motion present during strength program. Implemented GH joint mobilizations (inferior and posterior glides). Standing shoulder abduction continues to be fairly weak and challenging for patient. Patient would benefit from continued PT. Plan: Continue per plan of care.       Precautions: Surgery scheduled with Dr. Tal Borges on 5/10/23 (left  RTC repair)   Manuals 8/24 8/28   8/3 8/7 8/10 8/14 8/17 8/21   PROM of the left shoulder c/ protocol limitations 10' 10'    JL PROM to tolerance 10' 10' 10' 10'    GJ joint mobs: inf/post glide  Gr III                        Neuro Re-Ed     8/3 8/7       Scap retraction             Serratus press             Shoulder isometrics             Row blk tb 30x blk tb 30x    GTB 3" 3x10 blk tb 3x10  blk tb 3x10  blk tb 3x10 blk tb 3x10   Shoulder extension blk tb 30x blk tb 30x    GTB 3" 3x10    blk tb 3x10   Horizontal abd - bent over 3x10 2# 3x10 2#      3x10 2# 3x10 2# 3x10 2#   TB IR/ER (23)  3x10 gtb  3x10 btb      3x10 rtb ea 3x10 rtb ea 3x10 gtb ea 3x10 gtb ea   Ther Ex     8/3 8/7       *May Add light resistance at week 12 (23)*             PendulumsCane ER in neutral             Pulley Scaption 5' Scaption 5'   scaption 5' scaption 5' scaption 5' scaption 5' scaption 5' scaption 5'        ER/IRRTB 2x10 ea ER/IRRTB 2x10 ea       WB horizontal add stretch 10x10" 10x10"           SL ER  3x10 3# 3x10 3#   1# 3x10 1# 3x10 2#3x10 2# 3x10  3# 3x10  3# 3x10    SL shoulder abduction     1# 3x10 1# 3x10 2# 2x10  Standing     Sleeper stretch             Elbow AROM             Table slide with PB -AAROM             Hands clasp shoulder flexion in supine     AROM scaption at mirror 2x10 AROM scaption at mirror 2x10       Shoulder abduction- Standing  3x10 2# 3x10 2#      2x10 2# 2x10 2# 2x10 2#   Shoulder flexion -Standing 3x10 3# 3x10 3#   1# 3x10 1# 3x10 2# 2x10  2# 2x10  3# 3x10 3#  3x10   IR BTB stretch 10x10" with strap 10x10" with strap   10x10" w/ strap 10"x10 with strap 10"x10 with strap 10"x10 with strap 10"x10 with strap 10x10" with strap   Shoulder extension      10"x10 with dowel  10"x10 with dowel 10x10" with dowel 10x10" with dowel   Sleeper stretch       10x10" 10x10"     Ther Activity                                       Gait Training                                       Modalities                                         1:1 with PT from [id number]

## 2023-08-31 ENCOUNTER — OFFICE VISIT (OUTPATIENT)
Dept: PHYSICAL THERAPY | Facility: CLINIC | Age: 68
End: 2023-08-31
Payer: MEDICARE

## 2023-08-31 DIAGNOSIS — M75.102 TEAR OF LEFT SUPRASPINATUS TENDON: Primary | ICD-10-CM

## 2023-08-31 DIAGNOSIS — Z47.89 ORTHOPEDIC AFTERCARE: ICD-10-CM

## 2023-08-31 PROCEDURE — 97140 MANUAL THERAPY 1/> REGIONS: CPT | Performed by: PHYSICAL THERAPIST

## 2023-08-31 PROCEDURE — 97112 NEUROMUSCULAR REEDUCATION: CPT | Performed by: PHYSICAL THERAPIST

## 2023-08-31 PROCEDURE — 97110 THERAPEUTIC EXERCISES: CPT | Performed by: PHYSICAL THERAPIST

## 2023-08-31 NOTE — PROGRESS NOTES
Daily Note     Today's date: 2023  Patient name: Des Spicer  : 1955  MRN: [id number]  Referring provider: Pilar Encarnacion  Dx:   Encounter Diagnosis     ICD-10-CM    1. Tear of left supraspinatus tendon  M75.102       2. Orthopedic aftercare  Z47.89                      Subjective: Pt offers no new complaints today. He states that his shoulder and neck region are feeling better than last visit. Objective: See treatment diary below      Assessment: Pt is 16 weeks post op. He his doing very well. His shoulder continues to fatigue quickly with shoulder ABD and horizontal abduction. However, minimal shoulder symptoms with strength program. Progressed rows and extensions to [place] resistance. Tolerated treatment well. Patient would benefit from continued PT. Plan: Continue per plan of care.       Precautions: Surgery scheduled with Dr. Dmitriy Mancilla on 5/10/23 (left  RTC repair)   Manuals 8/24 8/28 8/31  8/3 8/7 8/10 8/14 8/17 8/21   PROM of the left shoulder c/ protocol limitations 10' 10' 10'   JL PROM to tolerance 10' 10' 10' 10'    GJ joint mobs: inf/post glide  Gr III Gr III                       Neuro Re-Ed     8/3 8/7       Scap retraction             Serratus press             Shoulder isometrics             Row blk tb 30x blk tb 30x [place] 25# 3x10    GTB 3" 3x10 blk tb 3x10  blk tb 3x10  blk tb 3x10 blk tb 3x10   Shoulder extension blk tb 30x blk tb 30x [place] 15# 3x10   GTB 3" 3x10    blk tb 3x10   Horizontal abd - bent over 3x10 2# 3x10 2# 3x10 2#     3x10 2# 3x10 2# 3x10 2#   TB IR/ER (23)  3x10 gtb  3x10 btb  3x10 btb    3x10 rtb ea 3x10 rtb ea 3x10 gtb ea 3x10 gtb ea   Ther Ex     8/3 8/7       *May Add light resistance at week 12 (23)*             PendulumsCane ER in neutral             Pulley Scaption 5' Scaption 5' Scaption 5'  scaption 5' scaption 5' scaption 5' scaption 5' scaption 5' scaption 5'        ER/IRRTB 2x10 ea ER/IRRTB 2x10 ea       WB horizontal add stretch 10x10" 10x10" 10x10"          SL ER  3x10 3# 3x10 3# 3x10 3#  1# 3x10 1# 3x10 2#3x10 2# 3x10  3# 3x10  3# 3x10    SL shoulder abduction     1# 3x10 1# 3x10 2# 2x10  Standing     Sleeper stretch             Elbow AROM             Table slide with PB -AAROM             Hands clasp shoulder flexion in supine     AROM scaption at mirror 2x10 AROM scaption at mirror 2x10       Shoulder abduction- Standing  3x10 2# 3x10 2# 3x10 2#     2x10 2# 2x10 2# 2x10 2#   Shoulder flexion -Standing 3x10 3# 3x10 3# 3x10 3#  1# 3x10 1# 3x10 2# 2x10  2# 2x10  3# 3x10 3#  3x10   IR BTB stretch 10x10" with strap 10x10" with strap 10x10" with strap  10x10" w/ strap 10"x10 with strap 10"x10 with strap 10"x10 with strap 10"x10 with strap 10x10" with strap   Shoulder extension      10"x10 with dowel  10"x10 with dowel 10x10" with dowel 10x10" with dowel   Sleeper stretch       10x10" 10x10"     Ther Activity                                       Gait Training                                       Modalities                                         1:1 with PT from [id number]  [email or web address]  Access Code: [email or web address]  - Updated HEP.

## 2023-09-06 ENCOUNTER — OFFICE VISIT (OUTPATIENT)
Dept: PHYSICAL THERAPY | Facility: CLINIC | Age: 68
End: 2023-09-06
Payer: MEDICARE

## 2023-09-06 DIAGNOSIS — M75.102 TEAR OF LEFT SUPRASPINATUS TENDON: Primary | ICD-10-CM

## 2023-09-06 DIAGNOSIS — Z47.89 ORTHOPEDIC AFTERCARE: ICD-10-CM

## 2023-09-06 PROCEDURE — 97140 MANUAL THERAPY 1/> REGIONS: CPT | Performed by: PHYSICAL THERAPIST

## 2023-09-06 PROCEDURE — 97110 THERAPEUTIC EXERCISES: CPT | Performed by: PHYSICAL THERAPIST

## 2023-09-06 NOTE — PROGRESS NOTES
Daily Note     Today's date: 2023  Patient name: Ivet Pisano  : 1955  MRN: [id number]  Referring provider: Vega Torrez  Dx:   Encounter Diagnosis     ICD-10-CM    1. Tear of left supraspinatus tendon  M75.102       2. Orthopedic aftercare  Z47.89                      Subjective: Pt reports using arm functional a lot over the weekend. He had to move some furniture and help with grandson. No complaints of pain or discomfort this AM. He does admit to not being able to work on HEP as much as he would like. Objective: See treatment diary below      Assessment: Tolerated treatment well. Pt appropriately fatigued with prescribed exercises. Increased [place] rows and extensions by 5#. Patient exhibited good technique with therapeutic exercises and would benefit from continued PT. Plan: Continue per plan of care.       Precautions: Surgery scheduled with Dr. Pati Hein on 5/10/23 (left  RTC repair)   Manuals    PROM of the left shoulder c/ protocol limitations 10' 10' 10' 10'    10' 10' 10'    GJ joint mobs: inf/post glide  Gr III Gr III Gr III                      Neuro Re-Ed             Scap retraction             Serratus press             Shoulder isometrics             Row blk tb 30x blk tb 30x [place] 25# 3x10  [place] 30# 3x10     blk tb 3x10  blk tb 3x10 blk tb 3x10   Shoulder extension blk tb 30x blk tb 30x [place] 15# 3x10 [place] 20# 3x10      blk tb 3x10   Horizontal abd - bent over 3x10 2# 3x10 2# 3x10 2# 3x10 2#    3x10 2# 3x10 2# 3x10 2#   TB IR/ER (23)  3x10 gtb  3x10 btb  3x10 btb     3x10 rtb ea 3x10 gtb ea 3x10 gtb ea   Ther Ex             *May Add light resistance at week 12 (23)*             PendulumsCane ER in neutral             Pulley Scaption 5' Scaption 5' Scaption 5' Scaption 5'    scaption 5' scaption 5' scaption 5'                WB horizontal add stretch 10x10" 10x10" 10x10" 10x10"         SL ER  3x10 3# 3x10 3# 3x10 3# 3x10 3#    2# 3x10  3# 3x10  3# 3x10    SL shoulder abduction        Standing     Sleeper stretch             Elbow AROM             Table slide with PB -AAROM             Hands clasp shoulder flexion in supine             Shoulder abduction- Standing  3x10 2# 3x10 2# 3x10 2# 3x10 2#    2x10 2# 2x10 2# 2x10 2#   Shoulder flexion -Standing 3x10 3# 3x10 3# 3x10 3# 3x10 4#    2# 2x10  3# 3x10 3#  3x10   IR BTB stretch 10x10" with strap 10x10" with strap 10x10" with strap 10x10" with strap    10"x10 with strap 10"x10 with strap 10x10" with strap   Shoulder extension        10"x10 with dowel 10x10" with dowel 10x10" with dowel   Sleeper stretch        10x10"     Ther Activity                                       Gait Training                                       Modalities                                         1:1 with PT from [id number]  [email or web address]  Access Code: [email or web address]  - Updated HEP.

## 2023-09-07 DIAGNOSIS — F41.9 ANXIETY: ICD-10-CM

## 2023-09-07 DIAGNOSIS — M25.512 CHRONIC LEFT SHOULDER PAIN: ICD-10-CM

## 2023-09-07 DIAGNOSIS — G89.29 CHRONIC LEFT SHOULDER PAIN: ICD-10-CM

## 2023-09-07 DIAGNOSIS — G47.00 INSOMNIA, UNSPECIFIED TYPE: ICD-10-CM

## 2023-09-07 RX ORDER — ZOLPIDEM TARTRATE 10 MG/1
10 TABLET ORAL
Qty: 30 TABLET | Refills: 0 | Status: SHIPPED | OUTPATIENT
Start: 2023-09-07

## 2023-09-07 RX ORDER — ALPRAZOLAM 0.25 MG/1
0.25 TABLET ORAL 4 TIMES DAILY PRN
Qty: 120 TABLET | Refills: 0 | Status: SHIPPED | OUTPATIENT
Start: 2023-09-07

## 2023-09-07 RX ORDER — OXYCODONE AND ACETAMINOPHEN 7.5; 325 MG/1; MG/1
1 TABLET ORAL EVERY 6 HOURS PRN
Qty: 120 TABLET | Refills: 0 | Status: SHIPPED | OUTPATIENT
Start: 2023-09-07

## 2023-09-07 NOTE — TELEPHONE ENCOUNTER
1 [id number] 08/13/2023 08/11/2023 Zolpidem Tartrate (Tablet) 30.0 30 10 MG NA DOYLE BLANKENSHIP [facility], [facility]. [place] 0 / 0 [place]    1 [id number] 08/11/2023 08/11/2023 ALPRAZolam (Tablet) 120.0 30 0.25 MG NA DOYLE BLANKENSHIP [facility], [facility]. [place] 0 / 0 [place]    1 [id number] 08/11/2023 08/11/2023 ACETAMINOPHEN 325 MG / oxyCODONE HYDROCHLORIDE 7.5 MG ORAL TABLET (Tablet) 120.0 30 7.5 MG/325.0 MG 45.0 DOYLE BLANKENSHIP [facility], [facility]. Medicare 0 / 0 [place]    1 [id number] 07/17/2023 07/14/2023 ACETAMINOPHEN 325 MG / oxyCODONE HYDROCHLORIDE 7.5 MG ORAL TABLET (Tablet) 120.0 30 7.5 MG/325.0 MG 45.0 BUSHRA BROADT [facility]'  0 / 0 [place]    1 [id number] 07/17/2023 07/14/2023 Zolpidem Tartrate (Tablet) 30.0 30 10 MG NA BUSHRA VENEGAS [facility], [facility].. Medicare 0 / 3 [place]    1 [id number] 07/11/2023 07/11/2023 ALPRAZolam (Tablet) 120.0 30 0.25 MG NA BUSHRA VENEGAS [facility], ... Medicare 0 / 0 [place]    1 [id number] 06/20/2023 06/15/2023 Zolpidem Tartrate (Tablet) 30.0 30 10 MG NA BUSHRA VENEGAS [facility], [facility]. Medicare 0 / 0 [place]    1 [id number] 06/19/2023 06/19/2023 ACETAMINOPHEN 325 MG / oxyCODONE HYDROCHLORIDE 7.5 MG ORAL TABLET (Tablet) 120.0 30 7.5 MG/325.0 MG 45.0 BUSHRA BROADT [facility] 0 / 0 [place]    1 [id number] 06/07/2023 06/07/2023 ALPRAZolam (Tablet) 120.0 30 0.25 MG NA PARAS STEARNS [facility]  Medicare 0 / 0 [place]    1 [id number] 05/24/2023 05/22/2023 Zolpidem Tartrate (Tablet) 30.0 30 10 MG NA BUSHRA VENEGAS [facility], L

## 2023-09-11 ENCOUNTER — OFFICE VISIT (OUTPATIENT)
Dept: PHYSICAL THERAPY | Facility: CLINIC | Age: 68
End: 2023-09-11
Payer: MEDICARE

## 2023-09-11 DIAGNOSIS — Z47.89 ORTHOPEDIC AFTERCARE: ICD-10-CM

## 2023-09-11 DIAGNOSIS — M75.102 TEAR OF LEFT SUPRASPINATUS TENDON: Primary | ICD-10-CM

## 2023-09-11 PROCEDURE — 97112 NEUROMUSCULAR REEDUCATION: CPT | Performed by: PHYSICAL THERAPIST

## 2023-09-11 PROCEDURE — 97110 THERAPEUTIC EXERCISES: CPT | Performed by: PHYSICAL THERAPIST

## 2023-09-11 PROCEDURE — 97140 MANUAL THERAPY 1/> REGIONS: CPT | Performed by: PHYSICAL THERAPIST

## 2023-09-11 NOTE — PROGRESS NOTES
Daily Note     Today's date: 2023  Patient name: Serenity Jain  : 1955  MRN: [id number]  Referring provider: Nic George  Dx:   Encounter Diagnosis     ICD-10-CM    1. Tear of left supraspinatus tendon  M75.102       2. Orthopedic aftercare  Z47.89           Start Time: 1025  Stop Time: 1110  Total time in clinic (min): 45 minutes    Subjective: Pt reports having difficulty with lifting tasks over the weekend but no worsening of symptoms. Objective: See treatment diary below      Assessment: Tolerated treatment well. Continued with shoulder PRE program without adverse effects. Shoulder abduction and ER fatigued fairly easy. Patient exhibited good technique with therapeutic exercises and would benefit from continued PT      Plan: Continue per plan of care.       Precautions: Surgery scheduled with Dr. Carlos Millan on 5/10/23 (left  RTC repair)   Manuals    PROM of the left shoulder c/ protocol limitations 10' 10' 10' 10' 10'   10' 10' 10'    GJ joint mobs: inf/post glide  Gr III Gr III Gr III Gr III                     Neuro Re-Ed             Scap retraction             Serratus press             Shoulder isometrics             Row blk tb 30x blk tb 30x [place] 25# 3x10  [place] 30# 3x10  [place] 30# 3x10    blk tb 3x10  blk tb 3x10 blk tb 3x10   Shoulder extension blk tb 30x blk tb 30x [place] 15# 3x10 [place] 20# 3x10 Jim[place] 20# 3x10     blk tb 3x10   Horizontal abd - bent over 3x10 2# 3x10 2# 3x10 2# 3x10 2# 3x10 2#   3x10 2# 3x10 2# 3x10 2#   TB IR/ER (23)  3x10 gtb  3x10 btb  3x10 btb     3x10 rtb ea 3x10 gtb ea 3x10 gtb ea   Ther Ex             *May Add light resistance at week 12 (23)*             PendulumsCane ER in neutral             Pulley Scaption 5' Scaption 5' Scaption 5' Scaption 5' Scaption 5'   scaption 5' scaption 5' scaption 5'                WB horizontal add stretch 10x10" 10x10" 10x10" 10x10" 10x10"        SL ER  3x10 3# 3x10 3# 3x10 3# 3x10 3# 3x10 3#   2# 3x10  3# 3x10  3# 3x10    SL shoulder abduction        Standing     Sleeper stretch             Elbow AROM             Table slide with PB -AAROM             Hands clasp shoulder flexion in supine             Shoulder abduction- Standing  3x10 2# 3x10 2# 3x10 2# 3x10 2# 3x10 2#   2x10 2# 2x10 2# 2x10 2#   Shoulder flexion -Standing 3x10 3# 3x10 3# 3x10 3# 3x10 4# 3x10 4#   2# 2x10  3# 3x10 3#  3x10   IR BTB stretch 10x10" with strap 10x10" with strap 10x10" with strap 10x10" with strap 10x10" with strap   10"x10 with strap 10"x10 with strap 10x10" with strap   Shoulder extension        10"x10 with dowel 10x10" with dowel 10x10" with dowel   Sleeper stretch        10x10"     PNF ER c/ TB     2x10 rtb         Ther Activity                                       Gait Training                                       Modalities                                         1:1 with PT from [id number]  [email or web address]  Access Code: [email or web address]

## 2023-09-18 ENCOUNTER — OFFICE VISIT (OUTPATIENT)
Dept: PHYSICAL THERAPY | Facility: CLINIC | Age: 68
End: 2023-09-18
Payer: MEDICARE

## 2023-09-18 ENCOUNTER — OFFICE VISIT (OUTPATIENT)
Dept: OBGYN CLINIC | Facility: OTHER | Age: 68
End: 2023-09-18
Payer: MEDICARE

## 2023-09-18 VITALS
HEART RATE: 69 BPM | WEIGHT: 286 LBS | DIASTOLIC BLOOD PRESSURE: 80 MMHG | BODY MASS INDEX: 40.94 KG/M2 | HEIGHT: 70 IN | SYSTOLIC BLOOD PRESSURE: 145 MMHG

## 2023-09-18 DIAGNOSIS — M75.102 TEAR OF LEFT SUPRASPINATUS TENDON: Primary | ICD-10-CM

## 2023-09-18 DIAGNOSIS — M75.102 TEAR OF LEFT SUPRASPINATUS TENDON: ICD-10-CM

## 2023-09-18 DIAGNOSIS — Z47.89 ORTHOPEDIC AFTERCARE: ICD-10-CM

## 2023-09-18 DIAGNOSIS — Z47.89 AFTERCARE FOLLOWING SURGERY OF THE MUSCULOSKELETAL SYSTEM: Primary | ICD-10-CM

## 2023-09-18 PROCEDURE — 97110 THERAPEUTIC EXERCISES: CPT | Performed by: PHYSICAL THERAPIST

## 2023-09-18 PROCEDURE — 97140 MANUAL THERAPY 1/> REGIONS: CPT | Performed by: PHYSICAL THERAPIST

## 2023-09-18 PROCEDURE — 99213 OFFICE O/P EST LOW 20 MIN: CPT | Performed by: PHYSICIAN ASSISTANT

## 2023-09-18 PROCEDURE — 97112 NEUROMUSCULAR REEDUCATION: CPT | Performed by: PHYSICAL THERAPIST

## 2023-09-18 NOTE — PROGRESS NOTES
PT-Discharge    Today's date: 2023  Patient name: Erica Sexton  : 1955  MRN: [id number]  Referring provider: Francisco Suazo  Dx:   Encounter Diagnosis     ICD-10-CM    1. Tear of left supraspinatus tendon  M75.102       2. Orthopedic aftercare  Z47.89           Start Time: 1030  Stop Time: 1115  Total time in clinic (min): 45 minutes  Assessment  Assessment details: Erica Sexton is a 75 y.o. male who presents 4+ months s/p L RTC repairs, biceps tenodesis, and subacromial decompression with Dr. Krista Fernandez on 5/10/23. Pt is progressing well through [address] [place] [address]. Pt has functional AROM of the shoulder and improving strength. PROM is full at this time. He has implemented functional activities into his daily routine and continues to strengthen his shoulder at home. Pt has been attending PT 1x per week. Pt denies much shoulder pain during ADLs and over head activities. Overall, patient is very pleased with his progress. He will be discharged to a HEP to continue to gradually strengthen his shoulder. Thank you for this referral.       Impairments: abnormal muscle firing, abnormal or restricted ROM, abnormal movement, activity intolerance, impaired physical strength, lacks appropriate home exercise program, pain with function and weight-bearing intolerance  Understanding of Dx/Px/POC: good   Prognosis: good    Goals  Short Term Goals: to be achieved by 4 weeks- AFTER SURGERY  1) Patient to be independent with basic HEP.-MET  2) Decrease pain to 2/10 at its worst.-MET  3) Increase shoulder PROM by 5-10 degrees -MET    Long Term Goals: to be achieved by discharge-AFTER SURGERY  1) FOTO equal to or greater than expected . -MET  2) Patient to be independent with comprehensive HEP.-MET  3) Abolish pain for improved quality of life. -MET  4) Increase shoulder ROM to within functional limits to improve a/iadls. -MET  5) Increase shoulder strength to 5/5 MMT grade in all planes to improve a/iadls.  -MET Plan  Discharge from PT      Subjective Evaluation    History of Present Illness  Pt arrives to PT 5 days post op L RTC repair and biceps tenodesis. Pt arrives in sling without abduction pillow. Pain is well controlled. Pt is taking oxycodone for pain. Pt is sleeping well. Pt denies any numbness/tingling. Pt is independent with bathing and dressing. He has his wife for assistance.   _____________________________________  Mechanism of injury: surgery  Mechanism of injury: History of Current Injury: Pt fell in January on his left shoulder while walking with his left dog. Pt had MRI of L shoulder which identified full thickness RTC tear of supraspinatus. Pt is scheduled with Dr. Shankar Castrejon on 5/10/23 for left rotator cuff repair. He is here for pre-op examination. Pt denies any numbness or tingling but does have some radiation of pain. Pt lives at home with his wife. He will have the assistance of his wife during the recovery process. Pain location/Descriptors: Globally around left shoulder  Aggravating factors: Sleeping, lifting, reaching. Easing factors: Oxycodone for pain  Imaging: MRI of the L shoulder: Full thickness supraspinatus tear. Patient goals: Improve function in left shoulder. Hobbies/Interest: Playing with puppy, archery hunting. Pt has 3 grandchildren and 1 on the way.    Occupation: Retired  Pain  Current pain ratin  At worst pain rating: Minimal    Hand dominance: right      Diagnostic Tests  MRI studies: abnormal  Treatments  No previous or current treatments  Patient Goals  Patient goals for therapy: increased strength, increased motion, independence with ADLs/IADLs and decreased pain          Objective     Incision: Intact with steri-strips     Passive Range of Motion   Left Shoulder   Flexion: 168 degrees   Abduction: 170 degrees (scaption)  External rotation 90°: 70 degrees   Internal rotation 90°: 70 degrees    Active Range of Motion   Left Shoulder   Flexion: 155 degrees (sup migration of HH)   Abduction: 150 degrees (scaption)  External rotation 0°: 75 degrees   Internal rotation BTB: L1  Extension: 75 degrees     Strength/Myotome Testing     Left Shoulder     Planes of Motion   Flexion: 4  Abduction: 4   External rotation at 0°: 4  Internal rotation at 0°: 4+    Isolated Muscles   Biceps: 5  Triceps: 5    Elbow PROM:   Full extension, full flexion      Sensation intact  Distal pulses intact       strength:   Left: 70#  Right: 70 #            Precautions: Surgery scheduled with Dr. Alicja Rueda on 5/10/23 (left  RTC repair)   Manuals 8/24 8/28 8/31 9/6 9/11 9/18 8/14 8/17 8/21   PROM of the left shoulder  10' 10' 10' 10' 10' 10'  10' 10' 10'    GJ joint mobs: inf/post glide  Gr III Gr III Gr III Gr III Gr III                    Neuro Re-Ed             Scap retraction             Serratus press             Shoulder isometrics             Row blk tb 30x blk tb 30x [place] 25# 3x10  [place] 30# 3x10  [place] 30# 3x10  [place] 30# 3x10   blk tb 3x10  blk tb 3x10 blk tb 3x10   Shoulder extension blk tb 30x blk tb 30x [place] 15# 3x10 [place] 20# 3x10 [place] 20# 3x10 [place] 20# 3x10    blk tb 3x10   Horizontal abd - bent over 3x10 2# 3x10 2# 3x10 2# 3x10 2# 3x10 2# 3x10 2#  3x10 2# 3x10 2# 3x10 2#   TB IR/ER (8/2/23)  3x10 gtb  3x10 btb  3x10 btb     3x10 rtb ea 3x10 gtb ea 3x10 gtb ea   Ther Ex             *May Add light resistance at week 12 (8/2/23)*             PendulumsCane ER in neutral             Pulley Scaption 5' Scaption 5' Scaption 5' Scaption 5' Scaption 5' Scaption 5'  scaption 5' scaption 5' scaption 5'                WB horizontal add stretch 10x10" 10x10" 10x10" 10x10" 10x10" 10x10"       SL ER  3x10 3# 3x10 3# 3x10 3# 3x10 3# 3x10 3# 3x10 3#  2# 3x10  3# 3x10  3# 3x10    SL shoulder abduction        Standing     Sleeper stretch             Elbow AROM             Table slide with PB -AAROM             Hands clasp shoulder flexion in supine             Shoulder abduction- Standing 3x10 2# 3x10 2# 3x10 2# 3x10 2# 3x10 2# 3x10 2#  2x10 2# 2x10 2# 2x10 2#   Shoulder flexion -Standing 3x10 3# 3x10 3# 3x10 3# 3x10 4# 3x10 4# 3x10 4#  2# 2x10  3# 3x10 3#  3x10   IR BTB stretch 10x10" with strap 10x10" with strap 10x10" with strap 10x10" with strap 10x10" with strap 10x10" with strap  10"x10 with strap 10"x10 with strap 10x10" with strap   Shoulder extension        10"x10 with dowel 10x10" with dowel 10x10" with dowel   Sleeper stretch        10x10"     PNF ER c/ TB     2x10 rtb  2x10 rtb        Ther Activity                                       Gait Training                                       Modalities                                         1:1 with PT from [id number]

## 2023-09-18 NOTE — PROGRESS NOTES
Assessment  Diagnoses and all orders for this visit:    Aftercare following surgery of the musculoskeletal system    Tear of left supraspinatus tendon        Discussion and Plan:    Horace's left shoulder is healthy on exam.  He has good ROM and strength. He will continue with his HEP as instructed by his therapist.  He may slowly resume activities to his tolerance. If he has any issues, questions or concerns, he will contact the office. Follow up: PRN    Subjective:   Patient ID: Melvi Prakash is a 75 y.o. male    Berneda Vineetacle returns to the office in follow up of the left shoulder. He is 4 months s/p rotator cuff repair. He has been compliant formal PT and his HEP. He is pleased with his progress. He is able to use the arm for ADLs and activities of enjoyment without pain or significant limitation. He has not returned to [place] hunting. Still sleeps in a recliner but not entirely due to the shoulder. Denies new injury or trauma. The following portions of the patient's history were reviewed and updated as appropriate: allergies, current medications, past family history, past medical history, past social history, past surgical history and problem list.    Objective:  /80   Pulse 69   Ht 5' 10" (1.778 m)   Wt 130 kg (286 lb)   BMI 41.04 kg/m²       Left Shoulder Exam     Tenderness   The patient is experiencing no tenderness. Range of Motion   The patient has normal left shoulder ROM. Muscle Strength   External rotation: 5/5   Supraspinatus: 5/5     Tests   Giles test: negative  Impingement: negative    Other   Erythema: absent  Sensation: normal  Pulse: present             Physical Exam  Constitutional:       Appearance: He is well-developed. HENT:      Head: Normocephalic. Pulmonary:      Effort: No respiratory distress. Breath sounds: No wheezing. Musculoskeletal:      Cervical back: Normal range of motion. Skin:     General: Skin is warm and dry.    Neurological: Mental Status: He is alert and oriented to person, place, and time. Psychiatric:         Behavior: Behavior normal.         Thought Content:  Thought content normal.         Judgment: Judgment normal.

## 2023-10-23 ENCOUNTER — APPOINTMENT (OUTPATIENT)
Dept: LAB | Facility: CLINIC | Age: 68
End: 2023-10-23
Payer: MEDICARE

## 2023-10-23 DIAGNOSIS — E11.9 TYPE 2 DIABETES MELLITUS WITHOUT COMPLICATION, WITHOUT LONG-TERM CURRENT USE OF INSULIN (HCC): ICD-10-CM

## 2023-10-23 DIAGNOSIS — I10 ESSENTIAL HYPERTENSION: ICD-10-CM

## 2023-10-23 DIAGNOSIS — E78.5 HYPERLIPIDEMIA, UNSPECIFIED HYPERLIPIDEMIA TYPE: ICD-10-CM

## 2023-10-23 LAB
ALBUMIN SERPL BCP-MCNC: 4.3 G/DL (ref 3.5–5)
ALP SERPL-CCNC: 113 U/L (ref 34–104)
ALT SERPL W P-5'-P-CCNC: 36 U/L (ref 7–52)
ANION GAP SERPL CALCULATED.3IONS-SCNC: 9 MMOL/L
AST SERPL W P-5'-P-CCNC: 17 U/L (ref 13–39)
BILIRUB SERPL-MCNC: 0.52 MG/DL (ref 0.2–1)
BUN SERPL-MCNC: 21 MG/DL (ref 5–25)
CALCIUM SERPL-MCNC: 9.5 MG/DL (ref 8.4–10.2)
CHLORIDE SERPL-SCNC: 102 MMOL/L (ref 96–108)
CHOLEST SERPL-MCNC: 128 MG/DL
CO2 SERPL-SCNC: 26 MMOL/L (ref 21–32)
CREAT SERPL-MCNC: 1.08 MG/DL (ref 0.6–1.3)
EST. AVERAGE GLUCOSE BLD GHB EST-MCNC: 174 MG/DL
GFR SERPL CREATININE-BSD FRML MDRD: 70 ML/MIN/1.73SQ M
GLUCOSE P FAST SERPL-MCNC: 117 MG/DL (ref 65–99)
HBA1C MFR BLD: 7.7 %
HDLC SERPL-MCNC: 44 MG/DL
LDLC SERPL CALC-MCNC: 53 MG/DL (ref 0–100)
POTASSIUM SERPL-SCNC: 4.4 MMOL/L (ref 3.5–5.3)
PROT SERPL-MCNC: 7.2 G/DL (ref 6.4–8.4)
SODIUM SERPL-SCNC: 137 MMOL/L (ref 135–147)
TRIGL SERPL-MCNC: 154 MG/DL

## 2023-10-23 PROCEDURE — 80053 COMPREHEN METABOLIC PANEL: CPT

## 2023-10-23 PROCEDURE — 83036 HEMOGLOBIN GLYCOSYLATED A1C: CPT

## 2023-10-23 PROCEDURE — 36415 COLL VENOUS BLD VENIPUNCTURE: CPT

## 2023-10-23 PROCEDURE — 80061 LIPID PANEL: CPT

## 2023-11-02 DIAGNOSIS — F41.9 ANXIETY: ICD-10-CM

## 2023-11-02 DIAGNOSIS — G47.00 INSOMNIA, UNSPECIFIED TYPE: ICD-10-CM

## 2023-11-02 DIAGNOSIS — G89.29 CHRONIC LEFT SHOULDER PAIN: ICD-10-CM

## 2023-11-02 DIAGNOSIS — M25.512 CHRONIC LEFT SHOULDER PAIN: ICD-10-CM

## 2023-11-02 RX ORDER — ZOLPIDEM TARTRATE 10 MG/1
10 TABLET ORAL
Qty: 30 TABLET | Refills: 0 | Status: SHIPPED | OUTPATIENT
Start: 2023-11-02

## 2023-11-02 RX ORDER — ALPRAZOLAM 0.25 MG/1
0.25 TABLET ORAL 4 TIMES DAILY PRN
Qty: 120 TABLET | Refills: 0 | Status: SHIPPED | OUTPATIENT
Start: 2023-11-02

## 2023-11-02 RX ORDER — OXYCODONE AND ACETAMINOPHEN 7.5; 325 MG/1; MG/1
1 TABLET ORAL EVERY 6 HOURS PRN
Qty: 120 TABLET | Refills: 0 | Status: SHIPPED | OUTPATIENT
Start: 2023-11-02

## 2023-11-02 NOTE — TELEPHONE ENCOUNTER
1 [id number] 10/06/2023 10/03/2023 Zolpidem Tartrate (Tablet) 30.0 30 10 MG NA [facility], ... [place] 0 / 0 [place]    1 [id number] 10/04/2023 10/03/2023 ALPRAZolam (Tablet) 120.0 30 0.25 MG NA [facility], ..C. Medicare 0 / 0 [place]    1 [id number] 10/04/2023 10/03/2023 ACETAMINOPHEN 325 MG / oxyCODONE HYDROCHLORIDE 7.5 MG ORAL TABLET (Tablet) 120.0 30 7.5 MG/325.0 MG 45.0 [facility], [facility].C. Medicare 0 / 0 [place]    1 [id number] 09/09/2023 09/07/2023 Zolpidem Tartrate (Tablet) 30.0 30 10 MG NA [facility], [facility].C. Medicare 0 / 0 [place]    1 [id number] 09/07/2023 09/07/2023 ALPRAZolam (Tablet) 120.0 30 0.25 MG NA [facility], ..C. Medicare 0 / 0 [place]    1 [id number] 09/07/2023 09/07/2023 ACETAMINOPHEN 325 MG / oxyCODONE HYDROCHLORIDE 7.5 MG ORAL TABLET (Tablet) 120.0 30 7.5 MG/325.0 MG 45.0 [facility], ..C. Medicare 0 / 0 [place]    1 [id number] 08/13/2023 08/11/2023 Zolpidem Tartrate (Tablet) 30.0 30 10 MG [facility], [facility] Medicare 0 / 0 [place]    1 [id number] 08/11/2023 08/11/2023 ALPRAZolam (Tablet) 120.0 30 0.25 MG [facility] BL[facility], ..C. Medicare 0 / 0 [place]    1 [id number] 08/11/2023 08/11/2023 ACETAMINOPHEN 325 MG / oxyCODONE HYDROCHLORIDE 7.5 MG ORAL TABLET (Tablet) 120.0 30 7.5 MG/325.0 MG 45.0 [facility] P[facility]ODZ[facility], [facility]  Medicare 0 / 0 [place]    1 [id number] 07/17/2023 07/14/2023 ACETAMINOPHEN 325 MG / oxyCODONE HYDROCHLORIDE 7.5 MG ORAL TABLET (Tablet) 120.0 30 7.5 MG/325.0 MG 45.0 [facility]T [facility]

## 2023-11-02 NOTE — TELEPHONE ENCOUNTER
Reason for call:   [x] Refill   [] Prior Auth  [] Other:     Office:   [x] PCP/Provider -   [] Specialty/Provider -     Medication: xanax 0.25 mg 4 times daily  tabs, oxycodone 7.5-325 mg every 6 hours  tablets, ambien 10 mg nightly 30 tablets. Pharmacy: [facility] omero ahmadi    Does the patient have enough for 3 days?    [x] Yes   [] No - Send as HP to POD

## 2023-11-08 ENCOUNTER — OFFICE VISIT (OUTPATIENT)
Dept: NEUROLOGY | Facility: CLINIC | Age: 68
End: 2023-11-08
Payer: MEDICARE

## 2023-11-08 VITALS
HEART RATE: 82 BPM | WEIGHT: 278.8 LBS | BODY MASS INDEX: 39.91 KG/M2 | HEIGHT: 70 IN | SYSTOLIC BLOOD PRESSURE: 143 MMHG | DIASTOLIC BLOOD PRESSURE: 65 MMHG | TEMPERATURE: 98.4 F

## 2023-11-08 DIAGNOSIS — G40.009 PARTIAL IDIOPATHIC EPILEPSY WITH SEIZURES OF LOCALIZED ONSET, NOT INTRACTABLE, WITHOUT STATUS EPILEPTICUS (HCC): ICD-10-CM

## 2023-11-08 DIAGNOSIS — G25.0 TREMOR, ESSENTIAL: Primary | ICD-10-CM

## 2023-11-08 DIAGNOSIS — R41.3 MEMORY CHANGES: ICD-10-CM

## 2023-11-08 PROCEDURE — 99214 OFFICE O/P EST MOD 30 MIN: CPT | Performed by: PSYCHIATRY & NEUROLOGY

## 2023-11-08 RX ORDER — PRIMIDONE 50 MG/1
TABLET ORAL
Qty: 150 TABLET | Refills: 2 | Status: SHIPPED | OUTPATIENT
Start: 2023-11-08

## 2023-11-08 NOTE — PROGRESS NOTES
Review of Systems   Constitutional:  Negative for appetite change, fatigue and fever. HENT: Negative. Negative for hearing loss, tinnitus, trouble swallowing and voice change. Eyes: Negative. Negative for photophobia, pain and visual disturbance. Respiratory: Negative. Negative for shortness of breath. Cardiovascular: Negative. Negative for palpitations. Gastrointestinal: Negative. Negative for nausea and vomiting. Endocrine: Negative. Negative for cold intolerance. Genitourinary: Negative. Negative for dysuria, frequency and urgency. Musculoskeletal:  Negative for back pain, gait problem, myalgias and neck pain. Skin: Negative. Negative for rash. Allergic/Immunologic: Negative. Neurological:  Positive for tremors. Negative for dizziness, seizures, syncope, facial asymmetry, speech difficulty, weakness, light-headedness, numbness and headaches. Hematological: Negative. Does not bruise/bleed easily. Psychiatric/Behavioral: Negative. Negative for confusion, hallucinations and sleep disturbance.

## 2023-11-08 NOTE — PATIENT INSTRUCTIONS
Start primidone 50 mg pills. Take half pill (25 mg) nightly x 1 week and then increase by 25 mg weekly. Stop increasing if there are side effects or if there is adequate improvement in tremor. Call us to provide long term prescription when on stable dose. Continue levetiracetam 500 mg twice daily. Return in about 6 months (AP). Have blood work done. Consider MOCA at next visit.

## 2023-11-08 NOTE — PROGRESS NOTES
[facility] [address] [facility]  Follow Up Visit    Impression/Plan    Mr. Sabra Mccain is a 75 y.o. 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 EEG reveals left temporal epileptiform discharges. He was at some elevated risk for injury due to his profession which was additional motivation to continue AED therapy, but he retired in the last 2 years. The first event apparently involved additional symptoms including perioral numbness and lightheadedness. The levetiracetam side effects. Will continue therapy unchanged. Essential tremor has worsened some. There is a strong family history. He notices it while writing, eating, using tools. We will start primidone 25 mg nightly, increasing by 25 mg/week until maximum dose of 250 mg nightly. He will stop prior to reaching 250 mg if there are side effects or if there is adequate therapeutic response. He will then let us know about the maintenance dose so we can provide a long-term prescription. We discussed potential side effects including sedation. Avoiding propranolol due to history of asthma. Discussed decreasing caffeine intake to see if this helps with tremor. He notices some memory problems over the last 2 years since he retired. Primarily noticed when trying to remember names, the name will come to him sometime later. Not interfering with daily activities. We will check thyroid, B12 and CBC. Recent CMP was essentially normal.  He does have a prior history of B12 deficiency and has not been taking supplementation regularly lately. The last B12 level was done about 2 years ago. Offered to perform MoCA in the office today, but he declined. Consider MoCA at next visit. Patient Instructions   Start primidone 50 mg pills. Take half pill (25 mg) nightly x 1 week and then increase by 25 mg weekly.  Stop increasing if there are side effects or if there is adequate improvement in tremor. Call us to provide long term prescription when on stable dose. Continue levetiracetam 500 mg twice daily. Return in about 6 months (AP). Have blood work done. Consider MOCA at next visit. Diagnoses and all orders for this visit:    Tremor, essential  -     primidone (MYSOLINE) 50 mg tablet; Half pill (25 mg) nightly x 1 week and then increase by half pill weekly until a maximum of 5 pills (250 mg) daily. Memory changes  -     CBC; Future  -     TSH, 3rd generation with Free T4 reflex; Future  -     Vitamin B12; Future    Partial idiopathic epilepsy with seizures of localized onset, not intractable, without status epilepticus ([address])        Sandoval Vargas is returning to the [facility] for follow up. Interval Events:   Seizures since last visit: None  Hospitalizations: no    No events concerning for seizure. No levetiracetam side effects. Tremor has worsened since last visit and he is interested in therapy. He does note some memory problems, especially with names. He retired about 2 years ago. Strong family history of essential tremor, including in siblings. He has a history of asthma and uses an inhaler. Infrequent, small amounts of alcohol. Does drink coffee daily. Current AEDs:  levetiracetam 500 mg BID  Other medications as per [facility]. Event/Seizure semiology:  Two episodes of transient memory problems and confusion lasting a couple hours occurring on 3/26/2012 and 8/27/2014. Special Features  Status epilepticus: no  Self Injury Seizures: no  Precipitating Factors: none     Epilepsy Risk Factors:  None     Prior AEDs:  None     Prior Evaluation:  Sleep-deprived EEG done October 2017: This Routine, sleep deprived EEG recorded during wakefulness,   drowsiness, and sleep is abnormal. Two left anterior and anterior   mid-temporal spike wave discharges are a potential source of   seizures.       MRI brain w wo contrast September 2014:   FINDINGS-   BRAIN PARENCHYMA-  There is no discrete mass, mass effect or midline   shift. No abnormal white matter signal identified. Brainstem and   cerebellum demonstrate normal signal. There is no intracranial   hemorrhage. There is no evidence of acute infarction and diffusion   imaging is unremarkable. VENTRICLES-  Normal.   POSTCONTRAST IMAGING-  Postcontrast imaging of the brain demonstrates   no abnormal enhancement. SELLA AND PITUITARY GLAND-  Normal.   ORBITS-  Normal.   PARANASAL SINUSES-  Normal.   VASCULATURE-  Evaluation of the major intracranial vasculature   demonstrates appropriate flow voids. CALVARIUM AND SKULL BASE-  Normal.   EXTRACRANIAL SOFT TISSUES-  Normal.   IMPRESSION-   1. Unremarkable MRI of the brain. Psychiatric History:  None     Social History:   Driving: Yes  Lives Alone: No  Occupation: Retired in December from [address] [place] [address] [place]. Objective    /65 (BP Location: Right arm, Patient Position: Sitting, Cuff Size: Large)   Pulse 82   Temp 98.4 °F (36.9 °C) (Temporal)   Ht 5' 10" (1.778 m)   Wt 126 kg (278 lb 12.8 oz)   BMI 40.00 kg/m²      General Exam  No acute distress. Neurologic Exam  Mental Status:  Alert and oriented x 3. Spells world in reverse as DLORW (answers very quickly, anxiety may contribute). Language: normal fluency and comprehension. Normal naming. Cranial Nerves:  VFFTC. EOMI, no nystagums. Face symmetric. No dysarthria. Tongue midline, palate symmetric. V1-V3 intact. Motor:  Normal tone. No drift. Strength 5/5 throughout. Coordination: Bilateral mild postural and action tremor, low/moderate amplitude, high-frequency. There may be a slight voice tremor at times. Gait: Mildly wide-based, antalgic, normal stride length, normal arm swing.

## 2023-11-16 ENCOUNTER — OFFICE VISIT (OUTPATIENT)
Dept: FAMILY MEDICINE CLINIC | Facility: CLINIC | Age: 68
End: 2023-11-16
Payer: MEDICARE

## 2023-11-16 ENCOUNTER — APPOINTMENT (OUTPATIENT)
Dept: RADIOLOGY | Facility: MEDICAL CENTER | Age: 68
End: 2023-11-16
Payer: MEDICARE

## 2023-11-16 VITALS
OXYGEN SATURATION: 97 % | SYSTOLIC BLOOD PRESSURE: 148 MMHG | HEART RATE: 72 BPM | BODY MASS INDEX: 39.8 KG/M2 | TEMPERATURE: 97.3 F | DIASTOLIC BLOOD PRESSURE: 74 MMHG | WEIGHT: 278 LBS | HEIGHT: 70 IN

## 2023-11-16 DIAGNOSIS — T14.90XA TRAUMA: ICD-10-CM

## 2023-11-16 DIAGNOSIS — R07.81 RIB PAIN: Primary | ICD-10-CM

## 2023-11-16 PROCEDURE — 96372 THER/PROPH/DIAG INJ SC/IM: CPT | Performed by: FAMILY MEDICINE

## 2023-11-16 PROCEDURE — 99213 OFFICE O/P EST LOW 20 MIN: CPT | Performed by: FAMILY MEDICINE

## 2023-11-16 PROCEDURE — 71101 X-RAY EXAM UNILAT RIBS/CHEST: CPT

## 2023-11-16 PROCEDURE — 72072 X-RAY EXAM THORAC SPINE 3VWS: CPT

## 2023-11-16 RX ORDER — METHOCARBAMOL 500 MG/1
500 TABLET, FILM COATED ORAL
Qty: 10 TABLET | Refills: 0 | Status: SHIPPED | OUTPATIENT
Start: 2023-11-16 | End: 2023-11-27 | Stop reason: SDUPTHER

## 2023-11-16 RX ORDER — KETOROLAC TROMETHAMINE 30 MG/ML
60 INJECTION, SOLUTION INTRAMUSCULAR; INTRAVENOUS ONCE
Status: COMPLETED | OUTPATIENT
Start: 2023-11-16 | End: 2023-11-16

## 2023-11-16 RX ADMIN — KETOROLAC TROMETHAMINE 60 MG: 30 INJECTION, SOLUTION INTRAMUSCULAR; INTRAVENOUS at 13:58

## 2023-11-21 NOTE — PROGRESS NOTES
Patient ID: Clemente Rey is a 70 y.o. male. HPI: 70 y.o.male presents for evaluation of rib pain on the left after patient fell outside after tripping on a porch landing on a concrete pillar/side struck the concrete and he has left-sided rib pain laterally and posteriorly. He has pain with taking a deep breath twisting turning and moving. He denies any shortness of breath fever or cough.     SUBJECTIVE    Family History   Problem Relation Age of Onset    Heart disease Mother     Kidney cancer Mother     Cancer Mother     Hypertension Father     Bipolar disorder Sister         bipolar disorder NOS    Diabetes Maternal Grandmother      Social History     Socioeconomic History    Marital status: /Civil Union     Spouse name: Not on file    Number of children: 2    Years of education: trade school    Highest education level: Not on file   Occupational History     Comment: employed   Tobacco Use    Smoking status: Never     Passive exposure: Past    Smokeless tobacco: Never   Vaping Use    Vaping Use: Never used   Substance and Sexual Activity    Alcohol use: Not Currently     Comment: 1 or 2 month    Drug use: No    Sexual activity: Not Currently     Partners: Female     Birth control/protection: Male Sterilization   Other Topics Concern    Not on file   Social History Narrative    Always uses seat belt    Caffeine use    Daily coffee consumption    Daily cola consumption    Dental care, regularly    DENIED exercise frequency    Guns in the home:stored in locked cabinet    Multiple organ donor    Patient has living will    DENIED pets/animals    Power of  in existence    Water intake, adequate (per day)     Social Determinants of Health     Financial Resource Strain: Low Risk  (8/17/2023)    Overall Financial Resource Strain (CARDIA)     Difficulty of Paying Living Expenses: Not very hard   Food Insecurity: Not on file   Transportation Needs: No Transportation Needs (8/17/2023)    PRAPARE - Transportation     Lack of Transportation (Medical): No     Lack of Transportation (Non-Medical):  No   Physical Activity: Not on file   Stress: Not on file   Social Connections: Not on file   Intimate Partner Violence: Not on file   Housing Stability: Not on file     Past Medical History:   Diagnosis Date    Allergic     Allergic rhinitis     Anxiety     Asthma     Basal cell carcinoma 04/07/2022    nose    Diabetes mellitus ([address] [facility])     Disease of thyroid gland     EIC (epidermal inclusion cyst)     GERD (gastroesophageal reflux disease)     Hayfever     History of skin cancer     HL (hearing loss)     wears hearing aides on occasion    Hyperlipidemia     Hypertension     Memory loss, short term     R/O Seizures but placed on Keppra    PONV (postoperative nausea and vomiting)     x1 occurence    Sleep apnea     s/p gastric bypass    Tinnitus      Past Surgical History:   Procedure Laterality Date    ABDOMINOPLASTY      ADENOIDECTOMY      BACK SURGERY      lower back surgery    CARPAL TUNNEL RELEASE Bilateral     CHOLECYSTECTOMY      COLONOSCOPY      GASTRIC BYPASS      HERNIA REPAIR Right     JOINT REPLACEMENT Bilateral     Knee    KNEE ARTHROSCOPY Bilateral     X3    MOHS SURGERY  06/15/2022    nose-Dr. Catina Cordero    GA RPR 1ST INGUN HRNA AGE 4 YRS/> REDUCIBLE Left 11/26/2018    Procedure: INGUINAL HERNIA REPAIR;  Surgeon: Ruel Spatz, DO;  Location: [facility];  Service: General    GA SURGICAL ARTHROSCOPY SHOULDER W/ROTATOR CUFF RPR Left 5/10/2023    Procedure: SHOULDER ARTHROSCOPIC ROTATOR CUFF REPAIR, SUBACROMIAL DECOMPRESSION, BICEP TENODESIS;  Surgeon: Meryle Banana, MD;  [facility];  Service: Orthopedics    GA TENDON SHEATH INCISION Right 04/29/2022    Procedure: RELEASE TRIGGER FINGER RING WITH LONG;  Surgeon: Clark Tse MD;  Location: [facility];  Service: Orthopedics    TONSILLECTOMY      TRIGGER FINGER RELEASE Right 04/29/2022     Allergies   Allergen Reactions    Iv Dye [Iodinated Contrast Media] Hives    Penicillins Other (See Comments)     ? Had as a child-Unknown reaction       Current Outpatient Medications:     ALPRAZolam (XANAX) 0.25 mg tablet, Take 1 tablet (0.25 mg total) by mouth 4 (four) times a day as needed for anxiety, Disp: 120 tablet, Rfl: 0    atorvastatin (LIPITOR) 40 mg tablet, Take 40 mg by mouth daily, Disp: , Rfl:     azelastine (ASTELIN) 0.1 % nasal spray, 1-2 sprays into each nostril 2 (two) times a day as needed, Disp: , Rfl:     cetirizine (ZyrTEC) 10 mg tablet, Take 10 mg by mouth daily after dinner  , Disp: , Rfl:     Cholecalciferol 2000 units CAPS, Take 1 capsule by mouth daily after dinner, Disp: , Rfl:     clobetasol (TEMOVATE) 0.05 % cream, APPLY TO AFFECTED AREA TWICE A DAY, Disp: 60 g, Rfl: 0    Cyanocobalamin (B-12) 1000 MCG CAPS, Take 1 tablet by mouth daily after dinner, Disp: , Rfl:     diltiazem (DILACOR XR) 240 MG 24 hr capsule, Take 240 mg by mouth daily, Disp: , Rfl:     fluticasone (FLONASE) 50 mcg/act nasal spray, 2 sprays into each nostril 2 (two) times a day as needed  , Disp: , Rfl:     fluticasone-salmeterol (Advair HFA) 230-21 MCG/ACT inhaler, Inhale 2 puffs 2 (two) times a day, Disp: , Rfl:     hydrochlorothiazide (HYDRODIURIL) 25 mg tablet, Take 1 tablet by mouth daily in the early morning  , Disp: , Rfl: 3    ketoconazole (NIZORAL) 2 % cream, Apply 1 application.  topically 2 (two) times a day To affected area, Disp: 60 g, Rfl: 0    levalbuterol (XOPENEX HFA) 45 mcg/act inhaler, , Disp: , Rfl:     levalbuterol (XOPENEX) 0.63 mg/3 mL nebulizer solution, Take 0.63 mg by nebulization every 4 (four) hours as needed for wheezing or shortness of breath, Disp: , Rfl:     levETIRAcetam (KEPPRA) 500 mg tablet, TAKE 1 TABLET BY MOUTH TWICE A DAY, Disp: 180 tablet, Rfl: 1    losartan (COZAAR) 25 mg tablet, Take 25 mg by mouth daily, Disp: , Rfl:     metFORMIN (GLUCOPHAGE) 1000 MG tablet, TAKE 1 TABLET BY MOUTH TWICE A DAY WITH MEALS, Disp: 180 tablet, Rfl: 3    methocarbamol (ROBAXIN) 500 mg tablet, Take 1 tablet (500 mg total) by mouth daily at bedtime, Disp: 10 tablet, Rfl: 0    montelukast (SINGULAIR) 10 mg tablet, Take 10 mg by mouth every evening, Disp: , Rfl: 3    oxybutynin (DITROPAN) 5 mg tablet, TAKE 1 TABLET BY MOUTH EVERY DAY AT NIGHT, Disp: , Rfl:     oxyCODONE-acetaminophen (Percocet) 7.5-325 MG per tablet, Take 1 tablet by mouth every 6 (six) hours as needed for moderate pain Max Daily Amount: 4 tablets, Disp: 120 tablet, Rfl: 0    primidone (MYSOLINE) 50 mg tablet, Half pill (25 mg) nightly x 1 week and then increase by half pill weekly until a maximum of 5 pills (250 mg) daily. , Disp: 150 tablet, Rfl: 2    Pseudoeph-Doxylamine-DM-APAP (NYQUIL PO), Take by mouth as needed , Disp: , Rfl:     pseudoephedrine (SUDAFED) 30 mg tablet, Take 60 mg by mouth every 4 (four) hours as needed for congestion, Disp: , Rfl:     tamsulosin (FLOMAX) 0.4 mg, Take 0.4 mg by mouth daily with dinner, Disp: , Rfl:     zolpidem (AMBIEN) 10 mg tablet, Take 1 tablet (10 mg total) by mouth daily at bedtime as needed for sleep, Disp: 30 tablet, Rfl: 0    amoxicillin (AMOXIL) 500 mg capsule, Take 500 mg by mouth 4 tablets 1 hour prior to procedure (Patient not taking: Reported on 11/8/2023), Disp: , Rfl:     Review of Systems  Constitutional:     Denies fever, chills ,fatigue ,weakness ,weight loss, weight gain     ENT: Denies earache ,loss of hearing ,nosebleed, nasal discharge,nasal congestion ,sore throat ,hoarseness  Pulmonary: Denies shortness of breath ,cough  ,dyspnea on exertion, orthopnea  ,PND   Cardiovascular:  Denies bradycardia , tachycardia  ,palpations, lower extremity edema leg, claudication  Breast:  Denies new or changing breast lumps ,nipple discharge ,nipple changes  Abdomen:  Denies abdominal pain , anorexia , indigestion, nausea, vomiting, constipation, diarrhea  Musculoskeletal: Positive left lateral rib and posterior rib pain on left  Gu: denies dysuria, polyuria  Skin: Denies skin rash, skin lesion, skin wound, itching, dry skin  Neuro: Denies headache, numbness, tingling, confusion, loss of consciousness, dizziness, vertigo  Psychiatric: Denies feelings of depression, suicidal ideation, anxiety, sleep disturbances    OBJECTIVE  /74   Pulse 72   Temp (!) 97.3 °F (36.3 °C)   Ht 5' 10" (1.778 m)   Wt 126 kg (278 lb)   SpO2 97%   BMI 39.89 kg/m²   Constitutional:   NAD, well appearing and well nourished      ENT:   Conjunctiva and lids: no injection, edema, or discharge     Pupils and iris: MARY bilaterally    External inspection of ears and nose: normal without deformities or discharge. Otoscopic exam: Canals patent without erythema. Nasal mucosa, septum and turbinates: Normal or edema or discharge         Oropharynx:  Moist mucosa, normal tongue and tonsils without lesions. No erythema        Pulmonary:Respiratory effort normal rate and rhythm, no increased work of breathing.  Auscultation of lungs:  Clear bilaterally with no adventitious breath sounds       Cardiovascular: regular rate and rhythm, S1 and S2, no murmur, no edema and/or varicosities of LE      Abdomen: Soft and non-distended     Positive bowel sounds      No heptomegaly or splenomegaly      Gu: no suprapubic tenderness or CVA tenderness, no urethral discharge  Lymphatic:  No anterior or posterior cervical lymphadenopathy         Musculoskeletal:  Gait and station: Normal gait      Digits and nails normal without clubbing or cyanosis       Inspection/palpation of joints, bones, and muscles:  + tenderness on palpation of left lateral ribs on palpation and tenderness on palpation of left posterior ribs 7-10  Skin: Normal skin turgor and no rashes      Neuro:      Normal reflexes     Psych:   alert and oriented to person, place and time     normal mood and affect       Assessment/Plan:Diagnoses and all orders for this visit:    Rib pain  Comments:  Await x-ray results and in the meantime the patient is to ice her ribs and continue with pain relievers and muscle relaxants  Orders:  -     ketorolac (TORADOL) 60 mg/2 mL IM injection 60 mg    Trauma  -     XR spine thoracic 3 vw; Future  -     Cancel: XR ribs 2 vw left; Future  -     XR ribs left w pa chest min 3 views; Future  -     Cancel: XR ribs 2 vw left; Future  -     methocarbamol (ROBAXIN) 500 mg tablet; Take 1 tablet (500 mg total) by mouth daily at bedtime        Reviewed with patient plan to treat with above plan.     Patient instructed to call in 72 hours if not feeling better or if symptoms worsen

## 2023-11-27 DIAGNOSIS — T14.90XA TRAUMA: ICD-10-CM

## 2023-11-27 RX ORDER — METHOCARBAMOL 500 MG/1
500 TABLET, FILM COATED ORAL
Qty: 10 TABLET | Refills: 0 | Status: SHIPPED | OUTPATIENT
Start: 2023-11-27 | End: 2023-12-06 | Stop reason: SDUPTHER

## 2023-11-28 DIAGNOSIS — M25.512 CHRONIC LEFT SHOULDER PAIN: ICD-10-CM

## 2023-11-28 DIAGNOSIS — G89.29 CHRONIC LEFT SHOULDER PAIN: ICD-10-CM

## 2023-11-28 DIAGNOSIS — F41.9 ANXIETY: ICD-10-CM

## 2023-11-28 DIAGNOSIS — G47.00 INSOMNIA, UNSPECIFIED TYPE: ICD-10-CM

## 2023-11-28 DIAGNOSIS — B35.4 TINEA CORPORIS: ICD-10-CM

## 2023-11-28 NOTE — TELEPHONE ENCOUNTER
1 [id number] 11/06/2023 11/02/2023 Zolpidem Tartrate (Tablet) 30.0 30 10 MG NA [facility], [facility]. Medicare 0 / 0 [place]    1 [id number] 11/02/2023 11/02/2023 ALPRAZolam (Tablet) 120.0 30 0.25 MG NA [facility], [facility]. Medicare 0 / 0 [place]    1 [id number] 11/02/2023 11/02/2023 ACETAMINOPHEN 325 MG / oxyCODONE HYDROCHLORIDE 7.5 MG ORAL TABLET (Tablet) 120.0 30 7.5 MG/325.0 MG 45.0 [facility], [facility]. Medicare 0 / 0 [place]    1 [id number] 10/06/2023 10/03/2023 Zolpidem Tartrate (Tablet) 30.0 30 10 MG NA [facility], [facility]. Medicare 0 / 0 [place]    1 [id number] 10/04/2023 10/03/2023 ALPRAZolam (Tablet) 120.0 30 0.25 MG NA [facility], [facility]. Medicare 0 / 0 [place]    1 [id number] 10/04/2023 10/03/2023 ACETAMINOPHEN 325 MG / oxyCODONE HYDROCHLORIDE 7.5 MG ORAL TABLET (Tablet) 120.0 30 7.5 MG/325.0 MG 45.0 [facility], ..C. Medicare 0 / 0 [place]    1 [id number] 09/09/2023 09/07/2023 Zolpidem Tartrate (Tablet) 30.0 30 10 MG NA [facility], ..C. Medicare 0 / 0 [place]    1 [id number] 09/07/2023 09/07/2023 ALPRAZolam (Tablet) 120.0 30 0.25 MG NA [facility] Medicare 0 / 0 [place]    1 [id number] 09/07/2023 09/07/2023 ACETAMINOPHEN 325 MG / oxyCODONE HYDROCHLORIDE 7.5 MG ORAL TABLET (Tablet) 120.0 30 7.5 MG/325.0 MG 45.0 [facility] Medicare 0 / 0 [place]    1 [id number] 08/13/2023 08/11/2023 Zolpidem Tartrate (Tablet) 30.0 30 10 MG NA [facility]  Medicare 0 / 0 [place]

## 2023-11-29 ENCOUNTER — TELEPHONE (OUTPATIENT)
Age: 68
End: 2023-11-29

## 2023-11-29 ENCOUNTER — RA CDI HCC (OUTPATIENT)
Dept: OTHER | Facility: HOSPITAL | Age: 68
End: 2023-11-29

## 2023-11-29 RX ORDER — ALPRAZOLAM 0.25 MG/1
0.25 TABLET ORAL 4 TIMES DAILY PRN
Qty: 120 TABLET | Refills: 0 | Status: SHIPPED | OUTPATIENT
Start: 2023-11-29

## 2023-11-29 RX ORDER — OXYCODONE AND ACETAMINOPHEN 7.5; 325 MG/1; MG/1
1 TABLET ORAL EVERY 6 HOURS PRN
Qty: 120 TABLET | Refills: 0 | Status: SHIPPED | OUTPATIENT
Start: 2023-11-29

## 2023-11-29 RX ORDER — ZOLPIDEM TARTRATE 10 MG/1
10 TABLET ORAL
Qty: 30 TABLET | Refills: 0 | Status: SHIPPED | OUTPATIENT
Start: 2023-11-29

## 2023-11-29 RX ORDER — KETOCONAZOLE 20 MG/G
1 CREAM TOPICAL 2 TIMES DAILY
Qty: 60 G | Refills: 0 | Status: SHIPPED | OUTPATIENT
Start: 2023-11-29

## 2023-11-29 NOTE — PROGRESS NOTES
[address] [facility] coding opportunities          Chart Reviewed number of suggestions sent to Provider: 3     Patients Insurance     Medicare Insurance: Medicare        E66.01  E11.65  E11.36

## 2023-11-29 NOTE — TELEPHONE ENCOUNTER
Oxycodone-acetaminophen (Percocet) 7.5-325 mg per tablet PA submitted with clinical documentation via surescripts, waiting on determination.

## 2023-12-04 ENCOUNTER — APPOINTMENT (OUTPATIENT)
Dept: LAB | Facility: CLINIC | Age: 68
End: 2023-12-04
Payer: MEDICARE

## 2023-12-04 DIAGNOSIS — R41.3 MEMORY CHANGES: ICD-10-CM

## 2023-12-04 LAB
ERYTHROCYTE [DISTWIDTH] IN BLOOD BY AUTOMATED COUNT: 12.6 % (ref 11.6–15.1)
HCT VFR BLD AUTO: 35 % (ref 36.5–49.3)
HGB BLD-MCNC: 11.6 G/DL (ref 12–17)
MCH RBC QN AUTO: 29.1 PG (ref 26.8–34.3)
MCHC RBC AUTO-ENTMCNC: 33.1 G/DL (ref 31.4–37.4)
MCV RBC AUTO: 88 FL (ref 82–98)
PLATELET # BLD AUTO: 380 THOUSANDS/UL (ref 149–390)
PMV BLD AUTO: 10 FL (ref 8.9–12.7)
RBC # BLD AUTO: 3.99 MILLION/UL (ref 3.88–5.62)
TSH SERPL DL<=0.05 MIU/L-ACNC: 1.12 UIU/ML (ref 0.45–4.5)
VIT B12 SERPL-MCNC: 1156 PG/ML (ref 180–914)
WBC # BLD AUTO: 8.44 THOUSAND/UL (ref 4.31–10.16)

## 2023-12-04 PROCEDURE — 84443 ASSAY THYROID STIM HORMONE: CPT

## 2023-12-04 PROCEDURE — 82607 VITAMIN B-12: CPT

## 2023-12-04 PROCEDURE — 36415 COLL VENOUS BLD VENIPUNCTURE: CPT

## 2023-12-04 PROCEDURE — 85027 COMPLETE CBC AUTOMATED: CPT

## 2023-12-06 ENCOUNTER — OFFICE VISIT (OUTPATIENT)
Dept: FAMILY MEDICINE CLINIC | Facility: CLINIC | Age: 68
End: 2023-12-06
Payer: MEDICARE

## 2023-12-06 VITALS
DIASTOLIC BLOOD PRESSURE: 78 MMHG | TEMPERATURE: 98 F | BODY MASS INDEX: 39.51 KG/M2 | SYSTOLIC BLOOD PRESSURE: 118 MMHG | OXYGEN SATURATION: 96 % | HEIGHT: 70 IN | WEIGHT: 276 LBS | HEART RATE: 97 BPM

## 2023-12-06 DIAGNOSIS — I10 ESSENTIAL HYPERTENSION: ICD-10-CM

## 2023-12-06 DIAGNOSIS — F11.20 CONTINUOUS OPIOID DEPENDENCE (HCC): ICD-10-CM

## 2023-12-06 DIAGNOSIS — G40.109 TEMPORAL LOBE EPILEPSY (HCC): ICD-10-CM

## 2023-12-06 DIAGNOSIS — E78.5 HYPERLIPIDEMIA, UNSPECIFIED HYPERLIPIDEMIA TYPE: ICD-10-CM

## 2023-12-06 DIAGNOSIS — S20.211D RIB CONTUSION, RIGHT, SUBSEQUENT ENCOUNTER: Primary | ICD-10-CM

## 2023-12-06 DIAGNOSIS — D50.9 IRON DEFICIENCY ANEMIA, UNSPECIFIED IRON DEFICIENCY ANEMIA TYPE: ICD-10-CM

## 2023-12-06 DIAGNOSIS — E11.628 TYPE 2 DIABETES MELLITUS WITH OTHER SKIN COMPLICATIONS (HCC): ICD-10-CM

## 2023-12-06 PROCEDURE — 99214 OFFICE O/P EST MOD 30 MIN: CPT | Performed by: FAMILY MEDICINE

## 2023-12-06 RX ORDER — METHOCARBAMOL 500 MG/1
500 TABLET, FILM COATED ORAL
Qty: 30 TABLET | Refills: 0 | Status: SHIPPED | OUTPATIENT
Start: 2023-12-06

## 2023-12-06 RX ORDER — FEXOFENADINE HCL 180 MG/1
180 TABLET ORAL DAILY
COMMUNITY

## 2023-12-06 RX ORDER — LEVALBUTEROL INHALATION SOLUTION 1.25 MG/3ML
SOLUTION RESPIRATORY (INHALATION)
COMMUNITY
Start: 2023-11-29

## 2023-12-08 PROBLEM — E01.0 THYROMEGALY: Status: RESOLVED | Noted: 2017-11-28 | Resolved: 2023-12-08

## 2023-12-08 NOTE — PROGRESS NOTES
Patient ID: Alex Vargas is a 70 y.o. male. HPI: 70 y.o.male presents for follow up of niddm, hypertension and hypercholesterolemia. Hgba1c is   7.7 and patient's issues are well controlled. Patient deneis any dyspnea, chest pain or palpitations. SUBJECTIVE  BMI Counseling: Body mass index is 39.6 kg/m². The BMI is above normal. Nutrition recommendations include moderation in carbohydrate intake and increasing intake of lean protein. Exercise recommendations include exercising 3-5 times per week.     Family History   Problem Relation Age of Onset    Heart disease Mother     Kidney cancer Mother     Cancer Mother     Hypertension Father     Bipolar disorder Sister         bipolar disorder NOS    Diabetes Maternal Grandmother      Social History     Socioeconomic History    Marital status: /Civil Union     Spouse name: Not on file    Number of children: 2    Years of education: trade school    Highest education level: Not on file   Occupational History     Comment: employed   Tobacco Use    Smoking status: Never     Passive exposure: Past    Smokeless tobacco: Never   Vaping Use    Vaping Use: Never used   Substance and Sexual Activity    Alcohol use: Not Currently     Comment: 1 or 2 month    Drug use: No    Sexual activity: Not Currently     Partners: Female     Birth control/protection: Male Sterilization   Other Topics Concern    Not on file   Social History Narrative    Always uses seat belt    Caffeine use    Daily coffee consumption    Daily cola consumption    Dental care, regularly    DENIED exercise frequency    Guns in the home:stored in locked cabinet    Multiple organ donor    Patient has living will    DENIED pets/animals    Power of  in existence    Water intake, adequate (per day)     Social Determinants of Health     Financial Resource Strain: Low Risk  (8/17/2023)    Overall Financial Resource Strain (CARDIA)     Difficulty of Paying Living Expenses: Not very hard   Food Insecurity: Not on file   Transportation Needs: No Transportation Needs (8/17/2023)    PRAPARE - Transportation     Lack of Transportation (Medical): No     Lack of Transportation (Non-Medical):  No   Physical Activity: Not on file   Stress: Not on file   Social Connections: Not on file   Intimate Partner Violence: Not on file   Housing Stability: Not on file     Past Medical History:   Diagnosis Date    Allergic     Allergic rhinitis     Anxiety     Asthma     Basal cell carcinoma 04/07/2022    nose    Diabetes mellitus ([address])     Disease of thyroid gland     EIC (epidermal inclusion cyst)     GERD (gastroesophageal reflux disease)     Hayfever     History of skin cancer     HL (hearing loss)     wears hearing aides on occasion    Hyperlipidemia     Hypertension     Memory loss, short term     R/O Seizures but placed on Keppra    PONV (postoperative nausea and vomiting)     x1 occurence    Sleep apnea     s/p gastric bypass    Tinnitus      Past Surgical History:   Procedure Laterality Date    ABDOMINOPLASTY      ADENOIDECTOMY      BACK SURGERY      lower back surgery    CARPAL TUNNEL RELEASE Bilateral     CHOLECYSTECTOMY      COLONOSCOPY      GASTRIC BYPASS      HERNIA REPAIR Right     JOINT REPLACEMENT Bilateral     Knee    KNEE ARTHROSCOPY Bilateral     X3    MOHS SURGERY  06/15/2022    nose-Dr. Fina Rodriguez    DC RPR 1ST INGUN HRNA AGE 4 YRS/> REDUCIBLE Left 11/26/2018    Procedure: INGUINAL HERNIA REPAIR;  Surgeon: Yuli Koroma DO;  Location: [facility];  Service: General    DC SURGICAL ARTHROSCOPY SHOULDER W/ROTATOR CUFF RPR Left 5/10/2023    Procedure: SHOULDER ARTHROSCOPIC ROTATOR CUFF REPAIR, SUBACROMIAL DECOMPRESSION, BICEP TENODESIS;  Surgeon: Shad Gonzaelz MD;  [facility];  Service: Orthopedics    DC TENDON SHEATH INCISION Right 04/29/2022    Procedure: RELEASE TRIGGER FINGER RING WITH LONG;  Surgeon: Elma Land MD;  Location: [facility];  Service: Orthopedics TONSILLECTOMY      TRIGGER FINGER RELEASE Right 04/29/2022     Allergies   Allergen Reactions    Iv Dye [Iodinated Contrast Media] Hives    Penicillins Other (See Comments)     ?  Had as a child-Unknown reaction       Current Outpatient Medications:     ALPRAZolam (XANAX) 0.25 mg tablet, Take 1 tablet (0.25 mg total) by mouth 4 (four) times a day as needed for anxiety, Disp: 120 tablet, Rfl: 0    atorvastatin (LIPITOR) 40 mg tablet, Take 40 mg by mouth daily, Disp: , Rfl:     azelastine (ASTELIN) 0.1 % nasal spray, 1-2 sprays into each nostril 2 (two) times a day as needed, Disp: , Rfl:     Cholecalciferol 2000 units CAPS, Take 1 capsule by mouth daily after dinner, Disp: , Rfl:     clobetasol (TEMOVATE) 0.05 % cream, APPLY TO AFFECTED AREA TWICE A DAY, Disp: 60 g, Rfl: 0    Cyanocobalamin (B-12) 1000 MCG CAPS, Take 1 tablet by mouth daily after dinner, Disp: , Rfl:     diltiazem (DILACOR XR) 240 MG 24 hr capsule, Take 240 mg by mouth daily, Disp: , Rfl:     fexofenadine (ALLEGRA) 180 MG tablet, Take 180 mg by mouth daily, Disp: , Rfl:     fluticasone (FLONASE) 50 mcg/act nasal spray, 2 sprays into each nostril 2 (two) times a day as needed  , Disp: , Rfl:     fluticasone-salmeterol (Advair HFA) 230-21 MCG/ACT inhaler, Inhale 2 puffs 2 (two) times a day, Disp: , Rfl:     hydrochlorothiazide (HYDRODIURIL) 25 mg tablet, Take 1 tablet by mouth daily in the early morning  , Disp: , Rfl: 3    ketoconazole (NIZORAL) 2 % cream, Apply 1 Application topically 2 (two) times a day To affected area, Disp: 60 g, Rfl: 0    levalbuterol (XOPENEX HFA) 45 mcg/act inhaler, , Disp: , Rfl:     levalbuterol (XOPENEX) 0.63 mg/3 mL nebulizer solution, Take 0.63 mg by nebulization every 4 (four) hours as needed for wheezing or shortness of breath, Disp: , Rfl:     levalbuterol (XOPENEX) 1.25 mg/3 mL nebulizer solution, TAKE 3 ML (1.25 MG TOTAL) BY NEBULIZATION 3 (THREE) TIMES A DAY., Disp: , Rfl:     levETIRAcetam (KEPPRA) 500 mg tablet, TAKE 1 TABLET BY MOUTH TWICE A DAY, Disp: 180 tablet, Rfl: 1    losartan (COZAAR) 25 mg tablet, Take 25 mg by mouth daily, Disp: , Rfl:     metFORMIN (GLUCOPHAGE) 1000 MG tablet, TAKE 1 TABLET BY MOUTH TWICE A DAY WITH MEALS, Disp: 180 tablet, Rfl: 3    methocarbamol (ROBAXIN) 500 mg tablet, Take 1 tablet (500 mg total) by mouth daily at bedtime, Disp: 30 tablet, Rfl: 0    montelukast (SINGULAIR) 10 mg tablet, Take 10 mg by mouth every evening, Disp: , Rfl: 3    oxybutynin (DITROPAN) 5 mg tablet, TAKE 1 TABLET BY MOUTH EVERY DAY AT NIGHT, Disp: , Rfl:     oxyCODONE-acetaminophen (Percocet) 7.5-325 MG per tablet, Take 1 tablet by mouth every 6 (six) hours as needed for moderate pain Max Daily Amount: 4 tablets, Disp: 120 tablet, Rfl: 0    primidone (MYSOLINE) 50 mg tablet, Half pill (25 mg) nightly x 1 week and then increase by half pill weekly until a maximum of 5 pills (250 mg) daily. , Disp: 150 tablet, Rfl: 2    Pseudoeph-Doxylamine-DM-APAP (NYQUIL PO), Take by mouth as needed , Disp: , Rfl:     pseudoephedrine (SUDAFED) 30 mg tablet, Take 60 mg by mouth every 4 (four) hours as needed for congestion, Disp: , Rfl:     tamsulosin (FLOMAX) 0.4 mg, Take 0.4 mg by mouth daily with dinner, Disp: , Rfl:     zolpidem (AMBIEN) 10 mg tablet, Take 1 tablet (10 mg total) by mouth daily at bedtime as needed for sleep, Disp: 30 tablet, Rfl: 0    amoxicillin (AMOXIL) 500 mg capsule, Take 500 mg by mouth 4 tablets 1 hour prior to procedure (Patient not taking: Reported on 11/8/2023), Disp: , Rfl:     cetirizine (ZyrTEC) 10 mg tablet, Take 10 mg by mouth daily after dinner   (Patient not taking: Reported on 12/6/2023), Disp: , Rfl:     Review of Systems  Constitutional:     Denies fever, chills ,fatigue ,weakness ,weight loss, weight gain     ENT: Denies earache ,loss of hearing ,nosebleed, nasal discharge,nasal congestion ,sore throat ,hoarseness  Pulmonary: Denies shortness of breath ,cough  ,dyspnea on exertion, orthopnea  ,PND Cardiovascular:  Denies bradycardia , tachycardia  ,palpations, lower extremity edema leg, claudication  Breast:  Denies new or changing breast lumps ,nipple discharge ,nipple changes  Abdomen:  Denies abdominal pain , anorexia , indigestion, nausea, vomiting, constipation, diarrhea  Musculoskeletal: Denies myalgias, arthralgias, joint swelling, joint stiffness , limb pain, limb swelling positive intermittent spasms between ribs 6 through 8 on the right  Gu: denies dysuria, polyuria  Skin: Denies skin rash, skin lesion, skin wound, itching, dry skin  Neuro: Denies headache, numbness, tingling, confusion, loss of consciousness, dizziness, vertigo  Psychiatric: Denies feelings of depression, suicidal ideation, anxiety, sleep disturbances    OBJECTIVE  /78   Pulse 97   Temp 98 °F (36.7 °C)   Ht 5' 10" (1.778 m)   Wt 125 kg (276 lb)   SpO2 96%   BMI 39.60 kg/m²   Constitutional:   NAD, well appearing and well nourished      ENT:   Conjunctiva and lids: no injection, edema, or discharge     Pupils and iris: MARY bilaterally    External inspection of ears and nose: normal without deformities or discharge. Otoscopic exam: Canals patent without erythema. Nasal mucosa, septum and turbinates: Normal or edema or discharge         Oropharynx:  Moist mucosa, normal tongue and tonsils without lesions. No erythema        Pulmonary:Respiratory effort normal rate and rhythm, no increased work of breathing.  Auscultation of lungs:  Clear bilaterally with no adventitious breath sounds       Cardiovascular: regular rate and rhythm, S1 and S2, no murmur, no edema and/or varicosities of LE      Abdomen: Soft and non-distended     Positive bowel sounds      No heptomegaly or splenomegaly      Gu: no suprapubic tenderness or CVA tenderness, no urethral discharge  Lymphatic:  No anterior or posterior cervical lymphadenopathy         Musculoskeletal:  Gait and station: Normal gait      Digits and nails normal without clubbing or cyanosis       Inspection/palpation of joints, bones, and muscles:  No joint tenderness, swelling, full active and passive range of motion       Skin: Normal skin turgor and no rashes      Neuro:    Normal reflexes     Psych:   alert and oriented to person, place and time     normal mood and affect       Assessment/Plan:Diagnoses and all orders for this visit:    Rib contusion, right, subsequent encounter  Comments:  Patient is slowly healing will continue with as needed muscle relaxants  Orders:  -     methocarbamol (ROBAXIN) 500 mg tablet; Take 1 tablet (500 mg total) by mouth daily at bedtime    Type 2 diabetes mellitus with other skin complications (HCC)  Comments:  Stable on current diabetic medications. Orders:  -     Hemoglobin A1c (w/out EAG) ([facility] ONLY); Future  -     Hemoglobin A1c (w/out EAG) ([facility] ONLY)    Essential hypertension  Comments:  Stable on ARB therapy. Orders:  -     Comprehensive metabolic panel; Future  -     Lipid Panel with Direct LDL reflex; Future    Hyperlipidemia, unspecified hyperlipidemia type  Comments:  Continue statin therapy. Iron deficiency anemia, unspecified iron deficiency anemia type  Comments:  Continue iron supplementation and will check labs  Orders:  -     CBC and Platelet; Future  -     Ferritin; Future    Temporal lobe epilepsy [facility])  Comments:  Patient being managed by neurology and on Keppra therapy. Continuous opioid dependence ([address])  Comments:  Patient has had epidurals, physical therapy, and meds are to allow patient to complete ADLs. Other orders  -     levalbuterol (XOPENEX) 1.25 mg/3 mL nebulizer solution; TAKE 3 ML (1.25 MG TOTAL) BY NEBULIZATION 3 (THREE) TIMES A DAY. -     fexofenadine (ALLEGRA) 180 MG tablet; Take 180 mg by mouth daily        Reviewed with patient plan to treat with above plan.     Patient instructed to call in 72 hours if not feeling better or if symptoms worsen

## 2023-12-22 DIAGNOSIS — B35.4 TINEA CORPORIS: ICD-10-CM

## 2023-12-22 RX ORDER — KETOCONAZOLE 20 MG/G
1 CREAM TOPICAL 2 TIMES DAILY
Qty: 180 G | Refills: 1 | Status: SHIPPED | OUTPATIENT
Start: 2023-12-22

## 2023-12-26 DIAGNOSIS — J30.1 ALLERGIC RHINITIS DUE TO POLLEN, UNSPECIFIED SEASONALITY: Primary | ICD-10-CM

## 2023-12-26 DIAGNOSIS — G89.29 CHRONIC LEFT SHOULDER PAIN: ICD-10-CM

## 2023-12-26 DIAGNOSIS — F41.9 ANXIETY: ICD-10-CM

## 2023-12-26 DIAGNOSIS — M25.512 CHRONIC LEFT SHOULDER PAIN: ICD-10-CM

## 2023-12-26 DIAGNOSIS — S20.211D RIB CONTUSION, RIGHT, SUBSEQUENT ENCOUNTER: ICD-10-CM

## 2023-12-26 DIAGNOSIS — G47.00 INSOMNIA, UNSPECIFIED TYPE: ICD-10-CM

## 2023-12-27 RX ORDER — FEXOFENADINE HCL 180 MG/1
180 TABLET ORAL DAILY
Qty: 30 TABLET | Refills: 3 | Status: SHIPPED | OUTPATIENT
Start: 2023-12-27

## 2023-12-27 RX ORDER — METHOCARBAMOL 500 MG/1
500 TABLET, FILM COATED ORAL
Qty: 30 TABLET | Refills: 0 | Status: SHIPPED | OUTPATIENT
Start: 2023-12-27

## 2023-12-27 RX ORDER — ALPRAZOLAM 0.25 MG/1
0.25 TABLET ORAL 4 TIMES DAILY PRN
Qty: 120 TABLET | Refills: 0 | Status: SHIPPED | OUTPATIENT
Start: 2023-12-27

## 2023-12-27 RX ORDER — OXYCODONE AND ACETAMINOPHEN 7.5; 325 MG/1; MG/1
1 TABLET ORAL EVERY 6 HOURS PRN
Qty: 120 TABLET | Refills: 0 | Status: SHIPPED | OUTPATIENT
Start: 2023-12-27

## 2023-12-27 RX ORDER — ZOLPIDEM TARTRATE 10 MG/1
10 TABLET ORAL
Qty: 30 TABLET | Refills: 0 | Status: SHIPPED | OUTPATIENT
Start: 2023-12-27

## 2023-12-27 NOTE — TELEPHONE ENCOUNTER
1 [id number] 12/03/2023 11/29/2023 Zolpidem Tartrate (Tablet) 30.0 30 10 MG NA [facility] Medicare 0 / 0 [place]    1 [id number] 11/30/2023 11/29/2023 ALPRAZolam (Tablet) 120.0 30 0.25 MG NA [facility] Medicare 0 / 0 [place]    1 [id number] 11/30/2023 11/29/2023 ACETAMINOPHEN 325 MG / oxyCODONE HYDROCHLORIDE 7.5 MG ORAL TABLET (Tablet) 120.0 30 7.5 MG/325.0 MG 45.0 [facility] Medicare 0 / 0 [place]    1 [id number] 11/06/2023 11/02/2023 Zolpidem Tartrate (Tablet) 30.0 30 10 MG NA [facility] Medicare 0 / 0 [place]    1 [id number] 11/02/2023 11/02/2023 ALPRAZolam (Tablet) 120.0 30 0.25 MG NA [facility] Medicare 0 / 0 [place]    1 [id number] 11/02/2023 11/02/2023 ACETAMINOPHEN 325 MG / oxyCODONE HYDROCHLORIDE 7.5 MG ORAL TABLET (Tablet) 120.0 30 7.5 MG/325.0 MG 45.0 [facility] Medicare 0 / 0 [place]    1 [id number] 10/06/2023 10/03/2023 Zolpidem Tartrate (Tablet) 30.0 30 10 MG NA [facility] Medicare 0 / 0 [place]    1 [id number] 10/04/2023 10/03/2023 ALPRAZolam (Tablet) 120.0 30 0.25 MG NA [facility] Medicare 0 / 0 [place]    1 [id number] 10/04/2023 10/03/2023 ACETAMINOPHEN 325 MG / oxyCODONE HYDROCHLORIDE 7.5 MG ORAL TABLET (Tablet) 120.0 30 7.5 MG/325.0 MG 45.0 [facility]rtui Medicare 0 / 0 [place]    1 [id number] 09/09/2023 09/07/2023 Zolpidem Tartrate (Tablet) 30.0 30 10 MG NA [facility] Medicare 0 / 0 [place]

## 2023-12-28 NOTE — TELEPHONE ENCOUNTER
1 [id number] 12/27/2023 12/27/2023 ACETAMINOPHEN 325 MG / oxyCODONE HYDROCHLORIDE 7.5 MG ORAL TABLET (Tablet) 120.0 30 7.5 MG/325.0 MG 45.0 [facility] Medicare 0 / 0 [place]    1 [id number] 12/27/2023 12/27/2023 ALPRAZolam (Tablet) 120.0 30 0.25 MG NA [facility] Medicare 0 / 0 [place]    1 [id number] 12/03/2023 11/29/2023 Zolpidem Tartrate (Tablet) 30.0 30 10 MG NA [facility] Medicare 0 / 0 [place]    1 [id number] 11/30/2023 11/29/2023 ACETAMINOPHEN 325 MG / oxyCODONE HYDROCHLORIDE 7.5 MG ORAL TABLET (Tablet) 120.0 30 7.5 MG/325.0 MG 45.0 [facility] Medicare 0 / 0 [place]    1 [id number] 11/30/2023 11/29/2023 ALPRAZolam (Tablet) 120.0 30 0.25 MG NA [facility]. Medicare 0 / 0 [place]    1 [id number] 11/06/2023 11/02/2023 Zolpidem Tartrate (Tablet) 30.0 30 10 MG NA [facility]uti Medicare 0 / 0 [place]    1 [id number] 11/02/2023 11/02/2023 ACETAMINOPHEN 325 MG / oxyCODONE HYDROCHLORIDE 7.5 MG ORAL TABLET (Tablet) 120.0 30 7.5 MG/325.0 MG 45.0 [facility]uti Medicare 0 / 0 [place]    1 [id number] 11/02/2023 11/02/2023 ALPRAZolam (Tablet) 120.0 30 0.25 MG NA [facility] Medicare 0 / 0 [place]    1 [id number] 10/06/2023 10/03/2023 Zolpidem Tartrate (Tablet) 30.0 30 10 MG NA [facility]uti Medicare 0 / 0 [place]    1 [id number] 10/04/2023 10/03/2023 ACETAMINOPHEN 325 MG / oxyCODONE HYDROCHLORIDE 7.5 MG ORAL TABLET (Tablet) 120.0 30 7.5 MG/325.0 MG 45.0 [facility]uti Medicare 0 / 0 [place]

## 2024-01-18 DIAGNOSIS — G25.0 TREMOR, ESSENTIAL: ICD-10-CM

## 2024-01-18 RX ORDER — PRIMIDONE 50 MG/1
TABLET ORAL
Qty: 450 TABLET | Refills: 1 | Status: SHIPPED | OUTPATIENT
Start: 2024-01-18

## 2024-01-18 NOTE — TELEPHONE ENCOUNTER
Spoke to patient. Switches at times back and forth between 4 and 5 tablets asking for script to be written for 5 tablets daily. 90 day fill.    Script updated, please sign.

## 2024-01-20 DIAGNOSIS — B35.4 TINEA CORPORIS: ICD-10-CM

## 2024-01-22 DIAGNOSIS — S20.211D RIB CONTUSION, RIGHT, SUBSEQUENT ENCOUNTER: ICD-10-CM

## 2024-01-22 DIAGNOSIS — G89.29 CHRONIC LEFT SHOULDER PAIN: ICD-10-CM

## 2024-01-22 DIAGNOSIS — M25.512 CHRONIC LEFT SHOULDER PAIN: ICD-10-CM

## 2024-01-22 DIAGNOSIS — J30.1 ALLERGIC RHINITIS DUE TO POLLEN, UNSPECIFIED SEASONALITY: ICD-10-CM

## 2024-01-22 DIAGNOSIS — G47.00 INSOMNIA, UNSPECIFIED TYPE: ICD-10-CM

## 2024-01-22 DIAGNOSIS — F41.9 ANXIETY: ICD-10-CM

## 2024-01-22 RX ORDER — ALPRAZOLAM 0.25 MG/1
0.25 TABLET ORAL 4 TIMES DAILY PRN
Qty: 120 TABLET | Refills: 0 | Status: SHIPPED | OUTPATIENT
Start: 2024-01-22

## 2024-01-22 RX ORDER — CLOBETASOL PROPIONATE 0.5 MG/G
1 CREAM TOPICAL 2 TIMES DAILY
Qty: 60 G | Refills: 0 | Status: SHIPPED | OUTPATIENT
Start: 2024-01-22

## 2024-01-22 RX ORDER — FEXOFENADINE HCL 180 MG/1
180 TABLET ORAL DAILY
Qty: 90 TABLET | Refills: 1 | Status: SHIPPED | OUTPATIENT
Start: 2024-01-22

## 2024-01-22 RX ORDER — OXYCODONE AND ACETAMINOPHEN 7.5; 325 MG/1; MG/1
1 TABLET ORAL EVERY 6 HOURS PRN
Qty: 120 TABLET | Refills: 0 | Status: SHIPPED | OUTPATIENT
Start: 2024-01-22

## 2024-01-22 RX ORDER — ZOLPIDEM TARTRATE 10 MG/1
10 TABLET ORAL
Qty: 30 TABLET | Refills: 0 | Status: SHIPPED | OUTPATIENT
Start: 2024-01-22

## 2024-01-22 RX ORDER — METHOCARBAMOL 500 MG/1
500 TABLET, FILM COATED ORAL
Qty: 30 TABLET | Refills: 0 | Status: SHIPPED | OUTPATIENT
Start: 2024-01-22

## 2024-01-22 NOTE — TELEPHONE ENCOUNTER
1 [id number] 12/30/2023 12/27/2023 Zolpidem Tartrate (Tablet) 30.0 30 10 MG NA [facility] Medicare 0 / 0 [place]    1 [id number] 12/27/2023 12/27/2023 ACETAMINOPHEN 325 MG / oxyCODONE HYDROCHLORIDE 7.5 MG ORAL TABLET (Tablet) 120.0 30 7.5 MG/325.0 MG 45.0 [facility]. Medicare 0 / 0 [place]    1 [id number] 12/27/2023 12/27/2023 ALPRAZolam (Tablet) 120.0 30 0.25 MG NA [facility] Medicare 0 / 0 [place]    1 [id number] 12/03/2023 11/29/2023 Zolpidem Tartrate (Tablet) 30.0 30 10 MG NA [facility] Medicare 0 / 0 [place]    1 [id number] 11/30/2023 11/29/2023 ACETAMINOPHEN 325 MG / oxyCODONE HYDROCHLORIDE 7.5 MG ORAL TABLET (Tablet) 120.0 30 7.5 MG/325.0 MG 45.0 [facility] Medicare 0 / 0 PA    1 [id number] 11/30/2023 11/29/2023 ALPRAZolam (Tablet) 120.0 30 0.25 MG NA [facility] Medicare 0 / 0 PA    1 [id number] 11/06/2023 11/02/2023 Zolpidem Tartrate (Tablet) 30.0 30 10 MG NA [facility] Medicare 0 / 0 PA    1 [id number] 11/02/2023 11/02/2023 ACETAMINOPHEN 325 MG / oxyCODONE HYDROCHLORIDE 7.5 MG ORAL TABLET (Tablet) 120.0 30 7.5 MG/325.0 MG 45.0 [facility] Medicare 0 / 0 PA    1 [id number] 11/02/2023 11/02/2023 ALPRAZolam (Tablet) 120.0 30 0.25 MG NA [facility] Medicare 0 / 0 [place]    1 [id number] 10/06/2023 10/03/2023 Zolpidem Tartrate (Tablet) 30.0 30 10 MG NA [facility] Medica

## 2024-02-14 DIAGNOSIS — R06.2 WHEEZING: Primary | ICD-10-CM

## 2024-02-14 DIAGNOSIS — M25.512 CHRONIC LEFT SHOULDER PAIN: ICD-10-CM

## 2024-02-14 DIAGNOSIS — S20.211D RIB CONTUSION, RIGHT, SUBSEQUENT ENCOUNTER: ICD-10-CM

## 2024-02-14 DIAGNOSIS — F41.9 ANXIETY: ICD-10-CM

## 2024-02-14 DIAGNOSIS — G89.29 CHRONIC LEFT SHOULDER PAIN: ICD-10-CM

## 2024-02-14 DIAGNOSIS — G47.00 INSOMNIA, UNSPECIFIED TYPE: ICD-10-CM

## 2024-02-14 NOTE — TELEPHONE ENCOUNTER
1 [id number] 01/27/2024 01/22/2024 Zolpidem Tartrate (Tablet) 30.0 30 10 MG NA [facility] Medicare 0 / 0 [place]    1 [id number] 01/23/2024 01/22/2024 ALPRAZolam (Tablet) 120.0 30 0.25 MG NA [facility] Medicare 0 / 0 [place]    1 [id number] 01/23/2024 01/22/2024 ACETAMINOPHEN 325 MG / oxyCODONE HYDROCHLORIDE 7.5 MG ORAL TABLET (Tablet) 120.0 30 7.5 MG/325.0 MG 45.0 [facility] Medicare 0 / 0 [place]    1 [id number] 12/30/2023 12/27/2023 Zolpidem Tartrate (Tablet) 30.0 30 10 MG [facility] Medicare 0 / 0 [place]    1 [id number] 12/27/2023 12/27/2023 ALPRAZolam (Tablet) 120.0 30 0.25 MG NA [facility] Medicare 0 / 0 [place]    1 [id number] 12/27/2023 12/27/2023 ACETAMINOPHEN 325 MG / oxyCODONE HYDROCHLORIDE 7.5 MG ORAL TABLET (Tablet) 120.0 30 7.5 MG/325.0 MG 45.0 [facility] Medicare 0 / 0 PA    1 [id number] 12/03/2023 11/29/2023 Zolpidem Tartrate (Tablet) 30.0 30 10 MG NA [facility] Medicare 0 / 0 [place]    1 [id number] 11/30/2023 11/29/2023 ALPRAZolam (Tablet) 120.0 30 0.25 MG [facility] Medicare 0 / 0 [place]    1 [id number] 11/30/2023 11/29/2023 ACETAMINOPHEN 325 MG / oxyCODONE HYDROCHLORIDE 7.5 MG ORAL TABLET (Tablet) 120.0 30 7.5 MG/325.0 MG 45.0 [facility]uti Medicare 0 / 0 [place]    1 [id number] 11/06/2023 11/02/2023 Zolpidem Tartrate (Tablet) 30.0 30 10 MG [facility] Medicare 0 / 0 [place]

## 2024-02-15 RX ORDER — METHOCARBAMOL 500 MG/1
500 TABLET, FILM COATED ORAL
Qty: 30 TABLET | Refills: 0 | Status: SHIPPED | OUTPATIENT
Start: 2024-02-15

## 2024-02-15 RX ORDER — OXYCODONE AND ACETAMINOPHEN 7.5; 325 MG/1; MG/1
1 TABLET ORAL EVERY 6 HOURS PRN
Qty: 120 TABLET | Refills: 0 | Status: SHIPPED | OUTPATIENT
Start: 2024-02-15

## 2024-02-15 RX ORDER — ZOLPIDEM TARTRATE 10 MG/1
10 TABLET ORAL
Qty: 30 TABLET | Refills: 0 | Status: SHIPPED | OUTPATIENT
Start: 2024-02-15

## 2024-02-15 RX ORDER — LEVALBUTEROL TARTRATE 45 UG/1
2 AEROSOL, METERED ORAL EVERY 4 HOURS PRN
Qty: 15 G | Refills: 2 | Status: SHIPPED | OUTPATIENT
Start: 2024-02-15

## 2024-02-15 RX ORDER — ALPRAZOLAM 0.25 MG/1
0.25 TABLET ORAL 4 TIMES DAILY PRN
Qty: 120 TABLET | Refills: 0 | Status: SHIPPED | OUTPATIENT
Start: 2024-02-15

## 2024-03-08 DIAGNOSIS — S20.211D RIB CONTUSION, RIGHT, SUBSEQUENT ENCOUNTER: ICD-10-CM

## 2024-03-12 RX ORDER — METHOCARBAMOL 500 MG/1
500 TABLET, FILM COATED ORAL
Qty: 30 TABLET | Refills: 0 | Status: SHIPPED | OUTPATIENT
Start: 2024-03-12

## 2024-03-13 DIAGNOSIS — F41.9 ANXIETY: ICD-10-CM

## 2024-03-13 DIAGNOSIS — G89.29 CHRONIC LEFT SHOULDER PAIN: ICD-10-CM

## 2024-03-13 DIAGNOSIS — G47.00 INSOMNIA, UNSPECIFIED TYPE: ICD-10-CM

## 2024-03-13 DIAGNOSIS — M25.512 CHRONIC LEFT SHOULDER PAIN: ICD-10-CM

## 2024-03-14 RX ORDER — ALPRAZOLAM 0.25 MG/1
0.25 TABLET ORAL 4 TIMES DAILY PRN
Qty: 120 TABLET | Refills: 0 | Status: SHIPPED | OUTPATIENT
Start: 2024-03-14

## 2024-03-14 RX ORDER — ZOLPIDEM TARTRATE 10 MG/1
10 TABLET ORAL
Qty: 30 TABLET | Refills: 0 | Status: SHIPPED | OUTPATIENT
Start: 2024-03-14

## 2024-03-14 RX ORDER — OXYCODONE AND ACETAMINOPHEN 7.5; 325 MG/1; MG/1
1 TABLET ORAL EVERY 6 HOURS PRN
Qty: 120 TABLET | Refills: 0 | Status: SHIPPED | OUTPATIENT
Start: 2024-03-14

## 2024-03-14 NOTE — TELEPHONE ENCOUNTER
1 [id number] 02/23/2024 02/15/2024 Zolpidem Tartrate (Tablet) 30.0 30 10 MG NA [facility] Medicare 0 / 0 [place]    1 [id number] 02/19/2024 02/15/2024 ALPRAZolam (Tablet) 120.0 30 0.25 MG NA [facility] Medicare 0 / 0 [place]    1 [id number] 02/19/2024 02/15/2024 ACETAMINOPHEN 325 MG / oxyCODONE HYDROCHLORIDE 7.5 MG ORAL TABLET (Tablet) 120.0 30 7.5 MG/325.0 MG 45.0 [facility] Medicare 0 / 0 [place]    1 [id number] 01/27/2024 01/22/2024 Zolpidem Tartrate (Tablet) 30.0 30 10 MG NA [facility]. Medicare 0 / 0 [place]    1 [id number] 01/23/2024 01/22/2024 ALPRAZolam (Tablet) 120.0 30 0.25 MG NA [facility] Medicare 0 / 0 [place]    1 [id number] 01/23/2024 01/22/2024 ACETAMINOPHEN 325 MG / oxyCODONE HYDROCHLORIDE 7.5 MG ORAL TABLET (Tablet) 120.0 30 7.5 MG/325.0 MG 45.0 [facility] Medicare 0 / 0 PA    1 [id number] 12/30/2023 12/27/2023 Zolpidem Tartrate (Tablet) 30.0 30 10 MG NA [facility]. Medicare 0 / 0 [place]    1 [id number] 12/27/2023 12/27/2023 ALPRAZolam (Tablet) 120.0 30 0.25 MG [facility]. Medicare 0 / 0 [place]    1 [id number] 12/27/2023 12/27/2023 ACETAMINOPHEN 325 MG / oxyCODONE HYDROCHLORIDE 7.5 MG ORAL TABLET (Tablet) 120.0 30 7.5 MG/325.0 MG 45.0 [facility] Medicare 0 / 0 PA    1 [id number] 12/03/2023 11/29/2023 Zolpidem Tartrate (Tablet) 30.0 30 10 MG NA [facility] VENEG[facility]

## 2024-03-27 ENCOUNTER — APPOINTMENT (OUTPATIENT)
Dept: LAB | Facility: CLINIC | Age: 69
End: 2024-03-27
Payer: MEDICARE

## 2024-03-27 DIAGNOSIS — D50.9 IRON DEFICIENCY ANEMIA, UNSPECIFIED IRON DEFICIENCY ANEMIA TYPE: ICD-10-CM

## 2024-03-27 DIAGNOSIS — Z79.4 TYPE 2 DIABETES MELLITUS WITH OTHER SKIN COMPLICATION, WITH LONG-TERM CURRENT USE OF INSULIN (HCC): Primary | ICD-10-CM

## 2024-03-27 DIAGNOSIS — I10 ESSENTIAL HYPERTENSION: ICD-10-CM

## 2024-03-27 DIAGNOSIS — E11.628 TYPE 2 DIABETES MELLITUS WITH OTHER SKIN COMPLICATION, WITH LONG-TERM CURRENT USE OF INSULIN (HCC): Primary | ICD-10-CM

## 2024-03-27 LAB
ALBUMIN SERPL BCP-MCNC: 4.2 G/DL (ref 3.5–5)
ALP SERPL-CCNC: 143 U/L (ref 34–104)
ALT SERPL W P-5'-P-CCNC: 22 U/L (ref 7–52)
ANION GAP SERPL CALCULATED.3IONS-SCNC: 10 MMOL/L (ref 4–13)
AST SERPL W P-5'-P-CCNC: 15 U/L (ref 13–39)
BILIRUB SERPL-MCNC: 0.37 MG/DL (ref 0.2–1)
BUN SERPL-MCNC: 23 MG/DL (ref 5–25)
CALCIUM SERPL-MCNC: 9.4 MG/DL (ref 8.4–10.2)
CHLORIDE SERPL-SCNC: 96 MMOL/L (ref 96–108)
CHOLEST SERPL-MCNC: 167 MG/DL
CO2 SERPL-SCNC: 30 MMOL/L (ref 21–32)
CREAT SERPL-MCNC: 1.02 MG/DL (ref 0.6–1.3)
ERYTHROCYTE [DISTWIDTH] IN BLOOD BY AUTOMATED COUNT: 12.8 % (ref 11.6–15.1)
EST. AVERAGE GLUCOSE BLD GHB EST-MCNC: 189 MG/DL
FERRITIN SERPL-MCNC: 8 NG/ML (ref 24–336)
GFR SERPL CREATININE-BSD FRML MDRD: 74 ML/MIN/1.73SQ M
GLUCOSE P FAST SERPL-MCNC: 138 MG/DL (ref 65–99)
HBA1C MFR BLD: 8.2 %
HCT VFR BLD AUTO: 36.4 % (ref 36.5–49.3)
HDLC SERPL-MCNC: 49 MG/DL
HGB BLD-MCNC: 12 G/DL (ref 12–17)
LDLC SERPL CALC-MCNC: 76 MG/DL (ref 0–100)
MCH RBC QN AUTO: 28.6 PG (ref 26.8–34.3)
MCHC RBC AUTO-ENTMCNC: 33 G/DL (ref 31.4–37.4)
MCV RBC AUTO: 87 FL (ref 82–98)
PLATELET # BLD AUTO: 347 THOUSANDS/UL (ref 149–390)
PMV BLD AUTO: 10 FL (ref 8.9–12.7)
POTASSIUM SERPL-SCNC: 4.3 MMOL/L (ref 3.5–5.3)
PROT SERPL-MCNC: 6.9 G/DL (ref 6.4–8.4)
RBC # BLD AUTO: 4.19 MILLION/UL (ref 3.88–5.62)
SODIUM SERPL-SCNC: 136 MMOL/L (ref 135–147)
TRIGL SERPL-MCNC: 211 MG/DL
WBC # BLD AUTO: 7.81 THOUSAND/UL (ref 4.31–10.16)

## 2024-03-27 PROCEDURE — 80061 LIPID PANEL: CPT

## 2024-03-27 PROCEDURE — 82728 ASSAY OF FERRITIN: CPT

## 2024-03-27 PROCEDURE — 83036 HEMOGLOBIN GLYCOSYLATED A1C: CPT

## 2024-03-27 PROCEDURE — 80053 COMPREHEN METABOLIC PANEL: CPT

## 2024-03-27 PROCEDURE — 36415 COLL VENOUS BLD VENIPUNCTURE: CPT

## 2024-03-27 PROCEDURE — 85027 COMPLETE CBC AUTOMATED: CPT

## 2024-04-05 ENCOUNTER — OFFICE VISIT (OUTPATIENT)
Dept: FAMILY MEDICINE CLINIC | Facility: CLINIC | Age: 69
End: 2024-04-05
Payer: MEDICARE

## 2024-04-05 VITALS
HEART RATE: 111 BPM | SYSTOLIC BLOOD PRESSURE: 118 MMHG | OXYGEN SATURATION: 98 % | WEIGHT: 264.2 LBS | DIASTOLIC BLOOD PRESSURE: 74 MMHG | HEIGHT: 70 IN | TEMPERATURE: 97.6 F | BODY MASS INDEX: 37.82 KG/M2

## 2024-04-05 DIAGNOSIS — E11.9 TYPE 2 DIABETES MELLITUS WITHOUT COMPLICATION, WITHOUT LONG-TERM CURRENT USE OF INSULIN (HCC): ICD-10-CM

## 2024-04-05 DIAGNOSIS — M54.16 LUMBAR RADICULOPATHY: ICD-10-CM

## 2024-04-05 DIAGNOSIS — G40.009 PARTIAL IDIOPATHIC EPILEPSY WITH SEIZURES OF LOCALIZED ONSET, NOT INTRACTABLE, WITHOUT STATUS EPILEPTICUS (HCC): ICD-10-CM

## 2024-04-05 DIAGNOSIS — F11.20 CONTINUOUS OPIOID DEPENDENCE (HCC): ICD-10-CM

## 2024-04-05 DIAGNOSIS — I44.2 AIVR (ACCELERATED IDIOVENTRICULAR RHYTHM) (HCC): ICD-10-CM

## 2024-04-05 DIAGNOSIS — E11.628 TYPE 2 DIABETES MELLITUS WITH OTHER SKIN COMPLICATIONS (HCC): Primary | ICD-10-CM

## 2024-04-05 DIAGNOSIS — I10 ESSENTIAL HYPERTENSION: ICD-10-CM

## 2024-04-05 DIAGNOSIS — G47.00 INSOMNIA, UNSPECIFIED TYPE: ICD-10-CM

## 2024-04-05 DIAGNOSIS — E78.5 HYPERLIPIDEMIA, UNSPECIFIED HYPERLIPIDEMIA TYPE: ICD-10-CM

## 2024-04-05 DIAGNOSIS — G40.109 TEMPORAL LOBE EPILEPSY (HCC): ICD-10-CM

## 2024-04-05 PROCEDURE — G2211 COMPLEX E/M VISIT ADD ON: HCPCS | Performed by: FAMILY MEDICINE

## 2024-04-05 PROCEDURE — 99215 OFFICE O/P EST HI 40 MIN: CPT | Performed by: FAMILY MEDICINE

## 2024-04-05 RX ORDER — FAMOTIDINE 40 MG/1
40 TABLET, FILM COATED ORAL 2 TIMES DAILY
Qty: 60 TABLET | Refills: 3 | Status: SHIPPED | OUTPATIENT
Start: 2024-04-05

## 2024-04-05 RX ORDER — MAGNESIUM GLUCONATE 30 MG(550)
30 TABLET ORAL DAILY
COMMUNITY

## 2024-04-05 RX ORDER — ZOLPIDEM TARTRATE 12.5 MG/1
12.5 TABLET, FILM COATED, EXTENDED RELEASE ORAL
Qty: 30 TABLET | Refills: 0 | Status: SHIPPED | OUTPATIENT
Start: 2024-04-05

## 2024-04-05 RX ORDER — LEVETIRACETAM 500 MG/1
TABLET ORAL
Qty: 180 TABLET | Refills: 1 | Status: SHIPPED | OUTPATIENT
Start: 2024-04-05

## 2024-04-05 RX ORDER — CELECOXIB 200 MG/1
200 CAPSULE ORAL 2 TIMES DAILY
Qty: 60 CAPSULE | Refills: 0 | Status: SHIPPED | OUTPATIENT
Start: 2024-04-05

## 2024-04-09 DIAGNOSIS — M25.512 CHRONIC LEFT SHOULDER PAIN: ICD-10-CM

## 2024-04-09 DIAGNOSIS — F41.9 ANXIETY: ICD-10-CM

## 2024-04-09 DIAGNOSIS — G89.29 CHRONIC LEFT SHOULDER PAIN: ICD-10-CM

## 2024-04-09 DIAGNOSIS — S20.211D RIB CONTUSION, RIGHT, SUBSEQUENT ENCOUNTER: ICD-10-CM

## 2024-04-09 NOTE — TELEPHONE ENCOUNTER
[id number] 03/21/2024 03/14/2024 Zolpidem Tartrate (Tablet) 30.0 30 10 MG NA [facility] Medicare 0 / 0 [place]     1 [id number] 03/17/2024 03/14/2024 ALPRAZolam (Tablet) 120.0 30 0.25 MG NA [facility] Medicare 0 / 0 [place]    1 [id number] 03/17/2024 03/14/2024 ACETAMINOPHEN 325 MG / oxyCODONE HYDROCHLORIDE 7.5 MG ORAL TABLET (Tablet) 120.0 30 7.5 MG/325.0 MG 45.0 [facility] Medicare 0 / 0 PA    1 [id number] 02/23/2024 02/15/2024 Zolpidem Tartrate (Tablet) 30.0 30 10 MG [facility] Medicare 0 / 0 [place]    1 [id number] 02/19/2024 02/15/2024 ALPRAZolam (Tablet) 120.0 30 0.25 MG NA [facility] Medicare 0 / 0 PA    1 [id number] 02/19/2024 02/15/2024 ACETAMINOPHEN 325 MG / oxyCODONE HYDROCHLORIDE 7.5 MG ORAL TABLET (Tablet) 120.0 30 7.5 MG/325.0 MG 45.0 [facility] Medicare 0 / 0 PA    1 [id number] 01/27/2024 01/22/2024 Zolpidem Tartrate (Tablet) 30.0 30 10 MG NA [facility]. Medicare 0 / 0 PA    1 [id number] 01/23/2024 01/22/2024 ALPRAZolam (Tablet) 120.0 30 0.25 MG NA [facility] Medicare 0 / 0 PA    1 [id number] 01/23/2024 01/22/2024 ACETAMINOPHEN 325 MG / oxyCODONE HYDROCHLORIDE 7.5 MG ORAL TABLET (Tablet) 120.0 30 7.5 MG/325.0 MG 45.0 [facility]uti Medicare 0 / 0 [place]    1 [id number] 12/30/2023 12/27/2023 Zolpidem Tartrate (Tablet) 30.0 30 10 MG [facility]

## 2024-04-10 RX ORDER — METHOCARBAMOL 500 MG/1
500 TABLET, FILM COATED ORAL
Qty: 30 TABLET | Refills: 0 | Status: SHIPPED | OUTPATIENT
Start: 2024-04-10

## 2024-04-10 RX ORDER — ALPRAZOLAM 0.25 MG/1
0.25 TABLET ORAL 4 TIMES DAILY PRN
Qty: 120 TABLET | Refills: 0 | Status: SHIPPED | OUTPATIENT
Start: 2024-04-10

## 2024-04-10 RX ORDER — OXYCODONE AND ACETAMINOPHEN 7.5; 325 MG/1; MG/1
1 TABLET ORAL EVERY 6 HOURS PRN
Qty: 120 TABLET | Refills: 0 | Status: SHIPPED | OUTPATIENT
Start: 2024-04-10

## 2024-04-11 NOTE — PROGRESS NOTES
Patient ID: Horace Jacobo is a 68 y.o. male.    HPI: 68 y.o.male presents for follow up of niddm, hypertension, AIVR and hypercholesterolemia.  Hgba1c is  8.2 and patient is  willing to start on Tradjenta therapy.  Patient is lumbar back pain is progressing and meds do not controlling the pain any longer he is interested in getting an MRI of pursuing pain management therapy.  He has known herniated disks from a prior MRI of the lumbar spine.  He is having issues sleeping throughout the night.  The patient's other medical issues are well controlled. Patient deneis any dyspnea, chest pain or palpitations.      SUBJECTIVE    Family History   Problem Relation Age of Onset    Heart disease Mother     Kidney cancer Mother     Cancer Mother     Hypertension Father     Bipolar disorder Sister         bipolar disorder NOS    Diabetes Maternal Grandmother      Social History     Socioeconomic History    Marital status: /Civil Union     Spouse name: Not on file    Number of children: 2    Years of education: trade school    Highest education level: Not on file   Occupational History     Comment: employed   Tobacco Use    Smoking status: Never     Passive exposure: Past    Smokeless tobacco: Never   Vaping Use    Vaping status: Never Used   Substance and Sexual Activity    Alcohol use: Not Currently     Comment: 1 or 2 month    Drug use: No    Sexual activity: Not Currently     Partners: Female     Birth control/protection: Male Sterilization   Other Topics Concern    Not on file   Social History Narrative    Always uses seat belt    Caffeine use    Daily coffee consumption    Daily cola consumption    Dental care, regularly    DENIED exercise frequency    Guns in the home:stored in locked cabinet    Multiple organ donor    Patient has living will    DENIED pets/animals    Power of  in existence    Water intake, adequate (per day)     Social Determinants of Health     Financial Resource Strain: Low Risk   (8/17/2023)    Overall Financial Resource Strain (CARDIA)     Difficulty of Paying Living Expenses: Not very hard   Food Insecurity: Not on file   Transportation Needs: No Transportation Needs (8/17/2023)    PRAPARE - Transportation     Lack of Transportation (Medical): No     Lack of Transportation (Non-Medical): No   Physical Activity: Not on file   Stress: Not on file   Social Connections: Not on file   Intimate Partner Violence: Not on file   Housing Stability: Not on file     Past Medical History:   Diagnosis Date    Allergic     Allergic rhinitis     Anxiety     Asthma     Basal cell carcinoma 04/07/2022    nose    Diabetes mellitus (HCC)     Disease of thyroid gland     EIC (epidermal inclusion cyst)     GERD (gastroesophageal reflux disease)     Hayfever     History of skin cancer     HL (hearing loss)     wears hearing aides on occasion    Hyperlipidemia     Hypertension     Memory loss, short term     R/O Seizures but placed on Keppra    PONV (postoperative nausea and vomiting)     x1 occurence    Sleep apnea     s/p gastric bypass    Tinnitus      Past Surgical History:   Procedure Laterality Date    ABDOMINOPLASTY      ADENOIDECTOMY      BACK SURGERY      lower back surgery    CARPAL TUNNEL RELEASE Bilateral     CHOLECYSTECTOMY      COLONOSCOPY      GASTRIC BYPASS      HERNIA REPAIR Right     JOINT REPLACEMENT Bilateral     Knee    KNEE ARTHROSCOPY Bilateral     X3    MOHS SURGERY  06/15/2022    nose-Dr. Patel    AR RPR 1ST INGUN HRNA AGE 4 YRS/> REDUCIBLE Left 11/26/2018    Procedure: INGUINAL HERNIA REPAIR;  Surgeon: Rubens Casas DO;  Location: [facility];  Service: General    AR SURGICAL ARTHROSCOPY SHOULDER W/ROTATOR CUFF RPR Left 5/10/2023    Procedure: SHOULDER ARTHROSCOPIC ROTATOR CUFF REPAIR, SUBACROMIAL DECOMPRESSION, BICEP TENODESIS;  Surgeon: Mario Jackson MD;  Location: [facility] OR;  Service: Orthopedics    AR TENDON SHEATH INCISION Right 04/29/2022    Procedure: RELEASE  TRIGGER FINGER RING WITH LONG;  Surgeon: Hay Doty MD;  Location: [facility];  Service: Orthopedics    TONSILLECTOMY      TRIGGER FINGER RELEASE Right 04/29/2022     Allergies   Allergen Reactions    Iv Dye [Iodinated Contrast Media] Hives    Penicillins Other (See Comments)     ? Had as a child-Unknown reaction       Current Outpatient Medications:     amoxicillin (AMOXIL) 500 mg capsule, Take 500 mg by mouth 4 tablets 1 hour prior to procedure, Disp: , Rfl:     atorvastatin (LIPITOR) 40 mg tablet, Take 40 mg by mouth daily, Disp: , Rfl:     azelastine (ASTELIN) 0.1 % nasal spray, 1-2 sprays into each nostril 2 (two) times a day as needed, Disp: , Rfl:     celecoxib (CeleBREX) 200 mg capsule, Take 1 capsule (200 mg total) by mouth 2 (two) times a day, Disp: 60 capsule, Rfl: 0    cetirizine (ZyrTEC) 10 mg tablet, Take 10 mg by mouth daily after dinner, Disp: , Rfl:     Cholecalciferol 2000 units CAPS, Take 1 capsule by mouth daily after dinner, Disp: , Rfl:     clobetasol (TEMOVATE) 0.05 % cream, Apply 5 g (1 Application total) topically 2 (two) times a day To affected area, Disp: 60 g, Rfl: 0    Cyanocobalamin (B-12) 1000 MCG CAPS, Take 1 tablet by mouth daily after dinner, Disp: , Rfl:     diltiazem (DILACOR XR) 240 MG 24 hr capsule, Take 240 mg by mouth daily, Disp: , Rfl:     famotidine (PEPCID) 40 MG tablet, Take 1 tablet (40 mg total) by mouth 2 (two) times a day, Disp: 60 tablet, Rfl: 3    fexofenadine (ALLEGRA) 180 MG tablet, Take 1 tablet (180 mg total) by mouth daily, Disp: 90 tablet, Rfl: 1    fluticasone (FLONASE) 50 mcg/act nasal spray, 2 sprays into each nostril 2 (two) times a day as needed  , Disp: , Rfl:     fluticasone-salmeterol (Advair HFA) 230-21 MCG/ACT inhaler, Inhale 2 puffs 2 (two) times a day, Disp: , Rfl:     hydrochlorothiazide (HYDRODIURIL) 25 mg tablet, Take 1 tablet by mouth daily in the early morning  , Disp: , Rfl: 3    ketoconazole (NIZORAL) 2 % cream, APPLY 1 APPLICATION  TOPICALLY 2 (TWO) TIMES A DAY TO AFFECTED AREA, Disp: 180 g, Rfl: 1    levalbuterol (XOPENEX HFA) 45 mcg/act inhaler, Inhale 2 puffs every 4 (four) hours as needed for wheezing, Disp: 15 g, Rfl: 2    levalbuterol (XOPENEX) 0.63 mg/3 mL nebulizer solution, Take 0.63 mg by nebulization every 4 (four) hours as needed for wheezing or shortness of breath, Disp: , Rfl:     levalbuterol (XOPENEX) 1.25 mg/3 mL nebulizer solution, TAKE 3 ML (1.25 MG TOTAL) BY NEBULIZATION 3 (THREE) TIMES A DAY., Disp: , Rfl:     linaGLIPtin 5 MG TABS, Take 5 mg by mouth daily with or without food, Disp: 30 tablet, Rfl: 5    losartan (COZAAR) 25 mg tablet, Take 25 mg by mouth daily, Disp: , Rfl:     Magnesium Gluconate 550 MG TABS, Take 30 mg by mouth daily, Disp: , Rfl:     metFORMIN (GLUCOPHAGE) 1000 MG tablet, TAKE 1 TABLET BY MOUTH TWICE A DAY WITH MEALS, Disp: 180 tablet, Rfl: 3    montelukast (SINGULAIR) 10 mg tablet, Take 10 mg by mouth every evening, Disp: , Rfl: 3    oxybutynin (DITROPAN) 5 mg tablet, TAKE 1 TABLET BY MOUTH EVERY DAY AT NIGHT, Disp: , Rfl:     primidone (MYSOLINE) 50 mg tablet, TAKE 5 tablets daily, Disp: 450 tablet, Rfl: 1    Pseudoeph-Doxylamine-DM-APAP (NYQUIL PO), Take by mouth as needed , Disp: , Rfl:     pseudoephedrine (SUDAFED) 30 mg tablet, Take 60 mg by mouth every 4 (four) hours as needed for congestion, Disp: , Rfl:     tamsulosin (FLOMAX) 0.4 mg, Take 0.4 mg by mouth daily with dinner, Disp: , Rfl:     zolpidem (AMBIEN CR) 12.5 MG CR tablet, Take 1 tablet (12.5 mg total) by mouth daily at bedtime as needed for sleep, Disp: 30 tablet, Rfl: 0    ALPRAZolam (XANAX) 0.25 mg tablet, Take 1 tablet (0.25 mg total) by mouth 4 (four) times a day as needed for anxiety, Disp: 120 tablet, Rfl: 0    levETIRAcetam (KEPPRA) 500 mg tablet, TAKE 1 TABLET BY MOUTH TWICE A DAY, Disp: 180 tablet, Rfl: 1    methocarbamol (ROBAXIN) 500 mg tablet, Take 1 tablet (500 mg total) by mouth daily at bedtime, Disp: 30 tablet, Rfl:  "0    oxyCODONE-acetaminophen (Percocet) 7.5-325 MG per tablet, Take 1 tablet by mouth every 6 (six) hours as needed for moderate pain Max Daily Amount: 4 tablets, Disp: 120 tablet, Rfl: 0    Review of Systems  Constitutional:     Denies fever, chills ,fatigue ,weakness ,weight loss, weight gain     ENT: Denies earache ,loss of hearing ,nosebleed, nasal discharge,nasal congestion ,sore throat ,hoarseness  Pulmonary: Denies shortness of breath ,cough  ,dyspnea on exertion, orthopnea  ,PND   Cardiovascular:  Denies bradycardia , tachycardia  ,palpations, lower extremity edema leg, claudication  Breast:  Denies new or changing breast lumps ,nipple discharge ,nipple changes  Abdomen:  Denies abdominal pain , anorexia , indigestion, nausea, vomiting, constipation, diarrhea  Musculoskeletal: Denies myalgias, + lumbar back pain with radiation down right leg  Gu: denies dysuria, polyuria  Skin: Denies skin rash, skin lesion, skin wound, itching, dry skin  Neuro: Denies headache, numbness, tingling, confusion, loss of consciousness, dizziness, vertigo  Psychiatric: Denies feelings of depression, suicidal ideation, anxiety,+ sleep disturbances    OBJECTIVE  /74   Pulse (!) 111   Temp 97.6 °F (36.4 °C)   Ht 5' 10\" (1.778 m)   Wt 120 kg (264 lb 3.2 oz)   SpO2 98%   BMI 37.91 kg/m²   Constitutional:   NAD, well appearing and well nourished      ENT:   Conjunctiva and lids: no injection, edema, or discharge     Pupils and iris: MARY bilaterally    External inspection of ears and nose: normal without deformities or discharge.      Otoscopic exam: Canals patent without erythema.       Nasal mucosa, septum and turbinates: Normal or edema or discharge         Oropharynx:  Moist mucosa, normal tongue and tonsils without lesions. No erythema        Pulmonary:Respiratory effort normal rate and rhythm, no increased work of breathing. Auscultation of lungs:  Clear bilaterally with no adventitious breath sounds     "   Cardiovascular: regular rate and rhythm, S1 and S2, no murmur, no edema and/or varicosities of LE      Abdomen: Soft and non-distended     Positive bowel sounds      No heptomegaly or splenomegaly      Gu: no suprapubic tenderness or CVA tenderness, no urethral discharge  Lymphatic:  No anterior or posterior cervical lymphadenopathy         Musculoskeletal:  Gait and station: Normal gait      Digits and nails normal without clubbing or cyanosis       Inspection/palpation of joints, bones, and muscles:  +tenderness, of lumbar parapspinal musculature and + S<R on right  swelling, full active rom with pain      Skin: Normal skin turgor and no rashes      Neuro:    Normal reflexes     Psych:   alert and oriented to person, place and time     normal mood and affect       Assessment/Plan:Diagnoses and all orders for this visit:    Type 2 diabetes mellitus with other skin complications ([facility])  Comments:  Continue with current medications.  Orders:  -     linaGLIPtin 5 MG TABS; Take 5 mg by mouth daily with or without food    Essential hypertension  Comments:  Stable on calcium channel blocker therapy.  Orders:  -     Comprehensive metabolic panel; Future    Hyperlipidemia, unspecified hyperlipidemia type  Comments:  Continue statin therapy.  Orders:  -     Lipid Panel with Direct LDL reflex; Future    Lumbar radiculopathy  Comments:  Continue Celebrex and as needed Robaxin therapy.  Orders:  -     MRI lumbar spine wo contrast; Future  -     celecoxib (CeleBREX) 200 mg capsule; Take 1 capsule (200 mg total) by mouth 2 (two) times a day  -     famotidine (PEPCID) 40 MG tablet; Take 1 tablet (40 mg total) by mouth 2 (two) times a day    AIVR (accelerated idioventricular rhythm) ([facility])  Comments:  Patient following closely with cardiology.    Insomnia, unspecified type  Comments:  Continue Ambien CR therapy.  Orders:  -     zolpidem (AMBIEN CR) 12.5 MG CR tablet; Take 1 tablet (12.5 mg total) by mouth daily at bedtime as  needed for sleep    Temporal lobe epilepsy (HCC)  Comments:  Patient following with neurology.    Continuous opioid dependence (HCC)  Comments:  Patient continues with as needed pain meds for chronic lumbar back pain and advanced arthritis in the setting of post bariatric sx    Type 2 diabetes mellitus without complication, without long-term current use of insulin (HCC)  -     Hemoglobin A1C; Future    Other orders  -     Magnesium Gluconate 550 MG TABS; Take 30 mg by mouth daily        Reviewed with patient plan to treat with above plan.    Patient instructed to call in 72 hours if not feeling better or if symptoms worsen

## 2024-04-18 ENCOUNTER — TELEPHONE (OUTPATIENT)
Age: 69
End: 2024-04-18

## 2024-04-18 DIAGNOSIS — F41.9 ANXIETY: Primary | ICD-10-CM

## 2024-04-18 RX ORDER — DIAZEPAM 10 MG/1
TABLET ORAL
Qty: 1 TABLET | Refills: 0 | Status: SHIPPED | OUTPATIENT
Start: 2024-04-18

## 2024-04-18 NOTE — TELEPHONE ENCOUNTER
Patient is calling per Dr. Meza to request a one-time medication to complete his MRI due to claustrophobia   He is scheduled for 04/26/2024 @6:00  For an MRI lumbar spine wo contrast     Using [facility]/pharmacy #[id number] - [place] - [address] [facility]. [phone]

## 2024-04-19 DIAGNOSIS — E11.9 TYPE 2 DIABETES MELLITUS WITHOUT COMPLICATION, WITHOUT LONG-TERM CURRENT USE OF INSULIN (HCC): ICD-10-CM

## 2024-04-26 ENCOUNTER — HOSPITAL ENCOUNTER (OUTPATIENT)
Dept: MRI IMAGING | Facility: HOSPITAL | Age: 69
Discharge: HOME | End: 2024-04-26
Attending: FAMILY MEDICINE
Payer: MEDICARE

## 2024-04-26 DIAGNOSIS — M54.16 LUMBAR RADICULOPATHY: ICD-10-CM

## 2024-04-26 PROCEDURE — 72148 MRI LUMBAR SPINE W/O DYE: CPT

## 2024-05-08 ENCOUNTER — OFFICE VISIT (OUTPATIENT)
Dept: NEUROLOGY | Facility: CLINIC | Age: 69
End: 2024-05-08
Payer: MEDICARE

## 2024-05-08 VITALS
DIASTOLIC BLOOD PRESSURE: 58 MMHG | WEIGHT: 259.1 LBS | TEMPERATURE: 98 F | SYSTOLIC BLOOD PRESSURE: 103 MMHG | BODY MASS INDEX: 37.09 KG/M2 | HEIGHT: 70 IN | HEART RATE: 78 BPM

## 2024-05-08 DIAGNOSIS — G89.29 CHRONIC LEFT SHOULDER PAIN: ICD-10-CM

## 2024-05-08 DIAGNOSIS — G40.109 TEMPORAL LOBE EPILEPSY (HCC): Primary | ICD-10-CM

## 2024-05-08 DIAGNOSIS — M25.512 CHRONIC LEFT SHOULDER PAIN: ICD-10-CM

## 2024-05-08 DIAGNOSIS — R41.3 MEMORY IMPAIRMENT: ICD-10-CM

## 2024-05-08 DIAGNOSIS — F41.9 ANXIETY: ICD-10-CM

## 2024-05-08 DIAGNOSIS — B35.4 TINEA CORPORIS: ICD-10-CM

## 2024-05-08 DIAGNOSIS — G25.0 TREMOR, ESSENTIAL: ICD-10-CM

## 2024-05-08 PROCEDURE — 99214 OFFICE O/P EST MOD 30 MIN: CPT | Performed by: NURSE PRACTITIONER

## 2024-05-08 RX ORDER — DIPHENHYDRAMINE HCL 2 %
CREAM (GRAM) TOPICAL
COMMUNITY
Start: 2024-04-29

## 2024-05-08 RX ORDER — PRIMIDONE 50 MG/1
TABLET ORAL
Qty: 450 TABLET | Refills: 0 | Status: SHIPPED | OUTPATIENT
Start: 2024-05-08

## 2024-05-08 NOTE — ASSESSMENT & PLAN NOTE
Patient with temporal lobe epilepsy manifest as two episodes of transient memory problems with confusion. Currently on levetiracetam 500 mg BID without side effects or concerns. Denies further episodes concerning for seizures.     His neurologic exam is nonfocal with exception of fine tremor of right hand. Sleep deprived EEG in 2017 revealed two left anterior and anterior mid-temporal spike wave discharges as a potential source of seizures. MRI brain in 2014 was unremarkable.     Patient will continue levetiracetam 500 mg BID. He will call the office with any episodes concerning for seizure or issues. Follow up in 6 months or sooner if needed.

## 2024-05-08 NOTE — ASSESSMENT & PLAN NOTE
Mild and unchanged from prior visit. Not impacting daily life. No safety issues/concerns. Recommended calling the office with any change or worsening of memory. Consider MOCA if there is worsening of symptoms or concerns.

## 2024-05-08 NOTE — PATIENT INSTRUCTIONS
- Continue current dose of levetiracetam 500 mg BID  - Continue current dose of primidone  - Follow up with cardiology as scheduled - let then know if there are further episodes of lightheadedness/passing out  - Call the office with seizures, issues, worsening memory, worsening tremor, or concerns.  - Follow up with Dr Darling in 6 months

## 2024-05-08 NOTE — PROGRESS NOTES
Review of Systems   Constitutional:  Negative for appetite change, fatigue and fever.   HENT: Negative.  Negative for hearing loss, tinnitus, trouble swallowing and voice change.    Eyes: Negative.  Negative for photophobia, pain and visual disturbance.   Respiratory: Negative.  Negative for shortness of breath.    Cardiovascular: Negative.  Negative for palpitations.   Gastrointestinal: Negative.  Negative for nausea and vomiting.   Endocrine: Negative.  Negative for cold intolerance.   Genitourinary: Negative.  Negative for dysuria, frequency and urgency.   Musculoskeletal:  Negative for back pain, gait problem, myalgias, neck pain and neck stiffness.   Skin: Negative.  Negative for rash.   Allergic/Immunologic: Negative.    Neurological:  Positive for tremors (Both hands.) and light-headedness. Negative for dizziness, seizures, syncope, facial asymmetry, speech difficulty, weakness, numbness and headaches.   Hematological: Negative.  Does not bruise/bleed easily.   Psychiatric/Behavioral: Negative.  Negative for confusion, hallucinations and sleep disturbance.    All other systems reviewed and are negative.

## 2024-05-08 NOTE — PROGRESS NOTES
Patient ID: Horace Jacobo is a 68 y.o. male with emporal lobe epilepsy manifest as 2 episodes of transient memory problems and confusion (2012 and 2014), who is returning to Neurology office for follow up of his seizures and tremor.     Assessment/Plan:    Temporal lobe epilepsy (HCC)  Patient with temporal lobe epilepsy manifest as two episodes of transient memory problems with confusion. Currently on levetiracetam 500 mg BID without side effects or concerns. Denies further episodes concerning for seizures.     His neurologic exam is nonfocal with exception of fine tremor of right hand. Sleep deprived EEG in 2017 revealed two left anterior and anterior mid-temporal spike wave discharges as a potential source of seizures. MRI brain in 2014 was unremarkable.     Patient will continue levetiracetam 500 mg BID. He will call the office with any episodes concerning for seizure or issues. Follow up in 6 months or sooner if needed.     Tremor, essential  Improved with primidone. Continue current dose of 250 mg dialy.     Memory impairment  Mild and unchanged from prior visit. Not impacting daily life. No safety issues/concerns. Recommended calling the office with any change or worsening of memory. Consider MOCA if there is worsening of symptoms or concerns.     He will Return in about 6 months (around 11/8/2024) for Follow up with Dr Darling.      Subjective:  Horace Jacobo is a 68 y.o. male with emporal lobe epilepsy manifest as 2 episodes of transient memory problems and confusion (2012 and 2014), who is returning to Neurology office for follow up of his seizures and tremor. He was last seen in the office by Dr Darling on 11/8/2023. At that time, prior EEG revealed left temporal epileptiform discharges. No changes were made to AED regimen. Essential tremor had worsened and noted a strong family history. Recommended starting primidone (avoided propanolol due to asthma history) and decreasing caffeine intake. There  were also some noted memory difficulties over the prior 2 years since he retired. Declined MOCA at that time. Recommended 6 month follow up.     Patient denies any seizures since his last visit. He is currently on levetiracetam 500 mg BID without side effects.     Tremor improved with primidone. Currently taking primidone 250 mg daily. He is now able to trim his nails which he feels is an improvement compared to his last visit.     Memory is okay. There are some issues with remembering names. Not forgetting dates, times, or appointment. Does not get lost or have any issues with driving.     He did have an episode where he blacked out in November. Got up quick to get out of bed and then bent down to get his dogs leash and got lightheaded and he passed out. He does follow with cardiology for paroxysmal SVT and accelerated idiopathic ventricular rhythm.. When he was last seen on 4/3/23 he reported recurrent palpitations and denied syncope/ presyncope. Reported worsening shortness of breath and progression of dyspnea (unclear if due to congestion, asthma, or SVT). Noted occasional lightheadedness with bending over. Recommended echocardiogram and stress test. Cardiac cath on 4/30. Patient reports everything was fine with testing. Upcoming visit with cardiology later this week. Encouraged he discuss any episodes of lightheadedness/syncope/presyncope with cardiology. If he stands up too quick he does get lightheaded sometimes.     He does have some occasional mechanical falls since his knee surgery due to not being able to catch himself quickly if he gets off balance.     He will be seeing pain management in the near future due to back issues.     Current seizure medications:  - levetiracetam 500 mg BID  - primidone 250 mg daily - tremor  Other medications as per Epic.    Event/Seizure semiology:  Two episodes of transient memory problems and confusion lasting a couple hours occurring on 3/26/2012 and 8/27/2014.    "  Special Features  Status epilepticus: no  Self Injury Seizures: no  Precipitating Factors: none     Epilepsy Risk Factors:  None     Prior AEDs:  None     Prior Evaluation:  Sleep-deprived EEG done October 2017: This Routine, sleep deprived EEG recorded during wakefulness,   drowsiness, and sleep is abnormal. Two left anterior and anterior   mid-temporal spike wave discharges are a potential source of   seizures.      MRI brain w wo contrast September 2014:   FINDINGS-   BRAIN PARENCHYMA-  There is no discrete mass, mass effect or midline   shift. No abnormal white matter signal identified. Brainstem and   cerebellum demonstrate normal signal. There is no intracranial   hemorrhage.  There is no evidence of acute infarction and diffusion   imaging is unremarkable.     VENTRICLES-  Normal.   POSTCONTRAST IMAGING-  Postcontrast imaging of the brain demonstrates   no abnormal enhancement.   SELLA AND PITUITARY GLAND-  Normal.   ORBITS-  Normal.   PARANASAL SINUSES-  Normal.   VASCULATURE-  Evaluation of the major intracranial vasculature   demonstrates appropriate flow voids.   CALVARIUM AND SKULL BASE-  Normal.   EXTRACRANIAL SOFT TISSUES-  Normal.   IMPRESSION-   1.  Unremarkable MRI of the brain.      Psychiatric History:  None     Social History:   Driving: Yes  Lives Alone: No  Occupation: Retired in December from [employer].      I reviewed prior neurology notes, most recent labs, as documented in Epic/Soundsupply, and summarized above.     Objective:    Blood pressure 103/58, pulse 78, temperature 98 °F (36.7 °C), temperature source Temporal, height 5' 10\" (1.778 m), weight 118 kg (259 lb 1.6 oz).    Physical Exam  No apparent distress.   Appears well nourished.   Mood appropriate for situation     Neurologic Exam  Mental status- alert and oriented to person, place, and time. Speech appropriate for conversation. Good attention and knowledge.     Cranial Nerves- PERRL, EOMS normal, facial sensation " and muscles symmetric, hard of hearing (has hearing aids at home), tongue midline, palate rise symmetrical, shoulder shrug symmetrical.    Motor- No pronator drift.  Moves all extremities freely. Mild postural and intention tremor of bilateral hands. No tremor at rest. No head/neck/voice tremor.     Sensory-  Intact distally in all extremities to light touch.     DTRs- not assessed at today's visit.     Gait- antalgic gait.    Coordination- FNF intact.     ROS:  Review of Systems   Constitutional:  Negative for appetite change, fatigue and fever.   HENT: Negative.  Negative for hearing loss, tinnitus, trouble swallowing and voice change.    Eyes: Negative.  Negative for photophobia, pain and visual disturbance.   Respiratory: Negative.  Negative for shortness of breath.    Cardiovascular: Negative.  Negative for palpitations.   Gastrointestinal: Negative.  Negative for nausea and vomiting.   Endocrine: Negative.  Negative for cold intolerance.   Genitourinary: Negative.  Negative for dysuria, frequency and urgency.   Musculoskeletal:  Negative for back pain, gait problem, myalgias, neck pain and neck stiffness.   Skin: Negative.  Negative for rash.   Allergic/Immunologic: Negative.    Neurological:  Positive for tremors (Both hands.) and light-headedness. Negative for dizziness, seizures, syncope, facial asymmetry, speech difficulty, weakness, numbness and headaches.   Hematological: Negative.  Does not bruise/bleed easily.   Psychiatric/Behavioral: Negative.  Negative for confusion, hallucinations and sleep disturbance.    All other systems reviewed and are negative.     ROS obtained by MA and reviewed by myself.     This note may have been created using voice recognition software. There may be unintentional errors such as grammatical errors, spelling errors, or pronoun errors.

## 2024-05-09 RX ORDER — ALPRAZOLAM 0.25 MG/1
0.25 TABLET ORAL 4 TIMES DAILY PRN
Qty: 120 TABLET | Refills: 0 | Status: SHIPPED | OUTPATIENT
Start: 2024-05-09

## 2024-05-09 RX ORDER — KETOCONAZOLE 20 MG/G
1 CREAM TOPICAL 2 TIMES DAILY
Qty: 180 G | Refills: 0 | Status: SHIPPED | OUTPATIENT
Start: 2024-05-09

## 2024-05-09 RX ORDER — OXYCODONE AND ACETAMINOPHEN 7.5; 325 MG/1; MG/1
1 TABLET ORAL EVERY 6 HOURS PRN
Qty: 120 TABLET | Refills: 0 | Status: SHIPPED | OUTPATIENT
Start: 2024-05-09

## 2024-05-09 NOTE — TELEPHONE ENCOUNTER
1 [id number] 04/19/2024 04/05/2024 Zolpidem Tartrate (Tablet, Extended Release) 30.0 30 12.5 MG [facility], .[facility]. [place] 0 / 0 [place]    1 [id number] 04/18/2024 04/18/2024 diazePAM (Tablet) 1.0 1 10 MG [facility], [facility]. [place] 0 / 0 [place]    1 [id number] 04/13/2024 04/10/2024 ALPRAZolam (Tablet) 120.0 30 0.25 MG [facility], [facility]. [place] 0 / 0 [place]    1 [id number] 04/13/2024 04/10/2024 oxyCODONE HYDROCHLORIDE 7.5 MG ORAL TABLET/ACETAMINOPHEN 325 MG (Tablet) 120.0 30 7.5 MG/325.0 MG 45.0 [facility], [facility]. [place] 0 / 0 [place]    1 [id number] 03/21/2024 03/14/2024 Zolpidem Tartrate (Tablet) 30.0 30 10 MG [facility], [facility]. [place] 0 / 0 [place]    1 [id number] 03/17/2024 03/14/2024 ALPRAZolam (Tablet) 120.0 30 0.25 MG [facility], [facility] [place] 0 [place]    1 [id number] 03/17/2024 03/14/2024 oxyCODONE HYDROCHLORIDE 7.5 MG ORAL TABLET/ACETAMINOPHEN 325 MG (Tablet) 120.0 30 7.5 MG/325.0 MG 45.0 [facility], .[facility]. [place] 0 / 0 [place]    1 [id number] 02/23/2024 02/15/2024 Zolpidem Tartrate (Tablet) 30.0 30 10 MG [facility], [facility]. [place] 0 / 0 [place]    1 [id number] 02/19/2024 02/15/2024 ALPRAZolam (Tablet) 120.0 30 0.25 MG [facility], [facility]. [place] 0 / 0 [place]    1 [id number] 02/19/2024 02/15/2024 oxyCODONE HYDROCHLORIDE 7.5 MG ORAL TABLET/ACETAMINOPHEN 325 MG (Tablet) 120.0 30 7.5 MG/325.0 MG 45.0 [facility]EG[facility], [facility] Medicare

## 2024-05-09 NOTE — TELEPHONE ENCOUNTER
Refills must be reviewed and completed by the office or provider. The refill is unable to be approved or denied by the medication management team.      Alprazolam / Oxycodone -     Ketoconazole -

## 2024-05-15 DIAGNOSIS — G47.00 INSOMNIA, UNSPECIFIED TYPE: ICD-10-CM

## 2024-05-16 RX ORDER — ZOLPIDEM TARTRATE 12.5 MG/1
12.5 TABLET, FILM COATED, EXTENDED RELEASE ORAL
Qty: 30 TABLET | Refills: 0 | Status: SHIPPED | OUTPATIENT
Start: 2024-05-16 | End: 2024-05-22 | Stop reason: SDUPTHER

## 2024-05-16 NOTE — TELEPHONE ENCOUNTER
Refill must be reviewed and completed by the office or provider. The refill is unable to be approved or denied by the medication management team.

## 2024-05-16 NOTE — TELEPHONE ENCOUNTER
1 [id number] 05/10/2024 05/09/2024 ALPRAZolam (Tablet) 120.0 30 0.25 MG NA [facility], [facility]. [place] 0 [place]    1 [id number] 05/10/2024 05/09/2024 oxyCODONE HYDROCHLORIDE 7.5 MG ORAL TABLET/ACETAMINOPHEN 325 MG (Tablet) 120.0 30 7.5 MG/325.0 MG 45.0 [facility], [facility]. [place]    1 [id number] 04/19/2024 04/05/2024 Zolpidem Tartrate (Tablet, Extended Release) 30.0 30 12.5 MG [facility], [facility]. [place] 0 / 0 [place]    1 [id number] 04/18/2024 04/18/2024 diazePAM (Tablet) 1.0 1 10 MG [facility], [facility]. [place] 0 / 0 [place]    1 [id number] 04/13/2024 04/10/2024 ALPRAZolam (Tablet) 120.0 30 0.25 MG NA [facility], [facility]. [place] 0 / 0 [place]    1 [id number] 04/13/2024 04/10/2024 oxyCODONE HYDROCHLORIDE 7.5 MG ORAL TABLET/ACETAMINOPHEN 325 MG (Tablet) 120.0 30 7.5 MG/325.0 MG 45.0 [facility] [place]    1 [id number] 03/21/2024 03/14/2024 Zolpidem Tartrate (Tablet) 30.0 30 10 MG [facility], [facility]. [place] 0 / 0 [place]    1 [id number] 03/17/2024 03/14/2024 ALPRAZolam (Tablet) 120.0 30 0.25 MG NA [facility], [facility]. [place] 0 / 0 [place]    1 [id number] 03/17/2024 03/14/2024 oxyCODONE HYDROCHLORIDE 7.5 MG ORAL TABLET/ACETAMINOPHEN 325 MG (Tablet) 120.0 30 7.5 MG/325.0 MG 45.0 [facility], [facility] [place] / 0 [place]    1 [id number] 02/23/2024 02/15/2024 Zolpidem Tartrate (Tablet) 30.0 30 10 MG NA [facility], [facility] Medicare

## 2024-05-22 DIAGNOSIS — G47.00 INSOMNIA, UNSPECIFIED TYPE: ICD-10-CM

## 2024-05-22 RX ORDER — ZOLPIDEM TARTRATE 12.5 MG/1
12.5 TABLET, FILM COATED, EXTENDED RELEASE ORAL
Qty: 30 TABLET | Refills: 0 | Status: SHIPPED | OUTPATIENT
Start: 2024-05-22 | End: 2024-05-29

## 2024-05-22 NOTE — TELEPHONE ENCOUNTER
Horace stated he would like Dr Meza to send a script for     zolpidem (AMBIEN) 10 mg tablet     Medication: zolpidem (AMBIEN CR) 12.5 MG CR tablet     Dose/Frequency: Take 1 tablet (12.5 mg total) by mouth daily at bedtime as needed for sleep     Quantity: 30 tablet     Pharmacy: [facility]/pharmacy #[id number] - [place], [place] - [address]      Office:   [x] PCP/Provider -   [] Speciality/Provider -     Does the patient have enough for 3 days?   [] Yes   [x] No - Send as HP to POD

## 2024-05-22 NOTE — TELEPHONE ENCOUNTER
1 [id number] 05/10/2024 05/09/2024 ALPRAZolam (Tablet) 120.0 30 0.25 MG NA [facility], [facility]. [place] 0 / 0 [place]    1 [id number] 05/10/2024 05/09/2024 oxyCODONE HYDROCHLORIDE 7.5 MG ORAL TABLET/ACETAMINOPHEN 325 MG (Tablet) 120.0 30 7.5 MG/325.0 MG 45.0 [facility], [facility]. [place] 0 / 0 [place]    1 [id number] 04/19/2024 04/05/2024 Zolpidem Tartrate (Tablet, Extended Release) 30.0 30 12.5 MG [facility], [facility]. [place] 0 / 0 [place]    1 [id number] 04/18/2024 04/18/2024 diazePAM (Tablet) 1.0 1 10 MG [facility], [facility]. [place] 0 / 0 [place]    1 [id number] 04/13/2024 04/10/2024 ALPRAZolam (Tablet) 120.0 30 0.25 MG NA [facility], [facility]. [place] 0 / 0 [place]    1 [id number] 04/13/2024 04/10/2024 oxyCODONE HYDROCHLORIDE 7.5 MG ORAL TABLET/ACETAMINOPHEN 325 MG (Tablet) 120.0 30 7.5 MG/325.0 MG 45.0 [facility] [place]    1 [id number] 03/21/2024 03/14/2024 Zolpidem Tartrate (Tablet) 30.0 30 10 MG [facility], [facility]. [place] 0 / 0 [place]    1 [id number] 03/17/2024 03/14/2024 ALPRAZolam (Tablet) 120.0 30 0.25 MG NA [facility], [facility]. [place] 0 / 0 [place]    1 [id number] 03/17/2024 03/14/2024 oxyCODONE HYDROCHLORIDE 7.5 MG ORAL TABLET/ACETAMINOPHEN 325 MG (Tablet) 120.0 30 7.5 MG/325.0 MG 45.0 [facility] [place] 0 / 0 [place]    1 [id number] 02/23/2024 02/15/2024 Zolpidem Tartrate (Tablet) 30.0 30 10 MG NA [facility], [facility] Medic

## 2024-05-23 NOTE — TELEPHONE ENCOUNTER
Per patient's phone call, he called previously to inform the Dr that Zolpidem 12.5 mg is on back order. He requested that Zolpidem 10 mg be sent to the pharmacy, however 12.5 was sent to the pharmacy again. He is requesting that Rx for Zolpidem 10 mg be sent to pharmacy ASAP.

## 2024-05-29 ENCOUNTER — TELEPHONE (OUTPATIENT)
Dept: FAMILY MEDICINE CLINIC | Facility: CLINIC | Age: 69
End: 2024-05-29

## 2024-05-29 DIAGNOSIS — G47.00 INSOMNIA, UNSPECIFIED TYPE: Primary | ICD-10-CM

## 2024-05-29 RX ORDER — ZOLPIDEM TARTRATE 10 MG/1
10 TABLET ORAL
Qty: 30 TABLET | Refills: 0 | Status: SHIPPED | OUTPATIENT
Start: 2024-05-29

## 2024-05-29 NOTE — TELEPHONE ENCOUNTER
See message below from access. Was linked to a refill so went missed. Zolpidem 12.5mg is on backorder, please send 10mg    Per patient's phone call, he called previously to inform the Dr that Zolpidem 12.5 mg is on back order. He requested that Zolpidem 10 mg be sent to the pharmacy, however 12.5 was sent to the pharmacy again. He is requesting that Rx for Zolpidem 10 mg be sent to pharmacy ASAP.

## 2024-06-04 DIAGNOSIS — M25.512 CHRONIC LEFT SHOULDER PAIN: ICD-10-CM

## 2024-06-04 DIAGNOSIS — G89.29 CHRONIC LEFT SHOULDER PAIN: ICD-10-CM

## 2024-06-04 DIAGNOSIS — F41.9 ANXIETY: ICD-10-CM

## 2024-06-05 RX ORDER — ALPRAZOLAM 0.25 MG/1
0.25 TABLET ORAL 4 TIMES DAILY PRN
Qty: 120 TABLET | Refills: 0 | Status: SHIPPED | OUTPATIENT
Start: 2024-06-05

## 2024-06-05 RX ORDER — OXYCODONE AND ACETAMINOPHEN 7.5; 325 MG/1; MG/1
1 TABLET ORAL EVERY 6 HOURS PRN
Qty: 120 TABLET | Refills: 0 | Status: SHIPPED | OUTPATIENT
Start: 2024-06-05

## 2024-06-05 NOTE — TELEPHONE ENCOUNTER
1 [id number] 05/29/2024 05/29/2024 Zolpidem Tartrate (Tablet) 30.0 30 10 MG NA [facility], [facility]. [place] 0 / [place]    1 [id number] 05/10/2024 05/09/2024 ALPRAZolam (Tablet) 120.0 30 0.25 MG NA [facility], [facility] [place] 0 / 0 [place]    1 [id number] 05/10/2024 05/09/2024 oxyCODONE HYDROCHLORIDE 7.5 MG ORAL TABLET/ACETAMINOPHEN 325 MG (Tablet) 120.0 30 7.5 MG/325.0 MG 45.0 [facility], [facility]. [place] 0 / 0 [place]    1 [id number] 04/19/2024 04/05/2024 Zolpidem Tartrate (Tablet, Extended Release) 30.0 30 12.5 MG [facility], [facility].. [place] 0 / 0 [place]    1 [id number] 04/18/2024 04/18/2024 diazePAM (Tablet) 1.0 1 10 MG [facility], [facility].. [place] 0 / 0 [place]    1 [id number] 04/13/2024 04/10/2024 ALPRAZolam (Tablet) 120.0 30 0.25 MG [facility], ..[facility]. [place] / 0 [place]    1 [id number] 04/13/2024 04/10/2024 oxyCODONE HYDROCHLORIDE 7.5 MG ORAL TABLET/ACETAMINOPHEN 325 MG (Tablet) 120.0 30 7.5 MG/325.0 MG 45.0 [facility], ..[facility]. [place] / [place]    1 [id number] 03/21/2024 03/14/2024 Zolpidem Tartrate (Tablet) 30.0 30 10 MG [facility] [place] / [place]    1 [id number] 03/17/2024 03/14/2024 ALPRAZolam (Tablet) 120.0 30 0.25 MG [facility], [facility] [place] 0 / 0 [place]    1 [id number] 03/17/2024 03/14/2024 oxyCODONE HYDROCHLORIDE 7.5 MG ORAL TABLET/ACETAMINOPHEN 325 MG (Tablet) 120.0 30 7.5 MG/325.0 MG 45.0 [facility]USH[facility] VENEGAS [facility] [place] 0 / 0 [place]

## 2024-06-06 ENCOUNTER — TELEPHONE (OUTPATIENT)
Age: 69
End: 2024-06-06

## 2024-06-06 DIAGNOSIS — M54.16 LUMBAR RADICULOPATHY: Primary | ICD-10-CM

## 2024-06-06 NOTE — TELEPHONE ENCOUNTER
Patient called that he received the results back on his MRI back on 5/2, and was told he would be given a referral to pain management. Please look into this and call patient back when the referral is in his chart.

## 2024-06-07 NOTE — TELEPHONE ENCOUNTER
Spoke with patient, patient is requesting to have the dosage for the oxycodone changed to 5-325mg. Please advise.

## 2024-06-12 ENCOUNTER — CONSULT (OUTPATIENT)
Dept: PAIN MEDICINE | Facility: CLINIC | Age: 69
End: 2024-06-12
Payer: MEDICARE

## 2024-06-12 VITALS
DIASTOLIC BLOOD PRESSURE: 88 MMHG | WEIGHT: 259 LBS | BODY MASS INDEX: 37.08 KG/M2 | HEIGHT: 70 IN | SYSTOLIC BLOOD PRESSURE: 130 MMHG

## 2024-06-12 DIAGNOSIS — M47.816 LUMBAR SPONDYLOSIS: Primary | ICD-10-CM

## 2024-06-12 DIAGNOSIS — M54.16 LUMBAR RADICULOPATHY: ICD-10-CM

## 2024-06-12 PROCEDURE — 99204 OFFICE O/P NEW MOD 45 MIN: CPT | Performed by: PAIN MEDICINE

## 2024-06-12 NOTE — PROGRESS NOTES
Assessment  1. Lumbar spondylosis  -     FL spine and pain procedure; Future; Expected date: 06/12/2024  -     Ambulatory Referral to Physical Therapy; Future  2. Lumbar radiculopathy  -     Ambulatory referral to Spine & Pain Management  -     Ambulatory Referral to Physical Therapy; Future        Horace has primarily Axial low back pain described primarily by arthritic features.  Aching, nagging, indolent, stabbing, throbbing features in axial low back without radicular components.  5/5 strength bilaterally in lower extremities  Positive facet loading maneuvers in lumbar spine elicited pain, positive tenderness to palpation over lumbar paraspinal muscles.  Findings correlate with prominent lumbar facet arthropathy seen throughout axial low back on imaging studies.  Currently he is neurologically intact without gait instability, saddle anesthesia or bowel/bladder abnormality. Risks, benefits and alternatives to medial branch blocks and subsequent radiofrequency ablation if successful thoroughly discussed with patient.  Handouts provided questions answered to patient satisfaction.    The patient has been experiencing moderate to severe axial spine pain that is causing functional deficit.  The pain has been present for at least 3 months and is not improving with conservative care.  Currently the patient is not experiencing any radicular features nor neurogenic claudication.  Non-facet pathology has been ruled out on clinical evaluation.    Trials of at least 3 weeks of NSAIDS: no benefit  Anticoagulation: none  Imaging: MRI L spine reviewed  Procedure: bilateral L345 MBB # 1,The risks of the procedure were discussed in detail.  These risks include infection, increased pain, paralysis, bleeding.  Patient understands the risks and is willing to pursue with the procedure    Medications: defer discussed risks/benefits/alternatives regarding any new medications started at todays visit          My impressions and  treatment recommendations were discussed in detail with the patient who verbalized understanding and had no further questions.  Discharge instructions were provided. I personally saw and examined the patient and I agree with the above discussed plan of care.    Plan      No orders of the defined types were placed in this encounter.      Orders Placed This Encounter   Procedures   • FL spine and pain procedure     Standing Status:   Future     Standing Expiration Date:   6/12/2028     Order Specific Question:   Reason for Exam:     Answer:   bilateral L345 MBB #1     Order Specific Question:   Anticoagulant hold needed?     Answer:   none   • Ambulatory Referral to Physical Therapy     Standing Status:   Future     Standing Expiration Date:   6/12/2025     Referral Priority:   Routine     Referral Type:   Physical Therapy     Referral Reason:   Specialty Services Required     Requested Specialty:   Physical Therapy     Number of Visits Requested:   1     Expiration Date:   6/12/2025         History of Present Illness    Horace Jacobo is a 68 y.o. male with relevant PMH of left lumbar surgery at L4-5 8 years ago currently presenting with [facility] and pain for chief complaint of low back pain occasional right leg radicular pain mostly symptoms are with stiffness in the morning aching and throbbing in the lower back symptoms been present for several years include dull aching throbbing back pain quality pain is moderate to severe 60 prior to 90 per 5% of the time the present symptoms are present.  Pain is worse with activities like bending flexion extension.  Alleviated by lying flat he takes Percocet 7.5 mg 4 times daily as needed for his multiple pain complaints.  He is managed with a active opioid agreement.  On a scale of 1-10 his pain currently is a 7 out of 10 has not done recent therapy for his back pain he had epidurals in the past without relief.  He denies bowel bladder incontinence , he gets hives  with iodine contrast.      I have personally reviewed and/or updated the patient's past medical history, past surgical history, family history, social history, current medications, allergies, and vital signs today.     Review of Systems   Musculoskeletal:  Positive for back pain and gait problem.       Patient Active Problem List   Diagnosis   • Temporal lobe epilepsy (HCC)   • Moderate persistent asthma without complication   • Allergic rhinitis   • Amnesia, global, transient   • Anemia   • Anxiety   • Bilateral chronic knee pain   • BPH (benign prostatic hyperplasia)   • Disc degeneration, lumbar   • Other complications of other bariatric procedure   • Hyperlipidemia   • Essential hypertension   • Insomnia   • Lumbar radiculopathy   • Absolute anemia   • Vitamin B12 deficiency   • Vitamin D deficiency   • Trigger finger   • Impingement syndrome of right shoulder   • Primary osteoarthritis of right shoulder   • Type 2 diabetes mellitus with other skin complications ([facility])   • Tremor, essential   • Gastroesophageal reflux disease with esophagitis without hemorrhage   • Continuous opioid dependence ([facility])   • Internal derangement of left shoulder   • Tear of left supraspinatus tendon   • Aftercare following surgery of the musculoskeletal system   • AIVR (accelerated idioventricular rhythm) ([facility])   • Memory impairment       Past Medical History:   Diagnosis Date   • Allergic    • Allergic rhinitis    • Anxiety    • Asthma    • Basal cell carcinoma 04/07/2022    nose   • Diabetes mellitus ([facility])    • Disease of thyroid gland    • EIC (epidermal inclusion cyst)    • GERD (gastroesophageal reflux disease)    • Hayfever    • History of skin cancer    • HL (hearing loss)     wears hearing aides on occasion   • Hyperlipidemia    • Hypertension    • Memory loss, short term     R/O Seizures but placed on Keppra   • PONV (postoperative nausea and vomiting)     x1 occurence   • Sleep apnea     s/p gastric bypass   • Tinnitus         Past Surgical History:   Procedure Laterality Date   • ABDOMINOPLASTY     • ADENOIDECTOMY     • BACK SURGERY      lower back surgery   • CARPAL TUNNEL RELEASE Bilateral    • CHOLECYSTECTOMY     • COLONOSCOPY     • GASTRIC BYPASS     • HERNIA REPAIR Right    • JOINT REPLACEMENT Bilateral     Knee   • KNEE ARTHROSCOPY Bilateral     X3   • MOHS SURGERY  06/15/2022    Donald Patel   • CA RPR 1ST INGUN HRNA AGE 4 YRS/> REDUCIBLE Left 11/26/2018    Procedure: INGUINAL HERNIA REPAIR;  Surgeon: Rubens Casas DO;  Location: [facility];  Service: General   • CA SURGICAL ARTHROSCOPY SHOULDER W/ROTATOR CUFF RPR Left 5/10/2023    Procedure: SHOULDER ARTHROSCOPIC ROTATOR CUFF REPAIR, SUBACROMIAL DECOMPRESSION, BICEP TENODESIS;  Surgeon: Mario Jackson MD;  Location: [facility];  Service: Orthopedics   • CA TENDON SHEATH INCISION Right 04/29/2022    Procedure: RELEASE TRIGGER FINGER RING WITH LONG;  Surgeon: Hay Doty MD;  Location: [facility];  Service: Orthopedics   • TONSILLECTOMY     • TRIGGER FINGER RELEASE Right 04/29/2022       Family History   Problem Relation Age of Onset   • Heart disease Mother    • Kidney cancer Mother    • Cancer Mother    • Hypertension Father    • Bipolar disorder Sister         bipolar disorder NOS   • Diabetes Maternal Grandmother        Social History     Occupational History     Comment: employed   Tobacco Use   • Smoking status: Never     Passive exposure: Past   • Smokeless tobacco: Never   Vaping Use   • Vaping status: Never Used   Substance and Sexual Activity   • Alcohol use: Yes     Comment: 1 or 2 month   • Drug use: No   • Sexual activity: Not Currently     Partners: Female     Birth control/protection: Male Sterilization       Current Outpatient Medications on File Prior to Visit   Medication Sig   • ALPRAZolam (XANAX) 0.25 mg tablet Take 1 tablet (0.25 mg total) by mouth 4 (four) times a day as needed for anxiety   • atorvastatin (LIPITOR) 40 mg  tablet Take 40 mg by mouth daily   • celecoxib (CeleBREX) 200 mg capsule Take 1 capsule (200 mg total) by mouth 2 (two) times a day   • Cholecalciferol 2000 units CAPS Take 1 capsule by mouth daily after dinner   • clobetasol (TEMOVATE) 0.05 % cream Apply 5 g (1 Application total) topically 2 (two) times a day To affected area   • Cyanocobalamin (B-12) 1000 MCG CAPS Take 1 tablet by mouth daily after dinner   • diltiazem (DILACOR XR) 240 MG 24 hr capsule Take 240 mg by mouth daily   • famotidine (PEPCID) 40 MG tablet TAKE 1 TABLET BY MOUTH TWICE A DAY   • fexofenadine (ALLEGRA) 180 MG tablet Take 1 tablet (180 mg total) by mouth daily   • fluticasone (FLONASE) 50 mcg/act nasal spray 2 sprays into each nostril 2 (two) times a day as needed     • fluticasone-salmeterol (Advair HFA) 230-21 MCG/ACT inhaler Inhale 2 puffs 2 (two) times a day   • hydrochlorothiazide (HYDRODIURIL) 25 mg tablet Take 1 tablet by mouth daily in the early morning     • ketoconazole (NIZORAL) 2 % cream Apply 1 Application topically 2 (two) times a day To affected area   • levalbuterol (XOPENEX HFA) 45 mcg/act inhaler Inhale 2 puffs every 4 (four) hours as needed for wheezing   • levalbuterol (XOPENEX) 0.63 mg/3 mL nebulizer solution Take 0.63 mg by nebulization every 4 (four) hours as needed for wheezing or shortness of breath   • levETIRAcetam (KEPPRA) 500 mg tablet TAKE 1 TABLET BY MOUTH TWICE A DAY   • linaGLIPtin 5 MG TABS Take 5 mg by mouth daily with or without food   • losartan (COZAAR) 25 mg tablet Take 25 mg by mouth daily   • Magnesium Gluconate 550 MG TABS Take 30 mg by mouth daily   • metFORMIN (GLUCOPHAGE) 1000 MG tablet TAKE 1 TABLET BY MOUTH TWICE A DAY WITH MEALS   • methocarbamol (ROBAXIN) 500 mg tablet Take 1 tablet (500 mg total) by mouth daily at bedtime   • montelukast (SINGULAIR) 10 mg tablet Take 10 mg by mouth every evening   • oxybutynin (DITROPAN) 5 mg tablet TAKE 1 TABLET BY MOUTH EVERY DAY AT NIGHT   •  "oxyCODONE-acetaminophen (Percocet) 7.5-325 MG per tablet Take 1 tablet by mouth every 6 (six) hours as needed for moderate pain Max Daily Amount: 4 tablets   • primidone (MYSOLINE) 50 mg tablet TAKE 5 tablets daily   • Pseudoeph-Doxylamine-DM-APAP (NYQUIL PO) Take by mouth as needed    • pseudoephedrine (SUDAFED) 30 mg tablet Take 60 mg by mouth every 4 (four) hours as needed for congestion   • tamsulosin (FLOMAX) 0.4 mg Take 0.4 mg by mouth daily with dinner   • zolpidem (AMBIEN) 10 mg tablet Take 1 tablet (10 mg total) by mouth daily at bedtime as needed for sleep   • amoxicillin (AMOXIL) 500 mg capsule Take 500 mg by mouth 4 tablets 1 hour prior to procedure (Patient not taking: Reported on 6/12/2024)   • azelastine (ASTELIN) 0.1 % nasal spray 1-2 sprays into each nostril 2 (two) times a day as needed (Patient not taking: Reported on 6/12/2024)   • Benadryl Allergy 25 MG capsule Take 2 capsules (50mg total) 1 hour prior to procedure.  Do not drive after taking this medication. (Patient not taking: Reported on 6/12/2024)   • levalbuterol (XOPENEX) 1.25 mg/3 mL nebulizer solution TAKE 3 ML (1.25 MG TOTAL) BY NEBULIZATION 3 (THREE) TIMES A DAY. (Patient not taking: Reported on 5/8/2024)     No current facility-administered medications on file prior to visit.       Allergies   Allergen Reactions   • Iv Dye [Iodinated Contrast Media] Hives   • Penicillins Other (See Comments)     ? Had as a child-Unknown reaction         Physical Exam    /88   Ht 5' 10\" (1.778 m)   Wt 117 kg (259 lb)   BMI 37.16 kg/m²     Constitutional: normal, well developed, well nourished, alert, in no distress and non-toxic and no overt pain behavior.  Eyes: anicteric  HEENT: grossly intact  Neck: supple, symmetric, trachea midline and no masses   Pulmonary:even and unlabored  Cardiovascular:No edema or pitting edema present  Skin:Normal without rashes or lesions and well hydrated  Psychiatric:Mood and affect " appropriate  Neurologic:Cranial Nerves II-XII grossly intact Sensation grossly intact; no clonus negative mitchell's.      MSK:      Lumbar Spine:  No masses or atrophy,    Range of motion - Decreased extension/flexion  Palpation -  Tenderness to palpation in the lumbar parapsinals   PSIS tenderness no  Rashid's/BEENA no  Gaenslen's no  SLR positive left       Strength Right Left   Hip flexion L1,2 5 5   Knee extension L3 5 5   Ankle dorsiflexion L4 5 5   Great toe extension L5 5 5   Ankle Plantarflexion S1 5 5       Sensory Exam:  intact to light touch bilateral LE       Reflexes:     Right Left   Biceps 2+ 2+   Triceps 2+ 2+   Brachioradialis 2+ 2+   Patellar 1+ 1+   Achilles 1+ 1+   Babinski neg neg        Gait normal                  Imaging    MRI L spine  4/26/24  MRI LUMBAR SPINE WITHOUT CONTRAST     INDICATION: M54.16: Radiculopathy, lumbar region.     COMPARISON: 3/9/2016     TECHNIQUE:  Multiplanar, multisequence imaging of the lumbar spine was performed. .        IMAGE QUALITY:  Diagnostic     FINDINGS:     VERTEBRAL BODIES:  There are 5 lumbar type vertebral bodies. There is trace anterolisthesis of L5-S1. There is no suspicious marrow signal abnormality identified.     SACRUM:  Normal signal within the sacrum. No evidence of insufficiency or stress fracture.     DISTAL CORD AND CONUS:  Normal size and signal within the distal cord and conus.     PARASPINAL SOFT TISSUES:  Paraspinal soft tissues are unremarkable.     LOWER THORACIC DISC SPACES:  Normal disc height and signal.  No disc herniation, canal stenosis or foraminal narrowing.     LUMBAR DISC SPACES:     L1-L2: There is a bulging annulus. There is facet arthrosis. There is mild mass effect on the thecal sac. There is no significant foraminal narrowing.     L2-L3: There is facet arthrosis. There is no significant canal stenosis or foraminal narrowing.     L3-L4: There is facet arthrosis. There is no significant canal stenosis. There is mild left  greater than right foraminal narrowing.     L4-L5: Patient is status post left-sided hemilaminectomy at this level. There is a mild bulge. There is a probable small amount of scar tissue within the left lateral recess. There is mild mass effect on the thecal sac. There is mild foraminal narrowing.     L5-S1: There is a bulging annulus. There is facet arthrosis. There is mild right and mild to moderate left foraminal narrowing. There is no significant canal stenosis.

## 2024-06-14 DIAGNOSIS — J30.1 ALLERGIC RHINITIS DUE TO POLLEN, UNSPECIFIED SEASONALITY: ICD-10-CM

## 2024-06-14 RX ORDER — FEXOFENADINE HCL 180 MG/1
180 TABLET ORAL DAILY
Qty: 90 TABLET | Refills: 1 | Status: SHIPPED | OUTPATIENT
Start: 2024-06-14

## 2024-07-01 ENCOUNTER — HOSPITAL ENCOUNTER (OUTPATIENT)
Dept: RADIOLOGY | Facility: HOSPITAL | Age: 69
Discharge: HOME | End: 2024-07-01
Attending: PAIN MEDICINE
Payer: MEDICARE

## 2024-07-01 VITALS
SYSTOLIC BLOOD PRESSURE: 155 MMHG | DIASTOLIC BLOOD PRESSURE: 90 MMHG | OXYGEN SATURATION: 95 % | HEART RATE: 94 BPM | RESPIRATION RATE: 18 BRPM

## 2024-07-01 DIAGNOSIS — G47.00 INSOMNIA, UNSPECIFIED TYPE: ICD-10-CM

## 2024-07-01 DIAGNOSIS — M25.512 CHRONIC LEFT SHOULDER PAIN: ICD-10-CM

## 2024-07-01 DIAGNOSIS — M47.816 LUMBAR SPONDYLOSIS: ICD-10-CM

## 2024-07-01 DIAGNOSIS — G89.29 CHRONIC LEFT SHOULDER PAIN: ICD-10-CM

## 2024-07-01 DIAGNOSIS — F41.9 ANXIETY: ICD-10-CM

## 2024-07-01 RX ORDER — BUPIVACAINE HCL/PF 2.5 MG/ML
3 VIAL (ML) INJECTION ONCE
Status: COMPLETED | OUTPATIENT
Start: 2024-07-01 | End: 2024-07-01

## 2024-07-01 RX ORDER — LIDOCAINE HYDROCHLORIDE 10 MG/ML
5 INJECTION, SOLUTION EPIDURAL; INFILTRATION; INTRACAUDAL; PERINEURAL ONCE
Status: DISCONTINUED | OUTPATIENT
Start: 2024-07-01 | End: 2024-07-02 | Stop reason: HOSPADM

## 2024-07-01 RX ADMIN — BUPIVACAINE HYDROCHLORIDE 6 ML: 2.5 INJECTION, SOLUTION EPIDURAL; INFILTRATION; INTRACAUDAL at 14:51

## 2024-07-01 NOTE — DISCHARGE INSTR - LAB

## 2024-07-02 RX ORDER — ALPRAZOLAM 0.25 MG/1
0.25 TABLET ORAL 4 TIMES DAILY PRN
Qty: 120 TABLET | Refills: 0 | Status: SHIPPED | OUTPATIENT
Start: 2024-07-02

## 2024-07-02 RX ORDER — ZOLPIDEM TARTRATE 10 MG/1
10 TABLET ORAL
Qty: 30 TABLET | Refills: 0 | Status: SHIPPED | OUTPATIENT
Start: 2024-07-02

## 2024-07-02 RX ORDER — OXYCODONE AND ACETAMINOPHEN 7.5; 325 MG/1; MG/1
1 TABLET ORAL EVERY 6 HOURS PRN
Qty: 120 TABLET | Refills: 0 | Status: SHIPPED | OUTPATIENT
Start: 2024-07-02

## 2024-07-02 NOTE — TELEPHONE ENCOUNTER
1 [id number] 06/06/2024 06/05/2024 ALPRAZolam (Tablet) 120.0 30 0.25 MG NA [facility] Medicare 0 / 0 [place]    1 [id number] 06/06/2024 06/05/2024 oxyCODONE HYDROCHLORIDE 7.5 MG ORAL TABLET/ACETAMINOPHEN 325 MG (Tablet) 120.0 30 7.5 MG/325.0 MG 45.0 [facility] Medicare 0 / 0 [place]    1 [id number] 05/29/2024 05/29/2024 Zolpidem Tartrate (Tablet) 30.0 30 10 MG NA [facility] Medicare 0 / 0 [place]    1 [id number] 05/10/2024 05/09/2024 ALPRAZolam (Tablet) 120.0 30 0.25 MG NA [facility] Medicare 0 / 0 [place]    1 [id number] 05/10/2024 05/09/2024 oxyCODONE HYDROCHLORIDE 7.5 MG ORAL TABLET/ACETAMINOPHEN 325 MG (Tablet) 120.0 30 7.5 MG/325.0 MG 45.0 [facility] Medicare 0 / 0 [place]    1 [id number] 04/19/2024 04/05/2024 Zolpidem Tartrate (Tablet, Extended Release) 30.0 30 12.5 MG [facility] Private Pay 0 / 0 [place]    1 [id number] 04/18/2024 04/18/2024 diazePAM (Tablet) 1.0 1 10 MG NA [facility] Private Pay 0 / 0 [place]    1 [id number] 04/13/2024 04/10/2024 ALPRAZolam (Tablet) 120.0 30 0.25 MG NA [facility] Medicare 0 / 0 [place]    1 [id number] 04/13/2024 04/10/2024 oxyCODONE HYDROCHLORIDE 7.5 MG ORAL TABLET/ACETAMINOPHEN 325 MG (Tablet) 120.0 30 7.5 MG/325.0 MG 45.0 [facility] Medicare 0 / 0 [place]    1 [id number] 03/21/2024 03/14/2024 Zolpidem Tartrate (Tablet) 30.0 30 10 MG NA [facility] Medicare 0 / 0 [place]

## 2024-07-02 NOTE — TELEPHONE ENCOUNTER
1 [id number] 06/06/2024 06/05/2024 ALPRAZolam (Tablet) 120.0 30 0.25 MG NA [facility] Medicare 0 / 0 PA    1 [id number] 06/06/2024 06/05/2024 oxyCODONE HYDROCHLORIDE 7.5 MG ORAL TABLET/ACETAMINOPHEN 325 MG (Tablet) 120.0 30 7.5 MG/325.0 MG 45.0 [facility] [facility] Medicare 0 / 0 PA    1 [id number] 05/29/2024 05/29/2024 Zolpidem Tartrate (Tablet) 30.0 30 10 MG NA [facility] Medicare 0 / 0 [place]    1 [id number] 05/10/2024 05/09/2024 ALPRAZolam (Tablet) 120.0 30 0.25 MG NA [facility] Medicare 0 / 0 [place]    1 [id number] 05/10/2024 05/09/2024 oxyCODONE HYDROCHLORIDE 7.5 MG ORAL TABLET/ACETAMINOPHEN 325 MG (Tablet) 120.0 30 7.5 MG/325.0 MG 45.0 [facility] Medicare 0 / 0 PA    1 [id number] 04/19/2024 04/05/2024 Zolpidem Tartrate (Tablet, Extended Release) 30.0 30 12.5 MG [facility] Private Pay 0 / 0 [place]    1 [id number] 04/18/2024 04/18/2024 diazePAM (Tablet) 1.0 1 10 MG NA [facility] Private Pay 0 / 0 PA    1 [id number] 04/13/2024 04/10/2024 ALPRAZolam (Tablet) 120.0 30 0.25 MG NA [facility] Medicare 0 / 0 PA    1 [id number] 04/13/2024 04/10/2024 oxyCODONE HYDROCHLORIDE 7.5 MG ORAL TABLET/ACETAMINOPHEN 325 MG (Tablet) 120.0 30 7.5 MG/325.0 MG 45.0 [facility] Medicare 0 / 0 [place]    1 [id number] 03/21/2024 03/14/2024 Zolpidem Tartrate (Tablet) 30.0 30 10 MG NA [facility] Medicare 0 / 0 [place]

## 2024-07-08 ENCOUNTER — EVALUATION (OUTPATIENT)
Dept: PHYSICAL THERAPY | Facility: CLINIC | Age: 69
End: 2024-07-08
Payer: MEDICARE

## 2024-07-08 DIAGNOSIS — M54.16 LUMBAR RADICULOPATHY: Primary | ICD-10-CM

## 2024-07-08 DIAGNOSIS — M47.816 LUMBAR SPONDYLOSIS: ICD-10-CM

## 2024-07-08 DIAGNOSIS — R53.1 WEAKNESS: ICD-10-CM

## 2024-07-08 PROCEDURE — 97110 THERAPEUTIC EXERCISES: CPT

## 2024-07-08 PROCEDURE — 97161 PT EVAL LOW COMPLEX 20 MIN: CPT

## 2024-07-08 NOTE — PROGRESS NOTES
PT Evaluation     Today's date: 2024  Patient name: Horace Jacobo  : 1955  MRN: [id number]  Referring provider: Robles Kaur  Dx:   Encounter Diagnosis     ICD-10-CM    1. Lumbar radiculopathy  M54.16 Ambulatory Referral to Physical Therapy      2. Lumbar spondylosis  M47.816 Ambulatory Referral to Physical Therapy      3. Weakness  R53.1           Start Time: 1110  Stop Time: 1200  Total time in clinic (min): 50 minutes    Assessment  Impairments: abnormal or restricted ROM, activity intolerance, impaired balance, impaired physical strength, lacks appropriate home exercise program and pain with function  Symptom irritability: low    Assessment details: Horace Jacobo is a 68 y.o. male who presents with pain, decreased strength, decreased ROM, decreased joint mobility, and balance dysfunction. Due to these impairments, Patient has difficulty performing a/iadls, recreational activities, and engaging in social activities. Patient has signs and symptoms consistent with their referring diagnosis of lumbar radiculopathy, lumbar spondylosis, and weakness. Patient would benefit from skilled physical therapy to address the impairments, improve their level of function and to improve their overall quality of life.    Understanding of Dx/Px/POC: good     Prognosis: good    Goals  Short Term Goals: to be achieved by 4 weeks  1) Patient to be independent with basic HEP  2) Decrease pain to 3/10 at its worst  3) Increase lumbar spine AROM by 50% in all deficient planes   4) Increase LE strength by 1/2 MMT grade in all deficient planes    Long Term Goals: to be achieved by discharge  1) FOTO equal to or greater than projected goal.  2) Patient to be independent with comprehensive HEP  3) Lumbar spine ROM WNL all planes to improve a/iadls  4) Increase LE strength to 5/5 MMT grade in all planes to improve a/iadls  5) Increase tolerance for ambulatory activities to >30 min.     Plan  Patient would benefit  from: PT eval and skilled physical therapy  Planned modality interventions: low level laser therapy    Planned therapy interventions: manual therapy, neuromuscular re-education, therapeutic activities, therapeutic exercise and home exercise program    Frequency: 2x month  Duration in weeks: 5  Treatment plan discussed with: patient        Subjective Evaluation    History of Present Illness  Date of onset: 2024  Mechanism of injury: History of Current Injury: Pt mentioned that in 2024, noted decreased strength in the L LE which the knee ortho indicated could be the problem. Notes greater pain on the R side compared to the L side. Hx of laminectomy approximately 9 years ago. Notes greater tension in the back with knee extension and hip flexion of the R LE. Notes greater discomfort with home care and bending over to play with grand kids. Noted to have tried to pain cabin a couple weeks ago which resulted in greater stiffness. Pt mentioned to have received a nerve block with very slight improvement. Saw pain and spine management on , who referred pt to therapy.   Pain location/Descriptors: Nature: general aching, stabbing; location: lower back with radiating into R glute region. Denies waking due to back pain.   Aggravating factors: bending, home care, carrying weight  Easing factors: nerve block, stretching, NSAIDs  Imagin/26 MRI spine: VERTEBRAL BODIES:  There are 5 lumbar type vertebral bodies. There is trace anterolisthesis of L5-S1. There is no suspicious marrow signal abnormality identified.  LUMBAR DISC SPACES:  L1-L2: There is a bulging annulus. There is facet arthrosis. There is mild mass effect on the thecal sac. There is no significant foraminal narrowing.  L2-L3: There is facet arthrosis. There is no significant canal stenosis or foraminal narrowing.  L3-L4: There is facet arthrosis. There is no significant canal stenosis. There is mild left greater than right foraminal  narrowing.  L4-L5: Patient is status post left-sided hemilaminectomy at this level. There is a mild bulge. There is a probable small amount of scar tissue within the left lateral recess. There is mild mass effect on the thecal sac. There is mild foraminal narrowing.  L5-S1: There is a bulging annulus. There is facet arthrosis. There is mild right and mild to moderate left foraminal narrowing. There is no significant canal stenosis.  OTHER FINDINGS: There are left-sided renal cysts.  Patient goals: improve mobility  Special interest or hobbies: bending to play with grand kids, take care of cameras in the woods, [employer]  Occupation: retired      Quality of life: good    Patient Goals  Patient goals for therapy: decreased pain, improved balance, increased motion, increased strength, independence with ADLs/IADLs and return to sport/leisure activities    Pain  Current pain ratin  At worst pain ratin  Quality: dull ache and sharp  Relieving factors: medications and rest  Aggravating factors: lifting and standing (bending)    Social Support  Steps to enter house: yes  Stairs in house: yes   Lives in: multiple-level home  Lives with: spouse    Employment status: not working    Diagnostic Tests  MRI studies: abnormal  Treatments  Previous treatment: chiropractic (nerve block)        Objective     Postural Observations  Seated posture: fair  Standing posture: fair      Palpation   Left   No palpable tenderness to the gluteus heydi, gluteus medius, piriformis and quadratus lumborum.   Tenderness of the erector spinae and lumbar paraspinals.     Right   No palpable tenderness to the gluteus heydi, gluteus medius, piriformis and quadratus lumborum.   Tenderness of the erector spinae and lumbar paraspinals.     Active Range of Motion     Lumbar   Flexion: Active lumbar flexion: 11cm.  Restriction level: minimal  Extension: 5 degrees  with pain Restriction level: maximal  Left lateral flexion: Active left lumbar  "lateral flexion: 53 cm.    Restriction level: moderate  Right lateral flexion: Active right lumbar lateral flexion: 58 cm.  Restriction level: moderate  Left rotation:  Restriction level: moderate  Right rotation:  Restriction level: moderate  Left Hip   Flexion: 95 degrees   Extension: -5 degrees     Right Hip   Flexion: 100 degrees   Extension: 5 degrees     Joint Play     Hypomobile: T12, L1, L2, L3, L4, L5 and S1   Mechanical Assessment    Cervical      Thoracic      Lumbar    Standing flexion: repeated movements   Pain location:no change  Pain intensity: worse  Pain level: increased  Standing extension: repeated movements  Pain location: no change  Pain intensity: worse  Pain level: increased    Strength/Myotome Testing     Left Hip   Planes of Motion   Flexion: 4+  Extension: 3+  Abduction: 4    Right Hip   Planes of Motion   Flexion: 4-  Extension: 4-  Abduction: 4    Tests     Lumbar     Left   Negative passive SLR and slump test.     Right   Positive passive SLR.   Negative slump test.     Additional Tests Details  Passive SLR: L: 60 degrees, R: 65 degrees    Functional Assessment      Squat    within functional limits  Left within functional limits and right within functional limits.     Comments  30 sec STS: w/o UE 7x    Tandem balance EO even surface: R in front: 32 sec. L in front:33 sec. *M/L sway gilbert L>R             Precautions: Epilepsy, asthma, Amnesia, anemia, anxiety, BPH, HTN, hyperlipidemia, Vit B12 def., Vit D def, T2DM, tremor, GERD, AIVR, Memory Impairment, Continuous Opioid Dependence      Visit 1 2 3 4 5 6 7 8 9 10   Date 7/8            Manuals             Manual             Joint Mobs             STM             Meaasurements             Neuro Re-Ed             clams             PPT             Dead bugs             Hip 4-ways                                                    Ther Ex             Hamstring stretch HEP  3x30\"            Sciatic nerve glides HEP  2x10            NuStep       "       Cat and cow                                                                 Ther Activity                                       Gait Training                                       Self Care             Education POC, HEP, and objective findings

## 2024-07-10 ENCOUNTER — TELEPHONE (OUTPATIENT)
Dept: OBGYN CLINIC | Facility: CLINIC | Age: 69
End: 2024-07-10

## 2024-07-10 NOTE — TELEPHONE ENCOUNTER
Spoke to patient and appointment made with  Dr. Kaur for 7/16/24 at 11:30a in the [place] Office.  Patient called back and has a scheduling conflict that day and requested appointment be changed to the next Tuesday, 7/23/24 iat 11:30 am n [place].

## 2024-07-16 ENCOUNTER — OFFICE VISIT (OUTPATIENT)
Dept: PHYSICAL THERAPY | Facility: CLINIC | Age: 69
End: 2024-07-16
Payer: MEDICARE

## 2024-07-16 DIAGNOSIS — R53.1 WEAKNESS: ICD-10-CM

## 2024-07-16 DIAGNOSIS — M54.16 LUMBAR RADICULOPATHY: Primary | ICD-10-CM

## 2024-07-16 DIAGNOSIS — M47.816 LUMBAR SPONDYLOSIS: ICD-10-CM

## 2024-07-16 PROCEDURE — 97140 MANUAL THERAPY 1/> REGIONS: CPT

## 2024-07-16 PROCEDURE — 97112 NEUROMUSCULAR REEDUCATION: CPT

## 2024-07-16 NOTE — PROGRESS NOTES
"Daily Note     Today's date: 2024  Patient name: Horace Jacobo  : 1955  MRN: [id number]  Referring provider: Robles Kaur*  Dx:   Encounter Diagnosis     ICD-10-CM    1. Lumbar radiculopathy  M54.16       2. Lumbar spondylosis  M47.816       3. Weakness  R53.1           Start Time: 1500  Stop Time: 1540  Total time in clinic (min): 40 minutes    Subjective: The patient presents for the first follow-up appointment, reports that symptoms stayed the same and that he was compliant most of the time with the initial HEP.         Objective: See treatment diary below      Assessment: The patient tolerated manual and active treatment well today. The PT reviewed IHEP and introduced initial manual and ther-ex program during today's treatment. Educated pt regarding DOMs and the importance of monitoring symptoms in order to allow PT to progress POC in a safe manor. Min VC with clam in order to maintain hip stacking to prevent recruitment of lumbar musculature. The patient demonstrated good response to today's treatment.        Plan: Continue per plan of care.      Precautions: Epilepsy, asthma, Amnesia, anemia, anxiety, BPH, HTN, hyperlipidemia, Vit B12 def., Vit D def, T2DM, tremor, GERD, AIVR, Memory Impairment, Continuous Opioid Dependence      Visit 1 2 3 4 5 6 7 8 9 10   Date            Manuals             Manual  AML           Joint Mobs  AML           STM  AML           Meaasurements             Neuro Re-Ed             clams  RTB  2x10           PPT  10x10\" Hlying           Dead bugs  8x           Hip 4-ways             bridging  2x10                                     Ther Ex             Hamstring stretch HEP  3x30\"            Sciatic nerve glides HEP  2x10 2x10           NuStep  8' lvl 3           Cat and cow             PPU  W/op brething out 10x                                                  Ther Activity                                       Gait Training                        "                Self Care             Education POC, HEP, and objective findings DOMs

## 2024-07-18 ENCOUNTER — TELEPHONE (OUTPATIENT)
Dept: FAMILY MEDICINE CLINIC | Facility: CLINIC | Age: 69
End: 2024-07-18

## 2024-07-18 ENCOUNTER — OFFICE VISIT (OUTPATIENT)
Dept: PHYSICAL THERAPY | Facility: CLINIC | Age: 69
End: 2024-07-18
Payer: MEDICARE

## 2024-07-18 DIAGNOSIS — M54.16 LUMBAR RADICULOPATHY: Primary | ICD-10-CM

## 2024-07-18 DIAGNOSIS — R74.8 ABNORMAL LIVER ENZYMES: Primary | ICD-10-CM

## 2024-07-18 DIAGNOSIS — R53.1 WEAKNESS: ICD-10-CM

## 2024-07-18 DIAGNOSIS — M47.816 LUMBAR SPONDYLOSIS: ICD-10-CM

## 2024-07-18 PROCEDURE — 97112 NEUROMUSCULAR REEDUCATION: CPT

## 2024-07-18 PROCEDURE — 97140 MANUAL THERAPY 1/> REGIONS: CPT

## 2024-07-18 PROCEDURE — 97110 THERAPEUTIC EXERCISES: CPT

## 2024-07-18 NOTE — TELEPHONE ENCOUNTER
Pt called in to see if you could add a liver enzyme lab order to his chart so he can have those completed before his AWV 8/21   Clindamycin Counseling: I counseled the patient regarding use of clindamycin as an antibiotic for prophylactic and/or therapeutic purposes. Clindamycin is active against numerous classes of bacteria, including skin bacteria. Side effects may include nausea, diarrhea, gastrointestinal upset, rash, hives, yeast infections, and in rare cases, colitis.

## 2024-07-18 NOTE — PROGRESS NOTES
"Daily Note     Today's date: 2024  Patient name: Horace Jacobo  : 1955  MRN: [id number]  Referring provider: Robles Kaur*  Dx:   Encounter Diagnosis     ICD-10-CM    1. Lumbar radiculopathy  M54.16       2. Lumbar spondylosis  M47.816       3. Weakness  R53.1           Start Time: 1456  Stop Time: 1535  Total time in clinic (min): 39 minutes    Subjective: The patient presents today with a report of pain t a 4/10 in the posterior R hip/ lumbar region. The patient reports some change in symptoms since previous session.        Objective: See treatment diary below      Assessment: The PT continued manual therapy, neuromuscular reeducation, and therapeutic exercise during today's session. The patient's program was progressed by adding standing core set, hip adductor isometrics, standing lumbar extensions, and LTR  to promote mobility and appropriate muscle firing. Updated HEP and educated pt on changes. Min VC with bridging in order to perform PPT prior to bridge. The patient tolerated manual and active treatment well today. The patient would benefit from further skilled PT services.      Plan: Continue per plan of care.      Precautions: Epilepsy, asthma, Amnesia, anemia, anxiety, BPH, HTN, hyperlipidemia, Vit B12 def., Vit D def, T2DM, tremor, GERD, AIVR, Memory Impairment, Continuous Opioid Dependence      Visit 1 2 3 4 5 6 7 8 9 10   Date           Manuals                          Joint Mobs  AML AML          STM  AML AML          Meaasurements             Neuro Re-Ed             clams  RTB  2x10 RTB  2x10          PPT  10x10\" Hlying 10x10\" Hlying          Dead bugs  8x DC          Hip 4-ways             bridging  2x10 2x10          Hip add isometrics   10x10\"          Core set   Standing p pb  10x10\"          Ther Ex             Hamstring stretch HEP  3x30\"            Sciatic nerve glides HEP  2x10 2x10 DC          NuStep  8' lvl 3 6' lvl 4          Cat and cow           " "  Lumbar extensions   Standing  10x5\"          PPU  W/op brething out 10x           LTR   5\" holds  10x                                    Ther Activity                                       Gait Training                                       Self Care             Education POC, HEP, and objective findings DOMs Updated HEP                              "

## 2024-07-23 ENCOUNTER — OFFICE VISIT (OUTPATIENT)
Dept: PHYSICAL THERAPY | Facility: CLINIC | Age: 69
End: 2024-07-23
Payer: MEDICARE

## 2024-07-23 ENCOUNTER — OFFICE VISIT (OUTPATIENT)
Dept: PAIN MEDICINE | Facility: CLINIC | Age: 69
End: 2024-07-23
Payer: MEDICARE

## 2024-07-23 VITALS
WEIGHT: 259 LBS | BODY MASS INDEX: 37.08 KG/M2 | SYSTOLIC BLOOD PRESSURE: 119 MMHG | HEIGHT: 70 IN | DIASTOLIC BLOOD PRESSURE: 75 MMHG | HEART RATE: 89 BPM

## 2024-07-23 DIAGNOSIS — M47.816 LUMBAR SPONDYLOSIS: ICD-10-CM

## 2024-07-23 DIAGNOSIS — R53.1 WEAKNESS: ICD-10-CM

## 2024-07-23 DIAGNOSIS — M54.16 LUMBAR RADICULOPATHY: ICD-10-CM

## 2024-07-23 DIAGNOSIS — M54.16 LUMBAR RADICULOPATHY: Primary | ICD-10-CM

## 2024-07-23 DIAGNOSIS — M47.816 LUMBAR SPONDYLOSIS: Primary | ICD-10-CM

## 2024-07-23 PROCEDURE — 97110 THERAPEUTIC EXERCISES: CPT

## 2024-07-23 PROCEDURE — 97112 NEUROMUSCULAR REEDUCATION: CPT

## 2024-07-23 PROCEDURE — 99213 OFFICE O/P EST LOW 20 MIN: CPT | Performed by: PAIN MEDICINE

## 2024-07-23 NOTE — PROGRESS NOTES
"Daily Note     Today's date: 2024  Patient name: Horace Jacobo  : 1955  MRN: [id number]  Referring provider: Robles Kaur*  Dx:   Encounter Diagnosis     ICD-10-CM    1. Lumbar radiculopathy  M54.16       2. Weakness  R53.1       3. Lumbar spondylosis  M47.816           Start Time: 1402  Stop Time: 1445  Total time in clinic (min): 43 minutes    Subjective: The patient presents today with a report of pain at a 0/10. The patient reports improvement in symptoms since previous session. No questions noted regarding HEP. Mentioned slight R lower lumbar region tightness at times.         Objective: See treatment diary below      Assessment: The PT continued manual therapy, neuromuscular reeducation, and therapeutic exercise during today's session. The patient's program was progressed by adding TA anti-rotation  to promote improved TA and oblique firing. Min VC with TA anti-rotation in order to maintain PPT throughout exercise. Decreased tension in the lumbar region with palpation to the region compared to LV. The patient tolerated manual and active treatment well today. The patient would benefit from further skilled PT services.          Plan: Continue per plan of care.      Precautions: Epilepsy, asthma, Amnesia, anemia, anxiety, BPH, HTN, hyperlipidemia, Vit B12 def., Vit D def, T2DM, tremor, GERD, AIVR, Memory Impairment, Continuous Opioid Dependence      Visit 1 2 3 4 5 6 7 8 9 10   Date          Manuals                          Joint Mobs  AML AML          STM  AML AML AML         Meaasurements             Neuro Re-Ed             clams  RTB  2x10 RTB  2x10 GTB 2x10 gilbert         PPT  10x10\" Hlying 10x10\" Hlying 10x10\" Hlying         Dead bugs  8x DC          Hip 4-ways             bridging  2x10 2x10 2x12         Hip add isometrics   10x10\" 10x10\"         Core set   Standing p pb  10x10\" Standing p pb  10x10\"         TA anti-rotation    8lbs, 2x10 gilbert         Ther Ex         " "    Hamstring stretch HEP  3x30\"            Sciatic nerve glides HEP  2x10 2x10 DC          NuStep  8' lvl 3 6' lvl 4          Rec bike    5' lvl 3         Cat and cow             Lumbar extensions   Standing  10x5\" Standing  10x5\"         PPU  W/op brething out 10x           LTR   5\" holds  10x 5\" holds  10x         Hip hinges    NV                      Ther Activity             TRX squats    NV         Suitcase carries    NV         Gait Training                                       Self Care             Education POC, HEP, and objective findings DOMs Updated HEP                                "

## 2024-07-23 NOTE — PROGRESS NOTES
Assessment  1. Lumbar spondylosis  2. Lumbar radiculopathy        Horace presents with right-sided low back pain his MRI is not significant for issues on the right side but he does have some mild stenosis at L4-5 at his prior surgical site with some surgical scarring.  May benefit from a transforaminal epidural at L4-5 L5-S1 to see if it reduces his pain symptoms.  We will follow-up in about 1 month to see how he is doing.  He had no benefit with medial branch block.          My impressions and treatment recommendations were discussed in detail with the patient who verbalized understanding and had no further questions.  Discharge instructions were provided. I personally saw and examined the patient and I agree with the above discussed plan of care.    Plan      No orders of the defined types were placed in this encounter.      No orders of the defined types were placed in this encounter.        History of Present Illness  F/u 7/23/24  Tahir comes for follow-up reporting his right-sided low back pain left side is improved he reports that they gets into his buttocks and but not down the leg and the radicular low back pain.  Pain is currently a 4-5 out of 10.  He is status post a bilateral 345 diagnostic medial branch blocks 7/1/2024 with mild benefit.  But not significant relief to 80%.    Initial consult  Horace Jacobo is a 68 y.o. male with relevant PMH of left lumbar surgery at L4-5 8 years ago currently presenting with [facility] and pain for chief complaint of low back pain occasional right leg radicular pain mostly symptoms are with stiffness in the morning aching and throbbing in the lower back symptoms been present for several years include dull aching throbbing back pain quality pain is moderate to severe 60 prior to 90 per 5% of the time the present symptoms are present.  Pain is worse with activities like bending flexion extension.  Alleviated by lying flat he takes Percocet 7.5 mg 4 times daily as  needed for his multiple pain complaints.  He is managed with a active opioid agreement.  On a scale of 1-10 his pain currently is a 7 out of 10 has not done recent therapy for his back pain he had epidurals in the past without relief.  He denies bowel bladder incontinence , he gets hives with iodine contrast.      I have personally reviewed and/or updated the patient's past medical history, past surgical history, family history, social history, current medications, allergies, and vital signs today.     Review of Systems   Musculoskeletal:  Positive for back pain and gait problem.       Patient Active Problem List   Diagnosis   • Temporal lobe epilepsy (HCC)   • Moderate persistent asthma without complication   • Allergic rhinitis   • Amnesia, global, transient   • Anemia   • Anxiety   • Bilateral chronic knee pain   • BPH (benign prostatic hyperplasia)   • Disc degeneration, lumbar   • Other complications of other bariatric procedure   • Hyperlipidemia   • Essential hypertension   • Insomnia   • Lumbar radiculopathy   • Absolute anemia   • Vitamin B12 deficiency   • Vitamin D deficiency   • Trigger finger   • Impingement syndrome of right shoulder   • Primary osteoarthritis of right shoulder   • Type 2 diabetes mellitus with other skin complications ([facility])   • Tremor, essential   • Gastroesophageal reflux disease with esophagitis without hemorrhage   • Continuous opioid dependence ([facility])   • Internal derangement of left shoulder   • Tear of left supraspinatus tendon   • Aftercare following surgery of the musculoskeletal system   • AIVR (accelerated idioventricular rhythm) ([facility])   • Memory impairment       Past Medical History:   Diagnosis Date   • Allergic    • Allergic rhinitis    • Anxiety    • Asthma    • Basal cell carcinoma 04/07/2022    nose   • Diabetes mellitus ([facility])    • Disease of thyroid gland    • EIC (epidermal inclusion cyst)    • GERD (gastroesophageal reflux disease)    • Hayfever    • History of skin  cancer    • HL (hearing loss)     wears hearing aides on occasion   • Hyperlipidemia    • Hypertension    • Memory loss, short term     R/O Seizures but placed on Keppra   • PONV (postoperative nausea and vomiting)     x1 occurence   • Sleep apnea     s/p gastric bypass   • Tinnitus        Past Surgical History:   Procedure Laterality Date   • ABDOMINOPLASTY     • ADENOIDECTOMY     • BACK SURGERY      lower back surgery   • CARPAL TUNNEL RELEASE Bilateral    • CHOLECYSTECTOMY     • COLONOSCOPY     • GASTRIC BYPASS     • HERNIA REPAIR Right    • JOINT REPLACEMENT Bilateral     Knee   • KNEE ARTHROSCOPY Bilateral     X3   • MOHS SURGERY  06/15/2022    nose-Dr. Patel   • MO RPR 1ST INGUN HRNA AGE 5 YRS/> REDUCIBLE Left 11/26/2018    Procedure: INGUINAL HERNIA REPAIR;  Surgeon: Rubens Casas DO;  Location: [facility];  Service: General   • MO SURGICAL ARTHROSCOPY SHOULDER W/ROTATOR CUFF RPR Left 5/10/2023    Procedure: SHOULDER ARTHROSCOPIC ROTATOR CUFF REPAIR, SUBACROMIAL DECOMPRESSION, BICEP TENODESIS;  Surgeon: Mario Jackson MD;  Location: [facility];  Service: Orthopedics   • MO TENDON SHEATH INCISION Right 04/29/2022    Procedure: RELEASE TRIGGER FINGER RING WITH LONG;  Surgeon: Hay Doty MD;  Location: [facility];  Service: Orthopedics   • TONSILLECTOMY     • TRIGGER FINGER RELEASE Right 04/29/2022       Family History   Problem Relation Age of Onset   • Heart disease Mother    • Kidney cancer Mother    • Cancer Mother    • Hypertension Father    • Bipolar disorder Sister         bipolar disorder NOS   • Diabetes Maternal Grandmother        Social History     Occupational History     Comment: employed   Tobacco Use   • Smoking status: Never     Passive exposure: Past   • Smokeless tobacco: Never   Vaping Use   • Vaping status: Never Used   Substance and Sexual Activity   • Alcohol use: Yes     Comment: 1 or 2 month   • Drug use: No   • Sexual activity: Not Currently     Partners:  Female     Birth control/protection: Male Sterilization       Current Outpatient Medications on File Prior to Visit   Medication Sig   • atorvastatin (LIPITOR) 40 mg tablet Take 40 mg by mouth daily   • celecoxib (CeleBREX) 200 mg capsule Take 1 capsule (200 mg total) by mouth 2 (two) times a day   • Cholecalciferol 2000 units CAPS Take 1 capsule by mouth daily after dinner   • clobetasol (TEMOVATE) 0.05 % cream Apply 5 g (1 Application total) topically 2 (two) times a day To affected area   • Cyanocobalamin (B-12) 1000 MCG CAPS Take 1 tablet by mouth daily after dinner   • diltiazem (DILACOR XR) 240 MG 24 hr capsule Take 240 mg by mouth daily   • famotidine (PEPCID) 40 MG tablet TAKE 1 TABLET BY MOUTH TWICE A DAY   • fexofenadine (ALLEGRA) 180 MG tablet TAKE 1 TABLET BY MOUTH EVERY DAY   • fluticasone (FLONASE) 50 mcg/act nasal spray 2 sprays into each nostril 2 (two) times a day as needed     • fluticasone-salmeterol (Advair HFA) 230-21 MCG/ACT inhaler Inhale 2 puffs 2 (two) times a day   • hydrochlorothiazide (HYDRODIURIL) 25 mg tablet Take 1 tablet by mouth daily in the early morning     • ketoconazole (NIZORAL) 2 % cream Apply 1 Application topically 2 (two) times a day To affected area   • levalbuterol (XOPENEX HFA) 45 mcg/act inhaler Inhale 2 puffs every 4 (four) hours as needed for wheezing   • levETIRAcetam (KEPPRA) 500 mg tablet TAKE 1 TABLET BY MOUTH TWICE A DAY   • linaGLIPtin 5 MG TABS Take 5 mg by mouth daily with or without food   • losartan (COZAAR) 25 mg tablet Take 25 mg by mouth daily   • Magnesium Gluconate 550 MG TABS Take 30 mg by mouth daily   • metFORMIN (GLUCOPHAGE) 1000 MG tablet TAKE 1 TABLET BY MOUTH TWICE A DAY WITH MEALS   • montelukast (SINGULAIR) 10 mg tablet Take 10 mg by mouth every evening   • oxybutynin (DITROPAN) 5 mg tablet TAKE 1 TABLET BY MOUTH EVERY DAY AT NIGHT   • oxyCODONE-acetaminophen (Percocet) 7.5-325 MG per tablet Take 1 tablet by mouth every 6 (six) hours as needed  "for moderate pain Max Daily Amount: 4 tablets   • primidone (MYSOLINE) 50 mg tablet TAKE 5 tablets daily   • Pseudoeph-Doxylamine-DM-APAP (NYQUIL PO) Take by mouth as needed    • pseudoephedrine (SUDAFED) 30 mg tablet Take 60 mg by mouth every 4 (four) hours as needed for congestion   • tamsulosin (FLOMAX) 0.4 mg Take 0.4 mg by mouth daily with dinner   • zolpidem (AMBIEN) 10 mg tablet Take 1 tablet (10 mg total) by mouth daily at bedtime as needed for sleep   • ALPRAZolam (XANAX) 0.25 mg tablet Take 1 tablet (0.25 mg total) by mouth 4 (four) times a day as needed for anxiety (Patient not taking: Reported on 7/23/2024)   • amoxicillin (AMOXIL) 500 mg capsule Take 500 mg by mouth 4 tablets 1 hour prior to procedure (Patient not taking: Reported on 6/12/2024)   • azelastine (ASTELIN) 0.1 % nasal spray 1-2 sprays into each nostril 2 (two) times a day as needed (Patient not taking: Reported on 6/12/2024)   • Benadryl Allergy 25 MG capsule Take 2 capsules (50mg total) 1 hour prior to procedure.  Do not drive after taking this medication. (Patient not taking: Reported on 6/12/2024)   • levalbuterol (XOPENEX) 0.63 mg/3 mL nebulizer solution Take 0.63 mg by nebulization every 4 (four) hours as needed for wheezing or shortness of breath   • levalbuterol (XOPENEX) 1.25 mg/3 mL nebulizer solution TAKE 3 ML (1.25 MG TOTAL) BY NEBULIZATION 3 (THREE) TIMES A DAY. (Patient not taking: Reported on 5/8/2024)   • methocarbamol (ROBAXIN) 500 mg tablet Take 1 tablet (500 mg total) by mouth daily at bedtime     No current facility-administered medications on file prior to visit.       Allergies   Allergen Reactions   • Iv Dye [Iodinated Contrast Media] Hives   • Penicillins Other (See Comments)     ? Had as a child-Unknown reaction         Physical Exam    /75 (BP Location: Left arm, Patient Position: Sitting, Cuff Size: Standard)   Pulse 89   Ht 5' 10\" (1.778 m)   Wt 117 kg (259 lb)   BMI 37.16 kg/m²     Constitutional: " normal, well developed, well nourished, alert, in no distress and non-toxic and no overt pain behavior.  Eyes: anicteric  HEENT: grossly intact  Neck: supple, symmetric, trachea midline and no masses   Pulmonary:even and unlabored  Cardiovascular:No edema or pitting edema present  Skin:Normal without rashes or lesions and well hydrated  Psychiatric:Mood and affect appropriate  Neurologic:Cranial Nerves II-XII grossly intact Sensation grossly intact; no clonus negative mitchell's.      MSK:      Lumbar Spine:  No masses or atrophy,    Range of motion - Decreased extension/flexion  Palpation -  Tenderness to palpation in the lumbar parapsinals   PSIS tenderness no  Rashid's/BEENA no  Gaenslen's no  SLR positive left       Strength Right Left   Hip flexion L1,2 5 5   Knee extension L3 5 5   Ankle dorsiflexion L4 5 5   Great toe extension L5 5 5   Ankle Plantarflexion S1 5 5       Sensory Exam:  intact to light touch bilateral LE       Reflexes:     Right Left   Biceps 2+ 2+   Triceps 2+ 2+   Brachioradialis 2+ 2+   Patellar 1+ 1+   Achilles 1+ 1+   Babinski neg neg        Gait normal                  Imaging    MRI L spine  4/26/24  MRI LUMBAR SPINE WITHOUT CONTRAST     INDICATION: M54.16: Radiculopathy, lumbar region.     COMPARISON: 3/9/2016     TECHNIQUE:  Multiplanar, multisequence imaging of the lumbar spine was performed. .        IMAGE QUALITY:  Diagnostic     FINDINGS:     VERTEBRAL BODIES:  There are 5 lumbar type vertebral bodies. There is trace anterolisthesis of L5-S1. There is no suspicious marrow signal abnormality identified.     SACRUM:  Normal signal within the sacrum. No evidence of insufficiency or stress fracture.     DISTAL CORD AND CONUS:  Normal size and signal within the distal cord and conus.     PARASPINAL SOFT TISSUES:  Paraspinal soft tissues are unremarkable.     LOWER THORACIC DISC SPACES:  Normal disc height and signal.  No disc herniation, canal stenosis or foraminal narrowing.      LUMBAR DISC SPACES:     L1-L2: There is a bulging annulus. There is facet arthrosis. There is mild mass effect on the thecal sac. There is no significant foraminal narrowing.     L2-L3: There is facet arthrosis. There is no significant canal stenosis or foraminal narrowing.     L3-L4: There is facet arthrosis. There is no significant canal stenosis. There is mild left greater than right foraminal narrowing.     L4-L5: Patient is status post left-sided hemilaminectomy at this level. There is a mild bulge. There is a probable small amount of scar tissue within the left lateral recess. There is mild mass effect on the thecal sac. There is mild foraminal narrowing.     L5-S1: There is a bulging annulus. There is facet arthrosis. There is mild right and mild to moderate left foraminal narrowing. There is no significant canal stenosis.     Procedure  Bialteral L345 MBB 7/1/24

## 2024-07-25 ENCOUNTER — OFFICE VISIT (OUTPATIENT)
Dept: PHYSICAL THERAPY | Facility: CLINIC | Age: 69
End: 2024-07-25
Payer: MEDICARE

## 2024-07-25 DIAGNOSIS — R53.1 WEAKNESS: ICD-10-CM

## 2024-07-25 DIAGNOSIS — M54.16 LUMBAR RADICULOPATHY: Primary | ICD-10-CM

## 2024-07-25 DIAGNOSIS — M47.816 LUMBAR SPONDYLOSIS: ICD-10-CM

## 2024-07-25 PROCEDURE — 97110 THERAPEUTIC EXERCISES: CPT

## 2024-07-25 PROCEDURE — 97530 THERAPEUTIC ACTIVITIES: CPT

## 2024-07-25 PROCEDURE — 97112 NEUROMUSCULAR REEDUCATION: CPT

## 2024-07-25 NOTE — PROGRESS NOTES
"Daily Note     Today's date: 2024  Patient name: Horace Jacobo  : 1955  MRN: [id number]  Referring provider: Robles Kaur*  Dx:   Encounter Diagnosis     ICD-10-CM    1. Lumbar radiculopathy  M54.16       2. Weakness  R53.1       3. Lumbar spondylosis  M47.816           Start Time: 1415  Stop Time: 1454  Total time in clinic (min): 39 minutes    Subjective: The patient presents today with a report of aching discomfort at  /10. The patient reports improvement in symptoms since previous session. Mentioned to have performed yard work which resulted in discomfort in the gilbert knees.         Objective: See treatment diary below      Assessment: The PT continued therapeutic exercise, therapeutic activity, and neuromuscular reeducation during today's session. The patient's program was progressed by adding hip hinges, TRX squats, suitecase carries, and lateral stepping  to promote strength and endurance and proper walking and carrying abilities. Min VC with lateral stepping in order to prevent hip hinging and ER of the LE to compensate for deficit. The patient tolerated manual and active treatment well today. The patient would benefit from further skilled PT services.        Plan: Continue per plan of care.      Precautions: Epilepsy, asthma, Amnesia, anemia, anxiety, BPH, HTN, hyperlipidemia, Vit B12 def., Vit D def, T2DM, tremor, GERD, AIVR, Memory Impairment, Continuous Opioid Dependence      Visit 1 2 3 4 5 6 7 8 9 10   Date         Manuals                          Joint Mobs  AML AML          STM  AML AML AML         Meaasurements             Neuro Re-Ed             clams  RTB  2x10 RTB  2x10 GTB 2x10 gilbert         PPT  10x10\" Hlying 10x10\" Hlying 10x10\" Hlying DC        Dead bugs  8x DC          Hip 4-ways             bridging  2x10 2x10 2x12         Hip add isometrics   10x10\" 10x10\" 10x10\"        Core set   Standing p pb  10x10\" Standing p pb  10x10\" Standing p " "pb  20x5\"        Lateral stepping     GTB, 2x1'        TA anti-rotation    8lbs, 2x10 gilbert 8lbs, 2x10 gilbert  5 sec holds        Ther Ex             Hamstring stretch HEP  3x30\"            Sciatic nerve glides HEP  2x10 2x10 DC          NuStep  8' lvl 3 6' lvl 4  6' lvl 3        Rec bike    5' lvl 3         Cat and cow             Lumbar extensions   Standing  10x5\" Standing  10x5\" Standing  10x5\"        PPU  W/op brething out 10x           LTR   5\" holds  10x 5\" holds  10x         Hip hinges    NV 2x10                     Ther Activity             TRX squats    NV 2x10        Suitcase carries    NV 25lbs 2 laps        Gait Training                                       Self Care             Education POC, HEP, and objective findings DOMs Updated HEP                                  "

## 2024-07-26 DIAGNOSIS — G89.29 CHRONIC LEFT SHOULDER PAIN: ICD-10-CM

## 2024-07-26 DIAGNOSIS — M25.512 CHRONIC LEFT SHOULDER PAIN: ICD-10-CM

## 2024-07-26 DIAGNOSIS — F41.9 ANXIETY: ICD-10-CM

## 2024-07-26 DIAGNOSIS — G47.00 INSOMNIA, UNSPECIFIED TYPE: ICD-10-CM

## 2024-07-26 RX ORDER — ZOLPIDEM TARTRATE 10 MG/1
10 TABLET ORAL
Qty: 30 TABLET | Refills: 0 | Status: SHIPPED | OUTPATIENT
Start: 2024-07-26

## 2024-07-26 RX ORDER — ALPRAZOLAM 0.25 MG/1
0.25 TABLET ORAL 4 TIMES DAILY PRN
Qty: 120 TABLET | Refills: 0 | Status: SHIPPED | OUTPATIENT
Start: 2024-07-26

## 2024-07-26 RX ORDER — OXYCODONE AND ACETAMINOPHEN 7.5; 325 MG/1; MG/1
1 TABLET ORAL EVERY 6 HOURS PRN
Qty: 120 TABLET | Refills: 0 | Status: SHIPPED | OUTPATIENT
Start: 2024-07-26

## 2024-07-26 NOTE — TELEPHONE ENCOUNTER
[id number] 07/17/2024 07/17/2024 diazePAM (Tablet) 1.0 1 10 MG NA ANGE RESTREPO [facility] Private Pay 0 / 0 PA     1 [id number] 07/04/2024 07/02/2024 ALPRAZolam (Tablet) 120.0 30 0.25 MG NA [facility] Medicare 0 / 0 PA    1 [id number] 07/04/2024 07/02/2024 oxyCODONE HYDROCHLORIDE 7.5 MG ORAL TABLET/ACETAMINOPHEN 325 MG (Tablet) 120.0 30 325 MG-7.5 MG 45.0 [facility] Medicare 0 / 0 PA    1 [id number] 07/02/2024 07/02/2024 Zolpidem Tartrate (Tablet) 30.0 30 10 MG NA [facility] Medicare 0 / 0 PA    1 [id number] 06/06/2024 06/05/2024 ALPRAZolam (Tablet) 120.0 30 0.25 MG NA [facility] Medicare 0 / 0 PA    1 [id number] 06/06/2024 06/05/2024 oxyCODONE HYDROCHLORIDE 7.5 MG ORAL TABLET/ACETAMINOPHEN 325 MG (Tablet) 120.0 30 325 MG-7.5 MG 45.0 DOYLE BLANKENSHIP [facility]Shruti Medicare 0 / 0 PA    1 [id number] 05/29/2024 05/29/2024 Zolpidem Tartrate (Tablet) 30.0 30 10 MG NA BUSHRA VENEGAS [facility] Medicare 0 / 0 PA    1 [id number] 05/10/2024 05/09/2024 ALPRAZolam (Tablet) 120.0 30 0.25 MG NA BUSHRA VENEGAS [facility] Medicare 0 / 0 PA    1 [id number] 05/10/2024 05/09/2024 oxyCODONE HYDROCHLORIDE 7.5 MG ORAL TABLET/ACETAMINOPHEN 325 MG (Tablet) 120.0 30 325 MG-7.5 MG 45.0 [facility] Medicare 0 / 0 PA    1 [id number] 04/19/2024 04/05/2024 Zolpidem Tartrate (Tablet, Extended Release) 30.0 30 12.5 MG NA [facility]

## 2024-07-26 NOTE — TELEPHONE ENCOUNTER
[id number] 07/17/2024 07/17/2024 diazePAM (Tablet) 1.0 1 10 MG NA ANGE RESTREPO [facility] Private Pay 0 / 0 PA     1 [id number] 07/04/2024 07/02/2024 ALPRAZolam (Tablet) 120.0 30 0.25 MG NA [facility] Medicare 0 / 0 PA    1 [id number] 07/04/2024 07/02/2024 oxyCODONE HYDROCHLORIDE 7.5 MG ORAL TABLET/ACETAMINOPHEN 325 MG (Tablet) 120.0 30 325 MG-7.5 MG 45.0 [facility] Medicare 0 / 0 PA    1 [id number] 07/02/2024 07/02/2024 Zolpidem Tartrate (Tablet) 30.0 30 10 MG NA [facility] Medicare 0 / 0 PA    1 [id number] 06/06/2024 06/05/2024 ALPRAZolam (Tablet) 120.0 30 0.25 MG NA [facility] Medicare 0 / 0 PA    1 [id number] 06/06/2024 06/05/2024 oxyCODONE HYDROCHLORIDE 7.5 MG ORAL TABLET/ACETAMINOPHEN 325 MG (Tablet) 120.0 30 325 MG-7.5 MG 45.0 DOYLE BLANKENSHIP [facility]Shruti Medicare 0 / 0 PA    1 [id number] 05/29/2024 05/29/2024 Zolpidem Tartrate (Tablet) 30.0 30 10 MG NA BUSHRA VENEGAS [facility] Medicare 0 / 0 PA    1 [id number] 05/10/2024 05/09/2024 ALPRAZolam (Tablet) 120.0 30 0.25 MG NA BUSHRA VENEGAS [facility] Medicare 0 / 0 PA    1 [id number] 05/10/2024 05/09/2024 oxyCODONE HYDROCHLORIDE 7.5 MG ORAL TABLET/ACETAMINOPHEN 325 MG (Tablet) 120.0 30 325 MG-7.5 [facility] 45.0 [facility] Medicare 0 / 0 PA    1 [id number] 04/19/2024 04/05/2024 Zolpidem Tartrate (Tablet, Extended Release) 30.0 30 12.5 MG NA [facility]

## 2024-07-30 ENCOUNTER — OFFICE VISIT (OUTPATIENT)
Dept: PHYSICAL THERAPY | Facility: CLINIC | Age: 69
End: 2024-07-30
Payer: MEDICARE

## 2024-07-30 DIAGNOSIS — R53.1 WEAKNESS: ICD-10-CM

## 2024-07-30 DIAGNOSIS — M54.16 LUMBAR RADICULOPATHY: Primary | ICD-10-CM

## 2024-07-30 DIAGNOSIS — M47.816 LUMBAR SPONDYLOSIS: ICD-10-CM

## 2024-07-30 PROCEDURE — 97530 THERAPEUTIC ACTIVITIES: CPT

## 2024-07-30 PROCEDURE — 97110 THERAPEUTIC EXERCISES: CPT

## 2024-07-30 PROCEDURE — 97112 NEUROMUSCULAR REEDUCATION: CPT

## 2024-07-30 NOTE — PROGRESS NOTES
"Daily Note     Today's date: 2024  Patient name: Horace Jacobo  : 1955  MRN: [id number]  Referring provider: Robles Kaur*  Dx:   Encounter Diagnosis     ICD-10-CM    1. Lumbar radiculopathy  M54.16       2. Weakness  R53.1       3. Lumbar spondylosis  M47.816           Start Time: 1400  Stop Time: 1445  Total time in clinic (min): 45 minutes    Subjective: The patient presents today with a report of pain at a 0/10. The patient reports improvement in symptoms since previous session.        Objective: See treatment diary below      Assessment: The PT continued therapeutic exercise, neuromuscular reeducation, and therapeutic activity during today's session. The patient's program was progressed by adding RDLs and rollouts  to promote improved strength and endurance in the posterior lower chain and decrease stiffness. Min VC with rollouts in order to move in the appropriate direction to achieve stretch and with RDLs in order to maintain flat back with slight knee bend. The patient tolerated manual and active treatment well today. The patient would benefit from further skilled PT services.        Plan: Continue per plan of care.      Precautions: Epilepsy, asthma, Amnesia, anemia, anxiety, BPH, HTN, hyperlipidemia, Vit B12 def., Vit D def, T2DM, tremor, GERD, AIVR, Memory Impairment, Continuous Opioid Dependence      Visit 1 2 3 4 5 6 7 8 9 10   Date        Manuals                          Joint Mobs  AML AML          STM  AML AML AML         Measurements             Neuro Re-Ed             clams  RTB  2x10 RTB  2x10 GTB 2x10 gilbert         PPT  10x10\" Hlying 10x10\" Hlying 10x10\" Hlying DC        Dead bugs  8x DC          Hip 4-ways             bridging  2x10 2x10 2x12         Hip add isometrics   10x10\" 10x10\" 10x10\"        Core set   Standing p pb  10x10\" Standing p pb  10x10\" Standing p pb  20x5\"        Lateral stepping     GTB, 2x1' GTB, 2x1'       TA anti-rotation  " "  8lbs, 2x10 gilbert 8lbs, 2x10 gilbert  5 sec holds 10lbs, 2x10 gilbert  5 sec holds       Ther Ex             Hamstring stretch HEP  3x30\"            Sciatic nerve glides HEP  2x10 2x10 DC          NuStep  8' lvl 3 6' lvl 4  6' lvl 3        Rec bike    5' lvl 3  5' lvl 4       Cat and cow             Lumbar extensions   Standing  10x5\" Standing  10x5\" Standing  10x5\" Standing  10x5\"       PPU  W/op brething out 10x           LTR   5\" holds  10x 5\" holds  10x         Hip hinges    NV 2x10 10x       Rollouts      G pb  10x5\" hold, fwd/Rt/Lt       RDL      10lbs, 2x8       Ther Activity             TRX squats    NV 2x10 2x12       Suitcase carries    NV 25lbs 2 laps 25lbs 2 laps       Gait Training                                       Self Care             Education POC, HEP, and objective findings DOMs Updated HEP                                    "

## 2024-08-02 ENCOUNTER — OFFICE VISIT (OUTPATIENT)
Dept: PHYSICAL THERAPY | Facility: CLINIC | Age: 69
End: 2024-08-02
Payer: MEDICARE

## 2024-08-02 DIAGNOSIS — M54.16 LUMBAR RADICULOPATHY: Primary | ICD-10-CM

## 2024-08-02 DIAGNOSIS — M47.816 LUMBAR SPONDYLOSIS: ICD-10-CM

## 2024-08-02 DIAGNOSIS — R53.1 WEAKNESS: ICD-10-CM

## 2024-08-02 PROCEDURE — 97112 NEUROMUSCULAR REEDUCATION: CPT

## 2024-08-02 PROCEDURE — 97110 THERAPEUTIC EXERCISES: CPT

## 2024-08-02 PROCEDURE — 97530 THERAPEUTIC ACTIVITIES: CPT

## 2024-08-02 NOTE — PROGRESS NOTES
"Daily Note     Today's date: 2024  Patient name: Horace Jacobo  : 1955  MRN: [id number]  Referring provider: Robles Kaur*  Dx:   Encounter Diagnosis     ICD-10-CM    1. Lumbar radiculopathy  M54.16       2. Weakness  R53.1       3. Lumbar spondylosis  M47.816           Start Time: 1400  Stop Time: 1445  Total time in clinic (min): 45 minutes    Subjective: The patient presents today with a report of tightness in the lower back especially with ambulation. Notes tightness and discomfort rather than pain. The patient reports improvement in symptoms since previous session.        Objective: See treatment diary below      Assessment: The PT continued therapeutic exercise and neuromuscular reeducation during today's session. The patient's program was progressed by adding stir the pot and squat lifts  to promote improved TA activation with dynamic UE movement and proper bending mechanics with knees. Min VC with squat lift in order to bend knees when squatting down and stepping forward to place box on counter to decrease load on lumbar spine. The patient tolerated manual and active treatment well today. The patient would benefit from further skilled PT services.        Plan: Continue per plan of care.      Precautions: Epilepsy, asthma, Amnesia, anemia, anxiety, BPH, HTN, hyperlipidemia, Vit B12 def., Vit D def, T2DM, tremor, GERD, AIVR, Memory Impairment, Continuous Opioid Dependence      Visit 1 2 3 4 5 6 7 8 9 10   Date       Manuals                          Joint Mobs  AML AML          STM  AML AML AML         Measurements             Neuro Re-Ed             clams  RTB  2x10 RTB  2x10 GTB 2x10 gilbert         PPT  10x10\" Hlying 10x10\" Hlying 10x10\" Hlying DC        Dead bugs  8x DC          Hip 4-ways             bridging  2x10 2x10 2x12         Hip add isometrics   10x10\" 10x10\" 10x10\"        Core set   Standing p pb  10x10\" Standing p pb  10x10\" Standing p " "pb  20x5\"        Lateral stepping     GTB, 2x1' GTB, 2x1' GTB, 2x1'      Stir the Pot       10lbs 10x CW/CCW      TA anti-rotation    8lbs, 2x10 gilbert 8lbs, 2x10 gilbert  5 sec holds 10lbs, 2x10 gilbert  5 sec holds 10lbs, 2x10 gilbert  5 sec holds      Ther Ex             Hamstring stretch HEP  3x30\"            Sciatic nerve glides HEP  2x10 2x10 DC          NuStep  8' lvl 3 6' lvl 4  6' lvl 3  6' lvl 3      Rec bike    5' lvl 3  5' lvl 4       Cat and cow             Lumbar extensions   Standing  10x5\" Standing  10x5\" Standing  10x5\" Standing  10x5\"       PPU  W/op brething out 10x           LTR   5\" holds  10x 5\" holds  10x         Hip hinges    NV 2x10 10x 10x      Rollouts      G pb  10x5\" hold, fwd/Rt/Lt G pb  5x10\" hold, fwd/Rt/Lt      RDL      10lbs, 2x8 10lbs, 2x10      Ther Activity             TRX squats    NV 2x10 2x12 2x12      Squat lifts       2x8      Suitcase carries    NV 25lbs 2 laps 25lbs 2 laps       Gait Training                                       Self Care             Education POC, HEP, and objective findings DOMs Updated HEP                                      "

## 2024-08-05 ENCOUNTER — APPOINTMENT (OUTPATIENT)
Dept: LAB | Facility: CLINIC | Age: 69
End: 2024-08-05
Payer: MEDICARE

## 2024-08-05 DIAGNOSIS — I10 ESSENTIAL HYPERTENSION: ICD-10-CM

## 2024-08-05 DIAGNOSIS — R74.8 ABNORMAL LIVER ENZYMES: ICD-10-CM

## 2024-08-05 DIAGNOSIS — E78.5 HYPERLIPIDEMIA, UNSPECIFIED HYPERLIPIDEMIA TYPE: ICD-10-CM

## 2024-08-05 DIAGNOSIS — E11.9 TYPE 2 DIABETES MELLITUS WITHOUT COMPLICATION, WITHOUT LONG-TERM CURRENT USE OF INSULIN (HCC): ICD-10-CM

## 2024-08-05 LAB
ALBUMIN SERPL BCG-MCNC: 4.3 G/DL (ref 3.5–5)
ALP SERPL-CCNC: 114 U/L (ref 34–104)
ALT SERPL W P-5'-P-CCNC: 20 U/L (ref 7–52)
ANION GAP SERPL CALCULATED.3IONS-SCNC: 12 MMOL/L (ref 4–13)
AST SERPL W P-5'-P-CCNC: 16 U/L (ref 13–39)
BILIRUB DIRECT SERPL-MCNC: 0.08 MG/DL (ref 0–0.2)
BILIRUB SERPL-MCNC: 0.45 MG/DL (ref 0.2–1)
BUN SERPL-MCNC: 23 MG/DL (ref 5–25)
CALCIUM SERPL-MCNC: 9.5 MG/DL (ref 8.4–10.2)
CHLORIDE SERPL-SCNC: 98 MMOL/L (ref 96–108)
CHOLEST SERPL-MCNC: 133 MG/DL
CO2 SERPL-SCNC: 28 MMOL/L (ref 21–32)
CREAT SERPL-MCNC: 1.12 MG/DL (ref 0.6–1.3)
EST. AVERAGE GLUCOSE BLD GHB EST-MCNC: 203 MG/DL
GFR SERPL CREATININE-BSD FRML MDRD: 66 ML/MIN/1.73SQ M
GLUCOSE P FAST SERPL-MCNC: 125 MG/DL (ref 65–99)
HBA1C MFR BLD: 8.7 %
HDLC SERPL-MCNC: 47 MG/DL
LDLC SERPL CALC-MCNC: 47 MG/DL (ref 0–100)
POTASSIUM SERPL-SCNC: 4.6 MMOL/L (ref 3.5–5.3)
PROT SERPL-MCNC: 7.1 G/DL (ref 6.4–8.4)
SODIUM SERPL-SCNC: 138 MMOL/L (ref 135–147)
TRIGL SERPL-MCNC: 194 MG/DL

## 2024-08-05 PROCEDURE — 80061 LIPID PANEL: CPT

## 2024-08-05 PROCEDURE — 83036 HEMOGLOBIN GLYCOSYLATED A1C: CPT

## 2024-08-05 PROCEDURE — 36415 COLL VENOUS BLD VENIPUNCTURE: CPT

## 2024-08-05 PROCEDURE — 80053 COMPREHEN METABOLIC PANEL: CPT

## 2024-08-05 PROCEDURE — 82248 BILIRUBIN DIRECT: CPT

## 2024-08-06 ENCOUNTER — TELEPHONE (OUTPATIENT)
Dept: FAMILY MEDICINE CLINIC | Facility: CLINIC | Age: 69
End: 2024-08-06

## 2024-08-06 ENCOUNTER — OFFICE VISIT (OUTPATIENT)
Dept: PHYSICAL THERAPY | Facility: CLINIC | Age: 69
End: 2024-08-06
Payer: MEDICARE

## 2024-08-06 DIAGNOSIS — R53.1 WEAKNESS: ICD-10-CM

## 2024-08-06 DIAGNOSIS — M54.16 LUMBAR RADICULOPATHY: Primary | ICD-10-CM

## 2024-08-06 DIAGNOSIS — M47.816 LUMBAR SPONDYLOSIS: ICD-10-CM

## 2024-08-06 PROCEDURE — 97530 THERAPEUTIC ACTIVITIES: CPT

## 2024-08-06 PROCEDURE — 97110 THERAPEUTIC EXERCISES: CPT

## 2024-08-06 PROCEDURE — 97112 NEUROMUSCULAR REEDUCATION: CPT

## 2024-08-06 NOTE — PROGRESS NOTES
"Daily Note     Today's date: 2024  Patient name: Horace Jacobo  : 1955  MRN: [id number]  Referring provider: Robles Kaur*  Dx:   Encounter Diagnosis     ICD-10-CM    1. Lumbar radiculopathy  M54.16       2. Weakness  R53.1       3. Lumbar spondylosis  M47.816           Start Time: 1400  Stop Time: 1440  Total time in clinic (min): 40 minutes    Subjective: The patient presents today with a report of pain at a 2/10 with typical soreness and nothing abnormal. The patient reports some change in symptoms since previous session.        Objective: See treatment diary below      Assessment: The PT continued therapeutic exercise, neuromuscular reeducation, and therapeutic activity during today's session. The patient's program was progressed by adding anti-rotation TA lateral stepping  to promote improved TA activation with less discomfort. Min VC with squat lifts in order to bring feet further apart I order to allow greater bending at the knees rather than just bending at the back. Min VC and demonstration performed with prone press ups in order to maintain hips on the table. The patient tolerated manual and active treatment well today. The patient would benefit from further skilled PT services.        Plan: Continue per plan of care.      Precautions: Epilepsy, asthma, Amnesia, anemia, anxiety, BPH, HTN, hyperlipidemia, Vit B12 def., Vit D def, T2DM, tremor, GERD, AIVR, Memory Impairment, Continuous Opioid Dependence      Visit 1 2 3 4 5 6 7 8 9 10   Date /     Manuals                          Joint Mobs  AML AML          STM  AML AML AML         Measurements             Neuro Re-Ed             clams  RTB  2x10 RTB  2x10 GTB 2x10 gilbert         PPT  10x10\" Hlying 10x10\" Hlying 10x10\" Hlying DC        Dead bugs  8x DC          Hip 4-ways             bridging  2x10 2x10 2x12         Hip add isometrics   10x10\" 10x10\" 10x10\"        Core set   Standing p pb  10x10\" " "Standing p pb  10x10\" Standing p pb  20x5\"        Lateral stepping     GTB, 2x1' GTB, 2x1' GTB, 2x1' GTB, 2x1'     Stir the Pot       10lbs 10x CW/CCW 10lbs 10x CW/CCW     TA anti-rotation with lateral stepping        5.5# 3 steps in and out  6x B     TA anti-rotation    8lbs, 2x10 gilbert 8lbs, 2x10 gilbert  5 sec holds 10lbs, 2x10 gilbert  5 sec holds 10lbs, 2x10 gilbert  5 sec holds DC     Ther Ex             Hamstring stretch HEP  3x30\"            Sciatic nerve glides HEP  2x10 2x10 DC          NuStep  8' lvl 3 6' lvl 4  6' lvl 3  6' lvl 3 6' lvl 5     Rec bike    5' lvl 3  5' lvl 4       Cat and cow             Lumbar extensions   Standing  10x5\" Standing  10x5\" Standing  10x5\" Standing  10x5\"  Prone press up w/op  10x5\"     PPU  W/op brething out 10x           LTR   5\" holds  10x 5\" holds  10x         Hip hinges    NV 2x10 10x 10x DC     Rollouts      G pb  10x5\" hold, fwd/Rt/Lt G pb  5x10\" hold, fwd/Rt/Lt G pb  5x10\" hold, fwd/Rt/Lt     RDL      10lbs, 2x8 10lbs, 2x10 10lbs, 2x10     Ther Activity             TRX squats    NV 2x10 2x12 2x12 2x15     Squat lifts       2x8 2x10     Suitcase carries    NV 25lbs 2 laps 25lbs 2 laps       Gait Training                                       Self Care             Education POC, HEP, and objective findings DOMs Updated HEP                                        "

## 2024-08-06 NOTE — TELEPHONE ENCOUNTER
----- Message from Sherry Meza DO sent at 8/6/2024  7:05 AM EDT -----  His hemoglobin A1c is too high at 8.7.  I would like him to check with his insurance to see if they cover Mounjaro or Ozempic which is a weekly injection.  This will help him take weight off as well as control his blood sugars.

## 2024-08-06 NOTE — TELEPHONE ENCOUNTER
Spoke to patient. He will be calling his insurance. He will call back when he reaches them. He said it may not be today. Hold for call back.

## 2024-08-08 ENCOUNTER — OFFICE VISIT (OUTPATIENT)
Dept: PODIATRY | Facility: CLINIC | Age: 69
End: 2024-08-08
Payer: MEDICARE

## 2024-08-08 VITALS
BODY MASS INDEX: 37.51 KG/M2 | HEART RATE: 96 BPM | WEIGHT: 262 LBS | DIASTOLIC BLOOD PRESSURE: 84 MMHG | SYSTOLIC BLOOD PRESSURE: 115 MMHG | OXYGEN SATURATION: 97 % | HEIGHT: 70 IN

## 2024-08-08 DIAGNOSIS — B35.1 ONYCHOMYCOSIS: Primary | ICD-10-CM

## 2024-08-08 DIAGNOSIS — E11.9 TYPE 2 DIABETES MELLITUS WITHOUT COMPLICATION, WITHOUT LONG-TERM CURRENT USE OF INSULIN (HCC): ICD-10-CM

## 2024-08-08 PROCEDURE — 99203 OFFICE O/P NEW LOW 30 MIN: CPT | Performed by: PODIATRIST

## 2024-08-08 RX ORDER — TERBINAFINE HYDROCHLORIDE 250 MG/1
250 TABLET ORAL DAILY
Qty: 30 TABLET | Refills: 2 | Status: SHIPPED | OUTPATIENT
Start: 2024-08-08 | End: 2024-11-06

## 2024-08-08 NOTE — PROGRESS NOTES
Assessment/Plan:     The patient's clinical examination today significant for thickened and dystrophic pedal nail plates with brittleness and subtle debris consistent onychomycosis x 10.  The interdigital spaces are clear without maceration.  There is mild dry scaling skin to the plantar aspects of both feet without pruritus.  Pedal pulses are palpable.    Treatment options were discussed with the patient to include both oral and topical antifungal therapies.  The patient would like to treat his fungal infection his nail plates aggressively and wants proceed with oral therapy.  Pros and cons were discussed with the patient.  Potential side effects of oral antifungal therapy including hepatotoxicity was discussed with the patient.  His most recent CMP did show a mildly elevated alkaline phosphatase however his other liver enzymes are within normal limits.  I will start him on a 90-day course of oral terbinafine 250 mg daily.  We will repeat LFTs within 4 to 6 weeks of starting therapy.    Recommend follow-up in 3 months.  The patient will notify our office if he is to experience any side effects or complications with the oral terbinafine.     Diagnoses and all orders for this visit:    Onychomycosis  -     terbinafine (LamISIL) 250 mg tablet; Take 1 tablet (250 mg total) by mouth daily  -     Hepatic function panel; Future    Type 2 diabetes mellitus without complication, without long-term current use of insulin (HCC)  Comments:  Continue current therpay  Orders:  -     Ambulatory Referral to Podiatry          Subjective:     Patient ID: Horace Jacobo is a 68 y.o. male.    The patient presents today for his initial consultation with [facility] podiatry with a chief complaint of fungal toenails on his feet that have been present for many years.  He has tried topical therapies without any improvement.  He would be interested in starting oral therapy for managing his nail plates.      PAST MEDICAL HISTORY:  Past  Medical History:   Diagnosis Date    Allergic     Allergic rhinitis     Anxiety     Asthma     Basal cell carcinoma 04/07/2022    nose    Diabetes mellitus (HCC)     Disease of thyroid gland     EIC (epidermal inclusion cyst)     GERD (gastroesophageal reflux disease)     Hayfever     History of skin cancer     HL (hearing loss)     wears hearing aides on occasion    Hyperlipidemia     Hypertension     Memory loss, short term     R/O Seizures but placed on Keppra    PONV (postoperative nausea and vomiting)     x1 occurence    Sleep apnea     s/p gastric bypass    Tinnitus        PAST SURGICAL HISTORY:  Past Surgical History:   Procedure Laterality Date    ABDOMINOPLASTY      ADENOIDECTOMY      BACK SURGERY      lower back surgery    CARPAL TUNNEL RELEASE Bilateral     CHOLECYSTECTOMY      COLONOSCOPY      GASTRIC BYPASS      HERNIA REPAIR Right     JOINT REPLACEMENT Bilateral     Knee    KNEE ARTHROSCOPY Bilateral     X3    MOHS SURGERY  06/15/2022    nose-Dr. Patel    KY RPR 1ST INGUN HRNA AGE 5 YRS/> REDUCIBLE Left 11/26/2018    Procedure: INGUINAL HERNIA REPAIR;  Surgeon: Rubens Casas DO;  Location: [facility];  Service: General    KY SURGICAL ARTHROSCOPY SHOULDER W/ROTATOR CUFF RPR Left 5/10/2023    Procedure: SHOULDER ARTHROSCOPIC ROTATOR CUFF REPAIR, SUBACROMIAL DECOMPRESSION, BICEP TENODESIS;  Surgeon: Mario Jackson MD;  Location: [facility];  Service: Orthopedics    KY TENDON SHEATH INCISION Right 04/29/2022    Procedure: RELEASE TRIGGER FINGER RING WITH LONG;  Surgeon: Hay Doty MD;  Location: [facility];  Service: Orthopedics    TONSILLECTOMY      TRIGGER FINGER RELEASE Right 04/29/2022        ALLERGIES:  Iv dye [iodinated contrast media] and Penicillins    MEDICATIONS:  Current Outpatient Medications   Medication Sig Dispense Refill    ALPRAZolam (XANAX) 0.25 mg tablet Take 1 tablet (0.25 mg total) by mouth 4 (four) times a day as needed for anxiety 120 tablet 0     atorvastatin (LIPITOR) 40 mg tablet Take 40 mg by mouth daily      celecoxib (CeleBREX) 200 mg capsule Take 1 capsule (200 mg total) by mouth 2 (two) times a day 60 capsule 5    Cholecalciferol 2000 units CAPS Take 1 capsule by mouth daily after dinner      clobetasol (TEMOVATE) 0.05 % cream Apply 5 g (1 Application total) topically 2 (two) times a day To affected area 60 g 0    Cyanocobalamin (B-12) 1000 MCG CAPS Take 1 tablet by mouth daily after dinner      diltiazem (DILACOR XR) 240 MG 24 hr capsule Take 240 mg by mouth daily      famotidine (PEPCID) 40 MG tablet TAKE 1 TABLET BY MOUTH TWICE A  tablet 2    fexofenadine (ALLEGRA) 180 MG tablet TAKE 1 TABLET BY MOUTH EVERY DAY 90 tablet 1    fluticasone (FLONASE) 50 mcg/act nasal spray 2 sprays into each nostril 2 (two) times a day as needed        fluticasone-salmeterol (Advair HFA) 230-21 MCG/ACT inhaler Inhale 2 puffs 2 (two) times a day      hydrochlorothiazide (HYDRODIURIL) 25 mg tablet Take 1 tablet by mouth daily in the early morning    3    ketoconazole (NIZORAL) 2 % cream Apply 1 Application topically 2 (two) times a day To affected area 180 g 0    levalbuterol (XOPENEX HFA) 45 mcg/act inhaler Inhale 2 puffs every 4 (four) hours as needed for wheezing 15 g 2    levETIRAcetam (KEPPRA) 500 mg tablet TAKE 1 TABLET BY MOUTH TWICE A  tablet 1    linaGLIPtin 5 MG TABS Take 5 mg by mouth daily with or without food 30 tablet 5    losartan (COZAAR) 25 mg tablet Take 25 mg by mouth daily      metFORMIN (GLUCOPHAGE) 1000 MG tablet TAKE 1 TABLET BY MOUTH TWICE A DAY WITH MEALS 180 tablet 1    montelukast (SINGULAIR) 10 mg tablet Take 10 mg by mouth every evening  3    oxybutynin (DITROPAN) 5 mg tablet TAKE 1 TABLET BY MOUTH EVERY DAY AT NIGHT      oxyCODONE-acetaminophen (Percocet) 7.5-325 MG per tablet Take 1 tablet by mouth every 6 (six) hours as needed for moderate pain Max Daily Amount: 4 tablets 120 tablet 0    primidone (MYSOLINE) 50 mg tablet  TAKE 5 tablets daily 450 tablet 0    Pseudoeph-Doxylamine-DM-APAP (NYQUIL PO) Take by mouth as needed       pseudoephedrine (SUDAFED) 30 mg tablet Take 60 mg by mouth every 4 (four) hours as needed for congestion      tamsulosin (FLOMAX) 0.4 mg Take 0.4 mg by mouth daily with dinner      terbinafine (LamISIL) 250 mg tablet Take 1 tablet (250 mg total) by mouth daily 30 tablet 2    amoxicillin (AMOXIL) 500 mg capsule Take 500 mg by mouth 4 tablets 1 hour prior to procedure (Patient not taking: Reported on 6/12/2024)      azelastine (ASTELIN) 0.1 % nasal spray 1-2 sprays into each nostril 2 (two) times a day as needed (Patient not taking: Reported on 6/12/2024)      Benadryl Allergy 25 MG capsule Take 2 capsules (50mg total) 1 hour prior to procedure.  Do not drive after taking this medication. (Patient not taking: Reported on 6/12/2024)      levalbuterol (XOPENEX) 0.63 mg/3 mL nebulizer solution Take 0.63 mg by nebulization every 4 (four) hours as needed for wheezing or shortness of breath      levalbuterol (XOPENEX) 1.25 mg/3 mL nebulizer solution TAKE 3 ML (1.25 MG TOTAL) BY NEBULIZATION 3 (THREE) TIMES A DAY. (Patient not taking: Reported on 5/8/2024)      Magnesium Gluconate 550 MG TABS Take 30 mg by mouth daily      methocarbamol (ROBAXIN) 500 mg tablet Take 1 tablet (500 mg total) by mouth daily at bedtime 30 tablet 0    zolpidem (AMBIEN) 10 mg tablet Take 1 tablet (10 mg total) by mouth daily at bedtime as needed for sleep 30 tablet 0     No current facility-administered medications for this visit.       SOCIAL HISTORY:  Social History     Socioeconomic History    Marital status: /Civil Union     Spouse name: None    Number of children: 2    Years of education: trade school    Highest education level: None   Occupational History     Comment: employed   Tobacco Use    Smoking status: Never     Passive exposure: Past    Smokeless tobacco: Never   Vaping Use    Vaping status: Never Used   Substance and  Sexual Activity    Alcohol use: Yes     Comment: 1 or 2 month    Drug use: No    Sexual activity: Not Currently     Partners: Female     Birth control/protection: Male Sterilization   Other Topics Concern    None   Social History Narrative    Always uses seat belt    Caffeine use    Daily coffee consumption    Daily cola consumption    Dental care, regularly    DENIED exercise frequency    Guns in the home:stored in locked cabinet    Multiple organ donor    Patient has living will    DENIED pets/animals    Power of  in existence    Water intake, adequate (per day)     Social Determinants of Health     Financial Resource Strain: Low Risk  (8/17/2023)    Overall Financial Resource Strain (CARDIA)     Difficulty of Paying Living Expenses: Not very hard   Food Insecurity: Not on file   Transportation Needs: No Transportation Needs (8/17/2023)    PRAPARE - Transportation     Lack of Transportation (Medical): No     Lack of Transportation (Non-Medical): No   Physical Activity: Not on file   Stress: Not on file   Social Connections: Not on file   Intimate Partner Violence: Not At Risk (4/30/2024)    [facility]    Humiliation, Afraid, Rape, and Kick questionnaire     Fear of Current or Ex-Partner: No     Emotionally Abused: No     Physically Abused: No     Sexually Abused: No   Housing Stability: Not on file        Review of Systems   Constitutional: Negative.    HENT: Negative.     Eyes: Negative.    Respiratory: Negative.     Cardiovascular: Negative.    Endocrine: Negative.    Musculoskeletal: Negative.    Neurological: Negative.    Hematological: Negative.    Psychiatric/Behavioral: Negative.           Objective:     Physical Exam  Vitals reviewed.   Constitutional:       Appearance: Normal appearance.   HENT:      Head: Normocephalic and atraumatic.      Nose: Nose normal.   Eyes:      Conjunctiva/sclera: Conjunctivae normal.      Pupils: Pupils are  equal, round, and reactive to light.   Cardiovascular:      Pulses:           Dorsalis pedis pulses are 2+ on the right side and 2+ on the left side.        Posterior tibial pulses are 2+ on the right side and 2+ on the left side.   Pulmonary:      Effort: Pulmonary effort is normal.   Feet:      Right foot:      Skin integrity: Skin integrity normal.      Toenail Condition: Right toenails are abnormally thick. Fungal disease present.     Left foot:      Skin integrity: Skin integrity normal.      Toenail Condition: Left toenails are abnormally thick. Fungal disease present.     Comments: The patient's clinical examination today significant for thickened and dystrophic pedal nail plates with brittleness and subtle debris consistent onychomycosis x 10.  The interdigital spaces are clear without maceration.  There is mild dry scaling skin to the plantar aspects of both feet without pruritus.  Pedal pulses are palpable.    Skin:     General: Skin is warm.      Capillary Refill: Capillary refill takes less than 2 seconds.   Neurological:      General: No focal deficit present.      Mental Status: He is alert and oriented to person, place, and time.   Psychiatric:         Mood and Affect: Mood normal.         Behavior: Behavior normal.         Thought Content: Thought content normal.

## 2024-08-09 ENCOUNTER — OFFICE VISIT (OUTPATIENT)
Dept: PHYSICAL THERAPY | Facility: CLINIC | Age: 69
End: 2024-08-09
Payer: MEDICARE

## 2024-08-09 DIAGNOSIS — M54.16 LUMBAR RADICULOPATHY: Primary | ICD-10-CM

## 2024-08-09 DIAGNOSIS — M47.816 LUMBAR SPONDYLOSIS: ICD-10-CM

## 2024-08-09 DIAGNOSIS — R53.1 WEAKNESS: ICD-10-CM

## 2024-08-09 PROCEDURE — 97140 MANUAL THERAPY 1/> REGIONS: CPT

## 2024-08-09 PROCEDURE — 97110 THERAPEUTIC EXERCISES: CPT

## 2024-08-09 PROCEDURE — 97112 NEUROMUSCULAR REEDUCATION: CPT

## 2024-08-09 NOTE — PROGRESS NOTES
PT Discharge Summary    Today's date: 2024  Patient name: Horace Jacobo  : 1955  MRN: [id number]  Referring provider: Robles Kaur*  Dx:   Encounter Diagnosis     ICD-10-CM    1. Lumbar radiculopathy  M54.16       2. Weakness  R53.1       3. Lumbar spondylosis  M47.816           Start Time: 1400  Stop Time: 1440  Total time in clinic (min): 40 minutes    Subjective: Mentioned to be feeling approximately 70% back to PLOF. Mentioned to have less discomfort in back however feels it in the R lumbar region at times if pushing oneself. The patient presents today with a report of pain at a 0/10. The patient reports some change in symptoms since previous session.        Objective: See treatment diary below      Assessment: Noted improvements in special tests, ROM, strength, tenderness with palpation, pain, and FOTO score. These improvements allow the pt to be able to improve ADL and home care with less pain and discomfort. Deficits include strength, ROM, and tenderness with palpation these prevent the pt from being able to push himself with activities without discomfort following. The PT continued therapeutic exercise, neuromuscular reeducation, and therapeutic activity during today's session. Educated pt regarding HEP and the importance of continuing exercises in order to maintain improvements. The patient tolerated manual and active treatment well today.         Plan: Discharge due to feeling approximately 70% back to PLOF and meeting most goals.     Goals  Short Term Goals: to be achieved by 4 weeks  1) Patient to be independent with basic HEP- MET  2) Decrease pain to 3/10 at its worst- MET  3) Increase lumbar spine AROM by 50% in all deficient planes- MET  4) Increase LE strength by 1/2 MMT grade in all deficient planes- met     Long Term Goals: to be achieved by discharge  1) FOTO equal to or greater than projected goal- MET  2) Patient to be independent with comprehensive HEP- MET  3) Lumbar  "spine ROM WNL all planes to improve a/iadls-unmet  4) Increase LE strength to 5/5 MMT grade in all planes to improve a/iadls- unmet  5) Increase tolerance for ambulatory activities to >30 min.- MET       Pain  Current pain ratin  At worst pain rating: 3  Quality: dull ache and sharp  Relieving factors: medications and rest  Aggravating factors: lifting and standing (bending)     Objective      Postural Observations  Seated posture: fair  Standing posture: fair        Palpation   Left   Tenderness of the erector spinae and lumbar paraspinals.      Right   Tenderness of the erector spinae and lumbar paraspinals.      Active Range of Motion      Lumbar   Flexion: WFL  Extension: 10 degrees  with pain Restriction level: moderate  Left lateral flexion: WFL  Right lateral flexion: WFL  Left rotation:  Restriction level: minimal  Right rotation:  Restriction level: minimal    Left Hip   Flexion: 100 degrees   Extension: 0 degrees      Right Hip   Flexion: 105 degrees   Extension: 10 degrees      Joint Play      Hypomobile: T12, L1, L2, L3, L4, L5 and S1      Strength/Myotome Testing      Left Hip   Planes of Motion   Flexion: 5  Extension: 4  Abduction: 5     Right Hip   Planes of Motion   Flexion: 4+  Extension: 4  Abduction: 4+     Tests      Lumbar      Right   Negative passive SLR.      Functional Assessment        Comments  30 sec STS: w/o UE 8x     Tandem balance EO even surface: R in front: 60 sec. L in front: 60 sec.     Precautions: Epilepsy, asthma, Amnesia, anemia, anxiety, BPH, HTN, hyperlipidemia, Vit B12 def., Vit D def, T2DM, tremor, GERD, AIVR, Memory Impairment, Continuous Opioid Dependence      Visit 1 2 3 4 5 6 7 8 9 10   Date     Manuals                          Joint Mobs  AML AML          STM  AML AML AML         Measurements         AML    Neuro Re-Ed             clams  RTB  2x10 RTB  2x10 GTB 2x10 gilbert         PPT  10x10\" Hlying 10x10\" Hlying 10x10\" Hlying " "DC        Dead bugs  8x DC          Hip 4-ways             bridging  2x10 2x10 2x12         Hip add isometrics   10x10\" 10x10\" 10x10\"        Core set   Standing p pb  10x10\" Standing p pb  10x10\" Standing p pb  20x5\"        Lateral stepping     GTB, 2x1' GTB, 2x1' GTB, 2x1' GTB, 2x1'     Stir the Pot       10lbs 10x CW/CCW 10lbs 10x CW/CCW 12lbs 8x CW/CCW    TA anti-rotation with lateral stepping        5.5# 3 steps in and out  6x B 6.5# 3 steps in and out  7x B    Monster walks         G TB 2x10     TA anti-rotation    8lbs, 2x10 gilbert 8lbs, 2x10 gilbert  5 sec holds 10lbs, 2x10 gilbert  5 sec holds 10lbs, 2x10 gilbert  5 sec holds DC     Ther Ex             Hamstring stretch HEP  3x30\"            Sciatic nerve glides HEP  2x10 2x10 DC          NuStep  8' lvl 3 6' lvl 4  6' lvl 3  6' lvl 3 6' lvl 5     Rec bike    5' lvl 3  5' lvl 4   5' lvl 4    Cat and cow             Lumbar extensions   Standing  10x5\" Standing  10x5\" Standing  10x5\" Standing  10x5\"  Prone press up w/op  10x5\" Prone press up w/op  10x5\"    PPU  W/op brething out 10x           LTR   5\" holds  10x 5\" holds  10x         Hip hinges    NV 2x10 10x 10x DC     Rollouts      G pb  10x5\" hold, fwd/Rt/Lt G pb  5x10\" hold, fwd/Rt/Lt G pb  5x10\" hold, fwd/Rt/Lt     Goblet squats         2x10    RDL      10lbs, 2x8 10lbs, 2x10 10lbs, 2x10     Ther Activity             TRX squats    NV 2x10 2x12 2x12 2x15     Squat lifts       2x8 2x10     Suitcase carries    NV 25lbs 2 laps 25lbs 2 laps       Gait Training                                       Self Care             Education POC, HEP, and objective findings DOMs Updated HEP      Continuing HEP at home                                    "

## 2024-08-14 ENCOUNTER — RA CDI HCC (OUTPATIENT)
Dept: OTHER | Facility: HOSPITAL | Age: 69
End: 2024-08-14

## 2024-08-14 NOTE — PROGRESS NOTES
HCC coding opportunities          Chart Reviewed number of suggestions sent to Provider: 4  E66.01  E11.65  E11.36  E11.22    Patients Insurance     Medicare Insurance: Medicare

## 2024-08-16 DIAGNOSIS — G25.0 TREMOR, ESSENTIAL: ICD-10-CM

## 2024-08-16 RX ORDER — PRIMIDONE 50 MG/1
TABLET ORAL
Qty: 450 TABLET | Refills: 1 | Status: SHIPPED | OUTPATIENT
Start: 2024-08-16

## 2024-08-19 ENCOUNTER — TELEPHONE (OUTPATIENT)
Dept: NEUROLOGY | Facility: CLINIC | Age: 69
End: 2024-08-19

## 2024-08-19 NOTE — TELEPHONE ENCOUNTER
Received form for drug utilization review, for medication Primidone, forms have been scanned into patients chart.

## 2024-08-21 ENCOUNTER — OFFICE VISIT (OUTPATIENT)
Dept: FAMILY MEDICINE CLINIC | Facility: CLINIC | Age: 69
End: 2024-08-21
Payer: MEDICARE

## 2024-08-21 VITALS
DIASTOLIC BLOOD PRESSURE: 60 MMHG | OXYGEN SATURATION: 93 % | HEIGHT: 70 IN | BODY MASS INDEX: 36.97 KG/M2 | WEIGHT: 258.2 LBS | SYSTOLIC BLOOD PRESSURE: 100 MMHG | TEMPERATURE: 98 F | HEART RATE: 86 BPM

## 2024-08-21 DIAGNOSIS — E11.9 TYPE 2 DIABETES MELLITUS WITHOUT COMPLICATION, WITHOUT LONG-TERM CURRENT USE OF INSULIN (HCC): ICD-10-CM

## 2024-08-21 DIAGNOSIS — I10 ESSENTIAL HYPERTENSION: ICD-10-CM

## 2024-08-21 DIAGNOSIS — E78.5 HYPERLIPIDEMIA, UNSPECIFIED HYPERLIPIDEMIA TYPE: ICD-10-CM

## 2024-08-21 DIAGNOSIS — Z00.00 MEDICARE ANNUAL WELLNESS VISIT, SUBSEQUENT: Primary | ICD-10-CM

## 2024-08-21 LAB
CREAT UR-MCNC: 74 MG/DL
MICROALBUMIN UR-MCNC: 10.3 MG/L
MICROALBUMIN/CREAT 24H UR: 14 MG/G CREATININE (ref 0–30)

## 2024-08-21 PROCEDURE — 99214 OFFICE O/P EST MOD 30 MIN: CPT | Performed by: FAMILY MEDICINE

## 2024-08-21 PROCEDURE — 82570 ASSAY OF URINE CREATININE: CPT | Performed by: FAMILY MEDICINE

## 2024-08-21 PROCEDURE — G0439 PPPS, SUBSEQ VISIT: HCPCS | Performed by: FAMILY MEDICINE

## 2024-08-21 PROCEDURE — 82043 UR ALBUMIN QUANTITATIVE: CPT | Performed by: FAMILY MEDICINE

## 2024-08-21 RX ORDER — GLIMEPIRIDE 4 MG/1
4 TABLET ORAL 2 TIMES DAILY
Qty: 60 TABLET | Refills: 3 | Status: SHIPPED | OUTPATIENT
Start: 2024-08-21 | End: 2024-09-20

## 2024-08-21 NOTE — PATIENT INSTRUCTIONS
Medicare Preventive Visit Patient Instructions  Thank you for completing your Welcome to Medicare Visit or Medicare Annual Wellness Visit today. Your next wellness visit will be due in one year (8/22/2025).  The screening/preventive services that you may require over the next 5-10 years are detailed below. Some tests may not apply to you based off risk factors and/or age. Screening tests ordered at today's visit but not completed yet may show as past due. Also, please note that scanned in results may not display below.  Preventive Screenings:  Service Recommendations Previous Testing/Comments   Colorectal Cancer Screening  Colonoscopy    Fecal Occult Blood Test (FOBT)/Fecal Immunochemical Test (FIT)  Fecal DNA/Cologuard Test  Flexible Sigmoidoscopy Age: 45-75 years old   Colonoscopy: every 10 years (May be performed more frequently if at higher risk)  OR  FOBT/FIT: every 1 year  OR  Cologuard: every 3 years  OR  Sigmoidoscopy: every 5 years  Screening may be recommended earlier than age 45 if at higher risk for colorectal cancer. Also, an individualized decision between you and your healthcare provider will decide whether screening between the ages of 76-85 would be appropriate. Colonoscopy: 08/18/2023  FOBT/FIT: Not on file  Cologuard: Not on file  Sigmoidoscopy: Not on file          Prostate Cancer Screening Individualized decision between patient and health care provider in men between ages of 55-69   Medicare will cover every 12 months beginning on the day after your 50th birthday PSA: 0.96 ng/mL           Hepatitis C Screening Once for adults born between 1945 and 1965  More frequently in patients at high risk for Hepatitis C Hep C Antibody: Not on file        Diabetes Screening 1-2 times per year if you're at risk for diabetes or have pre-diabetes Fasting glucose: 125 mg/dL (8/5/2024)  A1C: 8.7 % (8/5/2024)      Cholesterol Screening Once every 5 years if you don't have a lipid disorder. May order more often  based on risk factors. Lipid panel: 08/05/2024         Other Preventive Screenings Covered by Medicare:  Abdominal Aortic Aneurysm (AAA) Screening: covered once if your at risk. You're considered to be at risk if you have a family history of AAA or a male between the age of 65-75 who smoking at least 100 cigarettes in your lifetime.  Lung Cancer Screening: covers low dose CT scan once per year if you meet all of the following conditions: (1) Age 55-77; (2) No signs or symptoms of lung cancer; (3) Current smoker or have quit smoking within the last 15 years; (4) You have a tobacco smoking history of at least 20 pack years (packs per day x number of years you smoked); (5) You get a written order from a healthcare provider.  Glaucoma Screening: covered annually if you're considered high risk: (1) You have diabetes OR (2) Family history of glaucoma OR (3)  aged 50 and older OR (4)  American aged 65 and older  Osteoporosis Screening: covered every 2 years if you meet one of the following conditions: (1) Have a vertebral abnormality; (2) On glucocorticoid therapy for more than 3 months; (3) Have primary hyperparathyroidism; (4) On osteoporosis medications and need to assess response to drug therapy.  HIV Screening: covered annually if you're between the age of 15-65. Also covered annually if you are younger than 15 and older than 65 with risk factors for HIV infection. For pregnant patients, it is covered up to 3 times per pregnancy.    Immunizations:  Immunization Recommendations   Influenza Vaccine Annual influenza vaccination during flu season is recommended for all persons aged >= 6 months who do not have contraindications   Pneumococcal Vaccine   * Pneumococcal conjugate vaccine = PCV13 (Prevnar 13), PCV15 (Vaxneuvance), PCV20 (Prevnar 20)  * Pneumococcal polysaccharide vaccine = PPSV23 (Pneumovax) Adults 19-63 yo with certain risk factors or if 65+ yo  If never received any pneumonia vaccine:  recommend Prevnar 20 (PCV20)  Give PCV20 if previously received 1 dose of PCV13 or PPSV23   Hepatitis B Vaccine 3 dose series if at intermediate or high risk (ex: diabetes, end stage renal disease, liver disease)   Respiratory syncytial virus (RSV) Vaccine - COVERED BY MEDICARE PART D  * RSVPreF3 (Arexvy) CDC recommends that adults 60 years of age and older may receive a single dose of RSV vaccine using shared clinical decision-making (SCDM)   Tetanus (Td) Vaccine - COST NOT COVERED BY MEDICARE PART B Following completion of primary series, a booster dose should be given every 10 years to maintain immunity against tetanus. Td may also be given as tetanus wound prophylaxis.   Tdap Vaccine - COST NOT COVERED BY MEDICARE PART B Recommended at least once for all adults. For pregnant patients, recommended with each pregnancy.   Shingles Vaccine (Shingrix) - COST NOT COVERED BY MEDICARE PART B  2 shot series recommended in those 19 years and older who have or will have weakened immune systems or those 50 years and older     Health Maintenance Due:      Topic Date Due   • Hepatitis C Screening  Never done   • Colorectal Cancer Screening  08/18/2026     Immunizations Due:      Topic Date Due   • COVID-19 Vaccine (5 - 2023-24 season) 09/01/2023   • Influenza Vaccine (1) 09/01/2024     Advance Directives   What are advance directives?  Advance directives are legal documents that state your wishes and plans for medical care. These plans are made ahead of time in case you lose your ability to make decisions for yourself. Advance directives can apply to any medical decision, such as the treatments you want, and if you want to donate organs.   What are the types of advance directives?  There are many types of advance directives, and each state has rules about how to use them. You may choose a combination of any of the following:  Living will:  This is a written record of the treatment you want. You can also choose which  treatments you do not want, which to limit, and which to stop at a certain time. This includes surgery, medicine, IV fluid, and tube feedings.   Durable power of  for healthcare (DPAHC):  This is a written record that states who you want to make healthcare choices for you when you are unable to make them for yourself. This person, called a proxy, is usually a family member or a friend. You may choose more than 1 proxy.  Do not resuscitate (DNR) order:  A DNR order is used in case your heart stops beating or you stop breathing. It is a request not to have certain forms of treatment, such as CPR. A DNR order may be included in other types of advance directives.  Medical directive:  This covers the care that you want if you are in a coma, near death, or unable to make decisions for yourself. You can list the treatments you want for each condition. Treatment may include pain medicine, surgery, blood transfusions, dialysis, IV or tube feedings, and a ventilator (breathing machine).  Values history:  This document has questions about your views, beliefs, and how you feel and think about life. This information can help others choose the care that you would choose.  Why are advance directives important?  An advance directive helps you control your care. Although spoken wishes may be used, it is better to have your wishes written down. Spoken wishes can be misunderstood, or not followed. Treatments may be given even if you do not want them. An advance directive may make it easier for your family to make difficult choices about your care.   Weight Management   Why it is important to manage your weight:  Being overweight increases your risk of health conditions such as heart disease, high blood pressure, type 2 diabetes, and certain types of cancer. It can also increase your risk for osteoarthritis, sleep apnea, and other respiratory problems. Aim for a slow, steady weight loss. Even a small amount of weight loss can  lower your risk of health problems.  How to lose weight safely:  A safe and healthy way to lose weight is to eat fewer calories and get regular exercise. You can lose up about 1 pound a week by decreasing the number of calories you eat by 500 calories each day.   Healthy meal plan for weight management:  A healthy meal plan includes a variety of foods, contains fewer calories, and helps you stay healthy. A healthy meal plan includes the following:  Eat whole-grain foods more often.  A healthy meal plan should contain fiber. Fiber is the part of grains, fruits, and vegetables that is not broken down by your body. Whole-grain foods are healthy and provide extra fiber in your diet. Some examples of whole-grain foods are whole-wheat breads and pastas, oatmeal, brown rice, and bulgur.  Eat a variety of vegetables every day.  Include dark, leafy greens such as spinach, kale, setela greens, and mustard greens. Eat yellow and orange vegetables such as carrots, sweet potatoes, and winter squash.   Eat a variety of fruits every day.  Choose fresh or canned fruit (canned in its own juice or light syrup) instead of juice. Fruit juice has very little or no fiber.  Eat low-fat dairy foods.  Drink fat-free (skim) milk or 1% milk. Eat fat-free yogurt and low-fat cottage cheese. Try low-fat cheeses such as mozzarella and other reduced-fat cheeses.  Choose meat and other protein foods that are low in fat.  Choose beans or other legumes such as split peas or lentils. Choose fish, skinless poultry (chicken or turkey), or lean cuts of red meat (beef or pork). Before you cook meat or poultry, cut off any visible fat.   Use less fat and oil.  Try baking foods instead of frying them. Add less fat, such as margarine, sour cream, regular salad dressing and mayonnaise to foods. Eat fewer high-fat foods. Some examples of high-fat foods include french fries, doughnuts, ice cream, and cakes.  Eat fewer sweets.  Limit foods and drinks that are  high in sugar. This includes candy, cookies, regular soda, and sweetened drinks.  Exercise:  Exercise at least 30 minutes per day on most days of the week. Some examples of exercise include walking, biking, dancing, and swimming. You can also fit in more physical activity by taking the stairs instead of the elevator or parking farther away from stores. Ask your healthcare provider about the best exercise plan for you.      © Copyright [employer] 2018 Information is for End User's use only and may not be sold, redistributed or otherwise used for commercial purposes. All illustrations and images included in CareNotes® are the copyrighted property of [employer] or [employer]

## 2024-08-21 NOTE — PROGRESS NOTES
Ambulatory Visit  Name: Horace Jacobo      : 1955      MRN: [id number]  Encounter Provider: Sherry Meza DO  Encounter Date: 2024   Encounter department: [facility]    Assessment & Plan   1. Medicare annual wellness visit, subsequent  2. Type 2 diabetes mellitus without complication, without long-term current use of insulin (HCC)  Comments:  Pt to check with his insurance to see if mounjaro or ozempic is preferred.  Orders:  -     glimepiride (AMARYL) 4 mg tablet; Take 1 tablet (4 mg total) by mouth 2 (two) times a day  -     Hemoglobin A1C; Future; Expected date: 2024  -     Albumin / creatinine urine ratio; Future; Expected date: 2024  -     Albumin / creatinine urine ratio  3. Essential hypertension  Comments:  Cotninue ARB therapy  Orders:  -     Comprehensive metabolic panel; Future; Expected date: 2024  4. Hyperlipidemia, unspecified hyperlipidemia type  Comments:  Continue with lowfat diet.  Orders:  -     Lipid Panel with Direct LDL reflex; Future; Expected date: 2024       Preventive health issues were discussed with patient, and age appropriate screening tests were ordered as noted in patient's After Visit Summary. Personalized health advice and appropriate referrals for health education or preventive services given if needed, as noted in patient's After Visit Summary.    History of Present Illness     HPI   Patient Care Team:  Sherry Meza DO as PCP - General  MD Pedro Luis Mora MD Brett Godbout, MD Kristan Faneck, PA-C  Her NIDDM, hypertension and hyperlipidemia area all well controlled.  Review of Systems   Constitutional:  Negative for appetite change, chills and fever.   HENT:  Negative for ear pain, facial swelling, rhinorrhea, sinus pain, sore throat and trouble swallowing.    Eyes:  Negative for discharge and redness.   Respiratory:  Negative for chest tightness, shortness of breath and wheezing.     Cardiovascular:  Negative for chest pain and palpitations.   Gastrointestinal:  Negative for abdominal pain, diarrhea, nausea and vomiting.   Endocrine: Negative for polyuria.   Genitourinary:  Negative for dysuria and urgency.   Musculoskeletal:  Negative for arthralgias and back pain.   Skin:  Negative for rash.   Neurological:  Negative for dizziness, weakness and headaches.   Hematological:  Negative for adenopathy.   Psychiatric/Behavioral:  Negative for behavioral problems, confusion and sleep disturbance.    All other systems reviewed and are negative.    Medical History Reviewed by provider this encounter:       Annual Wellness Visit Questionnaire   Horace is here for his Subsequent Wellness visit. Last Medicare Wellness visit information reviewed, patient interviewed, no change since last AWV.     Health Risk Assessment:   Patient rates overall health as fair. Patient feels that their physical health rating is same. Patient is satisfied with their life. Eyesight was rated as slightly worse. Hearing was rated as slightly worse. Patient feels that their emotional and mental health rating is same. Patients states they are never, rarely angry. Patient states they are sometimes unusually tired/fatigued. Pain experienced in the last 7 days has been a lot. Patient's pain rating has been 5/10. Patient states that he has experienced no weight loss or gain in last 6 months.     Depression Screening:   PHQ-2 Score: 0      Fall Risk Screening:   In the past year, patient has experienced: history of falling in past year    Number of falls: 1  Injured during fall?: Yes    Feels unsteady when standing or walking?: Yes    Worried about falling?: Yes      Home Safety:  Patient does not have trouble with stairs inside or outside of their home. Patient has working smoke alarms and has no working carbon monoxide detector. Home safety hazards include: none.     Nutrition:   Current diet is Regular.     Medications:   Patient  is currently taking over-the-counter supplements. OTC medications include: see medication list. Patient is able to manage medications.     Activities of Daily Living (ADLs)/Instrumental Activities of Daily Living (IADLs):   Walk and transfer into and out of bed and chair?: Yes  Dress and groom yourself?: Yes    Bathe or shower yourself?: Yes    Feed yourself? Yes  Do your laundry/housekeeping?: Yes  Manage your money, pay your bills and track your expenses?: Yes  Make your own meals?: Yes    Do your own shopping?: Yes    Previous Hospitalizations:   Any hospitalizations or ED visits within the last 12 months?: No      Advance Care Planning:   Living will: Yes    Durable POA for healthcare: Yes    Advanced directive: Yes    Advanced directive counseling given: Yes    ACP document given: Yes    Patient declined ACP directive: No    End of Life Decisions reviewed with patient: Yes    Provider agrees with end of life decisions: Yes      Cognitive Screening:   Provider or family/friend/caregiver concerned regarding cognition?: No    PREVENTIVE SCREENINGS      Cardiovascular Screening:    General: Screening Not Indicated and History Lipid Disorder      Diabetes Screening:     General: Screening Not Indicated and History Diabetes      Colorectal Cancer Screening:     General: Screening Current      Prostate Cancer Screening:    General: Screening Current      Osteoporosis Screening:    General: Screening Current      Abdominal Aortic Aneurysm (AAA) Screening:    Risk factors include: age between 65-76 yo        General: Screening Current and Screening Not Indicated      Lung Cancer Screening:     General: Screening Not Indicated      Hepatitis C Screening:    General: Screening Current    Screening, Brief Intervention, and Referral to Treatment (SBIRT)    Screening  Typical number of drinks in a day: 0  Typical number of drinks in a week: 0  Interpretation: Low risk drinking behavior.    AUDIT-C Screenin) How often  "did you have a drink containing alcohol in the past year? monthly or less  2) How many drinks did you have on a typical day when you were drinking in the past year? 1 to 2  3) How often did you have 6 or more drinks on one occasion in the past year? never    AUDIT-C Score: 1  Interpretation: Score 0-3 (male): Negative screen for alcohol misuse    Single Item Drug Screening:  How often have you used an illegal drug (including marijuana) or a prescription medication for non-medical reasons in the past year? never    Single Item Drug Screen Score: 0  Interpretation: Negative screen for possible drug use disorder    Review of Current Opioid Use    Opioid Risk Tool (ORT) Interpretation: Complete Opioid Risk Tool (ORT)    Social Determinants of Health     Financial Resource Strain: Low Risk  (8/17/2023)    Overall Financial Resource Strain (CARDIA)     Difficulty of Paying Living Expenses: Not very hard   Food Insecurity: No Food Insecurity (8/21/2024)    Hunger Vital Sign     Worried About Running Out of Food in the Last Year: Never true     Ran Out of Food in the Last Year: Never true   Transportation Needs: No Transportation Needs (8/21/2024)    PRAPARE - Transportation     Lack of Transportation (Medical): No     Lack of Transportation (Non-Medical): No   Housing Stability: Low Risk  (8/21/2024)    Housing Stability Vital Sign     Unable to Pay for Housing in the Last Year: No     Number of Times Moved in the Last Year: 1     Homeless in the Last Year: No   Utilities: Not At Risk (8/21/2024)    [facility] Utilities     Threatened with loss of utilities: No     No results found.    Objective     /60   Pulse 86   Temp 98 °F (36.7 °C)   Ht 5' 10\" (1.778 m)   Wt 117 kg (258 lb 3.2 oz)   SpO2 93%   BMI 37.05 kg/m²     Physical Exam  Constitutional:       General: He is not in acute distress.     Appearance: He is well-developed.   HENT:      Head: Normocephalic and atraumatic.      Right Ear: External ear normal.      " Left Ear: External ear normal.      Nose: Nose normal.      Mouth/Throat:      Pharynx: No oropharyngeal exudate.   Eyes:      General: No scleral icterus.        Right eye: No discharge.         Left eye: No discharge.      Conjunctiva/sclera: Conjunctivae normal.      Pupils: Pupils are equal, round, and reactive to light.   Neck:      Thyroid: No thyromegaly.      Vascular: No JVD.      Trachea: No tracheal deviation.   Cardiovascular:      Rate and Rhythm: Normal rate and regular rhythm.      Heart sounds: Normal heart sounds. No murmur heard.  Pulmonary:      Effort: No respiratory distress.      Breath sounds: Normal breath sounds. No stridor. No wheezing or rales.   Abdominal:      General: Bowel sounds are normal. There is no distension.      Palpations: Abdomen is soft. There is no mass.      Tenderness: There is no abdominal tenderness. There is no guarding or rebound.   Musculoskeletal:         General: No tenderness. Normal range of motion.      Cervical back: Normal range of motion and neck supple.   Lymphadenopathy:      Cervical: No cervical adenopathy.   Skin:     General: Skin is warm.      Findings: No erythema or rash.   Neurological:      Mental Status: He is alert and oriented to person, place, and time.      Cranial Nerves: No cranial nerve deficit.      Motor: No abnormal muscle tone.      Coordination: Coordination normal.      Deep Tendon Reflexes: Reflexes are normal and symmetric. Reflexes normal.   Psychiatric:         Behavior: Behavior normal.         Thought Content: Thought content normal.         Judgment: Judgment normal.       Administrative Statements   I have spent a total time of 20 minutes in caring for this patient on the day of the visit/encounter including Diagnostic results, Prognosis, Risks and benefits of tx options, Instructions for management, Patient and family education, and Importance of tx compliance.

## 2024-08-24 DIAGNOSIS — F41.9 ANXIETY: ICD-10-CM

## 2024-08-24 DIAGNOSIS — M25.512 CHRONIC LEFT SHOULDER PAIN: ICD-10-CM

## 2024-08-24 DIAGNOSIS — G47.00 INSOMNIA, UNSPECIFIED TYPE: ICD-10-CM

## 2024-08-24 DIAGNOSIS — J30.1 ALLERGIC RHINITIS DUE TO POLLEN, UNSPECIFIED SEASONALITY: ICD-10-CM

## 2024-08-24 DIAGNOSIS — G89.29 CHRONIC LEFT SHOULDER PAIN: ICD-10-CM

## 2024-08-26 RX ORDER — FEXOFENADINE HCL 180 MG/1
180 TABLET ORAL DAILY
Qty: 90 TABLET | Refills: 0 | Status: SHIPPED | OUTPATIENT
Start: 2024-08-26

## 2024-08-26 RX ORDER — ZOLPIDEM TARTRATE 10 MG/1
10 TABLET ORAL
Qty: 30 TABLET | Refills: 0 | Status: SHIPPED | OUTPATIENT
Start: 2024-08-26

## 2024-08-26 RX ORDER — ALPRAZOLAM 0.25 MG
0.25 TABLET ORAL 4 TIMES DAILY PRN
Qty: 120 TABLET | Refills: 0 | Status: SHIPPED | OUTPATIENT
Start: 2024-08-26

## 2024-08-26 RX ORDER — OXYCODONE AND ACETAMINOPHEN 7.5; 325 MG/1; MG/1
1 TABLET ORAL EVERY 6 HOURS PRN
Qty: 120 TABLET | Refills: 0 | Status: SHIPPED | OUTPATIENT
Start: 2024-08-26

## 2024-08-27 NOTE — TELEPHONE ENCOUNTER
Form requesting review of primidone - mentions of prolonged half life and increased duration of action. Wanted to make office aware of potential concerns for patient related to medication.     Any concerns with use of primidone?

## 2024-09-12 DIAGNOSIS — E11.9 TYPE 2 DIABETES MELLITUS WITHOUT COMPLICATION, WITHOUT LONG-TERM CURRENT USE OF INSULIN (HCC): ICD-10-CM

## 2024-09-12 RX ORDER — GLIMEPIRIDE 4 MG/1
4 TABLET ORAL 2 TIMES DAILY
Qty: 180 TABLET | Refills: 1 | Status: SHIPPED | OUTPATIENT
Start: 2024-09-12

## 2024-09-17 ENCOUNTER — APPOINTMENT (OUTPATIENT)
Dept: LAB | Facility: MEDICAL CENTER | Age: 69
End: 2024-09-17
Payer: MEDICARE

## 2024-09-17 ENCOUNTER — OFFICE VISIT (OUTPATIENT)
Dept: PAIN MEDICINE | Facility: CLINIC | Age: 69
End: 2024-09-17
Payer: MEDICARE

## 2024-09-17 VITALS
DIASTOLIC BLOOD PRESSURE: 81 MMHG | WEIGHT: 258 LBS | SYSTOLIC BLOOD PRESSURE: 138 MMHG | HEART RATE: 71 BPM | HEIGHT: 70 IN | BODY MASS INDEX: 36.94 KG/M2

## 2024-09-17 DIAGNOSIS — E66.01 MORBID (SEVERE) OBESITY DUE TO EXCESS CALORIES (HCC): ICD-10-CM

## 2024-09-17 DIAGNOSIS — B35.1 ONYCHOMYCOSIS: ICD-10-CM

## 2024-09-17 DIAGNOSIS — M54.16 LUMBAR RADICULOPATHY: Primary | ICD-10-CM

## 2024-09-17 LAB
ALBUMIN SERPL BCG-MCNC: 4.4 G/DL (ref 3.5–5)
ALP SERPL-CCNC: 101 U/L (ref 34–104)
ALT SERPL W P-5'-P-CCNC: 24 U/L (ref 7–52)
AST SERPL W P-5'-P-CCNC: 17 U/L (ref 13–39)
BILIRUB DIRECT SERPL-MCNC: 0.07 MG/DL (ref 0–0.2)
BILIRUB SERPL-MCNC: 0.33 MG/DL (ref 0.2–1)
PROT SERPL-MCNC: 7 G/DL (ref 6.4–8.4)

## 2024-09-17 PROCEDURE — 80076 HEPATIC FUNCTION PANEL: CPT

## 2024-09-17 PROCEDURE — 36415 COLL VENOUS BLD VENIPUNCTURE: CPT

## 2024-09-17 PROCEDURE — 99214 OFFICE O/P EST MOD 30 MIN: CPT | Performed by: PAIN MEDICINE

## 2024-09-17 NOTE — PROGRESS NOTES
Assessment:  No diagnosis found.  Lumbar radiculopathy  Obesity   Lumbar spondylosis    Plan:  Patient is a 68-year-old male presents for follow-up regarding his back pain.  Patient states that he had improvement in his left-sided back pain after receiving his medial branch block in 07/2024 but continues to have right low back pain rating down the lateral aspect of right lower extremity.  MRI lumbar spine done in April 2024 and interpreted by me showing postsurgical changes and degenerative disc disease and lower lumbar levels.    1.  Will schedule right L4-L5 and L5-S1 TFESI    My impressions and treatment recommendations were discussed in detail with the patient, who verbalized understanding and had no further questions.      Complete risks and benefits including bleeding, infection, tissue reaction, nerve injury and allergic reaction were discussed. The approach was demonstrated using models and literature was provided. Verbal and written consent was obtained.        History of Present Illness:    Horace Jacobo is a 68 y.o. male who presents to [facility] for initial evaluation of the above stated pain complaints. The patient has a past medical and chronic pain history as outlined in the assessment section. He was referred by Robles Kaur MD  [address] [place] [address]   [address] [place] [id number] .    Patient is a 68-year-old male presents for follow-up regarding his low back pain.  Patient states that he had improvement in his left-sided back pain after receiving a medial branch block but continues to have low back pain radiating down his right lower extremity.  Patient states that symptoms have been aggravated over the past couple of weeks after doing yard work around the house.    Review of Systems:    Review of Systems    General: no fevers, chills, infections  Neuro: no saddle anesthesia  GI: no changes in bowel habits  : no changes in bladder habits  Hem: no  bleeding/anticoagulant use      Past Medical History:   Diagnosis Date    Allergic     Allergic rhinitis     Anxiety     Asthma     Basal cell carcinoma 04/07/2022    nose    Diabetes mellitus (HCC)     Disease of thyroid gland     EIC (epidermal inclusion cyst)     GERD (gastroesophageal reflux disease)     Hayfever     History of skin cancer     HL (hearing loss)     wears hearing aides on occasion    Hyperlipidemia     Hypertension     Memory loss, short term     R/O Seizures but placed on Keppra    PONV (postoperative nausea and vomiting)     x1 occurence    Sleep apnea     s/p gastric bypass    Tinnitus        Past Surgical History:   Procedure Laterality Date    ABDOMINOPLASTY      ADENOIDECTOMY      BACK SURGERY      lower back surgery    CARPAL TUNNEL RELEASE Bilateral     CHOLECYSTECTOMY      COLONOSCOPY      GASTRIC BYPASS      HERNIA REPAIR Right     JOINT REPLACEMENT Bilateral     Knee    KNEE ARTHROSCOPY Bilateral     X3    MOHS SURGERY  06/15/2022    nose-Dr. Patel    TX RPR 1ST INGUN HRNA AGE 5 YRS/> REDUCIBLE Left 11/26/2018    Procedure: INGUINAL HERNIA REPAIR;  Surgeon: Rubens Casas DO;  Location: [facility];  Service: General    TX SURGICAL ARTHROSCOPY SHOULDER W/ROTATOR CUFF RPR Left 5/10/2023    Procedure: SHOULDER ARTHROSCOPIC ROTATOR CUFF REPAIR, SUBACROMIAL DECOMPRESSION, BICEP TENODESIS;  Surgeon: Mario Jackson MD;  Location: [facility];  Service: Orthopedics    TX TENDON SHEATH INCISION Right 04/29/2022    Procedure: RELEASE TRIGGER FINGER RING WITH LONG;  Surgeon: Hay Doty MD;  Location: [facility];  Service: Orthopedics    TONSILLECTOMY      TRIGGER FINGER RELEASE Right 04/29/2022       Family History   Problem Relation Age of Onset    Heart disease Mother     Kidney cancer Mother     Cancer Mother     Hypertension Father     Bipolar disorder Sister         bipolar disorder NOS    Diabetes Maternal Grandmother        Social History     Occupational History      Comment: employed   Tobacco Use    Smoking status: Never     Passive exposure: Past    Smokeless tobacco: Never   Vaping Use    Vaping status: Never Used   Substance and Sexual Activity    Alcohol use: Yes     Comment: 1 or 2 month    Drug use: No    Sexual activity: Not Currently     Partners: Female     Birth control/protection: Male Sterilization         Current Outpatient Medications:     ALPRAZolam (XANAX) 0.25 mg tablet, Take 1 tablet (0.25 mg total) by mouth 4 (four) times a day as needed for anxiety, Disp: 120 tablet, Rfl: 0    amoxicillin (AMOXIL) 500 mg capsule, Take 500 mg by mouth 4 tablets 1 hour prior to procedure, Disp: , Rfl:     atorvastatin (LIPITOR) 40 mg tablet, Take 40 mg by mouth daily, Disp: , Rfl:     celecoxib (CeleBREX) 200 mg capsule, Take 1 capsule (200 mg total) by mouth 2 (two) times a day, Disp: 60 capsule, Rfl: 5    Cholecalciferol 2000 units CAPS, Take 1 capsule by mouth daily after dinner, Disp: , Rfl:     clobetasol (TEMOVATE) 0.05 % cream, Apply 5 g (1 Application total) topically 2 (two) times a day To affected area, Disp: 60 g, Rfl: 0    Cyanocobalamin (B-12) 1000 MCG CAPS, Take 1 tablet by mouth daily after dinner, Disp: , Rfl:     diltiazem (DILACOR XR) 240 MG 24 hr capsule, Take 240 mg by mouth daily, Disp: , Rfl:     fexofenadine (ALLEGRA) 180 MG tablet, Take 1 tablet (180 mg total) by mouth daily, Disp: 90 tablet, Rfl: 0    fluticasone (FLONASE) 50 mcg/act nasal spray, 2 sprays into each nostril 2 (two) times a day as needed  , Disp: , Rfl:     fluticasone-salmeterol (Advair HFA) 230-21 MCG/ACT inhaler, Inhale 2 puffs 2 (two) times a day, Disp: , Rfl:     glimepiride (AMARYL) 4 mg tablet, TAKE 1 TABLET BY MOUTH 2 TIMES A DAY., Disp: 180 tablet, Rfl: 1    hydrochlorothiazide (HYDRODIURIL) 25 mg tablet, Take 1 tablet by mouth daily in the early morning  , Disp: , Rfl: 3    ketoconazole (NIZORAL) 2 % cream, Apply 1 Application topically 2 (two) times a day To affected  area, Disp: 180 g, Rfl: 0    levalbuterol (XOPENEX HFA) 45 mcg/act inhaler, Inhale 2 puffs every 4 (four) hours as needed for wheezing, Disp: 15 g, Rfl: 2    levETIRAcetam (KEPPRA) 500 mg tablet, TAKE 1 TABLET BY MOUTH TWICE A DAY, Disp: 180 tablet, Rfl: 1    linaGLIPtin 5 MG TABS, Take 5 mg by mouth daily with or without food, Disp: 30 tablet, Rfl: 5    losartan (COZAAR) 25 mg tablet, Take 25 mg by mouth daily, Disp: , Rfl:     metFORMIN (GLUCOPHAGE) 1000 MG tablet, TAKE 1 TABLET BY MOUTH TWICE A DAY WITH MEALS, Disp: 180 tablet, Rfl: 1    montelukast (SINGULAIR) 10 mg tablet, Take 10 mg by mouth every evening, Disp: , Rfl: 3    oxybutynin (DITROPAN) 5 mg tablet, TAKE 1 TABLET BY MOUTH EVERY DAY AT NIGHT, Disp: , Rfl:     oxyCODONE-acetaminophen (Percocet) 7.5-325 MG per tablet, Take 1 tablet by mouth every 6 (six) hours as needed for moderate pain Max Daily Amount: 4 tablets, Disp: 120 tablet, Rfl: 0    primidone (MYSOLINE) 50 mg tablet, TAKE 5 TABLETS BY MOUTH DAILY, Disp: 450 tablet, Rfl: 1    Pseudoeph-Doxylamine-DM-APAP (NYQUIL PO), Take by mouth as needed , Disp: , Rfl:     pseudoephedrine (SUDAFED) 30 mg tablet, Take 60 mg by mouth every 4 (four) hours as needed for congestion, Disp: , Rfl:     tamsulosin (FLOMAX) 0.4 mg, Take 0.4 mg by mouth daily with dinner, Disp: , Rfl:     terbinafine (LamISIL) 250 mg tablet, Take 1 tablet (250 mg total) by mouth daily, Disp: 30 tablet, Rfl: 2    zolpidem (AMBIEN) 10 mg tablet, Take 1 tablet (10 mg total) by mouth daily at bedtime as needed for sleep, Disp: 30 tablet, Rfl: 0    azelastine (ASTELIN) 0.1 % nasal spray, 1-2 sprays into each nostril 2 (two) times a day as needed (Patient not taking: Reported on 6/12/2024), Disp: , Rfl:     Benadryl Allergy 25 MG capsule, Take 2 capsules (50mg total) 1 hour prior to procedure.  Do not drive after taking this medication. (Patient not taking: Reported on 6/12/2024), Disp: , Rfl:     famotidine (PEPCID) 40 MG tablet, TAKE 1  "TABLET BY MOUTH TWICE A DAY (Patient not taking: Reported on 8/21/2024), Disp: 180 tablet, Rfl: 2    levalbuterol (XOPENEX) 1.25 mg/3 mL nebulizer solution, TAKE 3 ML (1.25 MG TOTAL) BY NEBULIZATION 3 (THREE) TIMES A DAY. (Patient not taking: Reported on 5/8/2024), Disp: , Rfl:     Allergies   Allergen Reactions    Iv Dye [Iodinated Contrast Media] Hives    Penicillins Other (See Comments)     ? Had as a child-Unknown reaction       Physical Exam:    /81   Pulse 71   Ht 5' 10\" (1.778 m)   Wt 117 kg (258 lb)   BMI 37.02 kg/m²     Vitals:    09/17/24 1145   BP: 138/81   Pulse: 71     Constitutional: no apparent distress, pleasant, well appearing, does not appear sedated   HEENT: pupils equal and round, NC/AT,  symmetric facial muscles   Neck: supple  Cardiovascular: well perfused, no peripheral cyanosis  Pulmonary: good chest wall excursion, breathing unlabored   Psych: appropriate affect and insight, No agitation. No evidence of aberrant behavior   Skin: No rashes or lesions  Neuro: cranial nerves II-XII grossly intact   MSK:  Inspection: no signs of infection to back  Palpation: no tenderness to palpation in spine, no right hip tenderness  ROM: no significant rom abnormalities in lumbar spine   MMT 5/5 strength in B/L LE  Sensation to light touch intact B/L LE  DTR: 2+ in B/L patellar and achilles  Special test: + Slump test on right, - lumbar facet loading, - hip scour on right, negative Soha's finger on right  Gait: ambulates unassisted, gait is not antalgic      Imaging  No orders to display       No orders of the defined types were placed in this encounter.      "

## 2024-09-23 DIAGNOSIS — G47.00 INSOMNIA, UNSPECIFIED TYPE: ICD-10-CM

## 2024-09-23 DIAGNOSIS — M25.512 CHRONIC LEFT SHOULDER PAIN: ICD-10-CM

## 2024-09-23 DIAGNOSIS — R06.2 WHEEZING: ICD-10-CM

## 2024-09-23 DIAGNOSIS — G89.29 CHRONIC LEFT SHOULDER PAIN: ICD-10-CM

## 2024-09-23 DIAGNOSIS — F41.9 ANXIETY: ICD-10-CM

## 2024-09-24 RX ORDER — ZOLPIDEM TARTRATE 10 MG/1
10 TABLET ORAL
Qty: 30 TABLET | Refills: 0 | Status: SHIPPED | OUTPATIENT
Start: 2024-09-24

## 2024-09-24 RX ORDER — OXYCODONE AND ACETAMINOPHEN 7.5; 325 MG/1; MG/1
1 TABLET ORAL EVERY 6 HOURS PRN
Qty: 120 TABLET | Refills: 0 | Status: SHIPPED | OUTPATIENT
Start: 2024-09-24

## 2024-09-24 RX ORDER — ALPRAZOLAM 0.25 MG
0.25 TABLET ORAL 4 TIMES DAILY PRN
Qty: 120 TABLET | Refills: 0 | Status: SHIPPED | OUTPATIENT
Start: 2024-09-24

## 2024-09-24 RX ORDER — LEVALBUTEROL TARTRATE 45 UG/1
2 AEROSOL, METERED ORAL EVERY 4 HOURS PRN
Qty: 15 G | Refills: 5 | Status: SHIPPED | OUTPATIENT
Start: 2024-09-24

## 2024-09-24 NOTE — TELEPHONE ENCOUNTER
1 [id number] 08/27/2024 08/26/2024 ALPRAZolam (Tablet) 120.0 30 0.25 MG NA [facility], ... [place] 0 / 0 [place]    1 [id number] 08/27/2024 08/26/2024 oxyCODONE HYDROCHLORIDE 7.5 MG ORAL TABLET/ACETAMINOPHEN 325 MG (Tablet) 120.0 30 325 MG-7.5 MG 45.0 [facility], [facility]. Medicare 0 / 0 [place]    1 [id number] 08/26/2024 08/26/2024 Zolpidem Tartrate (Tablet) 30.0 30 10 MG NA [facility], ... [place] 0 / 0 [place]    1 [id number] 07/31/2024 07/26/2024 ALPRAZolam (Tablet) 120.0 30 0.25 MG NA [facility], ... [place] 0 / 0 [place]    1 [id number] 07/31/2024 07/26/2024 oxyCODONE HYDROCHLORIDE 7.5 MG ORAL TABLET/ACETAMINOPHEN 325 MG (Tablet) 120.0 30 325 MG-7.5 MG 45.0 [facility], ..C. [place] 0 / 0 [place]    1 [id number] 07/29/2024 07/26/2024 Zolpidem Tartrate (Tablet) 30.0 30 10 MG NA [facility], ..C. Medicare 0 / 0 [place]    1 [id number] 07/17/2024 07/17/2024 diazePAM (Tablet) 1.0 1 10 MG NA [facility] Private Pay 0 / 0 [place]    1 [id number] 07/04/2024 07/02/2024 ALPRAZolam (Tablet) 120.0 30 0.25 MG NA [facility], ..C. [place] 0 / 0 [place]    1 [id number] 07/04/2024 07/02/2024 oxyCODONE HYDROCHLORIDE 7.5 MG ORAL TABLET/ACETAMINOPHEN 325 MG (Tablet) 120.0 30 325 MG-7.5 MG 45.0 [facility] [place] 0 / 0 [place]    1 [id number] 07/02/2024 07/02/2024 Zolpidem Tartrate (Tablet) 30.0 30 10 MG NA [facility] Medicare 0 / 0 [place]

## 2024-10-07 ENCOUNTER — HOSPITAL ENCOUNTER (OUTPATIENT)
Dept: RADIOLOGY | Facility: HOSPITAL | Age: 69
Discharge: HOME | End: 2024-10-07
Attending: PAIN MEDICINE
Payer: MEDICARE

## 2024-10-07 VITALS
HEART RATE: 76 BPM | RESPIRATION RATE: 17 BRPM | SYSTOLIC BLOOD PRESSURE: 136 MMHG | DIASTOLIC BLOOD PRESSURE: 78 MMHG | TEMPERATURE: 97.5 F | OXYGEN SATURATION: 98 %

## 2024-10-07 DIAGNOSIS — M51.16 INTERVERTEBRAL DISC DISORDERS WITH RADICULOPATHY, LUMBAR REGION: ICD-10-CM

## 2024-10-07 DIAGNOSIS — E11.628 TYPE 2 DIABETES MELLITUS WITH OTHER SKIN COMPLICATIONS (HCC): ICD-10-CM

## 2024-10-07 RX ORDER — 0.9 % SODIUM CHLORIDE 0.9 %
1 VIAL (ML) INJECTION ONCE
Status: DISCONTINUED | OUTPATIENT
Start: 2024-10-07 | End: 2024-10-08 | Stop reason: HOSPADM

## 2024-10-07 RX ORDER — LIDOCAINE HYDROCHLORIDE 10 MG/ML
10 INJECTION, SOLUTION EPIDURAL; INFILTRATION; INTRACAUDAL; PERINEURAL ONCE
Status: COMPLETED | OUTPATIENT
Start: 2024-10-07 | End: 2024-10-07

## 2024-10-07 RX ORDER — BUPIVACAINE HCL/PF 2.5 MG/ML
10 VIAL (ML) INJECTION ONCE
Status: COMPLETED | OUTPATIENT
Start: 2024-10-07 | End: 2024-10-07

## 2024-10-07 RX ADMIN — IOHEXOL 2 ML: 300 INJECTION, SOLUTION INTRAVENOUS at 13:11

## 2024-10-07 RX ADMIN — BUPIVACAINE HYDROCHLORIDE 1 ML: 2.5 INJECTION, SOLUTION EPIDURAL; INFILTRATION; INTRACAUDAL at 13:12

## 2024-10-07 RX ADMIN — Medication 6 MG: at 13:13

## 2024-10-07 RX ADMIN — LIDOCAINE HYDROCHLORIDE 4 ML: 10 INJECTION, SOLUTION EPIDURAL; INFILTRATION; INTRACAUDAL; PERINEURAL at 13:11

## 2024-10-07 NOTE — DISCHARGE INSTR - LAB
Epidural Steroid Injection   WHAT YOU NEED TO KNOW:   An epidural steroid injection (DALREEN) is a procedure to inject steroid medicine into the epidural space. The epidural space is between your spinal cord and vertebrae. Steroids reduce inflammation and fluid buildup in your spine that may be causing pain. You may be given pain medicine along with the steroids.          ACTIVITY  Do not drive or operate machinery today.  No strenuous activity today - bending, lifting, etc.  You may resume normal activites starting tomorrow - start slowly and as tolerated.  You may shower today, but no tub baths or hot tubs.  You may have numbness for several hours from the local anesthetic. Please use caution and common sense, especially with weight-bearing activities.    CARE OF THE INJECTION SITE  If you have soreness or pain, apply ice to the area today (20 minutes on/20 minutes off).  Starting tomorrow, you may use warm, moist heat or ice if needed.  You may have an increase or change in your discomfort for 36-48 hours after your treatment.  Apply ice and continue with any pain medication you have been prescribed.  Notify the [facility] if you have any of the following: redness, drainage, swelling, headache, stiff neck or fever above 100°F.    SPECIAL INSTRUCTIONS  Our office will contact you in approximately 14 days for a progress report.    MEDICATIONS  Continue to take all routine medications.  Our office may have instructed you to hold some medications.    As no general anesthesia was used in today's procedure, you should not experience any side effects related to anesthesia.     If you are diabetic, the steroids used in today's injection may temporarily increase your blood sugar levels after the first few days after your injection. Please keep a close eye on your sugars and alert the doctor who manages your diabetes if your sugars are significantly high from your baseline or you are symptomatic.     If you have a  problem specifically related to your procedure, please call our office at [phone].  Problems not related to your procedure should be directed to your primary care physician.

## 2024-10-08 RX ORDER — LINAGLIPTIN 5 MG/1
TABLET, FILM COATED ORAL
Qty: 30 TABLET | Refills: 5 | Status: SHIPPED | OUTPATIENT
Start: 2024-10-08

## 2024-10-20 DIAGNOSIS — M25.512 CHRONIC LEFT SHOULDER PAIN: ICD-10-CM

## 2024-10-20 DIAGNOSIS — G47.00 INSOMNIA, UNSPECIFIED TYPE: ICD-10-CM

## 2024-10-20 DIAGNOSIS — G89.29 CHRONIC LEFT SHOULDER PAIN: ICD-10-CM

## 2024-10-20 DIAGNOSIS — F41.9 ANXIETY: ICD-10-CM

## 2024-10-22 ENCOUNTER — OFFICE VISIT (OUTPATIENT)
Dept: PAIN MEDICINE | Facility: CLINIC | Age: 69
End: 2024-10-22
Payer: MEDICARE

## 2024-10-22 VITALS
DIASTOLIC BLOOD PRESSURE: 76 MMHG | SYSTOLIC BLOOD PRESSURE: 157 MMHG | HEIGHT: 70 IN | HEART RATE: 88 BPM | BODY MASS INDEX: 36.94 KG/M2 | WEIGHT: 258 LBS

## 2024-10-22 DIAGNOSIS — M48.061 LUMBAR FORAMINAL STENOSIS: ICD-10-CM

## 2024-10-22 DIAGNOSIS — M47.816 LUMBAR SPONDYLOSIS: ICD-10-CM

## 2024-10-22 DIAGNOSIS — M54.16 LUMBAR RADICULOPATHY: Primary | ICD-10-CM

## 2024-10-22 PROCEDURE — 99213 OFFICE O/P EST LOW 20 MIN: CPT | Performed by: PAIN MEDICINE

## 2024-10-22 RX ORDER — ALPRAZOLAM 0.25 MG/1
0.25 TABLET ORAL 4 TIMES DAILY PRN
Qty: 120 TABLET | Refills: 0 | Status: SHIPPED | OUTPATIENT
Start: 2024-10-22

## 2024-10-22 RX ORDER — OXYCODONE AND ACETAMINOPHEN 7.5; 325 MG/1; MG/1
1 TABLET ORAL EVERY 6 HOURS PRN
Qty: 120 TABLET | Refills: 0 | Status: SHIPPED | OUTPATIENT
Start: 2024-10-22

## 2024-10-22 RX ORDER — ZOLPIDEM TARTRATE 10 MG/1
10 TABLET ORAL
Qty: 30 TABLET | Refills: 0 | Status: SHIPPED | OUTPATIENT
Start: 2024-10-22

## 2024-10-22 NOTE — TELEPHONE ENCOUNTER
1 [id number] 09/26/2024 09/24/2024 ALPRAZolam (Tablet) 120.0 30 0.25 MG NA [facility] [place] 0 / 0 [place]    1 [id number] 09/26/2024 09/24/2024 oxyCODONE HYDROCHLORIDE 7.5 MG ORAL TABLET/ACETAMINOPHEN 325 MG (Tablet) 120.0 30 325 MG-7.5 MG 45.0 [facility], ..C. Medicare 0 / 0 [place]    1 [id number] 09/24/2024 09/24/2024 Zolpidem Tartrate (Tablet) 30.0 30 10 MG NA [facility] Medicare 0 / 0 [place]    1 [id number] 08/27/2024 08/26/2024 ALPRAZolam (Tablet) 120.0 30 0.25 MG NA [facility] Medicare 0 / 0 [place]    1 [id number] 08/27/2024 08/26/2024 oxyCODONE HYDROCHLORIDE 7.5 MG ORAL TABLET/ACETAMINOPHEN 325 MG (Tablet) 120.0 30 325 MG-7.5 MG 45.0 [facility] Medicare 0 / 0 [place]    1 [id number] 08/26/2024 08/26/2024 Zolpidem Tartrate (Tablet) 30.0 30 10 MG [facility] Medicare 0 / 0 [place]    1 [id number] 07/31/2024 07/26/2024 ALPRAZolam (Tablet) 120.0 30 0.25 MG NA [facility] Medicare 0 / 0 [place]    1 [id number] 07/31/2024 07/26/2024 oxyCODONE HYDROCHLORIDE 7.5 MG ORAL TABLET/ACETAMINOPHEN 325 MG (Tablet) 120.0 30 325 MG-7.5 MG 45.0 [facility] Medicare 0 / 0 [place]    1 [id number] 07/29/2024 07/26/2024 Zolpidem Tartrate (Tablet) 30.0 30 10 MG NA [facility] Medicare 0 / 0 [place]    1 [id number] 07/17/2024 07/17/2024 diazePAM (Tablet) 1.0 1 10 MG NA [facility] Private Pay 0 / 0 [place]

## 2024-10-22 NOTE — PROGRESS NOTES
Assessment:  1. Lumbar radiculopathy    2. Lumbar spondylosis    3. Lumbar foraminal stenosis        Plan:  Status post right sided epidural with improvement in his leg pain continues have lower back pain right buttocks pain recommend possible right SI joint injection we will follow-up in 6 weeks if pain is still persistent right buttocks will proceed with sacroiliac joint injection or possible repeat DARLEEN radicular symptoms come back.    My impressions and treatment recommendations were discussed in detail with the patient, who verbalized understanding and had no further questions.      Complete risks and benefits including bleeding, infection, tissue reaction, nerve injury and allergic reaction were discussed. The approach was demonstrated using models and literature was provided. Verbal and written consent was obtained.        History of Present Illness:    F/u 10/22/24    Status post right L4-5 L5-S1 transforaminal with 80% reduction his right leg pain he continues to have right-sided lower back pain buttocks pain with activity such as bending twisting moving.  No significant reduction in pain after medial branch block on the right side.    F/u   Horace Jacobo is a 68 y.o. male who presents to [facility] for initial evaluation of the above stated pain complaints. The patient has a past medical and chronic pain history as outlined in the assessment section. He was referred by No referring provider defined for this encounter. .    Patient is a 68-year-old male presents for follow-up regarding his low back pain.  Patient states that he had improvement in his left-sided back pain after receiving a medial branch block but continues to have low back pain radiating down his right lower extremity.  Patient states that symptoms have been aggravated over the past couple of weeks after doing yard work around the house.    Review of Systems:    Review of Systems    General: no fevers, chills,  infections  Neuro: no saddle anesthesia  GI: no changes in bowel habits  : no changes in bladder habits  Hem: no bleeding/anticoagulant use      Past Medical History:   Diagnosis Date    Allergic     Allergic rhinitis     Anxiety     Asthma     Basal cell carcinoma 04/07/2022    nose    Diabetes mellitus (HCC)     Disease of thyroid gland     EIC (epidermal inclusion cyst)     GERD (gastroesophageal reflux disease)     Hayfever     History of skin cancer     HL (hearing loss)     wears hearing aides on occasion    Hyperlipidemia     Hypertension     Memory loss, short term     R/O Seizures but placed on Keppra    PONV (postoperative nausea and vomiting)     x1 occurence    Sleep apnea     s/p gastric bypass    Tinnitus        Past Surgical History:   Procedure Laterality Date    ABDOMINOPLASTY      ADENOIDECTOMY      BACK SURGERY      lower back surgery    CARPAL TUNNEL RELEASE Bilateral     CHOLECYSTECTOMY      COLONOSCOPY      GASTRIC BYPASS      HERNIA REPAIR Right     JOINT REPLACEMENT Bilateral     Knee    KNEE ARTHROSCOPY Bilateral     X3    MOHS SURGERY  06/15/2022    nose-Dr. Patel    TN RPR 1ST INGUN HRNA AGE 5 YRS/> REDUCIBLE Left 11/26/2018    Procedure: INGUINAL HERNIA REPAIR;  Surgeon: Rubens Casas DO;  Location: [facility];  Service: General    TN SURGICAL ARTHROSCOPY SHOULDER W/ROTATOR CUFF RPR Left 5/10/2023    Procedure: SHOULDER ARTHROSCOPIC ROTATOR CUFF REPAIR, SUBACROMIAL DECOMPRESSION, BICEP TENODESIS;  Surgeon: Mario Jackson MD;  Location: [facility];  Service: Orthopedics    TN TENDON SHEATH INCISION Right 04/29/2022    Procedure: RELEASE TRIGGER FINGER RING WITH LONG;  Surgeon: Hay Doty MD;  Location: [facility];  Service: Orthopedics    TONSILLECTOMY      TRIGGER FINGER RELEASE Right 04/29/2022       Family History   Problem Relation Age of Onset    Heart disease Mother     Kidney cancer Mother     Cancer Mother     Hypertension Father     Bipolar disorder  Sister         bipolar disorder NOS    Diabetes Maternal Grandmother        Social History     Occupational History     Comment: employed   Tobacco Use    Smoking status: Never     Passive exposure: Past    Smokeless tobacco: Never   Vaping Use    Vaping status: Never Used   Substance and Sexual Activity    Alcohol use: Yes     Comment: 1 or 2 month    Drug use: No    Sexual activity: Not Currently     Partners: Female     Birth control/protection: Male Sterilization         Current Outpatient Medications:     ALPRAZolam (XANAX) 0.25 mg tablet, Take 1 tablet (0.25 mg total) by mouth 4 (four) times a day as needed for anxiety, Disp: 120 tablet, Rfl: 0    amoxicillin (AMOXIL) 500 mg capsule, Take 500 mg by mouth 4 tablets 1 hour prior to procedure, Disp: , Rfl:     atorvastatin (LIPITOR) 40 mg tablet, Take 40 mg by mouth daily, Disp: , Rfl:     celecoxib (CeleBREX) 200 mg capsule, Take 1 capsule (200 mg total) by mouth 2 (two) times a day, Disp: 60 capsule, Rfl: 5    Cholecalciferol 2000 units CAPS, Take 1 capsule by mouth daily after dinner, Disp: , Rfl:     clobetasol (TEMOVATE) 0.05 % cream, Apply 5 g (1 Application total) topically 2 (two) times a day To affected area, Disp: 60 g, Rfl: 0    Cyanocobalamin (B-12) 1000 MCG CAPS, Take 1 tablet by mouth daily after dinner, Disp: , Rfl:     diltiazem (DILACOR XR) 240 MG 24 hr capsule, Take 240 mg by mouth daily, Disp: , Rfl:     fexofenadine (ALLEGRA) 180 MG tablet, Take 1 tablet (180 mg total) by mouth daily, Disp: 90 tablet, Rfl: 0    fluticasone (FLONASE) 50 mcg/act nasal spray, 2 sprays into each nostril 2 (two) times a day as needed  , Disp: , Rfl:     fluticasone-salmeterol (Advair HFA) 230-21 MCG/ACT inhaler, Inhale 2 puffs 2 (two) times a day, Disp: , Rfl:     glimepiride (AMARYL) 4 mg tablet, TAKE 1 TABLET BY MOUTH 2 TIMES A DAY., Disp: 180 tablet, Rfl: 1    hydrochlorothiazide (HYDRODIURIL) 25 mg tablet, Take 1 tablet by mouth daily in the early morning  ,  Disp: , Rfl: 3    ketoconazole (NIZORAL) 2 % cream, Apply 1 Application topically 2 (two) times a day To affected area, Disp: 180 g, Rfl: 0    levalbuterol (XOPENEX HFA) 45 mcg/act inhaler, Inhale 2 puffs every 4 (four) hours as needed for wheezing, Disp: 15 g, Rfl: 5    levETIRAcetam (KEPPRA) 500 mg tablet, TAKE 1 TABLET BY MOUTH TWICE A DAY, Disp: 180 tablet, Rfl: 1    losartan (COZAAR) 25 mg tablet, Take 25 mg by mouth daily, Disp: , Rfl:     metFORMIN (GLUCOPHAGE) 1000 MG tablet, TAKE 1 TABLET BY MOUTH TWICE A DAY WITH MEALS, Disp: 180 tablet, Rfl: 1    montelukast (SINGULAIR) 10 mg tablet, Take 10 mg by mouth every evening, Disp: , Rfl: 3    oxybutynin (DITROPAN) 5 mg tablet, TAKE 1 TABLET BY MOUTH EVERY DAY AT NIGHT, Disp: , Rfl:     oxyCODONE-acetaminophen (Percocet) 7.5-325 MG per tablet, Take 1 tablet by mouth every 6 (six) hours as needed for moderate pain Max Daily Amount: 4 tablets, Disp: 120 tablet, Rfl: 0    primidone (MYSOLINE) 50 mg tablet, TAKE 5 TABLETS BY MOUTH DAILY, Disp: 450 tablet, Rfl: 1    Pseudoeph-Doxylamine-DM-APAP (NYQUIL PO), Take by mouth as needed , Disp: , Rfl:     pseudoephedrine (SUDAFED) 30 mg tablet, Take 60 mg by mouth every 4 (four) hours as needed for congestion, Disp: , Rfl:     tamsulosin (FLOMAX) 0.4 mg, Take 0.4 mg by mouth daily with dinner, Disp: , Rfl:     terbinafine (LamISIL) 250 mg tablet, Take 1 tablet (250 mg total) by mouth daily, Disp: 30 tablet, Rfl: 2    Tradjenta 5 MG TABS, TAKE 1 TABLET BY MOUTH DAILY WITH OR WITHOUT FOOD, Disp: 30 tablet, Rfl: 5    zolpidem (AMBIEN) 10 mg tablet, Take 1 tablet (10 mg total) by mouth daily at bedtime as needed for sleep, Disp: 30 tablet, Rfl: 0    azelastine (ASTELIN) 0.1 % nasal spray, 1-2 sprays into each nostril 2 (two) times a day as needed (Patient not taking: Reported on 6/12/2024), Disp: , Rfl:     Benadryl Allergy 25 MG capsule, Take 2 capsules (50mg total) 1 hour prior to procedure.  Do not drive after taking this  "medication. (Patient not taking: Reported on 6/12/2024), Disp: , Rfl:     famotidine (PEPCID) 40 MG tablet, TAKE 1 TABLET BY MOUTH TWICE A DAY (Patient not taking: Reported on 8/21/2024), Disp: 180 tablet, Rfl: 2    levalbuterol (XOPENEX) 1.25 mg/3 mL nebulizer solution, TAKE 3 ML (1.25 MG TOTAL) BY NEBULIZATION 3 (THREE) TIMES A DAY. (Patient not taking: Reported on 5/8/2024), Disp: , Rfl:     Allergies   Allergen Reactions    Iv Dye [Iodinated Contrast Media] Hives    Penicillins Other (See Comments)     ? Had as a child-Unknown reaction       Physical Exam:    /76   Pulse 88   Ht 5' 10\" (1.778 m)   Wt 117 kg (258 lb)   BMI 37.02 kg/m²     Vitals:    10/22/24 1320   BP: 157/76   Pulse: 88       Constitutional: no apparent distress, pleasant, well appearing, does not appear sedated   HEENT: pupils equal and round, NC/AT,  symmetric facial muscles   Neck: supple  Cardiovascular: well perfused, no peripheral cyanosis  Pulmonary: good chest wall excursion, breathing unlabored   Psych: appropriate affect and insight, No agitation. No evidence of aberrant behavior   Skin: No rashes or lesions  Neuro: cranial nerves II-XII grossly intact   MSK:  Inspection: no signs of infection to back  Palpation: no tenderness to palpation in spine, no right hip tenderness  ROM: no significant rom abnormalities in lumbar spine   MMT 5/5 strength in B/L LE  Sensation to light touch intact B/L LE  DTR: 2+ in B/L patellar and achilles  Special test: + Slump test on right, - lumbar facet loading, - hip scour on right, negative Soha's finger on right  Gait: ambulates unassisted, gait is not antalgic      Imaging  No orders to display       No orders of the defined types were placed in this encounter.    Procedure  Bilateral MBB L3-4-5 7/1/2024  10/7/2024 right L4-5 5 S1 transforaminal    "

## 2024-11-03 DIAGNOSIS — M54.16 LUMBAR RADICULOPATHY: ICD-10-CM

## 2024-11-03 RX ORDER — CELECOXIB 200 MG/1
200 CAPSULE ORAL 2 TIMES DAILY
Qty: 60 CAPSULE | Refills: 5 | Status: SHIPPED | OUTPATIENT
Start: 2024-11-03

## 2024-11-07 ENCOUNTER — OFFICE VISIT (OUTPATIENT)
Dept: PODIATRY | Facility: CLINIC | Age: 69
End: 2024-11-07
Payer: MEDICARE

## 2024-11-07 VITALS — OXYGEN SATURATION: 97 % | HEIGHT: 70 IN | HEART RATE: 105 BPM | WEIGHT: 263 LBS | BODY MASS INDEX: 37.65 KG/M2

## 2024-11-07 DIAGNOSIS — B35.1 ONYCHOMYCOSIS: Primary | ICD-10-CM

## 2024-11-07 PROCEDURE — 99212 OFFICE O/P EST SF 10 MIN: CPT | Performed by: PODIATRIST

## 2024-11-07 NOTE — PROGRESS NOTES
Assessment/Plan:     The patient's clinical examination today significant for thickened and dystrophic, brittle and discolored pedal nail plates 1 through 5 of the left foot.  There is no tenderness associate with the nail changes.  Lysis of the distal portions of the affected nail plates are noted.  Pedal pulses are palpable.    The patient is currently tolerating oral terbinafine therapy well.  Repeat LFTs from September 2024 were within normal limits.  The patient notes that he has about 5 days left oral terbinafine.  He will complete that 90-day course as previously prescribed.    We will reevaluate his pedal nails in 6 months.  We can consider really starting oral antifungal therapy, perhaps trialing itraconazole instead, if the toenails do not show adequate clearing.  We would also need repeat blood work.     Diagnoses and all orders for this visit:    Onychomycosis          Subjective:     Patient ID: Horace Jacobo is a 68 y.o. male.    The patient presents today for follow-up of mycotic pedal nail plates on his left foot.  He is currently in the midst of a 90-day course of oral terbinafine.  He is tolerating the medication well without any side effects.  He did have some repeat labs for his liver done in September which were within normal limits.        Review of Systems   Constitutional: Negative.    HENT: Negative.     Eyes: Negative.    Respiratory: Negative.     Cardiovascular: Negative.    Endocrine: Negative.    Musculoskeletal: Negative.    Neurological: Negative.    Hematological: Negative.    Psychiatric/Behavioral: Negative.           Objective:     Physical Exam  Vitals reviewed.   Constitutional:       Appearance: Normal appearance.   HENT:      Head: Normocephalic and atraumatic.      Nose: Nose normal.   Eyes:      Conjunctiva/sclera: Conjunctivae normal.      Pupils: Pupils are equal, round, and reactive to light.   Cardiovascular:      Pulses:           Dorsalis pedis pulses are 2+ on the  left side.        Posterior tibial pulses are 2+ on the left side.   Pulmonary:      Effort: Pulmonary effort is normal.   Feet:      Left foot:      Skin integrity: Skin integrity normal.      Toenail Condition: Left toenails are abnormally thick. Fungal disease present.     Comments: The patient's clinical examination today significant for thickened and dystrophic, brittle and discolored pedal nail plates 1 through 5 of the left foot.  There is no tenderness associate with the nail changes.  Lysis of the distal portions of the affected nail plates are noted.  Pedal pulses are palpable.  Skin:     General: Skin is warm.      Capillary Refill: Capillary refill takes less than 2 seconds.   Neurological:      General: No focal deficit present.      Mental Status: He is alert and oriented to person, place, and time.   Psychiatric:         Mood and Affect: Mood normal.         Behavior: Behavior normal.         Thought Content: Thought content normal.

## 2024-11-11 ENCOUNTER — OFFICE VISIT (OUTPATIENT)
Dept: NEUROLOGY | Facility: CLINIC | Age: 69
End: 2024-11-11
Payer: MEDICARE

## 2024-11-11 VITALS
TEMPERATURE: 98.6 F | HEART RATE: 66 BPM | SYSTOLIC BLOOD PRESSURE: 120 MMHG | DIASTOLIC BLOOD PRESSURE: 80 MMHG | HEIGHT: 70 IN | WEIGHT: 267 LBS | BODY MASS INDEX: 38.22 KG/M2 | OXYGEN SATURATION: 96 %

## 2024-11-11 DIAGNOSIS — G40.009 PARTIAL IDIOPATHIC EPILEPSY WITH SEIZURES OF LOCALIZED ONSET, NOT INTRACTABLE, WITHOUT STATUS EPILEPTICUS (HCC): Primary | ICD-10-CM

## 2024-11-11 DIAGNOSIS — Z91.89 AT RISK FOR BONE DENSITY LOSS: ICD-10-CM

## 2024-11-11 DIAGNOSIS — G25.0 TREMOR, ESSENTIAL: ICD-10-CM

## 2024-11-11 DIAGNOSIS — Z79.818 LONG TERM (CURRENT) USE OF OTHER AGENTS AFFECTING ESTROGEN RECEPTORS AND ESTROGEN LEVELS: ICD-10-CM

## 2024-11-11 PROCEDURE — G2211 COMPLEX E/M VISIT ADD ON: HCPCS | Performed by: PSYCHIATRY & NEUROLOGY

## 2024-11-11 PROCEDURE — 99214 OFFICE O/P EST MOD 30 MIN: CPT | Performed by: PSYCHIATRY & NEUROLOGY

## 2024-11-11 RX ORDER — PRIMIDONE 50 MG/1
TABLET ORAL
Qty: 450 TABLET | Refills: 3 | Status: SHIPPED | OUTPATIENT
Start: 2024-11-11

## 2024-11-11 RX ORDER — LEVETIRACETAM 500 MG/1
500 TABLET ORAL 2 TIMES DAILY
Qty: 180 TABLET | Refills: 3 | Status: SHIPPED | OUTPATIENT
Start: 2024-11-11

## 2024-11-11 NOTE — PATIENT INSTRUCTIONS
Continue current medications unchanged.   Let us know if there are seizures or problems with your medication.   Return in on year (AP).

## 2024-11-11 NOTE — PROGRESS NOTES
[facility]  Follow Up Visit    Impression/Plan    Mr. Jacobo is a 69 y.o. 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  EEG reveals left temporal epileptiform discharges.  He was at some elevated risk for injury due to his profession which was additional motivation to continue AED therapy, but he retired in the last few years. The first event apparently involved additional symptoms including perioral numbness and lightheadedness. There are no levetiracetam side effects. Will continue therapy unchanged. Discussed the possibility of coming off levetiracetam. He prefers to continue therapy for now, but might consider it next year. Could repeat EEG prior to considering coming off medication, but he would be low risk for recurrence given his 10 years of seizure freedom independent of the result.      Essential tremor stable.  There is a strong family history.  He noticed it while writing, eating, using tools.  Primidone helps. Not sure if symptoms improved with increased from 200 to 250 mg nightly. He may decrease to 200 mg and see if symptoms worsen. Avoided propranolol due to history of asthma.     Chronic primidone increases risk of bone health problems. Checking DXA.      He notices some memory problems over the last few years since he retired.  B12 normal 12/2023. Primarily noticed when trying to remember names, the name will come to him sometime later.  Not interfering with daily activities.  Consider MoCA at next visit.    Patient Instructions   Continue current medications unchanged.   Let us know if there are seizures or problems with your medication.   Return in on year (AP).     Diagnoses and all orders for this visit:    Partial idiopathic epilepsy with seizures of localized onset, not intractable, without status epilepticus (HCC)  -     levETIRAcetam (KEPPRA) 500 mg tablet; Take 1 tablet (500 mg  "total) by mouth 2 (two) times a day  -     DXA bone density spine hip and pelvis; Future    Tremor, essential  -     primidone (MYSOLINE) 50 mg tablet; TAKE 5 TABLETS BY MOUTH DAILY    At risk for bone density loss  -     DXA bone density spine hip and pelvis; Future    Long term (current) use of other agents affecting estrogen receptors and estrogen levels  -     DXA bone density spine hip and pelvis; Future        Subjective    Horace Jacobo is returning to the [facility] for follow up.     Interval Events:   Seizures since last visit: None    No seizures. No staring spells. Tremor stable. Used to notice it when clipping his nails. 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 Syncope last year. Workup with cardiology was unrevealing.     Current AEDs:  levetiracetam 500 mg BID    Other medications include:   Primidone 250 mg daily for tremor      Event/Seizure semiology:  Two episodes of transient memory problems and confusion lasting a couple hours occurring on 3/26/2012 and 8/27/2014.     Special Features  Status epilepticus: no  Self Injury Seizures: no  Precipitating Factors: none     Epilepsy Risk Factors:  None     Prior AEDs:  None     Prior Evaluation:  Sleep-deprived EEG done October 2017: This Routine, sleep deprived EEG recorded during wakefulness,   drowsiness, and sleep is abnormal. Two left anterior and anterior   mid-temporal spike wave discharges are a potential source of   seizures.      MRI brain w wo contrast September 2014:  1.  Unremarkable MRI of the brain.      Psychiatric History:  None     Social History:   Driving: Yes  Lives Alone: No  Occupation: Retired in December from [employer].      Objective    /80 (BP Location: Left arm, Patient Position: Sitting, Cuff Size: Adult)   Pulse 66   Temp 98.6 °F (37 °C) (Temporal)   Ht 5' 10\" (1.778 m)   Wt 121 kg (267 lb)   SpO2 96%   BMI 38.31 kg/m²      General Exam  No " acute distress.    Neurologic Exam  Mental Status:  Alert and oriented.  Language: normal fluency and comprehension.  Cranial Nerves:   EOMI, no nystagums. Face symmetric. No dysarthria.  Motor:  No drift. 5/5 .   Coordination: Slight bilateral postural tremor. No significant action tremor. Finger to nose intact.  Gait: wide base, steady.

## 2024-11-16 DIAGNOSIS — F41.9 ANXIETY: ICD-10-CM

## 2024-11-16 DIAGNOSIS — G89.29 CHRONIC LEFT SHOULDER PAIN: ICD-10-CM

## 2024-11-16 DIAGNOSIS — G47.00 INSOMNIA, UNSPECIFIED TYPE: ICD-10-CM

## 2024-11-16 DIAGNOSIS — M25.512 CHRONIC LEFT SHOULDER PAIN: ICD-10-CM

## 2024-11-18 NOTE — TELEPHONE ENCOUNTER
1 [id number] 10/23/2024 10/22/2024 oxyCODONE HYDROCHLORIDE 7.5 MG ORAL TABLET/ACETAMINOPHEN 325 MG (Tablet) 120.0 30 325 MG-7.5 MG 45.0 [facility] Medicare 0 / 0 [place]    1 [id number] 10/23/2024 10/22/2024 ALPRAZolam (Tablet) 120.0 30 0.25 MG NA [facility] Medicare 0 / 0 [place]    1 [id number] 10/22/2024 10/22/2024 Zolpidem Tartrate (Tablet) 30.0 30 10 MG [facility] Medicare 0 / 0 [place]    1 [id number] 09/26/2024 09/24/2024 oxyCODONE HYDROCHLORIDE 7.5 MG ORAL TABLET/ACETAMINOPHEN 325 MG (Tablet) 120.0 30 325 MG-7.5 MG 45.0 [facility]. Medicare 0 / 0 [place]    1 [id number] 09/26/2024 09/24/2024 ALPRAZolam (Tablet) 120.0 30 0.25 MG [facility] Medicare 0 / 0 [place]    1 [id number] 09/24/2024 09/24/2024 Zolpidem Tartrate (Tablet) 30.0 30 10 MG [facility] Medicare 0 / 0 [place]    1 [id number] 08/27/2024 08/26/2024 oxyCODONE HYDROCHLORIDE 7.5 MG ORAL TABLET/ACETAMINOPHEN 325 MG (Tablet) 120.0 30 325 MG-7.5 MG 45.0 [facility] Medicare 0 / 0 [place]    1 [id number] 08/27/2024 08/26/2024 ALPRAZolam (Tablet) 120.0 30 0.25 MG [facility] Medicare 0 / 0 [place]    1 [id number] 08/26/2024 08/26/2024 Zolpidem Tartrate (Tablet) 30.0 30 10 MG [facility] Medicare 0 / 0 [place]    1 [id number] 07/31/2024 07/26/2024 oxyCODONE HYDROCHLORIDE 7.5 MG ORAL TABLET/ACETAMINOPHEN 325 MG (Tablet) 120.0 30 325 MG-7.5 MG 45.0 [facility]EG[facility] Medicare 0 / 0 [place]

## 2024-11-19 RX ORDER — ZOLPIDEM TARTRATE 10 MG/1
10 TABLET ORAL
Qty: 30 TABLET | Refills: 0 | Status: SHIPPED | OUTPATIENT
Start: 2024-11-19

## 2024-11-19 RX ORDER — ALPRAZOLAM 0.25 MG/1
0.25 TABLET ORAL 4 TIMES DAILY PRN
Qty: 120 TABLET | Refills: 0 | Status: SHIPPED | OUTPATIENT
Start: 2024-11-19

## 2024-11-19 RX ORDER — OXYCODONE AND ACETAMINOPHEN 7.5; 325 MG/1; MG/1
1 TABLET ORAL EVERY 6 HOURS PRN
Qty: 120 TABLET | Refills: 0 | Status: SHIPPED | OUTPATIENT
Start: 2024-11-19

## 2024-11-21 DIAGNOSIS — J30.1 ALLERGIC RHINITIS DUE TO POLLEN, UNSPECIFIED SEASONALITY: ICD-10-CM

## 2024-11-21 RX ORDER — FEXOFENADINE HCL 180 MG/1
180 TABLET ORAL DAILY
Qty: 90 TABLET | Refills: 1 | Status: SHIPPED | OUTPATIENT
Start: 2024-11-21

## 2024-12-02 ENCOUNTER — OFFICE VISIT (OUTPATIENT)
Dept: PAIN MEDICINE | Facility: CLINIC | Age: 69
End: 2024-12-02
Payer: MEDICARE

## 2024-12-02 ENCOUNTER — APPOINTMENT (OUTPATIENT)
Dept: RADIOLOGY | Facility: MEDICAL CENTER | Age: 69
End: 2024-12-02
Payer: MEDICARE

## 2024-12-02 VITALS — HEIGHT: 70 IN | WEIGHT: 267 LBS | BODY MASS INDEX: 38.22 KG/M2

## 2024-12-02 DIAGNOSIS — M47.816 LUMBAR SPONDYLOSIS: ICD-10-CM

## 2024-12-02 DIAGNOSIS — M96.1 POST LAMINECTOMY SYNDROME: ICD-10-CM

## 2024-12-02 DIAGNOSIS — M54.16 LUMBAR RADICULOPATHY: ICD-10-CM

## 2024-12-02 DIAGNOSIS — M54.2 NECK PAIN: ICD-10-CM

## 2024-12-02 DIAGNOSIS — M54.16 LUMBAR RADICULOPATHY: Primary | ICD-10-CM

## 2024-12-02 DIAGNOSIS — M54.16 LUMBAR RADICULOPATHY, CHRONIC: ICD-10-CM

## 2024-12-02 PROCEDURE — 99214 OFFICE O/P EST MOD 30 MIN: CPT | Performed by: PAIN MEDICINE

## 2024-12-02 PROCEDURE — 72050 X-RAY EXAM NECK SPINE 4/5VWS: CPT

## 2024-12-02 NOTE — PROGRESS NOTES
Assessment:  1. Lumbar radiculopathy    2. Lumbar spondylosis    3. Neck pain    4. Post laminectomy syndrome    5. Lumbar radiculopathy, chronic          Plan:  Horace is doing well status post epidural right L4-5 L5-S1 transforaminal he status post prior low back surgery with continuation of his low back and right leg pain.  As he has had minimal response with epidural and medial branch block will recommend a spinal cord stimulator trial under fluoroscopic guidance, and x-rays of his neckj she is having right-sided neck pain rating to the right upper extremity and refer to chiropractic care for his low back and neck pain symptoms.        My impressions and treatment recommendations were discussed in detail with the patient, who verbalized understanding and had no further questions.      Complete risks and benefits including bleeding, infection, tissue reaction, nerve injury and allergic reaction were discussed. The approach was demonstrated using models and literature was provided. Verbal and written consent was obtained.        History of Present Illness:    Status post epidural right L4-5 L5-S1 transforaminal 724 currently reporting 40% improvement in his low back and right buttocks pain continues to have low back pain with flexion tension exercises twisting to the right side no benefit after medial branch block on the right side.        F/u 10/22/24    Status post right L4-5 L5-S1 transforaminal with 80% reduction his right leg pain he continues to have right-sided lower back pain buttocks pain with activity such as bending twisting moving.  No significant reduction in pain after medial branch block on the right side.    F/u   Horace Jacobo is a 69 y.o. male who presents to [facility] for initial evaluation of the above stated pain complaints. The patient has a past medical and chronic pain history as outlined in the assessment section. He was referred by Robles Kaur,  MD  [address]  [place] [address] .    Patient is a 68-year-old male presents for follow-up regarding his low back pain.  Patient states that he had improvement in his left-sided back pain after receiving a medial branch block but continues to have low back pain radiating down his right lower extremity.  Patient states that symptoms have been aggravated over the past couple of weeks after doing yard work around the house.    Review of Systems:    Review of Systems    General: no fevers, chills, infections  Neuro: no saddle anesthesia  GI: no changes in bowel habits  : no changes in bladder habits  Hem: no bleeding/anticoagulant use      Past Medical History:   Diagnosis Date    Allergic     Allergic rhinitis     Anxiety     Asthma     Basal cell carcinoma 04/07/2022    nose    Diabetes mellitus (HCC)     Disease of thyroid gland     EIC (epidermal inclusion cyst)     GERD (gastroesophageal reflux disease)     Hayfever     History of skin cancer     HL (hearing loss)     wears hearing aides on occasion    Hyperlipidemia     Hypertension     Memory loss, short term     R/O Seizures but placed on Keppra    PONV (postoperative nausea and vomiting)     x1 occurence    Sleep apnea     s/p gastric bypass    Tinnitus        Past Surgical History:   Procedure Laterality Date    ABDOMINOPLASTY      ADENOIDECTOMY      BACK SURGERY      lower back surgery    CARPAL TUNNEL RELEASE Bilateral     CHOLECYSTECTOMY      COLONOSCOPY      GASTRIC BYPASS      HERNIA REPAIR Right     JOINT REPLACEMENT Bilateral     Knee    KNEE ARTHROSCOPY Bilateral     X3    MOHS SURGERY  06/15/2022    nose-Dr. Patel    MO RPR 1ST INGUN HRNA AGE 5 YRS/> REDUCIBLE Left 11/26/2018    Procedure: INGUINAL HERNIA REPAIR;  Surgeon: Rubens Casas DO;  [facility];  Service: General    MO SURGICAL ARTHROSCOPY SHOULDER W/ROTATOR CUFF RPR Left 5/10/2023    Procedure: SHOULDER ARTHROSCOPIC ROTATOR CUFF REPAIR, SUBACROMIAL  DECOMPRESSION, BICEP TENODESIS;  Surgeon: Mario Jackson MD;  Location: [facility];  Service: Orthopedics    NY TENDON SHEATH INCISION Right 04/29/2022    Procedure: RELEASE TRIGGER FINGER RING WITH LONG;  Surgeon: Hay Doty MD;  Location:  MAIN OR;  Service: Orthopedics    TONSILLECTOMY      TRIGGER FINGER RELEASE Right 04/29/2022       Family History   Problem Relation Age of Onset    Heart disease Mother     Kidney cancer Mother     Cancer Mother     Hypertension Father     Bipolar disorder Sister         bipolar disorder NOS    Diabetes Maternal Grandmother        Social History     Occupational History     Comment: employed   Tobacco Use    Smoking status: Never     Passive exposure: Past    Smokeless tobacco: Never   Vaping Use    Vaping status: Never Used   Substance and Sexual Activity    Alcohol use: Yes     Comment: 1 or 2 month    Drug use: No    Sexual activity: Not Currently     Partners: Female     Birth control/protection: Male Sterilization         Current Outpatient Medications:     ALPRAZolam (XANAX) 0.25 mg tablet, Take 1 tablet (0.25 mg total) by mouth 4 (four) times a day as needed for anxiety, Disp: 120 tablet, Rfl: 0    amoxicillin (AMOXIL) 500 mg capsule, Take 500 mg by mouth 4 tablets 1 hour prior to procedure, Disp: , Rfl:     atorvastatin (LIPITOR) 40 mg tablet, Take 40 mg by mouth daily, Disp: , Rfl:     azelastine (ASTELIN) 0.1 % nasal spray, 1-2 sprays into each nostril 2 (two) times a day as needed, Disp: , Rfl:     Benadryl Allergy 25 MG capsule, , Disp: , Rfl:     celecoxib (CeleBREX) 200 mg capsule, TAKE 1 CAPSULE BY MOUTH TWICE A DAY, Disp: 60 capsule, Rfl: 5    Cholecalciferol 2000 units CAPS, Take 1 capsule by mouth daily after dinner, Disp: , Rfl:     clobetasol (TEMOVATE) 0.05 % cream, Apply 5 g (1 Application total) topically 2 (two) times a day To affected area, Disp: 60 g, Rfl: 0    Cyanocobalamin (B-12) 1000 MCG CAPS, Take 1 tablet by mouth daily  after dinner, Disp: , Rfl:     diltiazem (DILACOR XR) 240 MG 24 hr capsule, Take 240 mg by mouth daily, Disp: , Rfl:     fexofenadine (ALLEGRA) 180 MG tablet, TAKE 1 TABLET BY MOUTH EVERY DAY, Disp: 90 tablet, Rfl: 1    fluticasone (FLONASE) 50 mcg/act nasal spray, 2 sprays into each nostril 2 (two) times a day as needed  , Disp: , Rfl:     fluticasone-salmeterol (Advair HFA) 230-21 MCG/ACT inhaler, Inhale 2 puffs 2 (two) times a day, Disp: , Rfl:     glimepiride (AMARYL) 4 mg tablet, TAKE 1 TABLET BY MOUTH 2 TIMES A DAY., Disp: 180 tablet, Rfl: 1    hydrochlorothiazide (HYDRODIURIL) 25 mg tablet, Take 1 tablet by mouth daily in the early morning  , Disp: , Rfl: 3    ketoconazole (NIZORAL) 2 % cream, Apply 1 Application topically 2 (two) times a day To affected area, Disp: 180 g, Rfl: 0    levalbuterol (XOPENEX HFA) 45 mcg/act inhaler, Inhale 2 puffs every 4 (four) hours as needed for wheezing, Disp: 15 g, Rfl: 5    levalbuterol (XOPENEX) 1.25 mg/3 mL nebulizer solution, , Disp: , Rfl:     levETIRAcetam (KEPPRA) 500 mg tablet, Take 1 tablet (500 mg total) by mouth 2 (two) times a day, Disp: 180 tablet, Rfl: 3    losartan (COZAAR) 25 mg tablet, Take 25 mg by mouth daily, Disp: , Rfl:     metFORMIN (GLUCOPHAGE) 1000 MG tablet, TAKE 1 TABLET BY MOUTH TWICE A DAY WITH MEALS, Disp: 180 tablet, Rfl: 1    oxybutynin (DITROPAN) 5 mg tablet, TAKE 1 TABLET BY MOUTH EVERY DAY AT NIGHT, Disp: , Rfl:     oxyCODONE-acetaminophen (Percocet) 7.5-325 MG per tablet, Take 1 tablet by mouth every 6 (six) hours as needed for moderate pain Max Daily Amount: 4 tablets, Disp: 120 tablet, Rfl: 0    primidone (MYSOLINE) 50 mg tablet, TAKE 5 TABLETS BY MOUTH DAILY, Disp: 450 tablet, Rfl: 3    Pseudoeph-Doxylamine-DM-APAP (NYQUIL PO), Take by mouth as needed , Disp: , Rfl:     pseudoephedrine (SUDAFED) 30 mg tablet, Take 60 mg by mouth every 4 (four) hours as needed for congestion, Disp: , Rfl:     tamsulosin (FLOMAX) 0.4 mg, Take 0.4 mg by  "mouth daily with dinner, Disp: , Rfl:     Tradjenta 5 MG TABS, TAKE 1 TABLET BY MOUTH DAILY WITH OR WITHOUT FOOD, Disp: 30 tablet, Rfl: 5    zolpidem (AMBIEN) 10 mg tablet, Take 1 tablet (10 mg total) by mouth daily at bedtime as needed for sleep, Disp: 30 tablet, Rfl: 0    famotidine (PEPCID) 40 MG tablet, TAKE 1 TABLET BY MOUTH TWICE A DAY (Patient not taking: Reported on 11/11/2024), Disp: 180 tablet, Rfl: 2    montelukast (SINGULAIR) 10 mg tablet, Take 10 mg by mouth every evening (Patient not taking: Reported on 11/11/2024), Disp: , Rfl: 3    Allergies   Allergen Reactions    Iv Dye [Iodinated Contrast Media] Hives    Penicillins Other (See Comments)     ? Had as a child-Unknown reaction       Physical Exam:    Ht 5' 10\" (1.778 m)   Wt 121 kg (267 lb)   BMI 38.31 kg/m²     There were no vitals filed for this visit.      Constitutional: no apparent distress, pleasant, well appearing, does not appear sedated   HEENT: pupils equal and round, NC/AT,  symmetric facial muscles   Neck: supple  Cardiovascular: well perfused, no peripheral cyanosis  Pulmonary: good chest wall excursion, breathing unlabored   Psych: appropriate affect and insight, No agitation. No evidence of aberrant behavior   Skin: No rashes or lesions  Neuro: cranial nerves II-XII grossly intact   MSK:  Inspection: no signs of infection to back  Palpation: no tenderness to palpation in spine, no right hip tenderness  ROM: no significant rom abnormalities in lumbar spine   MMT 5/5 strength in B/L LE  Sensation to light touch intact B/L LE  DTR: 2+ in B/L patellar and achilles  Special test: + Slump test on right, - lumbar facet loading, - hip scour on right, negative Soha's finger on right  Gait: ambulates unassisted, gait is not antalgic      Imaging  No orders to display       No orders of the defined types were placed in this encounter.    Procedure  Bilateral MBB L3-4-5 7/1/2024  10/7/2024 right L4-5 5 S1 transforaminal    "

## 2024-12-03 ENCOUNTER — RESULTS FOLLOW-UP (OUTPATIENT)
Dept: PAIN MEDICINE | Facility: CLINIC | Age: 69
End: 2024-12-03

## 2024-12-03 NOTE — TELEPHONE ENCOUNTER
----- Message from Robles Kaur MD sent at 12/3/2024  8:32 AM EST -----  There is straightening of neck region due to muscle spasms and disc space narrowing

## 2024-12-05 ENCOUNTER — HOSPITAL ENCOUNTER (OUTPATIENT)
Dept: RADIOLOGY | Facility: MEDICAL CENTER | Age: 69
Discharge: HOME | End: 2024-12-05
Payer: MEDICARE

## 2024-12-05 DIAGNOSIS — E11.9 TYPE 2 DIABETES MELLITUS WITHOUT COMPLICATION, WITHOUT LONG-TERM CURRENT USE OF INSULIN (HCC): ICD-10-CM

## 2024-12-05 DIAGNOSIS — M96.1 POST LAMINECTOMY SYNDROME: ICD-10-CM

## 2024-12-05 DIAGNOSIS — M54.2 NECK PAIN: ICD-10-CM

## 2024-12-05 DIAGNOSIS — M54.16 LUMBAR RADICULOPATHY: ICD-10-CM

## 2024-12-05 DIAGNOSIS — M54.16 LUMBAR RADICULOPATHY, CHRONIC: ICD-10-CM

## 2024-12-05 DIAGNOSIS — M47.816 LUMBAR SPONDYLOSIS: ICD-10-CM

## 2024-12-05 PROCEDURE — 72128 CT CHEST SPINE W/O DYE: CPT

## 2024-12-09 ENCOUNTER — HOSPITAL ENCOUNTER (OUTPATIENT)
Dept: RADIOLOGY | Facility: MEDICAL CENTER | Age: 69
Discharge: HOME | End: 2024-12-09
Payer: MEDICARE

## 2024-12-09 DIAGNOSIS — Z91.89 AT RISK FOR BONE DENSITY LOSS: ICD-10-CM

## 2024-12-09 DIAGNOSIS — G40.009 PARTIAL IDIOPATHIC EPILEPSY WITH SEIZURES OF LOCALIZED ONSET, NOT INTRACTABLE, WITHOUT STATUS EPILEPTICUS (HCC): ICD-10-CM

## 2024-12-09 DIAGNOSIS — Z79.818 LONG TERM (CURRENT) USE OF OTHER AGENTS AFFECTING ESTROGEN RECEPTORS AND ESTROGEN LEVELS: ICD-10-CM

## 2024-12-09 PROCEDURE — 77080 DXA BONE DENSITY AXIAL: CPT

## 2024-12-09 NOTE — TELEPHONE ENCOUNTER
Patient came into the office today and said that he wants to start the process now instead of January    Please call him at [phone]

## 2024-12-11 ENCOUNTER — TELEPHONE (OUTPATIENT)
Age: 69
End: 2024-12-11

## 2024-12-11 NOTE — TELEPHONE ENCOUNTER
Called pt and went over medicare guidelines with $75.50 estimate for consult. Pat is aware and understood.

## 2024-12-12 DIAGNOSIS — F41.9 ANXIETY: ICD-10-CM

## 2024-12-12 DIAGNOSIS — G47.00 INSOMNIA, UNSPECIFIED TYPE: ICD-10-CM

## 2024-12-12 DIAGNOSIS — G89.29 CHRONIC LEFT SHOULDER PAIN: ICD-10-CM

## 2024-12-12 DIAGNOSIS — M25.512 CHRONIC LEFT SHOULDER PAIN: ICD-10-CM

## 2024-12-12 DIAGNOSIS — B35.4 TINEA CORPORIS: ICD-10-CM

## 2024-12-13 ENCOUNTER — RESULTS FOLLOW-UP (OUTPATIENT)
Dept: NEUROLOGY | Facility: CLINIC | Age: 69
End: 2024-12-13

## 2024-12-13 ENCOUNTER — RA CDI HCC (OUTPATIENT)
Dept: OTHER | Facility: HOSPITAL | Age: 69
End: 2024-12-13

## 2024-12-13 ENCOUNTER — OFFICE VISIT (OUTPATIENT)
Age: 69
End: 2024-12-13

## 2024-12-13 VITALS
SYSTOLIC BLOOD PRESSURE: 130 MMHG | HEART RATE: 72 BPM | DIASTOLIC BLOOD PRESSURE: 73 MMHG | WEIGHT: 267 LBS | HEIGHT: 70 IN | BODY MASS INDEX: 38.22 KG/M2

## 2024-12-13 DIAGNOSIS — M99.03 SEGMENTAL DYSFUNCTION OF LUMBAR REGION: ICD-10-CM

## 2024-12-13 DIAGNOSIS — M54.16 LUMBAR RADICULOPATHY: ICD-10-CM

## 2024-12-13 DIAGNOSIS — M96.1 POST LAMINECTOMY SYNDROME: ICD-10-CM

## 2024-12-13 DIAGNOSIS — M99.04 SEGMENTAL DYSFUNCTION OF SACRAL REGION: ICD-10-CM

## 2024-12-13 DIAGNOSIS — M99.01 SEGMENTAL DYSFUNCTION OF CERVICAL REGION: ICD-10-CM

## 2024-12-13 DIAGNOSIS — M99.05 SEGMENTAL DYSFUNCTION OF PELVIC REGION: Primary | ICD-10-CM

## 2024-12-13 DIAGNOSIS — M54.2 NECK PAIN: ICD-10-CM

## 2024-12-13 DIAGNOSIS — M54.16 LUMBAR RADICULOPATHY, CHRONIC: ICD-10-CM

## 2024-12-13 DIAGNOSIS — M47.816 LUMBAR SPONDYLOSIS: ICD-10-CM

## 2024-12-13 RX ORDER — ZOLPIDEM TARTRATE 10 MG/1
10 TABLET ORAL
Qty: 30 TABLET | Refills: 0 | Status: SHIPPED | OUTPATIENT
Start: 2024-12-13

## 2024-12-13 RX ORDER — OXYCODONE AND ACETAMINOPHEN 7.5; 325 MG/1; MG/1
1 TABLET ORAL EVERY 6 HOURS PRN
Qty: 120 TABLET | Refills: 0 | Status: SHIPPED | OUTPATIENT
Start: 2024-12-13 | End: 2024-12-20 | Stop reason: SDUPTHER

## 2024-12-13 RX ORDER — CLOBETASOL PROPIONATE 0.5 MG/G
1 CREAM TOPICAL 2 TIMES DAILY
Qty: 60 G | Refills: 0 | Status: SHIPPED | OUTPATIENT
Start: 2024-12-13

## 2024-12-13 RX ORDER — ALPRAZOLAM 0.25 MG/1
0.25 TABLET ORAL 4 TIMES DAILY PRN
Qty: 120 TABLET | Refills: 0 | Status: SHIPPED | OUTPATIENT
Start: 2024-12-13

## 2024-12-13 NOTE — PROGRESS NOTES
HCC coding opportunities          Chart Reviewed number of suggestions sent to Provider: 3     Patients Insurance     Medicare Insurance: Medicare        E11.65  E11.36  I47.10

## 2024-12-13 NOTE — TELEPHONE ENCOUNTER
1 [id number] 11/19/2024 11/19/2024 oxyCODONE HYDROCHLORIDE 7.5 MG ORAL TABLET/ACETAMINOPHEN 325 MG (Tablet) 120.0 30 325 MG-7.5 MG 45.0 [facility] [place] 0 / 0 [place]    1 [id number] 11/19/2024 11/19/2024 ALPRAZolam (Tablet) 120.0 30 0.25 MG NA [facility] .[facility].C[facility]uti [place] 0 / 0 [place]    1 [id number] 11/19/2024 11/19/2024 Zolpidem Tartrate (Tablet) 30.0 30 10 MG [facility] 0 / 0 [place]    1 [id number] 10/23/2024 10/22/2024 oxyCODONE HYDROCHLORIDE 7.5 MG ORAL TABLET/ACETAMINOPHEN 325 MG (Tablet) 120.0 30 325 MG-7.5 MG 45.0 [facility], ShrutiCRISTOBAL [place] 0 / 0 [place]    1 [id number] 10/23/2024 10/22/2024 ALPRAZolam (Tablet) 120.0 30 0.25 MG [facility] [place] 0 / 0 [place]    1 [id number] 10/22/2024 10/22/2024 Zolpidem Tartrate (Tablet) 30.0 30 10 MG [facility]. [place] 0 / 0 [place]    1 [id number] 09/26/2024 09/24/2024 oxyCODONE HYDROCHLORIDE 7.5 MG ORAL TABLET/ACETAMINOPHEN 325 MG (Tablet) 120.0 30 325 MG-7.5 MG 45.0 [facility]Shruti [place] 0 / 0 [place]    1 [id number] 09/26/2024 09/24/2024 ALPRAZolam (Tablet) 120.0 30 0.25 MG [facility] [place] 0 / 0 [place]    1 [id number] 09/24/2024 09/24/2024 Zolpidem Tartrate (Tablet) 30.0 30 10 MG [facility]utiCShruti [place] 0 / 0 [place]    1 [id number] 08/27/2024 08/26/2024 oxyCODONE HYDROCHLORIDE 7.5 MG ORAL TABLET/ACETAMINOPHEN 325 MG (Tablet) 120.0 30 325 MG-7.5 MG 45.0 [facility] [place] 0 / 0 [place]

## 2024-12-16 NOTE — TELEPHONE ENCOUNTER
Called re: below and spoke with pt, who verbalized an understanding. He has an appt with his PCP this Friday and will discuss it with her then.

## 2024-12-16 NOTE — TELEPHONE ENCOUNTER
----- Message from Luis F Darling MD sent at 12/13/2024 10:38 AM EST -----  DXA scan shows osteoporosis (bone softening). He should discuss the result and possible treatment with his PCP.

## 2024-12-17 ENCOUNTER — RESULTS FOLLOW-UP (OUTPATIENT)
Dept: FAMILY MEDICINE CLINIC | Facility: CLINIC | Age: 69
End: 2024-12-17

## 2024-12-17 ENCOUNTER — APPOINTMENT (OUTPATIENT)
Dept: LAB | Facility: CLINIC | Age: 69
End: 2024-12-17
Payer: MEDICARE

## 2024-12-17 DIAGNOSIS — R74.8 ABNORMAL LIVER ENZYMES: Primary | ICD-10-CM

## 2024-12-17 DIAGNOSIS — E78.5 HYPERLIPIDEMIA, UNSPECIFIED HYPERLIPIDEMIA TYPE: ICD-10-CM

## 2024-12-17 DIAGNOSIS — I10 ESSENTIAL HYPERTENSION: ICD-10-CM

## 2024-12-17 DIAGNOSIS — E11.9 TYPE 2 DIABETES MELLITUS WITHOUT COMPLICATION, WITHOUT LONG-TERM CURRENT USE OF INSULIN (HCC): ICD-10-CM

## 2024-12-17 LAB
ALBUMIN SERPL BCG-MCNC: 4.5 G/DL (ref 3.5–5)
ALP SERPL-CCNC: 127 U/L (ref 34–104)
ALT SERPL W P-5'-P-CCNC: 60 U/L (ref 7–52)
ANION GAP SERPL CALCULATED.3IONS-SCNC: 9 MMOL/L (ref 4–13)
AST SERPL W P-5'-P-CCNC: 90 U/L (ref 13–39)
BILIRUB SERPL-MCNC: 0.4 MG/DL (ref 0.2–1)
BUN SERPL-MCNC: 26 MG/DL (ref 5–25)
CALCIUM SERPL-MCNC: 9.9 MG/DL (ref 8.4–10.2)
CHLORIDE SERPL-SCNC: 99 MMOL/L (ref 96–108)
CHOLEST SERPL-MCNC: 162 MG/DL (ref ?–200)
CO2 SERPL-SCNC: 30 MMOL/L (ref 21–32)
CREAT SERPL-MCNC: 1.13 MG/DL (ref 0.6–1.3)
EST. AVERAGE GLUCOSE BLD GHB EST-MCNC: 154 MG/DL
GFR SERPL CREATININE-BSD FRML MDRD: 65 ML/MIN/1.73SQ M
GLUCOSE P FAST SERPL-MCNC: 123 MG/DL (ref 65–99)
HBA1C MFR BLD: 7 %
HDLC SERPL-MCNC: 54 MG/DL
LDLC SERPL CALC-MCNC: 74 MG/DL (ref 0–100)
POTASSIUM SERPL-SCNC: 4.2 MMOL/L (ref 3.5–5.3)
PROT SERPL-MCNC: 7.1 G/DL (ref 6.4–8.4)
SODIUM SERPL-SCNC: 138 MMOL/L (ref 135–147)
TRIGL SERPL-MCNC: 170 MG/DL (ref ?–150)

## 2024-12-17 PROCEDURE — 83036 HEMOGLOBIN GLYCOSYLATED A1C: CPT

## 2024-12-17 PROCEDURE — 80053 COMPREHEN METABOLIC PANEL: CPT

## 2024-12-17 PROCEDURE — 80061 LIPID PANEL: CPT

## 2024-12-17 PROCEDURE — 36415 COLL VENOUS BLD VENIPUNCTURE: CPT

## 2024-12-19 ENCOUNTER — PROCEDURE VISIT (OUTPATIENT)
Age: 69
End: 2024-12-19

## 2024-12-19 VITALS
DIASTOLIC BLOOD PRESSURE: 75 MMHG | WEIGHT: 267 LBS | HEIGHT: 70 IN | SYSTOLIC BLOOD PRESSURE: 145 MMHG | BODY MASS INDEX: 38.22 KG/M2 | HEART RATE: 94 BPM

## 2024-12-19 DIAGNOSIS — M99.05 SEGMENTAL DYSFUNCTION OF PELVIC REGION: ICD-10-CM

## 2024-12-19 DIAGNOSIS — M96.1 POST LAMINECTOMY SYNDROME: ICD-10-CM

## 2024-12-19 DIAGNOSIS — M99.04 SEGMENTAL DYSFUNCTION OF SACRAL REGION: ICD-10-CM

## 2024-12-19 DIAGNOSIS — M47.816 LUMBAR SPONDYLOSIS: ICD-10-CM

## 2024-12-19 DIAGNOSIS — M99.03 SEGMENTAL DYSFUNCTION OF LUMBAR REGION: ICD-10-CM

## 2024-12-19 DIAGNOSIS — M54.16 LUMBAR RADICULOPATHY: Primary | ICD-10-CM

## 2024-12-19 DIAGNOSIS — M99.01 SEGMENTAL DYSFUNCTION OF CERVICAL REGION: ICD-10-CM

## 2024-12-19 DIAGNOSIS — M54.2 NECK PAIN: ICD-10-CM

## 2024-12-19 RX ORDER — ALBUTEROL SULFATE 90 UG/1
2 INHALANT RESPIRATORY (INHALATION) EVERY 4 HOURS PRN
COMMUNITY
Start: 2024-12-16 | End: 2025-12-16

## 2024-12-20 ENCOUNTER — OFFICE VISIT (OUTPATIENT)
Dept: FAMILY MEDICINE CLINIC | Facility: CLINIC | Age: 69
End: 2024-12-20
Payer: MEDICARE

## 2024-12-20 ENCOUNTER — TELEPHONE (OUTPATIENT)
Age: 69
End: 2024-12-20

## 2024-12-20 VITALS
OXYGEN SATURATION: 97 % | TEMPERATURE: 97.3 F | DIASTOLIC BLOOD PRESSURE: 82 MMHG | SYSTOLIC BLOOD PRESSURE: 124 MMHG | WEIGHT: 264.2 LBS | BODY MASS INDEX: 37.82 KG/M2 | HEIGHT: 70 IN | HEART RATE: 88 BPM

## 2024-12-20 DIAGNOSIS — G89.29 CHRONIC LEFT SHOULDER PAIN: ICD-10-CM

## 2024-12-20 DIAGNOSIS — M25.512 CHRONIC LEFT SHOULDER PAIN: ICD-10-CM

## 2024-12-20 DIAGNOSIS — M54.16 LUMBAR RADICULOPATHY: ICD-10-CM

## 2024-12-20 DIAGNOSIS — M81.0 OSTEOPOROSIS, UNSPECIFIED OSTEOPOROSIS TYPE, UNSPECIFIED PATHOLOGICAL FRACTURE PRESENCE: Primary | ICD-10-CM

## 2024-12-20 PROCEDURE — 96372 THER/PROPH/DIAG INJ SC/IM: CPT | Performed by: FAMILY MEDICINE

## 2024-12-20 PROCEDURE — 99214 OFFICE O/P EST MOD 30 MIN: CPT | Performed by: FAMILY MEDICINE

## 2024-12-20 RX ORDER — OXYCODONE AND ACETAMINOPHEN 7.5; 325 MG/1; MG/1
1 TABLET ORAL EVERY 6 HOURS PRN
Qty: 120 TABLET | Refills: 0 | Status: SHIPPED | OUTPATIENT
Start: 2024-12-20

## 2024-12-20 RX ORDER — IBANDRONATE SODIUM 150 MG/1
150 TABLET, FILM COATED ORAL
Qty: 1 TABLET | Refills: 5 | Status: SHIPPED | OUTPATIENT
Start: 2024-12-20

## 2024-12-20 RX ORDER — METHOCARBAMOL 500 MG/1
500 TABLET, FILM COATED ORAL 2 TIMES DAILY
Qty: 60 TABLET | Refills: 3 | Status: SHIPPED | OUTPATIENT
Start: 2024-12-20

## 2024-12-20 RX ORDER — KETOROLAC TROMETHAMINE 30 MG/ML
60 INJECTION, SOLUTION INTRAMUSCULAR; INTRAVENOUS ONCE
Status: COMPLETED | OUTPATIENT
Start: 2024-12-20 | End: 2024-12-20

## 2024-12-20 RX ORDER — PREDNISONE 10 MG/1
TABLET ORAL
Qty: 26 TABLET | Refills: 0 | Status: SHIPPED | OUTPATIENT
Start: 2024-12-20

## 2024-12-20 RX ORDER — METHYLPREDNISOLONE ACETATE 80 MG/ML
80 INJECTION, SUSPENSION INTRA-ARTICULAR; INTRALESIONAL; INTRAMUSCULAR; SOFT TISSUE ONCE
Status: COMPLETED | OUTPATIENT
Start: 2024-12-20 | End: 2024-12-20

## 2024-12-20 RX ADMIN — METHYLPREDNISOLONE ACETATE 80 MG: 80 INJECTION, SUSPENSION INTRA-ARTICULAR; INTRALESIONAL; INTRAMUSCULAR; SOFT TISSUE at 10:54

## 2024-12-20 RX ADMIN — KETOROLAC TROMETHAMINE 60 MG: 30 INJECTION, SOLUTION INTRAMUSCULAR; INTRAVENOUS at 10:54

## 2024-12-20 NOTE — TELEPHONE ENCOUNTER
Patient called in stating that in his appointment today, it was discussed that oxycodone would be called in.    Patient went to the pharmacy and they do not have it.  Please advise if this can be ordered.    Preferred Pharmacy:  [facility]/pharm[facility] #[id number] - [place] - [address] Phone: [phone]   Fax: [phone]

## 2024-12-22 NOTE — ASSESSMENT & PLAN NOTE
Orders:    predniSONE 10 mg tablet; 3 tabs po bid x2 days, then 2 tabs po bid x2 days, then 1 tab bid x2 days, then 1 daily until done.    methylPREDNISolone acetate (DEPO-MEDROL) injection 80 mg    ketorolac (TORADOL) 60 mg/2 mL IM injection 60 mg    methocarbamol (ROBAXIN) 500 mg tablet; Take 1 tablet (500 mg total) by mouth 2 (two) times a day

## 2024-12-22 NOTE — PROGRESS NOTES
Name: Horace Jacobo      : 1955      MRN: [id number]  Encounter Provider: Sherry Meza DO  Encounter Date: 2024   Encounter department: [facility]    Assessment & Plan  Osteoporosis, unspecified osteoporosis type, unspecified pathological fracture presence  Pt is to take 1200 mg of calcium daily and recheck dexa in 2 years time.    Orders:    ibandronate (BONIVA) 150 MG tablet; Take 1 tablet (150 mg total) by mouth every 30 (thirty) days    Lumbar radiculopathy    Orders:    predniSONE 10 mg tablet; 3 tabs po bid x2 days, then 2 tabs po bid x2 days, then 1 tab bid x2 days, then 1 daily until done.    methylPREDNISolone acetate (DEPO-MEDROL) injection 80 mg    ketorolac (TORADOL) 60 mg/2 mL IM injection 60 mg    methocarbamol (ROBAXIN) 500 mg tablet; Take 1 tablet (500 mg total) by mouth 2 (two) times a day         History of Present Illness     The patient presents to discuss his recent dexa revealing osteoarthrititis in his forearm.  He has settled on Boniva after discussing the options.  He complains of a flair up of lumbar back pain.  He has pain in low back radiating into hips bilaterally and anterior thighs bilaterally.        Review of Systems   Constitutional:  Negative for appetite change, chills and fever.   HENT:  Negative for ear pain, facial swelling, rhinorrhea, sinus pain, sore throat and trouble swallowing.    Eyes:  Negative for discharge and redness.   Respiratory:  Negative for chest tightness, shortness of breath and wheezing.    Cardiovascular:  Negative for chest pain and palpitations.   Gastrointestinal:  Negative for abdominal pain, diarrhea, nausea and vomiting.   Endocrine: Negative for polyuria.   Genitourinary:  Negative for dysuria, flank pain and urgency.   Musculoskeletal:  Positive for back pain and gait problem. Negative for arthralgias.        Lumbar back pain with radiation down both legs into hips bilaterally and thighs  anteriorly withouut leg weakness   Skin:  Negative for rash.   Neurological:  Negative for dizziness, weakness and headaches.   Hematological:  Negative for adenopathy.   Psychiatric/Behavioral:  Negative for behavioral problems, confusion and sleep disturbance.    All other systems reviewed and are negative.    Past Medical History:   Diagnosis Date    Allergic     Allergic rhinitis     Anxiety     Asthma     Basal cell carcinoma 04/07/2022    nose    Diabetes mellitus (HCC)     Disease of thyroid gland     EIC (epidermal inclusion cyst)     GERD (gastroesophageal reflux disease)     Hayfever     History of skin cancer     HL (hearing loss)     wears hearing aides on occasion    Hyperlipidemia     Hypertension     Memory loss, short term     R/O Seizures but placed on Keppra    PONV (postoperative nausea and vomiting)     x1 occurence    Sleep apnea     s/p gastric bypass    Tinnitus      Past Surgical History:   Procedure Laterality Date    ABDOMINOPLASTY      ADENOIDECTOMY      BACK SURGERY      lower back surgery    CARPAL TUNNEL RELEASE Bilateral     CHOLECYSTECTOMY      COLONOSCOPY      GASTRIC BYPASS      HERNIA REPAIR Right     JOINT REPLACEMENT Bilateral     Knee    KNEE ARTHROSCOPY Bilateral     X3    MOHS SURGERY  06/15/2022    nose-Dr. Patel    MT RPR 1ST INGUN HRNA AGE 4 YRS/> REDUCIBLE Left 11/26/2018    Procedure: INGUINAL HERNIA REPAIR;  Surgeon: Rubens Casas DO;  Location: [facility];  Service: General    MT SURGICAL ARTHROSCOPY SHOULDER W/ROTATOR CUFF RPR Left 5/10/2023    Procedure: SHOULDER ARTHROSCOPIC ROTATOR CUFF REPAIR, SUBACROMIAL DECOMPRESSION, BICEP TENODESIS;  Surgeon: Mario Jackson MD;  Location: [facility] OR;  Service: Orthopedics    MT TENDON SHEATH INCISION Right 04/29/2022    Procedure: RELEASE TRIGGER FINGER RING WITH LONG;  Surgeon: Hay Doty MD;  Location: [facility];  Service: Orthopedics    TONSILLECTOMY      TRIGGER FINGER RELEASE Right 04/29/2022      Family History   Problem Relation Age of Onset    Heart disease Mother     Kidney cancer Mother     Cancer Mother     Hypertension Father     Bipolar disorder Sister         bipolar disorder NOS    Diabetes Maternal Grandmother      Social History     Tobacco Use    Smoking status: Never     Passive exposure: Past    Smokeless tobacco: Never   Vaping Use    Vaping status: Never Used   Substance and Sexual Activity    Alcohol use: Not Currently     Comment: 1 or 2 month    Drug use: No    Sexual activity: Not Currently     Partners: Female     Birth control/protection: Male Sterilization     Current Outpatient Medications on File Prior to Visit   Medication Sig    ALPRAZolam (XANAX) 0.25 mg tablet Take 1 tablet (0.25 mg total) by mouth 4 (four) times a day as needed for anxiety    amoxicillin (AMOXIL) 500 mg capsule Take 500 mg by mouth 4 tablets 1 hour prior to procedure    azelastine (ASTELIN) 0.1 % nasal spray 1-2 sprays into each nostril 2 (two) times a day as needed    Benadryl Allergy 25 MG capsule     celecoxib (CeleBREX) 200 mg capsule TAKE 1 CAPSULE BY MOUTH TWICE A DAY    Cholecalciferol 2000 units CAPS Take 1 capsule by mouth daily after dinner    clobetasol (TEMOVATE) 0.05 % cream Apply 5 g (1 Application total) topically 2 (two) times a day To affected area    Cyanocobalamin (B-12) 1000 MCG CAPS Take 1 tablet by mouth daily after dinner    diltiazem (DILACOR XR) 240 MG 24 hr capsule Take 240 mg by mouth daily    fexofenadine (ALLEGRA) 180 MG tablet TAKE 1 TABLET BY MOUTH EVERY DAY    fluticasone (FLONASE) 50 mcg/act nasal spray 2 sprays into each nostril 2 (two) times a day as needed      fluticasone-salmeterol (Advair HFA) 230-21 MCG/ACT inhaler Inhale 2 puffs 2 (two) times a day    glimepiride (AMARYL) 4 mg tablet TAKE 1 TABLET BY MOUTH 2 TIMES A DAY.    hydrochlorothiazide (HYDRODIURIL) 25 mg tablet Take 1 tablet by mouth daily in the early morning      ketoconazole (NIZORAL) 2 % cream Apply 1  Application topically 2 (two) times a day To affected area    levalbuterol (XOPENEX HFA) 45 mcg/act inhaler Inhale 2 puffs every 4 (four) hours as needed for wheezing    levalbuterol (XOPENEX) 1.25 mg/3 mL nebulizer solution     levETIRAcetam (KEPPRA) 500 mg tablet Take 1 tablet (500 mg total) by mouth 2 (two) times a day    losartan (COZAAR) 25 mg tablet Take 25 mg by mouth daily    metFORMIN (GLUCOPHAGE) 1000 MG tablet TAKE 1 TABLET BY MOUTH TWICE A DAY WITH MEALS    primidone (MYSOLINE) 50 mg tablet TAKE 5 TABLETS BY MOUTH DAILY    Pseudoeph-Doxylamine-DM-APAP (NYQUIL PO) Take by mouth as needed     Tradjenta 5 MG TABS TAKE 1 TABLET BY MOUTH DAILY WITH OR WITHOUT FOOD    zolpidem (AMBIEN) 10 mg tablet Take 1 tablet (10 mg total) by mouth daily at bedtime as needed for sleep    albuterol (PROVENTIL HFA,VENTOLIN HFA) 90 mcg/act inhaler Inhale 2 puffs every 4 (four) hours as needed (Patient not taking: Reported on 12/19/2024)    atorvastatin (LIPITOR) 40 mg tablet Take 40 mg by mouth daily (Patient not taking: Reported on 12/19/2024)    famotidine (PEPCID) 40 MG tablet TAKE 1 TABLET BY MOUTH TWICE A DAY (Patient not taking: Reported on 11/11/2024)     Allergies   Allergen Reactions    Iv Dye [Iodinated Contrast Media] Hives    Penicillins Other (See Comments)     ? Had as a child-Unknown reaction     Immunization History   Administered Date(s) Administered    COVID-19 PFIZER VACCINE 0.3 ML IM 02/17/2021, 03/08/2021, 12/11/2021    COVID-19 Pfizer Vac BIVALENT Cory-sucrose 12 Yr+ IM 10/05/2022    INFLUENZA 09/22/2018, 09/04/2019, 09/30/2022, 08/28/2023    Influenza Quadrivalent, 6-35 Months IM 10/13/2015    Influenza, high dose seasonal 0.7 mL 10/02/2020, 11/15/2021    Influenza, seasonal, injectable 11/03/2011, 09/14/2017    Pneumococcal Conjugate 13-Valent 10/13/2015    Pneumococcal Conjugate Vaccine 20-valent (Pcv20), Polysace 07/26/2022    influenza, trivalent, adjuvanted 10/23/2024     Objective   /82    "Pulse 88   Temp (!) 97.3 °F (36.3 °C)   Ht 5' 10\" (1.778 m)   Wt 120 kg (264 lb 3.2 oz)   SpO2 97%   BMI 37.91 kg/m²     Physical Exam  Vitals and nursing note reviewed.   Constitutional:       General: He is not in acute distress.     Appearance: Normal appearance. He is well-developed. He is not ill-appearing or diaphoretic.   HENT:      Head: Normocephalic and atraumatic.      Right Ear: Tympanic membrane, ear canal and external ear normal.      Left Ear: Tympanic membrane, ear canal and external ear normal.      Nose: Nose normal. No congestion or rhinorrhea.      Mouth/Throat:      Mouth: Mucous membranes are moist.      Pharynx: Oropharynx is clear. No oropharyngeal exudate or posterior oropharyngeal erythema.   Eyes:      General: No scleral icterus.        Right eye: No discharge.         Left eye: No discharge.      Extraocular Movements: Extraocular movements intact.      Conjunctiva/sclera: Conjunctivae normal.      Pupils: Pupils are equal, round, and reactive to light.   Neck:      Thyroid: No thyromegaly.      Vascular: No carotid bruit or JVD.      Trachea: No tracheal deviation.   Cardiovascular:      Rate and Rhythm: Normal rate and regular rhythm.      Pulses: Normal pulses.      Heart sounds: Normal heart sounds. No murmur heard.  Pulmonary:      Effort: Pulmonary effort is normal. No respiratory distress.      Breath sounds: Normal breath sounds. No stridor. No wheezing, rhonchi or rales.   Abdominal:      General: Abdomen is flat. Bowel sounds are normal. There is no distension.      Palpations: Abdomen is soft. There is no mass.      Tenderness: There is no abdominal tenderness. There is no guarding or rebound.   Musculoskeletal:         General: Tenderness present. No swelling or deformity. Normal range of motion.      Cervical back: Normal range of motion and neck supple. No rigidity.      Right lower leg: No edema.      Left lower leg: No edema.      Comments: + increased paravetebral " musculature of lumbar paraspinal area with tenderness on palpation and full rom with pain ; no loss of strength on palpation.     Lymphadenopathy:      Cervical: No cervical adenopathy.   Skin:     General: Skin is warm and dry.      Capillary Refill: Capillary refill takes less than 2 seconds.      Coloration: Skin is not jaundiced.      Findings: No bruising, erythema or rash.   Neurological:      General: No focal deficit present.      Mental Status: He is alert and oriented to person, place, and time.      Cranial Nerves: No cranial nerve deficit.      Sensory: No sensory deficit.      Motor: No abnormal muscle tone.      Coordination: Coordination normal.      Gait: Gait normal.      Deep Tendon Reflexes: Reflexes are normal and symmetric. Reflexes normal.   Psychiatric:         Mood and Affect: Mood normal.         Behavior: Behavior normal.         Thought Content: Thought content normal.         Judgment: Judgment normal.

## 2024-12-23 NOTE — TELEPHONE ENCOUNTER
Pt called re: his oxycodone-acetaminophen Rx.  He said Dr Meza had told him at his visit on Friday that she would prescribe oxycontin that pt would take every 12 hours, but she sent in the oxycodone-acetaminophen 7.5-325 mg.  He would prefer the 12 hour medication because he said the oxycodone-acetaminophen affected his liver function.  Please send new Rx to [facility] on [address] and call pt to let him know it's been sent.  Thank you!

## 2024-12-23 NOTE — TELEPHONE ENCOUNTER
12/16/2024 12/13/2024 1 Zolpidem Tartrate 10 Mg Tablet 30.00 30 Ca Bro [id number] Pen (2423) 0 0.50 LME Private Pay [place]   12/16/2024 12/13/2024 1 Alprazolam 0.25 Mg Tablet 120.00 30 Ca Bro [id number] Pen (2423) 0 2.00 LME Medicare [place]   12/16/2024 12/13/2024 1 Oxycodone-Acetaminophn 7.5-325 120.00 30 Ca Bro [id number] Pen (2423) 0 45.00 MME Medicare [place]   11/19/2024 11/19/2024 1 Oxycodone-Acetaminophn 7.5-325 120.00 30 Ca Bro [id number] Pen (2423) 0 45.00 MME Medicare [place]       Communicated with patient's PCP Dr Meza, who who states they did discuss it, but that this was a possibility. She wanted to wait to see the remaining results of his liver workup before making a change. For now, continue current medication. He should  Rx for what was already sent.

## 2024-12-31 ENCOUNTER — OFFICE VISIT (OUTPATIENT)
Age: 69
End: 2024-12-31

## 2024-12-31 VITALS
BODY MASS INDEX: 37.8 KG/M2 | HEIGHT: 70 IN | SYSTOLIC BLOOD PRESSURE: 136 MMHG | DIASTOLIC BLOOD PRESSURE: 86 MMHG | WEIGHT: 264 LBS | HEART RATE: 87 BPM

## 2024-12-31 DIAGNOSIS — M96.1 POST LAMINECTOMY SYNDROME: ICD-10-CM

## 2024-12-31 DIAGNOSIS — M99.05 SEGMENTAL DYSFUNCTION OF PELVIC REGION: ICD-10-CM

## 2024-12-31 DIAGNOSIS — M47.816 LUMBAR SPONDYLOSIS: ICD-10-CM

## 2024-12-31 DIAGNOSIS — M99.03 SEGMENTAL DYSFUNCTION OF LUMBAR REGION: ICD-10-CM

## 2024-12-31 DIAGNOSIS — M54.16 LUMBAR RADICULOPATHY: Primary | ICD-10-CM

## 2024-12-31 DIAGNOSIS — M99.04 SEGMENTAL DYSFUNCTION OF SACRAL REGION: ICD-10-CM

## 2024-12-31 DIAGNOSIS — M54.2 NECK PAIN: ICD-10-CM

## 2024-12-31 DIAGNOSIS — M99.01 SEGMENTAL DYSFUNCTION OF CERVICAL REGION: ICD-10-CM

## 2025-01-03 ENCOUNTER — HOSPITAL ENCOUNTER (OUTPATIENT)
Dept: RADIOLOGY | Facility: MEDICAL CENTER | Age: 70
Discharge: HOME | End: 2025-01-03
Payer: MEDICARE

## 2025-01-03 ENCOUNTER — APPOINTMENT (OUTPATIENT)
Dept: LAB | Facility: MEDICAL CENTER | Age: 70
End: 2025-01-03
Payer: MEDICARE

## 2025-01-03 ENCOUNTER — TELEPHONE (OUTPATIENT)
Age: 70
End: 2025-01-03

## 2025-01-03 DIAGNOSIS — R74.8 ABNORMAL LIVER ENZYMES: ICD-10-CM

## 2025-01-03 PROCEDURE — 36415 COLL VENOUS BLD VENIPUNCTURE: CPT

## 2025-01-03 PROCEDURE — 76705 ECHO EXAM OF ABDOMEN: CPT

## 2025-01-03 PROCEDURE — 80076 HEPATIC FUNCTION PANEL: CPT

## 2025-01-03 NOTE — TELEPHONE ENCOUNTER
Patient called complaining of a lot of back pain, he also stated there is some pain in his ribs and kidney area- he wanted an appointment - no avails for today, spoke to clerical and was advised to send a message, please review.

## 2025-01-04 LAB
ALBUMIN SERPL BCG-MCNC: 4.3 G/DL (ref 3.5–5)
ALP SERPL-CCNC: 126 U/L (ref 34–104)
ALT SERPL W P-5'-P-CCNC: 86 U/L (ref 7–52)
AST SERPL W P-5'-P-CCNC: 71 U/L (ref 13–39)
BILIRUB DIRECT SERPL-MCNC: 0.02 MG/DL (ref 0–0.2)
BILIRUB SERPL-MCNC: 0.42 MG/DL (ref 0.2–1)
PROT SERPL-MCNC: 7.3 G/DL (ref 6.4–8.4)

## 2025-01-06 NOTE — PROGRESS NOTES
Date of first visit: 12/13/2024      HPI:  Horace returns for treatment today reports ongoing neck pain in the right shoulder blade low back pain.  Neck pain is worse when looking down.  Neck pain is a 2 on a visual pain analog scale low back is a 6.    The following portions of the patient's history were reviewed and updated as appropriate: allergies, current medications, past family history, past medical history, past social history, past surgical history, and problem list.    Review of Systems    Physical Exam:  Exam reveals a pelvic obliquity leg with any quality biomechanically joint dysfunction right SI L5-S1 motion unit C5-C6 level active myofascial  triggers throughout    Assessment:   Diagnosis ICD-10-CM Associated Orders   1. Lumbar radiculopathy  M54.16       2. Lumbar spondylosis  M47.816       3. Neck pain  M54.2       4. Post laminectomy syndrome  M96.1                 Treatment: 69128  Manipulation to the right innominate, sacrum, L5 via side-lying flexion distraction manipulation.  Manipulation C5-C6 pretreat extensive myofascial release right shoulder girdle.    Discussion:  Reviewed home stretching see him back for follow-up.

## 2025-01-06 NOTE — PROGRESS NOTES
Date of first visit: 12/13/2024      HPI:  Horace returns for treatment today reports ongoing neck pain in the right shoulder blade low back pain.  Neck pain is worse when looking down.  Neck pain is a 2-3 on a visual pain analog scale low back is a 3-4.    The following portions of the patient's history were reviewed and updated as appropriate: allergies, current medications, past family history, past medical history, past social history, past surgical history, and problem list.    Review of Systems    Physical Exam:  Exam reveals a pelvic obliquity leg with any quality biomechanically joint dysfunction right SI L5-S1 motion unit C5-C6 level active myofascial  triggers throughout    Assessment:   Diagnosis ICD-10-CM Associated Orders   1. Lumbar radiculopathy  M54.16       2. Lumbar spondylosis  M47.816       3. Neck pain  M54.2       4. Post laminectomy syndrome  M96.1                   Treatment: 80227  Manipulation to the right innominate, sacrum, L5 via side-lying flexion distraction manipulation.  Manipulation C5-C6 pretreat extensive myofascial release right shoulder girdle.    Discussion:  Reviewed home stretching see him back for follow-up.

## 2025-01-06 NOTE — PROGRESS NOTES
Date of First Visit: 12/13/2024  Visit number: 1    HPI:  Back Pain  This is a chronic problem. The current episode started more than 1 year ago. The problem has been waxing and waning since onset. The pain is present in the lumbar spine and sacro-iliac. The quality of the pain is described as aching and shooting. The pain radiates to the right thigh. The pain is at a severity of 7/10. The pain is moderate. The symptoms are aggravated by bending, position, standing and twisting. He has tried NSAIDs, muscle relaxant, home exercises and analgesics for the symptoms. The treatment provided mild relief.     Horace Jacobo is a 69 y.o. male who presents for evaluation and possible treatment at the request of Dr. Kaur.  Horace has a history of chronic pain and presents with neck pain radiating into the right shoulder blade and upper extremity as well as lower back pain radiating into the right buttock.  He has a history of a recent rib injury last year as well as past history of lumbar laminectomy.  He is treated in spine and pain with epidural injections as well as sacroiliac joint injection with no significant lasting relief.  Most recently recommended for spinal cord stimulator.    He reports symptoms into the right upper extremity and right buttock denies any further radicular symptoms.  Denies any weaknesses.  Reports no recent known infection fever chills night sweats changes in bowel bladder status.    Pain does functionally limit him he does not receive restorative sleep.      Chief Complaint   Patient presents with   • Neck - Follow-up     Neck pain is  intermittent stabbing under shoulder blade and radiates down right arm. Pain score 5      • Back Pain     Lower lumbar pain that is constant pain down right buttocks.  Pain score 7       • Rib Injury     Right and left rib injury last year. Patient will not be able to lay on table for treatment      Past Medical History:   Diagnosis Date   • Allergic    •  Allergic rhinitis    • Anxiety    • Asthma    • Basal cell carcinoma 04/07/2022    nose   • Diabetes mellitus (HCC)    • Disease of thyroid gland    • EIC (epidermal inclusion cyst)    • GERD (gastroesophageal reflux disease)    • Hayfever    • History of skin cancer    • HL (hearing loss)     wears hearing aides on occasion   • Hyperlipidemia    • Hypertension    • Memory loss, short term     R/O Seizures but placed on Keppra   • PONV (postoperative nausea and vomiting)     x1 occurence   • Sleep apnea     s/p gastric bypass   • Tinnitus       Past Surgical History:   Procedure Laterality Date   • ABDOMINOPLASTY     • ADENOIDECTOMY     • BACK SURGERY      lower back surgery   • CARPAL TUNNEL RELEASE Bilateral    • CHOLECYSTECTOMY     • COLONOSCOPY     • GASTRIC BYPASS     • HERNIA REPAIR Right    • JOINT REPLACEMENT Bilateral     Knee   • KNEE ARTHROSCOPY Bilateral     X3   • MOHS SURGERY  06/15/2022    nose-Dr. Patel   • LA RPR 1ST INGUN HRNA AGE 5 YRS/> REDUCIBLE Left 11/26/2018    Procedure: INGUINAL HERNIA REPAIR;  Surgeon: Rubens Casas DO;  Location: [facility];  Service: General   • LA SURGICAL ARTHROSCOPY SHOULDER W/ROTATOR CUFF RPR Left 5/10/2023    Procedure: SHOULDER ARTHROSCOPIC ROTATOR CUFF REPAIR, SUBACROMIAL DECOMPRESSION, BICEP TENODESIS;  Surgeon: Mario Jackson MD;  [facility];  Service: Orthopedics   • LA TENDON SHEATH INCISION Right 04/29/2022    Procedure: RELEASE TRIGGER FINGER RING WITH LONG;  Surgeon: Hay Doty MD;  Location: [facility];  Service: Orthopedics   • TONSILLECTOMY     • TRIGGER FINGER RELEASE Right 04/29/2022     Family History   Problem Relation Age of Onset   • Heart disease Mother    • Kidney cancer Mother    • Cancer Mother    • Hypertension Father    • Bipolar disorder Sister         bipolar disorder NOS   • Diabetes Maternal Grandmother      Social History     Socioeconomic History   • Marital status: /Civil Union     Spouse  name: None   • Number of children: 2   • Years of education: trade school   • Highest education level: None   Occupational History     Comment: employed   Tobacco Use   • Smoking status: Never     Passive exposure: Past   • Smokeless tobacco: Never   Vaping Use   • Vaping status: Never Used   Substance and Sexual Activity   • Alcohol use: Not Currently     Comment: 1 or 2 month   • Drug use: No   • Sexual activity: Not Currently     Partners: Female     Birth control/protection: Male Sterilization   Other Topics Concern   • None   Social History Narrative    Always uses seat belt    Caffeine use    Daily coffee consumption    Daily cola consumption    Dental care, regularly    DENIED exercise frequency    Guns in the home:stored in locked cabinet    Multiple organ donor    Patient has living will    DENIED pets/animals    Power of  in existence    Water intake, adequate (per day)     Social Drivers of Health     Financial Resource Strain: Low Risk  (8/17/2023)    Overall Financial Resource Strain (CARDIA)    • Difficulty of Paying Living Expenses: Not very hard   Food Insecurity: No Food Insecurity (8/21/2024)    Nursing - Inadequate Food Risk Classification    • Worried About Running Out of Food in the Last Year: Never true    • Ran Out of Food in the Last Year: Never true    • Ran Out of Food in the Last Year: Not on file   Transportation Needs: No Transportation Needs (8/21/2024)    PRAPARE - Transportation    • Lack of Transportation (Medical): No    • Lack of Transportation (Non-Medical): No   Physical Activity: Not on file   Stress: Not on file   Social Connections: Not on file   Intimate Partner Violence: Not At Risk (4/30/2024)    [facility]    Humiliation, Afraid, Rape, and Kick questionnaire    • Fear of Current or Ex-Partner: No    • Emotionally Abused: No    • Physically Abused: No    • Sexually Abused: No   Housing Stability: Low Risk   (8/21/2024)    Housing Stability Vital Sign    • Unable to Pay for Housing in the Last Year: No    • Number of Times Moved in the Last Year: 1    • Homeless in the Last Year: No       Current Outpatient Medications:   •  albuterol (PROVENTIL HFA,VENTOLIN HFA) 90 mcg/act inhaler, Inhale 2 puffs every 4 (four) hours as needed (Patient not taking: Reported on 12/19/2024), Disp: , Rfl:   •  ALPRAZolam (XANAX) 0.25 mg tablet, Take 1 tablet (0.25 mg total) by mouth 4 (four) times a day as needed for anxiety, Disp: 120 tablet, Rfl: 0  •  amoxicillin (AMOXIL) 500 mg capsule, Take 500 mg by mouth 4 tablets 1 hour prior to procedure, Disp: , Rfl:   •  atorvastatin (LIPITOR) 40 mg tablet, Take 40 mg by mouth daily (Patient not taking: Reported on 12/19/2024), Disp: , Rfl:   •  azelastine (ASTELIN) 0.1 % nasal spray, 1-2 sprays into each nostril 2 (two) times a day as needed, Disp: , Rfl:   •  Benadryl Allergy 25 MG capsule, , Disp: , Rfl:   •  celecoxib (CeleBREX) 200 mg capsule, TAKE 1 CAPSULE BY MOUTH TWICE A DAY, Disp: 60 capsule, Rfl: 5  •  Cholecalciferol 2000 units CAPS, Take 1 capsule by mouth daily after dinner, Disp: , Rfl:   •  clobetasol (TEMOVATE) 0.05 % cream, Apply 5 g (1 Application total) topically 2 (two) times a day To affected area, Disp: 60 g, Rfl: 0  •  Cyanocobalamin (B-12) 1000 MCG CAPS, Take 1 tablet by mouth daily after dinner, Disp: , Rfl:   •  diltiazem (DILACOR XR) 240 MG 24 hr capsule, Take 240 mg by mouth daily, Disp: , Rfl:   •  famotidine (PEPCID) 40 MG tablet, TAKE 1 TABLET BY MOUTH TWICE A DAY (Patient not taking: Reported on 11/11/2024), Disp: 180 tablet, Rfl: 2  •  fexofenadine (ALLEGRA) 180 MG tablet, TAKE 1 TABLET BY MOUTH EVERY DAY, Disp: 90 tablet, Rfl: 1  •  fluticasone (FLONASE) 50 mcg/act nasal spray, 2 sprays into each nostril 2 (two) times a day as needed  , Disp: , Rfl:   •  fluticasone-salmeterol (Advair HFA) 230-21 MCG/ACT inhaler, Inhale 2 puffs 2 (two) times a day, Disp: ,  Rfl:   •  glimepiride (AMARYL) 4 mg tablet, TAKE 1 TABLET BY MOUTH 2 TIMES A DAY., Disp: 180 tablet, Rfl: 1  •  hydrochlorothiazide (HYDRODIURIL) 25 mg tablet, Take 1 tablet by mouth daily in the early morning  , Disp: , Rfl: 3  •  ibandronate (BONIVA) 150 MG tablet, Take 1 tablet (150 mg total) by mouth every 30 (thirty) days, Disp: 1 tablet, Rfl: 5  •  ketoconazole (NIZORAL) 2 % cream, Apply 1 Application topically 2 (two) times a day To affected area, Disp: 180 g, Rfl: 0  •  levalbuterol (XOPENEX HFA) 45 mcg/act inhaler, Inhale 2 puffs every 4 (four) hours as needed for wheezing, Disp: 15 g, Rfl: 5  •  levalbuterol (XOPENEX) 1.25 mg/3 mL nebulizer solution, , Disp: , Rfl:   •  levETIRAcetam (KEPPRA) 500 mg tablet, Take 1 tablet (500 mg total) by mouth 2 (two) times a day, Disp: 180 tablet, Rfl: 3  •  losartan (COZAAR) 25 mg tablet, Take 25 mg by mouth daily, Disp: , Rfl:   •  metFORMIN (GLUCOPHAGE) 1000 MG tablet, TAKE 1 TABLET BY MOUTH TWICE A DAY WITH MEALS, Disp: 180 tablet, Rfl: 1  •  methocarbamol (ROBAXIN) 500 mg tablet, Take 1 tablet (500 mg total) by mouth 2 (two) times a day, Disp: 60 tablet, Rfl: 3  •  oxyCODONE-acetaminophen (Percocet) 7.5-325 MG per tablet, Take 1 tablet by mouth every 6 (six) hours as needed for moderate pain Max Daily Amount: 4 tablets, Disp: 120 tablet, Rfl: 0  •  predniSONE 10 mg tablet, 3 tabs po bid x2 days, then 2 tabs po bid x2 days, then 1 tab bid x2 days, then 1 daily until done., Disp: 26 tablet, Rfl: 0  •  primidone (MYSOLINE) 50 mg tablet, TAKE 5 TABLETS BY MOUTH DAILY, Disp: 450 tablet, Rfl: 3  •  Pseudoeph-Doxylamine-DM-APAP (NYQUIL PO), Take by mouth as needed , Disp: , Rfl:   •  Tradjenta 5 MG TABS, TAKE 1 TABLET BY MOUTH DAILY WITH OR WITHOUT FOOD, Disp: 30 tablet, Rfl: 5  •  zolpidem (AMBIEN) 10 mg tablet, Take 1 tablet (10 mg total) by mouth daily at bedtime as needed for sleep, Disp: 30 tablet, Rfl: 0  Allergies as of 12/13/2024 - Reviewed 12/13/2024   Allergen  Reaction Noted   • Iv dye [iodinated contrast media] Hives 08/28/2013   • Penicillins Other (See Comments)          The following portions of the patient's history were reviewed and updated as appropriate: allergies, current medications, past family history, past medical history, past social history, past surgical history, and problem list.    Review of Systems   Constitutional: Negative.    Musculoskeletal:  Positive for back pain, myalgias, neck pain and neck stiffness.   Neurological: Negative.    Psychiatric/Behavioral: Negative.         Physical Exam:  Physical Exam  Vitals reviewed.   Constitutional:       Appearance: Normal appearance.   Cardiovascular:      Rate and Rhythm: Normal rate.      Pulses: Normal pulses.   Pulmonary:      Effort: Pulmonary effort is normal.   Musculoskeletal:      Cervical back: Spasms and tenderness present. Pain with movement present. Decreased range of motion.      Thoracic back: Spasms and tenderness present. Decreased range of motion.      Lumbar back: Spasms and tenderness present. Decreased range of motion. Negative right straight leg raise test and negative left straight leg raise test.        Back:    Skin:     General: Skin is warm and dry.   Neurological:      General: No focal deficit present.      Mental Status: He is alert and oriented to person, place, and time.      Sensory: Sensation is intact.      Motor: Motor function is intact.      Gait: Gait is intact.      Deep Tendon Reflexes: Reflexes are normal and symmetric.      Comments: Negative Spurling's   Psychiatric:         Mood and Affect: Mood normal.         Behavior: Behavior normal.         Thought Content: Thought content normal.         Assessment:  Diagnoses and all orders for this visit:    Lumbar radiculopathy  -     Ambulatory referral to Chiropractic    Lumbar spondylosis  -     Ambulatory referral to Chiropractic    Neck pain  -     Ambulatory referral to Chiropractic    Post laminectomy syndrome  -      Ambulatory referral to Chiropractic    Lumbar radiculopathy, chronic  -     Ambulatory referral to Chiropractic         Treatment:  [id number]  Manipulation to the right innominate, sacrum, L5 via a side-lying flexion distraction procedure well-tolerated.  Manipulation C5-C6 via seated stairstepping technique well-tolerated.    Discussion:  I reviewed past medical notes including diagnostics.  Discussed case with patient.  Trial of conservative care reassessment 4 weeks.  No follow-ups on file.

## 2025-01-08 ENCOUNTER — RESULTS FOLLOW-UP (OUTPATIENT)
Dept: FAMILY MEDICINE CLINIC | Facility: CLINIC | Age: 70
End: 2025-01-08

## 2025-01-08 DIAGNOSIS — K76.0 HEPATIC STEATOSIS: Primary | ICD-10-CM

## 2025-01-09 ENCOUNTER — TELEPHONE (OUTPATIENT)
Age: 70
End: 2025-01-09

## 2025-01-09 DIAGNOSIS — G47.00 INSOMNIA, UNSPECIFIED TYPE: ICD-10-CM

## 2025-01-09 DIAGNOSIS — F41.9 ANXIETY: ICD-10-CM

## 2025-01-09 DIAGNOSIS — E11.628 TYPE 2 DIABETES MELLITUS WITH OTHER SKIN COMPLICATIONS (HCC): Primary | ICD-10-CM

## 2025-01-09 RX ORDER — TIRZEPATIDE 2.5 MG/.5ML
2.5 INJECTION, SOLUTION SUBCUTANEOUS WEEKLY
Qty: 2 ML | Refills: 0 | Status: SHIPPED | OUTPATIENT
Start: 2025-01-09

## 2025-01-09 NOTE — TELEPHONE ENCOUNTER
Patient called in stating he had switched rx plans and would now like to start mounjaro. Patient stated he now has [facility] - id [id number], and was updated on file. Patient stated he will have to pay out of pocket for it for the first few months and then it will be covered for the rest of the year.    Please advise, thank you

## 2025-01-09 NOTE — TELEPHONE ENCOUNTER
Pt aware- would like to know if he can come in on Monday for diabetes education on how to do the injection and how to measure/ keep track of his blood sugars. Is currently scheduled for Monday 10am is this okay?

## 2025-01-10 RX ORDER — ALPRAZOLAM 0.25 MG/1
0.25 TABLET ORAL 4 TIMES DAILY PRN
Qty: 120 TABLET | Refills: 0 | Status: SHIPPED | OUTPATIENT
Start: 2025-01-10

## 2025-01-10 RX ORDER — ZOLPIDEM TARTRATE 10 MG/1
10 TABLET ORAL
Qty: 30 TABLET | Refills: 0 | Status: SHIPPED | OUTPATIENT
Start: 2025-01-10

## 2025-01-10 NOTE — TELEPHONE ENCOUNTER
1 [id number] 12/16/2024 12/13/2024 ALPRAZolam (Tablet) 120.0 30 0.25 MG NA [facility] Medicare 0 / 0 [place]    1 [id number] 12/16/2024 12/13/2024 oxyCODONE HYDROCHLORIDE 7.5 MG ORAL TABLET/ACETAMINOPHEN 325 MG (Tablet) 120.0 30 325 MG-7.5 MG 45.0 [facility] Medicare 0 / 0 [place]    1 [id number] 12/16/2024 12/13/2024 Zolpidem Tartrate (Tablet) 30.0 30 10 MG NA [facility] Private Pay 0 / 0 [place]    1 [id number] 11/19/2024 11/19/2024 ALPRAZolam (Tablet) 120.0 30 0.25 MG NA [facility] Medicare 0 / 0 [place]    1 [id number] 11/19/2024 11/19/2024 oxyCODONE HYDROCHLORIDE 7.5 MG ORAL TABLET/ACETAMINOPHEN 325 MG (Tablet) 120.0 30 325 MG-7.5 MG 45.0 [facility] Medicare 0 / 0 [place]    1 [id number] 11/19/2024 11/19/2024 Zolpidem Tartrate (Tablet) 30.0 30 10 MG [facility] Private Pay 0 / 0 [place]    1 [id number] 10/23/2024 10/22/2024 ALPRAZolam (Tablet) 120.0 30 0.25 MG NA [facility] Medicare 0 / 0 [place]    1 [id number] 10/23/2024 10/22/2024 oxyCODONE HYDROCHLORIDE 7.5 MG ORAL TABLET/ACETAMINOPHEN 325 MG (Tablet) 120.0 30 325 MG-7.5 MG 45.0 [facility] Medicare 0 / 0 [place]    1 [id number] 10/22/2024 10/22/2024 Zolpidem Tartrate (Tablet) 30.0 30 10 MG [facility] Medicare 0 / 0 [place]    1 [id number] 09/26/2024 09/24/2024 ALPRAZolam (Tablet) 120.0 30 0.25 MG NA [facility]ENEG[facility] Medicare 0 / 0 [place]

## 2025-01-11 DIAGNOSIS — M81.0 OSTEOPOROSIS, UNSPECIFIED OSTEOPOROSIS TYPE, UNSPECIFIED PATHOLOGICAL FRACTURE PRESENCE: ICD-10-CM

## 2025-01-13 RX ORDER — IBANDRONATE SODIUM 150 MG/1
150 TABLET, FILM COATED ORAL
Qty: 3 TABLET | Refills: 1 | Status: SHIPPED | OUTPATIENT
Start: 2025-01-13

## 2025-01-14 ENCOUNTER — PROCEDURE VISIT (OUTPATIENT)
Age: 70
End: 2025-01-14
Payer: MEDICARE

## 2025-01-14 ENCOUNTER — CLINICAL SUPPORT (OUTPATIENT)
Dept: FAMILY MEDICINE CLINIC | Facility: CLINIC | Age: 70
End: 2025-01-14

## 2025-01-14 ENCOUNTER — TELEPHONE (OUTPATIENT)
Age: 70
End: 2025-01-14

## 2025-01-14 VITALS
WEIGHT: 264 LBS | HEART RATE: 89 BPM | SYSTOLIC BLOOD PRESSURE: 134 MMHG | DIASTOLIC BLOOD PRESSURE: 81 MMHG | HEIGHT: 70 IN | BODY MASS INDEX: 37.8 KG/M2

## 2025-01-14 DIAGNOSIS — M99.04 SEGMENTAL DYSFUNCTION OF SACRAL REGION: ICD-10-CM

## 2025-01-14 DIAGNOSIS — M99.03 SEGMENTAL DYSFUNCTION OF LUMBAR REGION: ICD-10-CM

## 2025-01-14 DIAGNOSIS — M99.01 SEGMENTAL DYSFUNCTION OF CERVICAL REGION: ICD-10-CM

## 2025-01-14 DIAGNOSIS — M54.41 CHRONIC BILATERAL LOW BACK PAIN WITH RIGHT-SIDED SCIATICA: ICD-10-CM

## 2025-01-14 DIAGNOSIS — M79.18 MYOFASCIAL PAIN: ICD-10-CM

## 2025-01-14 DIAGNOSIS — M54.16 LUMBAR RADICULOPATHY: ICD-10-CM

## 2025-01-14 DIAGNOSIS — M99.05 SEGMENTAL DYSFUNCTION OF PELVIC REGION: Primary | ICD-10-CM

## 2025-01-14 DIAGNOSIS — G89.29 CHRONIC BILATERAL LOW BACK PAIN WITH RIGHT-SIDED SCIATICA: ICD-10-CM

## 2025-01-14 DIAGNOSIS — M54.2 NECK PAIN: ICD-10-CM

## 2025-01-14 DIAGNOSIS — Z71.89 ENCOUNTER FOR DIABETES EDUCATION: Primary | ICD-10-CM

## 2025-01-14 PROCEDURE — 98941 CHIROPRACT MANJ 3-4 REGIONS: CPT | Performed by: CHIROPRACTOR

## 2025-01-14 NOTE — PROGRESS NOTES
Date of first visit: 12/13/2024      HPI:  Horace returns for treatment today reports ongoing neck pain in the right shoulder blade low back pain.  Had an episode last week of shoulder blade pain low back pain rib cage spasms as well as right lower extremity pain.  Symptoms are feeling better today.      The following portions of the patient's history were reviewed and updated as appropriate: allergies, current medications, past family history, past medical history, past social history, past surgical history, and problem list.    Review of Systems    Physical Exam:  Exam reveals a pelvic obliquity leg with any quality biomechanically joint dysfunction right SI L5-S1 motion unit C5-C6 level active myofascial  triggers throughout.    Lower extremity neuroexam fails to reveal any evidence of focal deficit.  He does not have any nerve root tension signs appreciated and straight leg raising.  Good motor strength.    Assessment:   Diagnosis ICD-10-CM Associated Orders   1. Segmental dysfunction of pelvic region  M99.05       2. Lumbar radiculopathy  M54.16       3. Segmental dysfunction of sacral region  M99.04       4. Segmental dysfunction of lumbar region  M99.03       5. Chronic bilateral low back pain with right-sided sciatica  M54.41     G89.29       6. Neck pain  M54.2       7. Segmental dysfunction of cervical region  M99.01       8. Myofascial pain  M79.18                     Treatment: [id number]  Manipulation to the right innominate, sacrum, L5 via side-lying flexion distraction manipulation.  Manipulation C5-C6 pretreat extensive myofascial release right shoulder girdle.    Discussion:  Reviewed home stretching see him back for follow-up.

## 2025-01-14 NOTE — PROGRESS NOTES
Pt was seen today in office for diabetic education- was shown how to use mounjaro injection pen and how to measure blood glucose levels. Pts BS was 81. Pt was instructed to keep a log on blood sugar levels daily each morning before eating for one week to see how new medication effects BS. Pt has no further concerns or questions at this time.

## 2025-01-14 NOTE — TELEPHONE ENCOUNTER
PA Mounjaro 2.5MG/0.5ML SUBMITTED     to [facility]     via    [x]CMM-KEY: [id number]  []Surescripts-Case ID #    []Availity-Auth ID #  NDC #    []Faxed to plan   []Other website    []Phone call Case ID #      []PA sent as URGENT    All office notes, labs and other pertaining documents and studies sent. Clinical questions answered. Awaiting determination from insurance company.     Turnaround time for your insurance to make a decision on your Prior Authorization can take 7-21 business days.

## 2025-01-30 ENCOUNTER — PROCEDURE VISIT (OUTPATIENT)
Age: 70
End: 2025-01-30
Payer: MEDICARE

## 2025-01-30 VITALS
DIASTOLIC BLOOD PRESSURE: 82 MMHG | BODY MASS INDEX: 37.8 KG/M2 | HEIGHT: 70 IN | SYSTOLIC BLOOD PRESSURE: 122 MMHG | WEIGHT: 264 LBS | HEART RATE: 85 BPM

## 2025-01-30 DIAGNOSIS — M99.05 SEGMENTAL DYSFUNCTION OF PELVIC REGION: Primary | ICD-10-CM

## 2025-01-30 DIAGNOSIS — M96.1 POST LAMINECTOMY SYNDROME: ICD-10-CM

## 2025-01-30 DIAGNOSIS — M99.01 SEGMENTAL DYSFUNCTION OF CERVICAL REGION: ICD-10-CM

## 2025-01-30 DIAGNOSIS — M47.816 LUMBAR SPONDYLOSIS: ICD-10-CM

## 2025-01-30 DIAGNOSIS — M99.04 SEGMENTAL DYSFUNCTION OF SACRAL REGION: ICD-10-CM

## 2025-01-30 DIAGNOSIS — M54.41 CHRONIC BILATERAL LOW BACK PAIN WITH RIGHT-SIDED SCIATICA: ICD-10-CM

## 2025-01-30 DIAGNOSIS — M54.16 LUMBAR RADICULOPATHY: ICD-10-CM

## 2025-01-30 DIAGNOSIS — M54.2 NECK PAIN: ICD-10-CM

## 2025-01-30 DIAGNOSIS — G89.29 CHRONIC BILATERAL LOW BACK PAIN WITH RIGHT-SIDED SCIATICA: ICD-10-CM

## 2025-01-30 DIAGNOSIS — M99.03 SEGMENTAL DYSFUNCTION OF LUMBAR REGION: ICD-10-CM

## 2025-01-30 DIAGNOSIS — M79.18 MYOFASCIAL PAIN: ICD-10-CM

## 2025-01-30 DIAGNOSIS — M81.0 OSTEOPOROSIS, UNSPECIFIED OSTEOPOROSIS TYPE, UNSPECIFIED PATHOLOGICAL FRACTURE PRESENCE: ICD-10-CM

## 2025-01-30 DIAGNOSIS — E11.628 TYPE 2 DIABETES MELLITUS WITH OTHER SKIN COMPLICATIONS (HCC): ICD-10-CM

## 2025-01-30 DIAGNOSIS — M54.16 LUMBAR RADICULOPATHY, CHRONIC: ICD-10-CM

## 2025-01-30 PROCEDURE — 98941 CHIROPRACT MANJ 3-4 REGIONS: CPT | Performed by: CHIROPRACTOR

## 2025-01-30 NOTE — PROGRESS NOTES
Date of first visit: 12/13/2024      HPI:  Horace returns for treatment today reports ongoing neck pain in the right shoulder blade low back pain.   Symptoms are feeling better today.      The following portions of the patient's history were reviewed and updated as appropriate: allergies, current medications, past family history, past medical history, past social history, past surgical history, and problem list.    Review of Systems    Physical Exam:  Exam reveals a pelvic obliquity leg with any quality biomechanically joint dysfunction right SI L5-S1 motion unit C5-C6 level active myofascial  triggers throughout.    Lower extremity neuroexam fails to reveal any evidence of focal deficit.  He does not have any nerve root tension signs appreciated and straight leg raising.  Good motor strength.    Assessment:   Diagnosis ICD-10-CM Associated Orders   1. Segmental dysfunction of pelvic region  M99.05       2. Lumbar radiculopathy  M54.16       3. Segmental dysfunction of sacral region  M99.04       4. Segmental dysfunction of lumbar region  M99.03       5. Chronic bilateral low back pain with right-sided sciatica  M54.41     G89.29       6. Neck pain  M54.2       7. Segmental dysfunction of cervical region  M99.01       8. Myofascial pain  M79.18       9. Lumbar spondylosis  M47.816       10. Post laminectomy syndrome  M96.1       11. Lumbar radiculopathy, chronic  M54.16                     Treatment: [id number]  Manipulation to the right innominate, sacrum, L5 via side-lying flexion distraction manipulation.  Manipulation C5-C6 pretreat extensive myofascial release right shoulder girdle.    Discussion:  Reviewed home stretching see him back for follow-up.

## 2025-01-31 DIAGNOSIS — E11.628 TYPE 2 DIABETES MELLITUS WITH OTHER SKIN COMPLICATIONS (HCC): ICD-10-CM

## 2025-01-31 DIAGNOSIS — M81.0 OSTEOPOROSIS, UNSPECIFIED OSTEOPOROSIS TYPE, UNSPECIFIED PATHOLOGICAL FRACTURE PRESENCE: ICD-10-CM

## 2025-01-31 RX ORDER — IBANDRONATE SODIUM 150 MG/1
150 TABLET, FILM COATED ORAL
Qty: 1 TABLET | Refills: 0 | Status: SHIPPED | OUTPATIENT
Start: 2025-01-31

## 2025-01-31 RX ORDER — TIRZEPATIDE 2.5 MG/.5ML
2.5 INJECTION, SOLUTION SUBCUTANEOUS WEEKLY
Qty: 2 ML | Refills: 0 | Status: SHIPPED | OUTPATIENT
Start: 2025-01-31

## 2025-02-03 RX ORDER — TIRZEPATIDE 2.5 MG/.5ML
2.5 INJECTION, SOLUTION SUBCUTANEOUS WEEKLY
Qty: 2 ML | Refills: 0 | OUTPATIENT
Start: 2025-02-03

## 2025-02-03 RX ORDER — IBANDRONATE SODIUM 150 MG/1
150 TABLET, FILM COATED ORAL
Qty: 1 TABLET | Refills: 0 | OUTPATIENT
Start: 2025-02-03

## 2025-02-06 DIAGNOSIS — G47.00 INSOMNIA, UNSPECIFIED TYPE: ICD-10-CM

## 2025-02-06 DIAGNOSIS — F41.9 ANXIETY: ICD-10-CM

## 2025-02-06 DIAGNOSIS — E11.9 TYPE 2 DIABETES MELLITUS WITHOUT COMPLICATION, WITHOUT LONG-TERM CURRENT USE OF INSULIN (HCC): ICD-10-CM

## 2025-02-06 DIAGNOSIS — G89.29 CHRONIC LEFT SHOULDER PAIN: ICD-10-CM

## 2025-02-06 DIAGNOSIS — M25.512 CHRONIC LEFT SHOULDER PAIN: ICD-10-CM

## 2025-02-06 RX ORDER — OXYCODONE AND ACETAMINOPHEN 7.5; 325 MG/1; MG/1
1 TABLET ORAL EVERY 6 HOURS PRN
Qty: 120 TABLET | Refills: 0 | Status: SHIPPED | OUTPATIENT
Start: 2025-02-06

## 2025-02-06 RX ORDER — ALPRAZOLAM 0.25 MG/1
0.25 TABLET ORAL 4 TIMES DAILY PRN
Qty: 120 TABLET | Refills: 0 | Status: SHIPPED | OUTPATIENT
Start: 2025-02-06

## 2025-02-06 RX ORDER — ZOLPIDEM TARTRATE 10 MG/1
10 TABLET ORAL
Qty: 30 TABLET | Refills: 0 | Status: SHIPPED | OUTPATIENT
Start: 2025-02-06

## 2025-02-06 NOTE — TELEPHONE ENCOUNTER
1 [id number] 01/12/2025 12/20/2024 oxyCODONE HYDROCHLORIDE 7.5 MG ORAL TABLET/ACETAMINOPHEN 325 MG (Tablet) 120.0 30 325 MG-7.5 MG 45.0 [facility] Private Pay 0 / 0 PA    1 [id number] 01/12/2025 01/10/2025 ALPRAZolam (Tablet) 120.0 30 0.25 MG NA [facility] Medicare 0 / 0 [place]    1 [id number] 01/12/2025 01/10/2025 Zolpidem Tartrate (Tablet) 30.0 30 10 MG [facility] Medicare 0 / 0 [place]    1 [id number] 12/16/2024 12/13/2024 oxyCODONE HYDROCHLORIDE 7.5 MG ORAL TABLET/ACETAMINOPHEN 325 MG (Tablet) 120.0 30 325 MG-7.5 MG 45.0 [facility] Medicare 0 / 0 PA    1 [id number] 12/16/2024 12/13/2024 ALPRAZolam (Tablet) 120.0 30 0.25 MG [facility] Medicare 0 / 0 PA    1 [id number] 12/16/2024 12/13/2024 Zolpidem Tartrate (Tablet) 30.0 30 10 MG [facility] Private Pay 0 / 0 PA    1 [id number] 11/19/2024 11/19/2024 oxyCODONE HYDROCHLORIDE 7.5 MG ORAL TABLET/ACETAMINOPHEN 325 MG (Tablet) 120.0 30 325 MG-7.5 MG 45.0 [facility] Medicare 0 / 0 PA    1 [id number] 11/19/2024 11/19/2024 ALPRAZolam (Tablet) 120.0 30 0.25 MG [facility] Medicare 0 / 0 PA    1 [id number] 11/19/2024 11/19/2024 Zolpidem Tartrate (Tablet) 30.0 30 10 MG [facility] Private Pay 0 / 0 PA    1 [id number] 10/23/2024 10/22/2024 oxyCODONE HYDROCHLORIDE 7.5 MG ORAL TABLET/ACETAMINOPHEN 325 MG (Tablet) 120.0 30 325 MG-7.5 MG 45.0 [facility]

## 2025-02-07 RX ORDER — GLIMEPIRIDE 4 MG/1
4 TABLET ORAL 2 TIMES DAILY
Qty: 180 TABLET | Refills: 1 | Status: SHIPPED | OUTPATIENT
Start: 2025-02-07

## 2025-02-21 ENCOUNTER — PROCEDURE VISIT (OUTPATIENT)
Age: 70
End: 2025-02-21
Payer: MEDICARE

## 2025-02-21 VITALS
DIASTOLIC BLOOD PRESSURE: 76 MMHG | SYSTOLIC BLOOD PRESSURE: 120 MMHG | HEIGHT: 70 IN | HEART RATE: 71 BPM | WEIGHT: 264 LBS | BODY MASS INDEX: 37.8 KG/M2

## 2025-02-21 DIAGNOSIS — M79.18 MYOFASCIAL PAIN: ICD-10-CM

## 2025-02-21 DIAGNOSIS — M54.16 LUMBAR RADICULOPATHY: ICD-10-CM

## 2025-02-21 DIAGNOSIS — M47.816 LUMBAR SPONDYLOSIS: ICD-10-CM

## 2025-02-21 DIAGNOSIS — M54.2 NECK PAIN: ICD-10-CM

## 2025-02-21 DIAGNOSIS — M99.03 SEGMENTAL DYSFUNCTION OF LUMBAR REGION: ICD-10-CM

## 2025-02-21 DIAGNOSIS — M99.05 SEGMENTAL DYSFUNCTION OF PELVIC REGION: Primary | ICD-10-CM

## 2025-02-21 DIAGNOSIS — M54.41 CHRONIC BILATERAL LOW BACK PAIN WITH RIGHT-SIDED SCIATICA: ICD-10-CM

## 2025-02-21 DIAGNOSIS — M96.1 POST LAMINECTOMY SYNDROME: ICD-10-CM

## 2025-02-21 DIAGNOSIS — G89.29 CHRONIC BILATERAL LOW BACK PAIN WITH RIGHT-SIDED SCIATICA: ICD-10-CM

## 2025-02-21 DIAGNOSIS — M99.01 SEGMENTAL DYSFUNCTION OF CERVICAL REGION: ICD-10-CM

## 2025-02-21 DIAGNOSIS — M99.04 SEGMENTAL DYSFUNCTION OF SACRAL REGION: ICD-10-CM

## 2025-02-21 DIAGNOSIS — M54.16 LUMBAR RADICULOPATHY, CHRONIC: ICD-10-CM

## 2025-02-21 PROCEDURE — 98941 CHIROPRACT MANJ 3-4 REGIONS: CPT | Performed by: CHIROPRACTOR

## 2025-02-21 NOTE — PROGRESS NOTES
Date of first visit: 12/13/2024      HPI:  Horace returns for treatment today reports ongoing neck pain in the right shoulder blade low back pain.   Symptoms are feeling better today.      The following portions of the patient's history were reviewed and updated as appropriate: allergies, current medications, past family history, past medical history, past social history, past surgical history, and problem list.    Review of Systems    Physical Exam:  Exam reveals a pelvic obliquity leg with any quality biomechanically joint dysfunction right SI L5-S1 motion unit C5-C6 level active myofascial  triggers throughout.    Lower extremity neuroexam fails to reveal any evidence of focal deficit.  He does not have any nerve root tension signs appreciated and straight leg raising.  Good motor strength.    Assessment:   Diagnosis ICD-10-CM Associated Orders   1. Segmental dysfunction of pelvic region  M99.05       2. Lumbar radiculopathy  M54.16       3. Segmental dysfunction of sacral region  M99.04       4. Segmental dysfunction of lumbar region  M99.03       5. Chronic bilateral low back pain with right-sided sciatica  M54.41     G89.29       6. Neck pain  M54.2       7. Segmental dysfunction of cervical region  M99.01       8. Myofascial pain  M79.18       9. Lumbar spondylosis  M47.816       10. Post laminectomy syndrome  M96.1       11. Lumbar radiculopathy, chronic  M54.16                     Treatment: [id number]  Manipulation to the right innominate, sacrum, L5 via side-lying flexion distraction manipulation.  Manipulation C5-C6 pretreat extensive myofascial release right shoulder girdle.    Discussion:  Reviewed home stretching see him back for follow-up.

## 2025-02-23 DIAGNOSIS — M54.16 LUMBAR RADICULOPATHY: ICD-10-CM

## 2025-02-24 RX ORDER — FAMOTIDINE 40 MG/1
40 TABLET, FILM COATED ORAL 2 TIMES DAILY
Qty: 180 TABLET | Refills: 1 | Status: SHIPPED | OUTPATIENT
Start: 2025-02-24

## 2025-02-24 RX ORDER — FAMOTIDINE 40 MG/1
40 TABLET, FILM COATED ORAL 2 TIMES DAILY
Qty: 180 TABLET | Refills: 0 | OUTPATIENT
Start: 2025-02-24

## 2025-03-04 DIAGNOSIS — G47.00 INSOMNIA, UNSPECIFIED TYPE: ICD-10-CM

## 2025-03-04 DIAGNOSIS — F41.9 ANXIETY: ICD-10-CM

## 2025-03-04 DIAGNOSIS — G89.29 CHRONIC LEFT SHOULDER PAIN: ICD-10-CM

## 2025-03-04 DIAGNOSIS — E11.628 TYPE 2 DIABETES MELLITUS WITH OTHER SKIN COMPLICATIONS (HCC): ICD-10-CM

## 2025-03-04 DIAGNOSIS — M25.512 CHRONIC LEFT SHOULDER PAIN: ICD-10-CM

## 2025-03-04 RX ORDER — TIRZEPATIDE 2.5 MG/.5ML
2.5 INJECTION, SOLUTION SUBCUTANEOUS WEEKLY
Qty: 2 ML | Refills: 0 | Status: CANCELLED | OUTPATIENT
Start: 2025-03-04

## 2025-03-05 DIAGNOSIS — E11.628 TYPE 2 DIABETES MELLITUS WITH OTHER SKIN COMPLICATIONS (HCC): ICD-10-CM

## 2025-03-05 RX ORDER — ZOLPIDEM TARTRATE 10 MG/1
10 TABLET ORAL
Qty: 30 TABLET | Refills: 0 | Status: SHIPPED | OUTPATIENT
Start: 2025-03-05

## 2025-03-05 RX ORDER — OXYCODONE AND ACETAMINOPHEN 7.5; 325 MG/1; MG/1
1 TABLET ORAL EVERY 6 HOURS PRN
Qty: 120 TABLET | Refills: 0 | Status: SHIPPED | OUTPATIENT
Start: 2025-03-05

## 2025-03-05 RX ORDER — ALPRAZOLAM 0.25 MG
0.25 TABLET ORAL 4 TIMES DAILY PRN
Qty: 120 TABLET | Refills: 0 | Status: SHIPPED | OUTPATIENT
Start: 2025-03-05

## 2025-03-05 RX ORDER — TIRZEPATIDE 5 MG/.5ML
5 INJECTION, SOLUTION SUBCUTANEOUS WEEKLY
Qty: 2 ML | Refills: 0 | Status: SHIPPED | OUTPATIENT
Start: 2025-03-05

## 2025-03-05 NOTE — TELEPHONE ENCOUNTER
1 [id number] 02/08/2025 02/06/2025 ALPRAZolam (Tablet) 120.0 30 0.25 MG NA [facility] [place] 0 / 0 [place]    1 [id number] 02/08/2025 02/06/2025 oxyCODONE HYDROCHLORIDE 7.5 MG ORAL TABLET/ACETAMINOPHEN 325 MG (Tablet) 120.0 30 325 MG-7.5 MG 45.0 [facility] Private Pay 0 / 0 [place]    1 [id number] 02/08/2025 02/06/2025 Zolpidem Tartrate (Tablet) 30.0 30 10 MG NA [facility] Medicare 0 / 0 [place]    1 [id number] 01/12/2025 01/10/2025 ALPRAZolam (Tablet) 120.0 30 0.25 MG NA [facility] [place] 0 / 0 [place]    1 [id number] 01/12/2025 12/20/2024 oxyCODONE HYDROCHLORIDE 7.5 MG ORAL TABLET/ACETAMINOPHEN 325 MG (Tablet) 120.0 30 325 MG-7.5 MG 45.0 [facility] Private Pay 0 / 0 [place]    1 [id number] 01/12/2025 01/10/2025 Zolpidem Tartrate (Tablet) 30.0 30 10 MG [facility] Medicare 0 / 0 [place]    1 [id number] 12/16/2024 12/13/2024 ALPRAZolam (Tablet) 120.0 30 0.25 MG NA [facility] Medicare 0 / 0 [place]    1 [id number] 12/16/2024 12/13/2024 oxyCODONE HYDROCHLORIDE 7.5 MG ORAL TABLET/ACETAMINOPHEN 325 MG (Tablet) 120.0 30 325 MG-7.5 MG 45.0 [facility] Medicare 0 / 0 [place]    1 [id number] 12/16/2024 12/13/2024 Zolpidem Tartrate (Tablet) 30.0 30 10 MG NA [facility] Private Pay 0 / 0 [place]    1 [id number] 11/19/2024 11/19/2024 ALPRAZolam (Tablet) 120.0 30 0.25 MG NA [facility] Medicare 0 / 0 [place]

## 2025-03-14 ENCOUNTER — PROCEDURE VISIT (OUTPATIENT)
Age: 70
End: 2025-03-14
Payer: MEDICARE

## 2025-03-14 VITALS
WEIGHT: 264 LBS | DIASTOLIC BLOOD PRESSURE: 80 MMHG | SYSTOLIC BLOOD PRESSURE: 138 MMHG | BODY MASS INDEX: 37.8 KG/M2 | HEART RATE: 81 BPM | HEIGHT: 70 IN

## 2025-03-14 DIAGNOSIS — M79.18 MYOFASCIAL PAIN: ICD-10-CM

## 2025-03-14 DIAGNOSIS — M54.2 NECK PAIN: ICD-10-CM

## 2025-03-14 DIAGNOSIS — M99.04 SEGMENTAL DYSFUNCTION OF SACRAL REGION: ICD-10-CM

## 2025-03-14 DIAGNOSIS — M96.1 POST LAMINECTOMY SYNDROME: ICD-10-CM

## 2025-03-14 DIAGNOSIS — M54.16 LUMBAR RADICULOPATHY: ICD-10-CM

## 2025-03-14 DIAGNOSIS — M47.816 LUMBAR SPONDYLOSIS: ICD-10-CM

## 2025-03-14 DIAGNOSIS — M54.41 CHRONIC BILATERAL LOW BACK PAIN WITH RIGHT-SIDED SCIATICA: ICD-10-CM

## 2025-03-14 DIAGNOSIS — M99.01 SEGMENTAL DYSFUNCTION OF CERVICAL REGION: ICD-10-CM

## 2025-03-14 DIAGNOSIS — M99.03 SEGMENTAL DYSFUNCTION OF LUMBAR REGION: ICD-10-CM

## 2025-03-14 DIAGNOSIS — G89.29 CHRONIC BILATERAL LOW BACK PAIN WITH RIGHT-SIDED SCIATICA: ICD-10-CM

## 2025-03-14 DIAGNOSIS — M99.05 SEGMENTAL DYSFUNCTION OF PELVIC REGION: Primary | ICD-10-CM

## 2025-03-14 PROCEDURE — 98941 CHIROPRACT MANJ 3-4 REGIONS: CPT | Performed by: CHIROPRACTOR

## 2025-03-14 RX ORDER — ATORVASTATIN CALCIUM 20 MG/1
20 TABLET, FILM COATED ORAL DAILY
COMMUNITY
Start: 2025-03-06

## 2025-03-14 NOTE — PROGRESS NOTES
Date of first visit: 12/13/2024      HPI:  Horace returns for treatment today reports ongoing neck pain in the right shoulder blade low back pain.   Symptoms are feeling better today.  2 on a pain scale.      The following portions of the patient's history were reviewed and updated as appropriate: allergies, current medications, past family history, past medical history, past social history, past surgical history, and problem list.    Review of Systems    Physical Exam:  Exam reveals a pelvic obliquity leg with any quality biomechanically joint dysfunction right SI L5-S1 motion unit C5-C6 level active myofascial  triggers throughout.    Lower extremity neuroexam fails to reveal any evidence of focal deficit.  He does not have any nerve root tension signs appreciated and straight leg raising.  Good motor strength.    Assessment:   Diagnosis ICD-10-CM Associated Orders   1. Segmental dysfunction of pelvic region  M99.05       2. Lumbar radiculopathy  M54.16       3. Segmental dysfunction of sacral region  M99.04       4. Segmental dysfunction of lumbar region  M99.03       5. Chronic bilateral low back pain with right-sided sciatica  M54.41     G89.29       6. Neck pain  M54.2       7. Segmental dysfunction of cervical region  M99.01       8. Myofascial pain  M79.18       9. Lumbar spondylosis  M47.816       10. Post laminectomy syndrome  M96.1                     Treatment: 99024  Manipulation to the right innominate, sacrum, L5 via side-lying flexion distraction manipulation.  Manipulation C5-C6 pretreat extensive myofascial release right shoulder girdle.    Discussion:  Reviewed home stretching see him back for follow-up.

## 2025-03-27 ENCOUNTER — NURSE TRIAGE (OUTPATIENT)
Age: 70
End: 2025-03-27

## 2025-03-27 NOTE — TELEPHONE ENCOUNTER
"Hypoglycemia    SYMPTOMS: none at this time     OTHER: Patient calls stating blood sugars started running low around March 20 th. Patient states he started Mounjaro in January and none of his other medications have been stopped.  He is on :    Metformin 1000 mg BID  Tradjenta 5 mg OD  Glimepiride 4 mg  Mounjaro 5 mg once per week     Patient states last night at 11 pm his BS was 39 and his tongue felt numb. He drank OJ and ate candy and numbness went away and he went to sleep not checking BS again until 8 am. AT 8 am it was 127.   Patient states for the past week or so his AM blood sugars have been 47, 43, 42 , 53, 56 , 58, 48 and denies all symptoms when blood sugar is at this level.      I explained if BS is 39 ever again with a numb tongue patient should be seen in the ER for evaluation. Patient verbalized understanding.     Patient calls asking if the doctor will take him off one of his medications . He would prefer to come off of Tradjenta.  Patient also asked if his Metformin should be decreased?  Patient states the sugars have been running low since after he added the Mounjaro.       Please review and call th e patient back within 1 hr.    While on the phone BS is 199. No symptoms.    FOLLOW UP:  Call patient back with 1 hr     REASON FOR CONVERSATION:     DISPOSITION: Discuss With PCP and Callback by Nurse Today    Reason for Disposition   Morning (before breakfast) blood glucose < 80 mg/dL (4.4 mmol/L) and more than once in past week    Answer Assessment - Initial Assessment Questions  1. SYMPTOMS: \"What symptoms are you concerned about?\"        127 BS     BS 39 11 pm woke up     199      3 20- BS  47 in AM , 52  AM 53 56 97 AM all     Patient tongue got numb when at 39       2. ONSET:  \"When did the symptoms start?\"        3 20 2025     3. BLOOD GLUCOSE: \"What is your blood glucose level?\"         Running low in AM     4. USUAL RANGE: \"What is your blood glucose level usually?\" (e.g., usual fasting morning " "value, usual evening value)            5. TYPE 1 or 2:  \"Do you know what type of diabetes you have?\"  (e.g., Type 1, Type 2, Gestational; doesn't know)         Type 2     6. INSULIN: \"Do you take insulin?\" \"What type of insulin(s) do you use? What is the mode of delivery? (syringe, pen; injection or pump) \"When did you last give yourself an insulin dose?\" (i.e., time or hours/minutes ago) \"How much did you give?\" (i.e., how many units)        Mounjaro, Metformin, Trajenta Glymeperide,     7. DIABETES PILLS: \"Do you take any pills for your diabetes?\" If Yes, ask: \"What is the name of the medicine(s) that you take for high blood sugar?\"        See above     8. OTHER SYMPTOMS: \"Do you have any symptoms?\" (e.g., fever, frequent urination, difficulty breathing, vomiting)        Denies    9. LOW BLOOD GLUCOSE TREATMENT: \"What have you done so far to treat the low blood glucose level?\"       OJ candy    Protocols used: Diabetes - Low Blood Sugar-Adult-[place]    "

## 2025-03-31 DIAGNOSIS — F41.9 ANXIETY: ICD-10-CM

## 2025-03-31 DIAGNOSIS — M25.512 CHRONIC LEFT SHOULDER PAIN: ICD-10-CM

## 2025-03-31 DIAGNOSIS — G89.29 CHRONIC LEFT SHOULDER PAIN: ICD-10-CM

## 2025-03-31 DIAGNOSIS — E11.628 TYPE 2 DIABETES MELLITUS WITH OTHER SKIN COMPLICATIONS (HCC): ICD-10-CM

## 2025-03-31 DIAGNOSIS — G47.00 INSOMNIA, UNSPECIFIED TYPE: ICD-10-CM

## 2025-04-01 DIAGNOSIS — E11.628 TYPE 2 DIABETES MELLITUS WITH OTHER SKIN COMPLICATIONS (HCC): ICD-10-CM

## 2025-04-02 RX ORDER — OXYCODONE AND ACETAMINOPHEN 7.5; 325 MG/1; MG/1
1 TABLET ORAL EVERY 6 HOURS PRN
Qty: 120 TABLET | Refills: 0 | Status: SHIPPED | OUTPATIENT
Start: 2025-04-02

## 2025-04-02 RX ORDER — TIRZEPATIDE 5 MG/.5ML
5 INJECTION, SOLUTION SUBCUTANEOUS WEEKLY
Qty: 2 ML | Refills: 0 | Status: SHIPPED | OUTPATIENT
Start: 2025-04-02

## 2025-04-02 RX ORDER — ZOLPIDEM TARTRATE 10 MG/1
10 TABLET ORAL
Qty: 30 TABLET | Refills: 0 | Status: SHIPPED | OUTPATIENT
Start: 2025-04-02

## 2025-04-02 RX ORDER — TIRZEPATIDE 5 MG/.5ML
INJECTION, SOLUTION SUBCUTANEOUS
OUTPATIENT
Start: 2025-04-02

## 2025-04-02 RX ORDER — ALPRAZOLAM 0.25 MG
0.25 TABLET ORAL 4 TIMES DAILY PRN
Qty: 120 TABLET | Refills: 0 | Status: SHIPPED | OUTPATIENT
Start: 2025-04-02

## 2025-04-02 NOTE — TELEPHONE ENCOUNTER
1 [id number] 03/07/2025 03/05/2025 oxyCODONE HYDROCHLORIDE 7.5 MG ORAL TABLET/ACETAMINOPHEN 325 MG (Tablet) 120.0 30 325 MG-7.5 MG 45.0 [facility] Private Pay 0 / 0 [place]    1 [id number] 03/07/2025 03/05/2025 ALPRAZolam (Tablet) 120.0 30 0.25 MG NA [facility] Medicare 0 / 0 [place]    1 [id number] 03/07/2025 03/05/2025 Zolpidem Tartrate (Tablet) 30.0 30 10 MG [facility] Medicare 0 / 0 [place]    1 [id number] 02/08/2025 02/06/2025 oxyCODONE HYDROCHLORIDE 7.5 MG ORAL TABLET/ACETAMINOPHEN 325 MG (Tablet) 120.0 30 325 MG-7.5 MG 45.0 [facility] Private Pay 0 / 0 [place]    1 [id number] 02/08/2025 02/06/2025 ALPRAZolam (Tablet) 120.0 30 0.25 MG [facility] Medicare 0 / 0 [place]    1 [id number] 02/08/2025 02/06/2025 Zolpidem Tartrate (Tablet) 30.0 30 10 MG [facility] Medicare 0 / 0 [place]    1 [id number] 01/12/2025 12/20/2024 oxyCODONE HYDROCHLORIDE 7.5 MG ORAL TABLET/ACETAMINOPHEN 325 MG (Tablet) 120.0 30 325 MG-7.5 MG 45.0 [facility] Private Pay 0 / 0 [place]    1 [id number] 01/12/2025 01/10/2025 ALPRAZolam (Tablet) 120.0 30 0.25 MG [facility] Medicare 0 / 0 [place]    1 [id number] 01/12/2025 01/10/2025 Zolpidem Tartrate (Tablet) 30.0 30 10 MG [facility] Medicare 0 / 0 [place]    1 [id number] 12/16/2024 12/13/2024 oxyCODONE HYDROCHLORIDE 7.5 MG ORAL TABLET/ACETAMINOPHEN 325 MG (Tablet) 120.0 30 325 MG-7.5 MG 45.0 [facility]

## 2025-04-04 ENCOUNTER — PROCEDURE VISIT (OUTPATIENT)
Age: 70
End: 2025-04-04
Payer: MEDICARE

## 2025-04-04 VITALS
SYSTOLIC BLOOD PRESSURE: 90 MMHG | HEIGHT: 70 IN | DIASTOLIC BLOOD PRESSURE: 59 MMHG | WEIGHT: 264 LBS | BODY MASS INDEX: 37.8 KG/M2 | HEART RATE: 69 BPM

## 2025-04-04 DIAGNOSIS — M99.03 SEGMENTAL DYSFUNCTION OF LUMBAR REGION: ICD-10-CM

## 2025-04-04 DIAGNOSIS — M99.01 SEGMENTAL DYSFUNCTION OF CERVICAL REGION: ICD-10-CM

## 2025-04-04 DIAGNOSIS — M96.1 POST LAMINECTOMY SYNDROME: ICD-10-CM

## 2025-04-04 DIAGNOSIS — G89.29 CHRONIC BILATERAL LOW BACK PAIN WITH RIGHT-SIDED SCIATICA: ICD-10-CM

## 2025-04-04 DIAGNOSIS — M99.04 SEGMENTAL DYSFUNCTION OF SACRAL REGION: ICD-10-CM

## 2025-04-04 DIAGNOSIS — M54.16 LUMBAR RADICULOPATHY, CHRONIC: ICD-10-CM

## 2025-04-04 DIAGNOSIS — M79.18 MYOFASCIAL PAIN: ICD-10-CM

## 2025-04-04 DIAGNOSIS — M54.2 NECK PAIN: ICD-10-CM

## 2025-04-04 DIAGNOSIS — M54.16 LUMBAR RADICULOPATHY: ICD-10-CM

## 2025-04-04 DIAGNOSIS — M47.816 LUMBAR SPONDYLOSIS: ICD-10-CM

## 2025-04-04 DIAGNOSIS — M54.41 CHRONIC BILATERAL LOW BACK PAIN WITH RIGHT-SIDED SCIATICA: ICD-10-CM

## 2025-04-04 DIAGNOSIS — M99.05 SEGMENTAL DYSFUNCTION OF PELVIC REGION: Primary | ICD-10-CM

## 2025-04-04 PROCEDURE — 98941 CHIROPRACT MANJ 3-4 REGIONS: CPT | Performed by: CHIROPRACTOR

## 2025-04-06 NOTE — PROGRESS NOTES
Date of first visit: 12/13/2024      HPI:  Horace returns for treatment today reports ongoing neck pain in the right shoulder blade low back pain.   Symptoms are feeling better today.  3-4 on a pain scale.      The following portions of the patient's history were reviewed and updated as appropriate: allergies, current medications, past family history, past medical history, past social history, past surgical history, and problem list.    Review of Systems    Physical Exam:  Exam reveals a pelvic obliquity leg with any quality biomechanically joint dysfunction right SI L5-S1 motion unit C5-C6 level active myofascial  triggers throughout.    Lower extremity neuroexam fails to reveal any evidence of focal deficit.  He does not have any nerve root tension signs appreciated and straight leg raising.  Good motor strength.    Assessment:   Diagnosis ICD-10-CM Associated Orders   1. Segmental dysfunction of pelvic region  M99.05       2. Lumbar radiculopathy  M54.16       3. Segmental dysfunction of sacral region  M99.04       4. Segmental dysfunction of lumbar region  M99.03       5. Chronic bilateral low back pain with right-sided sciatica  G89.29     M54.41       6. Neck pain  M54.2       7. Segmental dysfunction of cervical region  M99.01       8. Myofascial pain  M79.18       9. Lumbar spondylosis  M47.816       10. Post laminectomy syndrome  M96.1       11. Lumbar radiculopathy, chronic  M54.16                     Treatment: 81865  Manipulation to the right innominate, sacrum, L5 via side-lying flexion distraction manipulation.  Manipulation C5-C6 pretreat extensive myofascial release right shoulder girdle.    Discussion:  Reviewed home stretching see him back for follow-up.

## 2025-04-23 DIAGNOSIS — E11.628 TYPE 2 DIABETES MELLITUS WITH OTHER SKIN COMPLICATIONS (HCC): ICD-10-CM

## 2025-04-23 RX ORDER — LINAGLIPTIN 5 MG/1
TABLET, FILM COATED ORAL
Qty: 30 TABLET | Refills: 5 | Status: SHIPPED | OUTPATIENT
Start: 2025-04-23

## 2025-04-24 DIAGNOSIS — E11.628 TYPE 2 DIABETES MELLITUS WITH OTHER SKIN COMPLICATIONS (HCC): ICD-10-CM

## 2025-04-24 DIAGNOSIS — G47.00 INSOMNIA, UNSPECIFIED TYPE: ICD-10-CM

## 2025-04-24 DIAGNOSIS — G89.29 CHRONIC LEFT SHOULDER PAIN: ICD-10-CM

## 2025-04-24 DIAGNOSIS — F41.9 ANXIETY: ICD-10-CM

## 2025-04-24 DIAGNOSIS — M25.512 CHRONIC LEFT SHOULDER PAIN: ICD-10-CM

## 2025-04-25 ENCOUNTER — PROCEDURE VISIT (OUTPATIENT)
Age: 70
End: 2025-04-25
Payer: MEDICARE

## 2025-04-25 VITALS
HEIGHT: 70 IN | BODY MASS INDEX: 37.8 KG/M2 | HEART RATE: 80 BPM | SYSTOLIC BLOOD PRESSURE: 126 MMHG | DIASTOLIC BLOOD PRESSURE: 79 MMHG | WEIGHT: 264 LBS

## 2025-04-25 DIAGNOSIS — M47.816 LUMBAR SPONDYLOSIS: ICD-10-CM

## 2025-04-25 DIAGNOSIS — M99.01 SEGMENTAL DYSFUNCTION OF CERVICAL REGION: ICD-10-CM

## 2025-04-25 DIAGNOSIS — G89.29 CHRONIC BILATERAL LOW BACK PAIN WITH RIGHT-SIDED SCIATICA: ICD-10-CM

## 2025-04-25 DIAGNOSIS — M54.41 CHRONIC BILATERAL LOW BACK PAIN WITH RIGHT-SIDED SCIATICA: ICD-10-CM

## 2025-04-25 DIAGNOSIS — M79.18 MYOFASCIAL PAIN: ICD-10-CM

## 2025-04-25 DIAGNOSIS — M96.1 POST LAMINECTOMY SYNDROME: ICD-10-CM

## 2025-04-25 DIAGNOSIS — M54.16 LUMBAR RADICULOPATHY: ICD-10-CM

## 2025-04-25 DIAGNOSIS — M54.2 NECK PAIN: ICD-10-CM

## 2025-04-25 DIAGNOSIS — M99.05 SEGMENTAL DYSFUNCTION OF PELVIC REGION: Primary | ICD-10-CM

## 2025-04-25 DIAGNOSIS — M99.04 SEGMENTAL DYSFUNCTION OF SACRAL REGION: ICD-10-CM

## 2025-04-25 DIAGNOSIS — M99.03 SEGMENTAL DYSFUNCTION OF LUMBAR REGION: ICD-10-CM

## 2025-04-25 PROCEDURE — 98941 CHIROPRACT MANJ 3-4 REGIONS: CPT | Performed by: CHIROPRACTOR

## 2025-04-25 RX ORDER — OXYCODONE AND ACETAMINOPHEN 7.5; 325 MG/1; MG/1
1 TABLET ORAL EVERY 6 HOURS PRN
Qty: 120 TABLET | Refills: 0 | Status: SHIPPED | OUTPATIENT
Start: 2025-04-25 | End: 2025-05-02 | Stop reason: SDUPTHER

## 2025-04-25 RX ORDER — TIRZEPATIDE 5 MG/.5ML
5 INJECTION, SOLUTION SUBCUTANEOUS WEEKLY
Qty: 2 ML | Refills: 2 | Status: SHIPPED | OUTPATIENT
Start: 2025-04-25

## 2025-04-25 RX ORDER — ZOLPIDEM TARTRATE 10 MG/1
10 TABLET ORAL
Qty: 30 TABLET | Refills: 0 | Status: SHIPPED | OUTPATIENT
Start: 2025-04-25

## 2025-04-25 RX ORDER — ALPRAZOLAM 0.25 MG
0.25 TABLET ORAL 4 TIMES DAILY PRN
Qty: 120 TABLET | Refills: 0 | Status: SHIPPED | OUTPATIENT
Start: 2025-04-25

## 2025-04-25 NOTE — PROGRESS NOTES
Date of first visit: 12/13/2024      HPI:  Horace returns for treatment today reports ongoing neck pain in the right shoulder blade low back pain.   Bad flareup of low back pain last week and 8 on a pain scale better this week.    The following portions of the patient's history were reviewed and updated as appropriate: allergies, current medications, past family history, past medical history, past social history, past surgical history, and problem list.    Review of Systems    Physical Exam:  Exam reveals a pelvic obliquity leg with any quality biomechanically joint dysfunction right SI L5-S1 motion unit C5-C6 level active myofascial  triggers throughout.    Lower extremity neuroexam fails to reveal any evidence of focal deficit.  He does not have any nerve root tension signs appreciated and straight leg raising.  Good motor strength.    Assessment:   Diagnosis ICD-10-CM Associated Orders   1. Segmental dysfunction of pelvic region  M99.05       2. Lumbar radiculopathy  M54.16       3. Segmental dysfunction of sacral region  M99.04       4. Segmental dysfunction of lumbar region  M99.03       5. Chronic bilateral low back pain with right-sided sciatica  G89.29     M54.41       6. Neck pain  M54.2       7. Segmental dysfunction of cervical region  M99.01       8. Myofascial pain  M79.18       9. Lumbar spondylosis  M47.816       10. Post laminectomy syndrome  M96.1                     Treatment: 00718  Manipulation to the right innominate, sacrum, L5 via side-lying flexion distraction manipulation.  Manipulation C5-C6 pretreat extensive myofascial release right shoulder girdle.    Discussion:  Reviewed home stretching see him back for follow-up.

## 2025-04-25 NOTE — TELEPHONE ENCOUNTER
PD MP Last refill   Oxycodone 04/03/2025 # 120   Alprazolam  04/03/2025 # 120   Zolpidem 04/03/2025 # 30

## 2025-05-02 DIAGNOSIS — G89.29 CHRONIC LEFT SHOULDER PAIN: ICD-10-CM

## 2025-05-02 DIAGNOSIS — M25.512 CHRONIC LEFT SHOULDER PAIN: ICD-10-CM

## 2025-05-02 RX ORDER — OXYCODONE AND ACETAMINOPHEN 7.5; 325 MG/1; MG/1
1 TABLET ORAL EVERY 6 HOURS PRN
Qty: 120 TABLET | Refills: 0 | Status: SHIPPED | OUTPATIENT
Start: 2025-05-02

## 2025-05-02 NOTE — TELEPHONE ENCOUNTER
1 [id number] ** 04/30/2025 04/25/2025 oxyCODONE HYDROCHLORIDE 7.5 MG ORAL TABLET/ACETAMINOPHEN 325 MG (Tablet) 28.0 7 325 MG-7.5 MG 45.0 [facility] [place]    1 [id number] ** 04/30/2025 04/25/2025 ALPRAZolam (Tablet) 120.0 30 0.25 MG NA [facility] / 0 [place]    1 [id number] ** 04/30/2025 04/25/2025 Zolpidem Tartrate (Tablet) 30.0 30 10 MG NA [facility]    1 [id number] ** 04/03/2025 04/02/2025 oxyCODONE HYDROCHLORIDE 7.5 MG ORAL TABLET/ACETAMINOPHEN 325 MG (Tablet) 120.0 30 325 MG-7.5 MG 45.0 [facility], [facility] Private Pay 0 / 0 [place]    1 [id number] ** 04/03/2025 04/02/2025 ALPRAZolam (Tablet) 120.0 30 0.25 MG NA [facility] / 0 [place]    1 [id number] ** 04/03/2025 04/02/2025 Zolpidem Tartrate (Tablet) 30.0 30 10 MG NA [facility] 0 / 0 [place]    1 [id number] ** 03/07/2025 03/05/2025 oxyCODONE HYDROCHLORIDE 7.5 MG ORAL TABLET/ACETAMINOPHEN 325 MG (Tablet) 120.0 30 325 MG-7.5 MG 45.0 [facility], [facility] Private Pay 0 / 0 [place]    1 [id number] ** 03/07/2025 03/05/2025 ALPRAZolam (Tablet) 120.0 30 0.25 MG NA [facility], [facility] 0 / 0 [place]    1 [id number] ** 03/07/2025 03/05/2025 Zolpidem Tartrate (Tablet) 30.0 30 10 MG NA [facility] Medicare 0 / 0 [place]    1 [id number] ** 02/08/2025 02/06/2025 oxyCODONE HYDROCHLORIDE 7.5 MG ORAL TABLET/ACETAMINOPHEN 325 MG (Tablet) 120.0 30 325 MG-7.5 MG 45.0 [facility] Private Pay 0 / 0 [place]

## 2025-05-02 NOTE — TELEPHONE ENCOUNTER
Patient called and stated he only received a seven day supply of oxycodone 7.5 mg on 4/30 at [facility],due to patients new insurance.Patient stated he will be putting in for a new refill request of 30 tablets to  medication on Monday 5/5.

## 2025-05-07 ENCOUNTER — PROCEDURE VISIT (OUTPATIENT)
Dept: PODIATRY | Facility: CLINIC | Age: 70
End: 2025-05-07
Payer: MEDICARE

## 2025-05-07 ENCOUNTER — OFFICE VISIT (OUTPATIENT)
Dept: FAMILY MEDICINE CLINIC | Facility: CLINIC | Age: 70
End: 2025-05-07
Payer: MEDICARE

## 2025-05-07 VITALS
OXYGEN SATURATION: 99 % | DIASTOLIC BLOOD PRESSURE: 72 MMHG | TEMPERATURE: 97.4 F | SYSTOLIC BLOOD PRESSURE: 122 MMHG | WEIGHT: 259.6 LBS | HEIGHT: 70 IN | HEART RATE: 74 BPM | BODY MASS INDEX: 37.16 KG/M2

## 2025-05-07 VITALS — HEART RATE: 94 BPM | OXYGEN SATURATION: 96 % | BODY MASS INDEX: 36.94 KG/M2 | WEIGHT: 258 LBS | HEIGHT: 70 IN

## 2025-05-07 DIAGNOSIS — M54.16 LUMBAR RADICULOPATHY: ICD-10-CM

## 2025-05-07 DIAGNOSIS — B35.1 ONYCHOMYCOSIS: Primary | ICD-10-CM

## 2025-05-07 DIAGNOSIS — E78.00 HYPERCHOLESTEROLEMIA: ICD-10-CM

## 2025-05-07 DIAGNOSIS — E11.9 TYPE 2 DIABETES MELLITUS WITHOUT COMPLICATION, WITHOUT LONG-TERM CURRENT USE OF INSULIN (HCC): Primary | ICD-10-CM

## 2025-05-07 DIAGNOSIS — R74.8 ELEVATED LIVER ENZYMES: ICD-10-CM

## 2025-05-07 DIAGNOSIS — E11.42 DIABETIC POLYNEUROPATHY ASSOCIATED WITH TYPE 2 DIABETES MELLITUS (HCC): ICD-10-CM

## 2025-05-07 DIAGNOSIS — E11.9 TYPE 2 DIABETES MELLITUS WITHOUT COMPLICATION, WITHOUT LONG-TERM CURRENT USE OF INSULIN (HCC): ICD-10-CM

## 2025-05-07 DIAGNOSIS — I10 ESSENTIAL HYPERTENSION: ICD-10-CM

## 2025-05-07 LAB — SL AMB POCT HEMOGLOBIN AIC: 7 (ref ?–6.5)

## 2025-05-07 PROCEDURE — G2211 COMPLEX E/M VISIT ADD ON: HCPCS | Performed by: FAMILY MEDICINE

## 2025-05-07 PROCEDURE — 11721 DEBRIDE NAIL 6 OR MORE: CPT | Performed by: PODIATRIST

## 2025-05-07 PROCEDURE — 83036 HEMOGLOBIN GLYCOSYLATED A1C: CPT | Performed by: FAMILY MEDICINE

## 2025-05-07 PROCEDURE — 99214 OFFICE O/P EST MOD 30 MIN: CPT | Performed by: FAMILY MEDICINE

## 2025-05-07 PROCEDURE — 99213 OFFICE O/P EST LOW 20 MIN: CPT | Performed by: PODIATRIST

## 2025-05-07 RX ORDER — FENTANYL 50 UG/1
1 PATCH TRANSDERMAL
Qty: 10 PATCH | Refills: 0 | Status: SHIPPED | OUTPATIENT
Start: 2025-05-07

## 2025-05-07 NOTE — PROGRESS NOTES
:  Assessment & Plan  Onychomycosis    Orders:    Hepatic function panel; Future    Type 2 diabetes mellitus without complication, without long-term current use of insulin (HCC)    Lab Results   Component Value Date    HGBA1C 7.0 (H) 12/17/2024            Diabetic polyneuropathy associated with type 2 diabetes mellitus (HCC)    Lab Results   Component Value Date    HGBA1C 7.0 (H) 12/17/2024            Elevated liver enzymes           The patient's clinical examination today is significant for thickened, brittle discolored pedal nail plates with subungual debris consistent with onychomycosis x 10.  There is no tenderness associate with the nail plates.  There is some scant malodor most noticeable at the left fifth toenail plate.  Pedal pulses are palpable.  The patient does note paresthesias to the forefoot bilaterally, worse at nighttime.  There are no open lesions.    The pedal nail plates are sharply debrided with a sterile nail clipper x 10 without complication.  The nails then mechanically reduced and venous and keratinized a rotary bur without incident.  The patient does note that his liver enzymes were recently elevated as he was taking a lot of Tylenol for pain.  Since stopping that, his numbers have come back down to normal.  His last set of LFTs from late March 2025 were reviewed and noted to be within normal limits.    The patient is interested in repeating oral antifungal therapy.  Given his recent liver issues, I would recommend at least holding off another 2 to 3 months.  I would like to repeat blood work at that point in time to recheck his liver enzymes.  We can consider alternative therapy with oral itraconazole instead as he has failed terbinafine.  I also asked that he discuss this with his primary care doctor who he will be seeing soon.    Recommend follow-up in 2 to 3 months for at risk diabetic footcare.    History of Present Illness     Horace Jacobo is a 69 y.o. male   The patient presents  today for at risk diabetic footcare.  He does have a history of diabetes and does note numbness and tingling in his feet, typically worse at nighttime.  He notes no other acute pedal issues.  He of note, he did complete a 90-day course of oral terbinafine about 6 months ago and tolerated it well.  Unfortunately, there has been minimal improvement with his pedal nail plates.      PAST MEDICAL HISTORY:  Past Medical History:   Diagnosis Date    Allergic     Allergic rhinitis     Anxiety     Asthma     Basal cell carcinoma 04/07/2022    nose    Diabetes mellitus (HCC)     Disease of thyroid gland     EIC (epidermal inclusion cyst)     GERD (gastroesophageal reflux disease)     Hayfever     History of skin cancer     HL (hearing loss)     wears hearing aides on occasion    Hyperlipidemia     Hypertension     Memory loss, short term     R/O Seizures but placed on Keppra    PONV (postoperative nausea and vomiting)     x1 occurence    Sleep apnea     s/p gastric bypass    Tinnitus        PAST SURGICAL HISTORY:  Past Surgical History:   Procedure Laterality Date    ABDOMINOPLASTY      ADENOIDECTOMY      BACK SURGERY      lower back surgery    CARPAL TUNNEL RELEASE Bilateral     CHOLECYSTECTOMY      COLONOSCOPY      GASTRIC BYPASS      HERNIA REPAIR Right     JOINT REPLACEMENT Bilateral     Knee    KNEE ARTHROSCOPY Bilateral     X3    MOHS SURGERY  06/15/2022    nose-Dr. Patel    AK RPR 1ST INGUN HRNA AGE 5 YRS/> REDUCIBLE Left 11/26/2018    Procedure: INGUINAL HERNIA REPAIR;  Surgeon: Rubens Casas DO;  Location: [facility];  Service: General    AK SURGICAL ARTHROSCOPY SHOULDER W/ROTATOR CUFF RPR Left 5/10/2023    Procedure: SHOULDER ARTHROSCOPIC ROTATOR CUFF REPAIR, SUBACROMIAL DECOMPRESSION, BICEP TENODESIS;  Surgeon: Mario Jackson MD;  Location: [facility];  Service: Orthopedics    AK TENDON SHEATH INCISION Right 04/29/2022    Procedure: RELEASE TRIGGER FINGER RING WITH LONG;  Surgeon: Hay  MD Soren;  Location: [facility];  Service: Orthopedics    TONSILLECTOMY      TRIGGER FINGER RELEASE Right 04/29/2022        ALLERGIES:  Iv dye [iodinated contrast media] and Penicillins    MEDICATIONS:  Current Outpatient Medications   Medication Sig Dispense Refill    ALPRAZolam (XANAX) 0.25 mg tablet Take 1 tablet (0.25 mg total) by mouth 4 (four) times a day as needed for anxiety 120 tablet 0    atorvastatin (LIPITOR) 20 mg tablet Take 20 mg by mouth daily      azelastine (ASTELIN) 0.1 % nasal spray 1-2 sprays into each nostril 2 (two) times a day as needed      Benadryl Allergy 25 MG capsule       celecoxib (CeleBREX) 200 mg capsule TAKE 1 CAPSULE BY MOUTH TWICE A DAY 60 capsule 5    Cholecalciferol 2000 units CAPS Take 1 capsule by mouth daily after dinner      clobetasol (TEMOVATE) 0.05 % cream Apply 5 g (1 Application total) topically 2 (two) times a day To affected area 60 g 0    Cyanocobalamin (B-12) 1000 MCG CAPS Take 1 tablet by mouth daily after dinner      diltiazem (DILACOR XR) 240 MG 24 hr capsule Take 240 mg by mouth daily      famotidine (PEPCID) 40 MG tablet TAKE 1 TABLET BY MOUTH TWICE A  tablet 1    fexofenadine (ALLEGRA) 180 MG tablet TAKE 1 TABLET BY MOUTH EVERY DAY 90 tablet 1    fluticasone (FLONASE) 50 mcg/act nasal spray 2 sprays into each nostril 2 (two) times a day as needed        fluticasone-salmeterol (Advair HFA) 230-21 MCG/ACT inhaler Inhale 2 puffs 2 (two) times a day      glimepiride (AMARYL) 4 mg tablet Take 1 tablet (4 mg total) by mouth 2 (two) times a day 180 tablet 1    hydrochlorothiazide (HYDRODIURIL) 25 mg tablet Take 1 tablet by mouth daily in the early morning    3    ibandronate (BONIVA) 150 MG tablet Take 1 tablet (150 mg total) by mouth every 30 (thirty) days 1 tablet 0    ketoconazole (NIZORAL) 2 % cream Apply 1 Application topically 2 (two) times a day To affected area 180 g 0    levalbuterol (XOPENEX HFA) 45 mcg/act inhaler Inhale 2 puffs every 4 (four)  hours as needed for wheezing 15 g 5    levalbuterol (XOPENEX) 1.25 mg/3 mL nebulizer solution       levETIRAcetam (KEPPRA) 500 mg tablet Take 1 tablet (500 mg total) by mouth 2 (two) times a day 180 tablet 3    losartan (COZAAR) 25 mg tablet Take 25 mg by mouth daily      metFORMIN (GLUCOPHAGE) 1000 MG tablet TAKE 1 TABLET BY MOUTH TWICE A DAY WITH MEALS 180 tablet 1    methocarbamol (ROBAXIN) 500 mg tablet Take 1 tablet (500 mg total) by mouth 2 (two) times a day 60 tablet 3    oxyCODONE-acetaminophen (Percocet) 7.5-325 MG per tablet Take 1 tablet by mouth every 6 (six) hours as needed for moderate pain Max Daily Amount: 4 tablets 120 tablet 0    primidone (MYSOLINE) 50 mg tablet TAKE 5 TABLETS BY MOUTH DAILY 450 tablet 3    Pseudoeph-Doxylamine-DM-APAP (NYQUIL PO) Take by mouth as needed       Tirzepatide (Mounjaro) 5 MG/0.5ML SOAJ Inject 5 mg under the skin once a week 2 mL 2    Tradjenta 5 MG TABS TAKE 1 TABLET BY MOUTH DAILY WITH OR WITHOUT FOOD 30 tablet 5    zolpidem (AMBIEN) 10 mg tablet Take 1 tablet (10 mg total) by mouth daily at bedtime as needed for sleep 30 tablet 0    albuterol (PROVENTIL HFA,VENTOLIN HFA) 90 mcg/act inhaler Inhale 2 puffs every 4 (four) hours as needed (Patient not taking: Reported on 12/19/2024)      amoxicillin (AMOXIL) 500 mg capsule Take 500 mg by mouth 4 tablets 1 hour prior to procedure (Patient not taking: Reported on 1/30/2025)      predniSONE 10 mg tablet 3 tabs po bid x2 days, then 2 tabs po bid x2 days, then 1 tab bid x2 days, then 1 daily until done. (Patient not taking: Reported on 1/14/2025) 26 tablet 0     No current facility-administered medications for this visit.       SOCIAL HISTORY:  Social History     Socioeconomic History    Marital status: /Civil Union     Spouse name: None    Number of children: 2    Years of education: trade school    Highest education level: None   Occupational History     Comment: employed   Tobacco Use    Smoking status: Never      Passive exposure: Past    Smokeless tobacco: Never   Vaping Use    Vaping status: Never Used   Substance and Sexual Activity    Alcohol use: Not Currently     Comment: 1 or 2 month    Drug use: No    Sexual activity: Not Currently     Partners: Female     Birth control/protection: Male Sterilization   Other Topics Concern    None   Social History Narrative    Always uses seat belt    Caffeine use    Daily coffee consumption    Daily cola consumption    Dental care, regularly    DENIED exercise frequency    Guns in the home:stored in locked cabinet    Multiple organ donor    Patient has living will    DENIED pets/animals    Power of  in existence    Water intake, adequate (per day)     Social Drivers of Health     Financial Resource Strain: Low Risk  (8/17/2023)    Overall Financial Resource Strain (CARDIA)     Difficulty of Paying Living Expenses: Not very hard   Food Insecurity: No Food Insecurity (8/21/2024)    Nursing - Inadequate Food Risk Classification     Worried About Running Out of Food in the Last Year: Never true     Ran Out of Food in the Last Year: Never true     Ran Out of Food in the Last Year: Not on file   Transportation Needs: No Transportation Needs (8/21/2024)    PRAPARE - Transportation     Lack of Transportation (Medical): No     Lack of Transportation (Non-Medical): No   Physical Activity: Not on file   Stress: Not on file   Social Connections: Not on file   Intimate Partner Violence: Not At Risk (4/30/2024)    [facility]    Humiliation, Afraid, Rape, and Kick questionnaire     Fear of Current or Ex-Partner: No     Emotionally Abused: No     Physically Abused: No     Sexually Abused: No   Housing Stability: Low Risk  (8/21/2024)    Housing Stability Vital Sign     Unable to Pay for Housing in the Last Year: No     Number of Times Moved in the Last Year: 1     Homeless in the Last Year: No     "  Review of Systems   Constitutional: Negative.    HENT: Negative.     Eyes: Negative.    Respiratory: Negative.     Cardiovascular: Negative.    Endocrine: Negative.    Musculoskeletal: Negative.    Neurological: Negative.    Hematological: Negative.    Psychiatric/Behavioral: Negative.       Objective   Pulse 94   Ht 5' 10\" (1.778 m)   Wt 117 kg (258 lb)   SpO2 96%   BMI 37.02 kg/m²      Physical Exam  Vitals and nursing note reviewed.   Constitutional:       Appearance: He is well-developed.   HENT:      Head: Normocephalic and atraumatic.   Cardiovascular:      Pulses:           Dorsalis pedis pulses are 2+ on the right side and 2+ on the left side.        Posterior tibial pulses are 1+ on the right side and 1+ on the left side.   Pulmonary:      Effort: Pulmonary effort is normal.   Feet:      Right foot:      Skin integrity: Skin integrity normal.      Toenail Condition: Right toenails are abnormally thick and long. Fungal disease present.     Left foot:      Skin integrity: Skin integrity normal.      Toenail Condition: Left toenails are abnormally thick and long. Fungal disease present.     Comments: The patient's clinical examination today is significant for thickened, brittle discolored pedal nail plates with subungual debris consistent with onychomycosis x 10.  There is no tenderness associate with the nail plates.  There is some scant malodor most noticeable at the left fifth toenail plate.  Pedal pulses are palpable.  The patient does note paresthesias to the forefoot bilaterally, worse at nighttime.  There are no open lesions.  Skin:     General: Skin is warm and dry.      Capillary Refill: Capillary refill takes less than 2 seconds.   Neurological:      Mental Status: He is alert.   Psychiatric:         Mood and Affect: Mood normal.           "

## 2025-05-07 NOTE — PROGRESS NOTES
Name: Horace Jacobo      : 1955      MRN: [id number]  Encounter Provider: Sherry Meza DO  Encounter Date: 2025   Encounter department: [facility]    Assessment & Plan  Type 2 diabetes mellitus without complication, without long-term current use of insulin (HCC)    Lab Results   Component Value Date    HGBA1C 7.0 (A) 2025       Orders:    POCT hemoglobin A1c    Comprehensive metabolic panel; Future    Lumbar radiculopathy    Orders:    fentaNYL (DURAGESIC) 50 mcg/hr; Place 1 patch on the skin over 72 hours every third day Max Daily Amount: 1 patch    Essential hypertension         Hypercholesterolemia    Orders:    Lipid panel; Future         History of Present Illness     The patient presents for follow-up for type 2 diabetes, hypertension, and hypercholesterolemia.  All are under good control at this time.  The patient is complaining of worsening lumbar back pain and in the past he did not get relief from pain management injections.  We talked about adding on a long-acting pain med and he is agreeable to doing so but I explained he is not to take more than 4 pain pills for breakthrough pain a day and hopefully the number will significantly decrease.    Diabetes  He presents for his initial diabetic visit. He has type 2 diabetes mellitus. No MedicAlert identification noted. His disease course has been stable. Pertinent negatives for hypoglycemia include no confusion, dizziness or headaches. Pertinent negatives for diabetes include no chest pain, no polyuria and no weakness. Pertinent negatives for hypoglycemia complications include no blackouts, no hospitalization and no nocturnal hypoglycemia. Symptoms are stable. Risk factors for coronary artery disease include diabetes mellitus, dyslipidemia, hypertension and family history. Current diabetic treatment includes diet. His weight is stable. He is following a diabetic diet. Meal planning includes avoidance  of concentrated sweets. He has not had a previous visit with a dietitian. He participates in exercise daily. His home blood glucose trend is decreasing rapidly. His breakfast blood glucose is taken between 7-8 am. His breakfast blood glucose range is generally  mg/dl. His lunch blood glucose is taken between 12-1 pm. His lunch blood glucose range is generally 110-130 mg/dl. His dinner blood glucose is taken between 5-6 pm. His dinner blood glucose range is generally  mg/dl. His bedtime blood glucose is taken between 8-9 pm. His bedtime blood glucose range is generally  mg/dl. His overall blood glucose range is 110-130 mg/dl. An ACE inhibitor/angiotensin II receptor blocker is being taken. He sees a podiatrist.Eye exam is current.     Review of Systems   Constitutional:  Negative for appetite change, chills and fever.   HENT:  Negative for ear pain, facial swelling, rhinorrhea, sinus pain, sore throat and trouble swallowing.    Eyes:  Negative for discharge and redness.   Respiratory:  Negative for chest tightness, shortness of breath and wheezing.    Cardiovascular:  Negative for chest pain and palpitations.   Gastrointestinal:  Negative for abdominal pain, diarrhea, nausea and vomiting.   Endocrine: Negative for polyuria.   Genitourinary:  Negative for dysuria and urgency.   Musculoskeletal:  Positive for arthralgias. Negative for back pain.        Positive lower back pain with intermittent   Skin:  Negative for rash.   Neurological:  Negative for dizziness, weakness and headaches.   Hematological:  Negative for adenopathy.   Psychiatric/Behavioral:  Negative for behavioral problems, confusion and sleep disturbance.    All other systems reviewed and are negative.    Past Medical History:   Diagnosis Date    Allergic     Allergic rhinitis     Anxiety     Asthma     Basal cell carcinoma 04/07/2022    nose    Diabetes mellitus (HCC)     Disease of thyroid gland     EIC (epidermal inclusion cyst)      GERD (gastroesophageal reflux disease)     Hayfever     History of skin cancer     HL (hearing loss)     wears hearing aides on occasion    Hyperlipidemia     Hypertension     Memory loss, short term     R/O Seizures but placed on Keppra    PONV (postoperative nausea and vomiting)     x1 occurence    Sleep apnea     s/p gastric bypass    Tinnitus      Past Surgical History:   Procedure Laterality Date    ABDOMINOPLASTY      ADENOIDECTOMY      BACK SURGERY      lower back surgery    CARPAL TUNNEL RELEASE Bilateral     CHOLECYSTECTOMY      COLONOSCOPY      GASTRIC BYPASS      HERNIA REPAIR Right     JOINT REPLACEMENT Bilateral     Knee    KNEE ARTHROSCOPY Bilateral     X3    MOHS SURGERY  06/15/2022    esther-Dr. Patel    NC RPR 1ST INGUN HRNA AGE 5 YRS/> REDUCIBLE Left 11/26/2018    Procedure: INGUINAL HERNIA REPAIR;  Surgeon: Rubens Casas DO;  Location: [facility];  Service: General    NC SURGICAL ARTHROSCOPY SHOULDER W/ROTATOR CUFF RPR Left 5/10/2023    Procedure: SHOULDER ARTHROSCOPIC ROTATOR CUFF REPAIR, SUBACROMIAL DECOMPRESSION, BICEP TENODESIS;  Surgeon: Mario Jackson MD;  Location: [facility] OR;  Service: Orthopedics    NC TENDON SHEATH INCISION Right 04/29/2022    Procedure: RELEASE TRIGGER FINGER RING WITH LONG;  Surgeon: Hay Doty MD;  Location: [facility];  Service: Orthopedics    TONSILLECTOMY      TRIGGER FINGER RELEASE Right 04/29/2022     Family History   Problem Relation Age of Onset    Heart disease Mother     Kidney cancer Mother     Cancer Mother     Hypertension Father     Bipolar disorder Sister         bipolar disorder NOS    Diabetes Maternal Grandmother      Social History     Tobacco Use    Smoking status: Never     Passive exposure: Past    Smokeless tobacco: Never   Vaping Use    Vaping status: Never Used   Substance and Sexual Activity    Alcohol use: Not Currently     Comment: 1 or 2 month    Drug use: No    Sexual activity: Not Currently     Partners: Female      Birth control/protection: Male Sterilization     Current Outpatient Medications on File Prior to Visit   Medication Sig    ALPRAZolam (XANAX) 0.25 mg tablet Take 1 tablet (0.25 mg total) by mouth 4 (four) times a day as needed for anxiety    amoxicillin (AMOXIL) 500 mg capsule Take 500 mg by mouth 4 tablets 1 hour prior to procedure (Patient taking differently: Take 500 mg by mouth As needed predental 4 tablets 1 hour prior to procedure)    atorvastatin (LIPITOR) 20 mg tablet Take 20 mg by mouth daily    azelastine (ASTELIN) 0.1 % nasal spray 1-2 sprays into each nostril 2 (two) times a day as needed    Benadryl Allergy 25 MG capsule     celecoxib (CeleBREX) 200 mg capsule TAKE 1 CAPSULE BY MOUTH TWICE A DAY    clobetasol (TEMOVATE) 0.05 % cream Apply 5 g (1 Application total) topically 2 (two) times a day To affected area    diltiazem (DILACOR XR) 240 MG 24 hr capsule Take 240 mg by mouth daily    fexofenadine (ALLEGRA) 180 MG tablet TAKE 1 TABLET BY MOUTH EVERY DAY    fluticasone (FLONASE) 50 mcg/act nasal spray 2 sprays into each nostril 2 (two) times a day as needed      fluticasone-salmeterol (Advair HFA) 230-21 MCG/ACT inhaler Inhale 2 puffs 2 (two) times a day    hydrochlorothiazide (HYDRODIURIL) 25 mg tablet Take 1 tablet by mouth daily in the early morning      ibandronate (BONIVA) 150 MG tablet Take 1 tablet (150 mg total) by mouth every 30 (thirty) days    ketoconazole (NIZORAL) 2 % cream Apply 1 Application topically 2 (two) times a day To affected area    levalbuterol (XOPENEX HFA) 45 mcg/act inhaler Inhale 2 puffs every 4 (four) hours as needed for wheezing    levalbuterol (XOPENEX) 1.25 mg/3 mL nebulizer solution     levETIRAcetam (KEPPRA) 500 mg tablet Take 1 tablet (500 mg total) by mouth 2 (two) times a day    losartan (COZAAR) 25 mg tablet Take 25 mg by mouth daily    methocarbamol (ROBAXIN) 500 mg tablet Take 1 tablet (500 mg total) by mouth 2 (two) times a day    oxyCODONE-acetaminophen  (Percocet) 7.5-325 MG per tablet Take 1 tablet by mouth every 6 (six) hours as needed for moderate pain Max Daily Amount: 4 tablets    primidone (MYSOLINE) 50 mg tablet TAKE 5 TABLETS BY MOUTH DAILY    Pseudoeph-Doxylamine-DM-APAP (NYQUIL PO) Take by mouth as needed     Tirzepatide (Mounjaro) 5 MG/0.5ML SOAJ Inject 5 mg under the skin once a week    Tradjenta 5 MG TABS TAKE 1 TABLET BY MOUTH DAILY WITH OR WITHOUT FOOD    zolpidem (AMBIEN) 10 mg tablet Take 1 tablet (10 mg total) by mouth daily at bedtime as needed for sleep    albuterol (PROVENTIL HFA,VENTOLIN HFA) 90 mcg/act inhaler Inhale 2 puffs every 4 (four) hours as needed (Patient not taking: Reported on 5/7/2025)    Cholecalciferol 2000 units CAPS Take 1 capsule by mouth daily after dinner (Patient not taking: Reported on 5/7/2025)    Cyanocobalamin (B-12) 1000 MCG CAPS Take 1 tablet by mouth daily after dinner (Patient not taking: Reported on 5/7/2025)    famotidine (PEPCID) 40 MG tablet TAKE 1 TABLET BY MOUTH TWICE A DAY (Patient not taking: Reported on 5/7/2025)    glimepiride (AMARYL) 4 mg tablet Take 1 tablet (4 mg total) by mouth 2 (two) times a day (Patient not taking: Reported on 5/7/2025)    metFORMIN (GLUCOPHAGE) 1000 MG tablet TAKE 1 TABLET BY MOUTH TWICE A DAY WITH MEALS (Patient not taking: Reported on 5/7/2025)    predniSONE 10 mg tablet 3 tabs po bid x2 days, then 2 tabs po bid x2 days, then 1 tab bid x2 days, then 1 daily until done. (Patient not taking: Reported on 1/14/2025)     Allergies   Allergen Reactions    Iv Dye [Iodinated Contrast Media] Hives    Penicillins Other (See Comments)     ? Had as a child-Unknown reaction     Immunization History   Administered Date(s) Administered    COVID-19 PFIZER VACCINE 0.3 ML IM 02/17/2021, 03/08/2021, 12/11/2021    COVID-19 Pfizer Vac BIVALENT Cory-sucrose 12 Yr+ IM 10/05/2022    INFLUENZA 09/22/2018, 09/04/2019, 09/30/2022, 08/28/2023    Influenza Quadrivalent, 6-35 Months IM 10/13/2015     "Influenza, high dose seasonal 0.7 mL 10/02/2020, 11/15/2021    Influenza, seasonal, injectable 11/03/2011, 09/14/2017    Pneumococcal Conjugate 13-Valent 10/13/2015    Pneumococcal Conjugate Vaccine 20-valent (Pcv20), Polysace 07/26/2022    influenza, trivalent, adjuvanted 10/23/2024     Objective   /72   Pulse 74   Temp (!) 97.4 °F (36.3 °C)   Ht 5' 10\" (1.778 m)   Wt 118 kg (259 lb 9.6 oz)   SpO2 99%   BMI 37.25 kg/m²     Physical Exam  Vitals and nursing note reviewed.   Constitutional:       General: He is not in acute distress.     Appearance: Normal appearance. He is well-developed. He is not ill-appearing or diaphoretic.   HENT:      Head: Normocephalic and atraumatic.      Right Ear: Tympanic membrane, ear canal and external ear normal.      Left Ear: Tympanic membrane, ear canal and external ear normal.      Nose: Nose normal. No congestion or rhinorrhea.      Mouth/Throat:      Mouth: Mucous membranes are moist.      Pharynx: Oropharynx is clear. No oropharyngeal exudate or posterior oropharyngeal erythema.   Eyes:      General: No scleral icterus.        Right eye: No discharge.         Left eye: No discharge.      Extraocular Movements: Extraocular movements intact.      Conjunctiva/sclera: Conjunctivae normal.      Pupils: Pupils are equal, round, and reactive to light.   Neck:      Thyroid: No thyromegaly.      Vascular: No carotid bruit or JVD.      Trachea: No tracheal deviation.   Cardiovascular:      Rate and Rhythm: Normal rate and regular rhythm.      Pulses: Normal pulses.      Heart sounds: Normal heart sounds. No murmur heard.  Pulmonary:      Effort: Pulmonary effort is normal. No respiratory distress.      Breath sounds: Normal breath sounds. No stridor. No wheezing, rhonchi or rales.   Abdominal:      General: Abdomen is flat. Bowel sounds are normal. There is no distension.      Palpations: Abdomen is soft. There is no mass.      Tenderness: There is no abdominal tenderness. " There is no guarding or rebound.   Musculoskeletal:         General: Tenderness present. No swelling or deformity. Normal range of motion.      Cervical back: Normal range of motion and neck supple. No rigidity.      Right lower leg: No edema.      Left lower leg: No edema.      Comments: Increased paravertebral muscle tension of the lumbar paraspinal musculature on palpation with tenderness on palpation   Lymphadenopathy:      Cervical: No cervical adenopathy.   Skin:     General: Skin is warm and dry.      Capillary Refill: Capillary refill takes less than 2 seconds.      Coloration: Skin is not jaundiced.      Findings: No bruising, erythema or rash.   Neurological:      General: No focal deficit present.      Mental Status: He is alert and oriented to person, place, and time.      Cranial Nerves: No cranial nerve deficit.      Sensory: No sensory deficit.      Motor: No abnormal muscle tone.      Coordination: Coordination normal.      Gait: Gait normal.      Deep Tendon Reflexes: Reflexes are normal and symmetric. Reflexes normal.   Psychiatric:         Mood and Affect: Mood normal.         Behavior: Behavior normal.         Thought Content: Thought content normal.         Judgment: Judgment normal.

## 2025-05-07 NOTE — ASSESSMENT & PLAN NOTE
Orders:    fentaNYL (DURAGESIC) 50 mcg/hr; Place 1 patch on the skin over 72 hours every third day Max Daily Amount: 1 patch

## 2025-05-08 ENCOUNTER — TELEPHONE (OUTPATIENT)
Age: 70
End: 2025-05-08

## 2025-05-08 NOTE — TELEPHONE ENCOUNTER
PA for fentaNYL (DURAGESIC) 50 mcg/hr SUBMITTED to     via    [x][facility]-KEY: [id number]  []Surescripts-Case ID #   []Availity-Auth ID # NDC #   []Faxed to plan   []Other website   []Phone call Case ID #     [x]PA sent as URGENT    All office notes, labs and other pertaining documents and studies sent. Clinical questions answered. Awaiting determination from insurance company.     Turnaround time for your insurance to make a decision on your Prior Authorization can take 7-21 business days.

## 2025-05-09 NOTE — TELEPHONE ENCOUNTER
PA for fentaNYL (DURAGESIC) 50 mcg/hr  APPROVED     Date(s) approved 04/24/2025 until further notice      Patient advised by          []MyChart Message  [x]Phone call   []LMOM  []L/M to call office as no active Communication consent on file  []Unable to leave detailed message as VM not approved on Communication consent       Pharmacy advised by    [x]Fax  []Phone call  []Secure Chat    Specialty Pharmacy    []     Approval letter scanned into Media Yes

## 2025-05-15 ENCOUNTER — TELEPHONE (OUTPATIENT)
Age: 70
End: 2025-05-15

## 2025-05-15 NOTE — TELEPHONE ENCOUNTER
Patient called in wanted to let provider know that he was doing yard work yesterday and he noticed that the fentanyl patch fell off at some point. He could not find it so he had to replace it early.

## 2025-05-16 ENCOUNTER — PROCEDURE VISIT (OUTPATIENT)
Age: 70
End: 2025-05-16
Payer: MEDICARE

## 2025-05-16 VITALS
BODY MASS INDEX: 37.08 KG/M2 | HEART RATE: 80 BPM | DIASTOLIC BLOOD PRESSURE: 81 MMHG | WEIGHT: 259 LBS | HEIGHT: 70 IN | SYSTOLIC BLOOD PRESSURE: 130 MMHG

## 2025-05-16 DIAGNOSIS — M54.16 LUMBAR RADICULOPATHY: ICD-10-CM

## 2025-05-16 DIAGNOSIS — M96.1 POST LAMINECTOMY SYNDROME: ICD-10-CM

## 2025-05-16 DIAGNOSIS — M79.18 MYOFASCIAL PAIN: ICD-10-CM

## 2025-05-16 DIAGNOSIS — M99.01 SEGMENTAL DYSFUNCTION OF CERVICAL REGION: ICD-10-CM

## 2025-05-16 DIAGNOSIS — M54.16 LUMBAR RADICULOPATHY, CHRONIC: ICD-10-CM

## 2025-05-16 DIAGNOSIS — M99.05 SEGMENTAL DYSFUNCTION OF PELVIC REGION: Primary | ICD-10-CM

## 2025-05-16 DIAGNOSIS — M47.816 LUMBAR SPONDYLOSIS: ICD-10-CM

## 2025-05-16 DIAGNOSIS — G89.29 CHRONIC BILATERAL LOW BACK PAIN WITH RIGHT-SIDED SCIATICA: ICD-10-CM

## 2025-05-16 DIAGNOSIS — M99.03 SEGMENTAL DYSFUNCTION OF LUMBAR REGION: ICD-10-CM

## 2025-05-16 DIAGNOSIS — M54.41 CHRONIC BILATERAL LOW BACK PAIN WITH RIGHT-SIDED SCIATICA: ICD-10-CM

## 2025-05-16 DIAGNOSIS — M99.04 SEGMENTAL DYSFUNCTION OF SACRAL REGION: ICD-10-CM

## 2025-05-16 DIAGNOSIS — M54.2 NECK PAIN: ICD-10-CM

## 2025-05-16 PROCEDURE — 98941 CHIROPRACT MANJ 3-4 REGIONS: CPT | Performed by: CHIROPRACTOR

## 2025-05-19 ENCOUNTER — APPOINTMENT (OUTPATIENT)
Dept: LAB | Facility: CLINIC | Age: 70
End: 2025-05-19
Payer: MEDICARE

## 2025-05-19 DIAGNOSIS — B35.1 ONYCHOMYCOSIS: ICD-10-CM

## 2025-05-19 DIAGNOSIS — E78.00 HYPERCHOLESTEROLEMIA: ICD-10-CM

## 2025-05-19 DIAGNOSIS — E11.9 TYPE 2 DIABETES MELLITUS WITHOUT COMPLICATION, WITHOUT LONG-TERM CURRENT USE OF INSULIN (HCC): ICD-10-CM

## 2025-05-19 LAB
ALBUMIN SERPL BCG-MCNC: 4.5 G/DL (ref 3.5–5)
ALP SERPL-CCNC: 131 U/L (ref 34–104)
ALT SERPL W P-5'-P-CCNC: 23 U/L (ref 7–52)
ANION GAP SERPL CALCULATED.3IONS-SCNC: 12 MMOL/L (ref 4–13)
AST SERPL W P-5'-P-CCNC: 20 U/L (ref 13–39)
BILIRUB DIRECT SERPL-MCNC: 0.13 MG/DL (ref 0–0.2)
BILIRUB SERPL-MCNC: 0.55 MG/DL (ref 0.2–1)
BUN SERPL-MCNC: 12 MG/DL (ref 5–25)
CALCIUM SERPL-MCNC: 9.4 MG/DL (ref 8.4–10.2)
CHLORIDE SERPL-SCNC: 94 MMOL/L (ref 96–108)
CHOLEST SERPL-MCNC: 161 MG/DL (ref ?–200)
CO2 SERPL-SCNC: 28 MMOL/L (ref 21–32)
CREAT SERPL-MCNC: 1.11 MG/DL (ref 0.6–1.3)
GFR SERPL CREATININE-BSD FRML MDRD: 66 ML/MIN/1.73SQ M
GLUCOSE P FAST SERPL-MCNC: 145 MG/DL (ref 65–99)
HDLC SERPL-MCNC: 63 MG/DL
LDLC SERPL CALC-MCNC: 67 MG/DL (ref 0–100)
NONHDLC SERPL-MCNC: 98 MG/DL
POTASSIUM SERPL-SCNC: 3.9 MMOL/L (ref 3.5–5.3)
PROT SERPL-MCNC: 7.1 G/DL (ref 6.4–8.4)
SODIUM SERPL-SCNC: 134 MMOL/L (ref 135–147)
TRIGL SERPL-MCNC: 155 MG/DL (ref ?–150)

## 2025-05-19 PROCEDURE — 80061 LIPID PANEL: CPT

## 2025-05-19 PROCEDURE — 82248 BILIRUBIN DIRECT: CPT

## 2025-05-19 PROCEDURE — 80053 COMPREHEN METABOLIC PANEL: CPT

## 2025-05-19 PROCEDURE — 36415 COLL VENOUS BLD VENIPUNCTURE: CPT

## 2025-05-20 ENCOUNTER — RESULTS FOLLOW-UP (OUTPATIENT)
Dept: FAMILY MEDICINE CLINIC | Facility: CLINIC | Age: 70
End: 2025-05-20

## 2025-05-20 DIAGNOSIS — B35.4 TINEA CORPORIS: ICD-10-CM

## 2025-05-21 ENCOUNTER — TELEPHONE (OUTPATIENT)
Age: 70
End: 2025-05-21

## 2025-05-21 DIAGNOSIS — E11.9 TYPE 2 DIABETES MELLITUS WITHOUT COMPLICATION, WITHOUT LONG-TERM CURRENT USE OF INSULIN (HCC): Primary | ICD-10-CM

## 2025-05-21 DIAGNOSIS — G89.29 CHRONIC LEFT SHOULDER PAIN: ICD-10-CM

## 2025-05-21 DIAGNOSIS — G47.00 INSOMNIA, UNSPECIFIED TYPE: ICD-10-CM

## 2025-05-21 DIAGNOSIS — M54.16 LUMBAR RADICULOPATHY: ICD-10-CM

## 2025-05-21 DIAGNOSIS — M25.512 CHRONIC LEFT SHOULDER PAIN: ICD-10-CM

## 2025-05-21 DIAGNOSIS — F41.9 ANXIETY: ICD-10-CM

## 2025-05-21 RX ORDER — TIRZEPATIDE 7.5 MG/.5ML
7.5 INJECTION, SOLUTION SUBCUTANEOUS WEEKLY
Qty: 2 ML | Refills: 0 | Status: SHIPPED | OUTPATIENT
Start: 2025-05-21

## 2025-05-21 RX ORDER — KETOCONAZOLE 20 MG/G
1 CREAM TOPICAL 2 TIMES DAILY
Qty: 180 G | Refills: 0 | Status: SHIPPED | OUTPATIENT
Start: 2025-05-21

## 2025-05-21 RX ORDER — ZOLPIDEM TARTRATE 10 MG/1
10 TABLET ORAL
Qty: 30 TABLET | Refills: 0 | Status: SHIPPED | OUTPATIENT
Start: 2025-05-21

## 2025-05-21 RX ORDER — CELECOXIB 200 MG/1
200 CAPSULE ORAL 2 TIMES DAILY
Qty: 180 CAPSULE | Refills: 1 | Status: SHIPPED | OUTPATIENT
Start: 2025-05-21

## 2025-05-21 RX ORDER — ALPRAZOLAM 0.25 MG
0.25 TABLET ORAL 4 TIMES DAILY PRN
Qty: 120 TABLET | Refills: 0 | Status: SHIPPED | OUTPATIENT
Start: 2025-05-21

## 2025-05-21 RX ORDER — OXYCODONE AND ACETAMINOPHEN 7.5; 325 MG/1; MG/1
1 TABLET ORAL EVERY 6 HOURS PRN
Qty: 120 TABLET | Refills: 0 | Status: SHIPPED | OUTPATIENT
Start: 2025-05-21

## 2025-05-21 NOTE — TELEPHONE ENCOUNTER
1 [id number] ** 05/12/2025 05/07/2025 fentaNYL (Patch, Extended Release) 10.0 30 50 MCG/1 .0 [facility] 0 [facility]    1 [id number] ** 05/04/2025 05/02/2025 oxyCODONE HYDROCHLORIDE 7.5 MG ORAL TABLET/ACETAMINOPHEN 325 MG (Tablet) 120.0 30 325 MG-7.5 MG 45.0 PARAS STEARNS [facility], [facility]. Medicare 0 / 0 PA    1 [id number] ** 04/30/2025 04/25/2025 oxyCODONE HYDROCHLORIDE 7.5 MG ORAL TABLET/ACETAMINOPHEN 325 MG (Tablet) 28.0 7 325 MG-7.5 MG 45.0 [facility], [facility].C. Medicare 0 / 0 PA    1 [id number] ** 04/30/2025 04/25/2025 ALPRAZolam (Tablet) 120.0 30 0.25 MG NA [facility], [facility].C. Medicare 0 / 0 PA    1 [id number] ** 04/30/2025 04/25/2025 Zolpidem Tartrate (Tablet) 30.0 30 10 MG NA [facility] 0 / 0 PA    1 [id number] ** 04/03/2025 04/02/2025 oxyCODONE HYDROCHLORIDE 7.5 MG ORAL TABLET/ACETAMINOPHEN 325 MG (Tablet) 120.0 30 325 MG-7.5 MG 45.0 [facility], [facility] Private Pay 0 / 0 PA    1 [id number] ** 04/03/2025 04/02/2025 ALPRAZolam (Tablet) 120.0 30 0.25 MG NA [facility] 0 / 0 PA    1 [id number] ** 04/03/2025 04/02/2025 Zolpidem Tartrate (Tablet) 30.0 30 10 MG NA [facility], [facility].C. Medicare 0 / 0 PA    1 [id number] ** 03/07/2025 03/05/2025 oxyCODONE HYDROCHLORIDE 7.5 MG ORAL TABLET/ACETAMINOPHEN 325 MG (Tablet) 120.0 30 325 MG-7.5 MG 45.0 BUSHRA VENEGAS [facility], [facility] Private Pay 0 / 0 PA    1 [id number] ** 03/07/2025 03/05/2025 ALPRAZolam (Tablet) 120.0 30 0.25 MG NA [facility], L

## 2025-05-21 NOTE — TELEPHONE ENCOUNTER
Patient called and stated he is on his last Mounjaro 5mg dose.  When he was last seen he was told to call regarding starting the next dose of Mounjaro and discontinuing Tradjenta.  Patient's blood sugar has been ranging 101-115.  Please advise if a prescription for the next dose of Mounjaro can be sent to [facility] and if the Tradjenta can be discontinued.  Thank you!

## 2025-05-27 NOTE — PROGRESS NOTES
Date of first visit: 12/13/2024      HPI:  Horace returns for treatment today reports ongoing neck pain in the right shoulder blade low back pain.   Bad flareup of low back pain last week 6-7 on a pain scale this week is feeling better.    The following portions of the patient's history were reviewed and updated as appropriate: allergies, current medications, past family history, past medical history, past social history, past surgical history, and problem list.    Review of Systems    Physical Exam:  Exam reveals a pelvic obliquity leg with any quality biomechanically joint dysfunction right SI L5-S1 motion unit C5-C6 level active myofascial  triggers throughout.    Lower extremity neuroexam fails to reveal any evidence of focal deficit.  He does not have any nerve root tension signs appreciated and straight leg raising.  Good motor strength.    Assessment:   Diagnosis ICD-10-CM Associated Orders   1. Segmental dysfunction of pelvic region  M99.05       2. Lumbar radiculopathy  M54.16       3. Segmental dysfunction of sacral region  M99.04       4. Segmental dysfunction of lumbar region  M99.03       5. Chronic bilateral low back pain with right-sided sciatica  G89.29     M54.41       6. Neck pain  M54.2       7. Segmental dysfunction of cervical region  M99.01       8. Myofascial pain  M79.18       9. Lumbar spondylosis  M47.816       10. Post laminectomy syndrome  M96.1       11. Lumbar radiculopathy, chronic  M54.16                     Treatment: 00288  Manipulation to the right innominate, sacrum, L5 via side-lying flexion distraction manipulation.  Manipulation C5-C6 pretreat extensive myofascial release right shoulder girdle.    Discussion:  Reviewed home stretching see him back for follow-up.

## 2025-05-29 ENCOUNTER — PROCEDURE VISIT (OUTPATIENT)
Age: 70
End: 2025-05-29
Payer: MEDICARE

## 2025-05-29 VITALS
HEIGHT: 70 IN | SYSTOLIC BLOOD PRESSURE: 132 MMHG | BODY MASS INDEX: 37.08 KG/M2 | DIASTOLIC BLOOD PRESSURE: 76 MMHG | HEART RATE: 78 BPM | WEIGHT: 259 LBS

## 2025-05-29 DIAGNOSIS — M54.16 LUMBAR RADICULOPATHY: ICD-10-CM

## 2025-05-29 DIAGNOSIS — M47.816 LUMBAR SPONDYLOSIS: ICD-10-CM

## 2025-05-29 DIAGNOSIS — G89.29 CHRONIC BILATERAL LOW BACK PAIN WITH RIGHT-SIDED SCIATICA: ICD-10-CM

## 2025-05-29 DIAGNOSIS — M99.05 SEGMENTAL DYSFUNCTION OF PELVIC REGION: Primary | ICD-10-CM

## 2025-05-29 DIAGNOSIS — M96.1 POST LAMINECTOMY SYNDROME: ICD-10-CM

## 2025-05-29 DIAGNOSIS — M54.41 CHRONIC BILATERAL LOW BACK PAIN WITH RIGHT-SIDED SCIATICA: ICD-10-CM

## 2025-05-29 DIAGNOSIS — M99.04 SEGMENTAL DYSFUNCTION OF SACRAL REGION: ICD-10-CM

## 2025-05-29 DIAGNOSIS — M99.03 SEGMENTAL DYSFUNCTION OF LUMBAR REGION: ICD-10-CM

## 2025-05-29 PROCEDURE — 98941 CHIROPRACT MANJ 3-4 REGIONS: CPT | Performed by: CHIROPRACTOR

## 2025-05-29 NOTE — PROGRESS NOTES
Date of first visit: 12/13/2024      H  Assessment:   Diagnosis ICD-10-CM Associated Orders   1. Segmental dysfunction of pelvic region  M99.05       2. Lumbar radiculopathy  M54.16       3. Segmental dysfunction of sacral region  M99.04       4. Segmental dysfunction of lumbar region  M99.03       5. Chronic bilateral low back pain with right-sided sciatica  G89.29     M54.41       6. Lumbar spondylosis  M47.816       7. Post laminectomy syndrome  M96.1                     Treatment: [id number]  Manipulation to the right innominate, sacrum, L5 via side-lying flexion distraction manipulation.  Manipulation C5-C6 pretreat extensive myofascial release right shoulder girdle.    Discussion:  Reviewed home stretching see him back for follow-up.    HPI:  Horace returns for treatment today primarily of low back pain exacerbated by bending 2-5 on a pain scale but overall better.      The following portions of the patient's history were reviewed and updated as appropriate: allergies, current medications, past family history, past medical history, past social history, past surgical history, and problem list.    Review of Systems    Physical Exam:  Exam reveals a pelvic obliquity leg with any quality biomechanically joint dysfunction right SI L5-S1 motion unit C5-C6 level active myofascial  triggers throughout.    Lower extremity neuroexam fails to reveal any evidence of focal deficit.  He does not have any nerve root tension signs appreciated and straight leg raising.  Good motor strength.

## 2025-06-10 DIAGNOSIS — B35.4 TINEA CORPORIS: ICD-10-CM

## 2025-06-10 DIAGNOSIS — M54.16 LUMBAR RADICULOPATHY: ICD-10-CM

## 2025-06-10 RX ORDER — KETOCONAZOLE 20 MG/G
1 CREAM TOPICAL 2 TIMES DAILY
Qty: 180 G | Refills: 0 | Status: SHIPPED | OUTPATIENT
Start: 2025-06-10

## 2025-06-10 RX ORDER — FENTANYL 50 UG/1
1 PATCH TRANSDERMAL
Qty: 10 PATCH | Refills: 0 | Status: SHIPPED | OUTPATIENT
Start: 2025-06-10

## 2025-06-10 NOTE — TELEPHONE ENCOUNTER
1 [id number] ** 05/31/2025 05/21/2025 oxyCODONE HYDROCHLORIDE 7.5 MG ORAL TABLET/ACETAMINOPHEN 325 MG (Tablet) 120.0 30 325 MG-7.5 MG 45.0 [facility], ..C. Medicare 0 / 0 PA    1 [id number] ** 05/27/2025 05/21/2025 ALPRAZolam (Tablet) 120.0 30 0.25 MG NA [facility], [facility].C. Medicare 0 / 0 PA    1 [id number] ** 05/27/2025 05/21/2025 Zolpidem Tartrate (Tablet) 30.0 30 10 MG NA [facility], [facility].C. Medicare 0 / 0 PA    1 [id number] ** 05/12/2025 05/07/2025 fentaNYL (Patch, Extended Release) 10.0 30 50 MCG/1 .0 [facility], [facility].C. Medicare 0 / 0 PA    1 [id number] ** 05/04/2025 05/02/2025 oxyCODONE HYDROCHLORIDE 7.5 MG ORAL TABLET/ACETAMINOPHEN 325 MG (Tablet) 120.0 30 325 MG-7.5 MG 45.0 PARAS STEARNS [facility], ..C. Medicare 0 / 0 PA    1 [id number] ** 04/30/2025 04/25/2025 oxyCODONE HYDROCHLORIDE 7.5 MG ORAL TABLET/ACETAMINOPHEN 325 MG (Tablet) 28.0 7 325 MG-7.5 MG 45.0 [facility], ..C. Medicare 0 / 0 PA    1 [id number] ** 04/30/2025 04/25/2025 ALPRAZolam (Tablet) 120.0 30 0.25 MG NA [facility], ..C. Medicare 0 / 0 PA    1 [id number] ** 04/30/2025 04/25/2025 Zolpidem Tartrate (Tablet) 30.0 30 10 MG NA [facility], ..C. Medicare 0 / 0 PA    1 [id number] ** 04/03/2025 04/02/2025 oxyCODONE HYDROCHLORIDE 7.5 MG ORAL TABLET/ACETAMINOPHEN 325 MG (Tablet) 120.0 30 325 MG-7.5 MG 45.0 [facility]USH[facility] VENEGAS [facility], [facility] Private Pay 0 / 0 PA    1 [id number] ** 04/03/2025 04/02/2025 ALPRAZolam (Tablet) 120.0 30 0.25 MG NA [facility] VENEG[facility]

## 2025-06-12 ENCOUNTER — RA CDI HCC (OUTPATIENT)
Dept: OTHER | Facility: HOSPITAL | Age: 70
End: 2025-06-12

## 2025-06-16 DIAGNOSIS — E11.9 TYPE 2 DIABETES MELLITUS WITHOUT COMPLICATION, WITHOUT LONG-TERM CURRENT USE OF INSULIN (HCC): ICD-10-CM

## 2025-06-16 RX ORDER — TIRZEPATIDE 7.5 MG/.5ML
INJECTION, SOLUTION SUBCUTANEOUS
Qty: 2 ML | Refills: 0 | Status: SHIPPED | OUTPATIENT
Start: 2025-06-16

## 2025-06-19 ENCOUNTER — TELEPHONE (OUTPATIENT)
Dept: FAMILY MEDICINE CLINIC | Facility: CLINIC | Age: 70
End: 2025-06-19

## 2025-06-19 ENCOUNTER — OFFICE VISIT (OUTPATIENT)
Dept: FAMILY MEDICINE CLINIC | Facility: CLINIC | Age: 70
End: 2025-06-19
Payer: MEDICARE

## 2025-06-19 VITALS
RESPIRATION RATE: 16 BRPM | OXYGEN SATURATION: 98 % | SYSTOLIC BLOOD PRESSURE: 100 MMHG | TEMPERATURE: 97.3 F | BODY MASS INDEX: 35.96 KG/M2 | HEART RATE: 86 BPM | DIASTOLIC BLOOD PRESSURE: 62 MMHG | HEIGHT: 70 IN | WEIGHT: 251.2 LBS

## 2025-06-19 DIAGNOSIS — G40.009 PARTIAL IDIOPATHIC EPILEPSY WITH SEIZURES OF LOCALIZED ONSET, NOT INTRACTABLE, WITHOUT STATUS EPILEPTICUS (HCC): ICD-10-CM

## 2025-06-19 DIAGNOSIS — E11.628 TYPE 2 DIABETES MELLITUS WITH OTHER SKIN COMPLICATIONS (HCC): ICD-10-CM

## 2025-06-19 DIAGNOSIS — F41.9 ANXIETY: ICD-10-CM

## 2025-06-19 DIAGNOSIS — I44.2 AIVR (ACCELERATED IDIOVENTRICULAR RHYTHM) (HCC): ICD-10-CM

## 2025-06-19 DIAGNOSIS — E66.01 MORBID (SEVERE) OBESITY DUE TO EXCESS CALORIES (HCC): ICD-10-CM

## 2025-06-19 DIAGNOSIS — F11.20 CONTINUOUS OPIOID DEPENDENCE (HCC): ICD-10-CM

## 2025-06-19 DIAGNOSIS — M54.16 LUMBAR RADICULOPATHY: ICD-10-CM

## 2025-06-19 DIAGNOSIS — M54.16 LUMBAR RADICULOPATHY: Primary | ICD-10-CM

## 2025-06-19 PROCEDURE — 99214 OFFICE O/P EST MOD 30 MIN: CPT | Performed by: FAMILY MEDICINE

## 2025-06-19 PROCEDURE — G2211 COMPLEX E/M VISIT ADD ON: HCPCS | Performed by: FAMILY MEDICINE

## 2025-06-19 RX ORDER — FENTANYL 25 UG/1
1 PATCH TRANSDERMAL
Qty: 5 PATCH | Refills: 0 | Status: SHIPPED | OUTPATIENT
Start: 2025-06-19

## 2025-06-19 RX ORDER — GABAPENTIN 100 MG/1
CAPSULE ORAL
Qty: 60 CAPSULE | Refills: 5 | Status: SHIPPED | OUTPATIENT
Start: 2025-06-19 | End: 2025-06-30 | Stop reason: SDUPTHER

## 2025-06-19 RX ORDER — ALPRAZOLAM 0.25 MG
0.25 TABLET ORAL 4 TIMES DAILY PRN
Qty: 120 TABLET | Refills: 0 | Status: SHIPPED | OUTPATIENT
Start: 2025-06-19

## 2025-06-19 RX ORDER — FENTANYL 25 UG/1
1 PATCH TRANSDERMAL
Qty: 2 PATCH | Refills: 0 | Status: SHIPPED | OUTPATIENT
Start: 2025-06-19 | End: 2025-06-19 | Stop reason: SDUPTHER

## 2025-06-19 NOTE — TELEPHONE ENCOUNTER
Leonid from [facility] called the RX Refill Line. Message is being forwarded to the office.     Calling in regard to the prescription sent for   fentaNYL (DURAGESIC) 25 mcg/hr     States that they are unable to open the box to dispense 2 patches. Boxes contain a quantity of 5 patches. Please advise if a new prescription can be sent quantity of 5.     Please review and advise, thank you

## 2025-06-20 ENCOUNTER — PROCEDURE VISIT (OUTPATIENT)
Age: 70
End: 2025-06-20
Payer: MEDICARE

## 2025-06-20 VITALS
HEIGHT: 70 IN | WEIGHT: 251.32 LBS | DIASTOLIC BLOOD PRESSURE: 72 MMHG | BODY MASS INDEX: 35.98 KG/M2 | SYSTOLIC BLOOD PRESSURE: 111 MMHG | HEART RATE: 87 BPM

## 2025-06-20 DIAGNOSIS — M96.1 POST LAMINECTOMY SYNDROME: ICD-10-CM

## 2025-06-20 DIAGNOSIS — M99.05 SEGMENTAL DYSFUNCTION OF PELVIC REGION: Primary | ICD-10-CM

## 2025-06-20 DIAGNOSIS — M54.41 CHRONIC BILATERAL LOW BACK PAIN WITH RIGHT-SIDED SCIATICA: ICD-10-CM

## 2025-06-20 DIAGNOSIS — M54.16 LUMBAR RADICULOPATHY: ICD-10-CM

## 2025-06-20 DIAGNOSIS — M99.04 SEGMENTAL DYSFUNCTION OF SACRAL REGION: ICD-10-CM

## 2025-06-20 DIAGNOSIS — G89.29 CHRONIC BILATERAL LOW BACK PAIN WITH RIGHT-SIDED SCIATICA: ICD-10-CM

## 2025-06-20 DIAGNOSIS — M99.03 SEGMENTAL DYSFUNCTION OF LUMBAR REGION: ICD-10-CM

## 2025-06-20 DIAGNOSIS — M47.816 LUMBAR SPONDYLOSIS: ICD-10-CM

## 2025-06-20 PROCEDURE — 98941 CHIROPRACT MANJ 3-4 REGIONS: CPT | Performed by: CHIROPRACTOR

## 2025-06-20 NOTE — PROGRESS NOTES
Date of first visit: 12/13/2024      H  Assessment:   Diagnosis ICD-10-CM Associated Orders   1. Segmental dysfunction of pelvic region  M99.05       2. Lumbar radiculopathy  M54.16       3. Segmental dysfunction of sacral region  M99.04       4. Segmental dysfunction of lumbar region  M99.03       5. Chronic bilateral low back pain with right-sided sciatica  G89.29     M54.41       6. Post laminectomy syndrome  M96.1       7. Lumbar spondylosis  M47.816                     Treatment: 43938  Manipulation to the right innominate, sacrum, L5 via side-lying flexion distraction manipulation.  Manipulation C5-C6 pretreat extensive myofascial release right shoulder girdle.    Discussion:  Reviewed home stretching see him back for follow-up.    HPI:  Horace returns for treatment today primarily of low back pain exacerbated by bending 4 on a pain scale.      The following portions of the patient's history were reviewed and updated as appropriate: allergies, current medications, past family history, past medical history, past social history, past surgical history, and problem list.    Review of Systems    Physical Exam:  Exam reveals a pelvic obliquity leg with any quality biomechanically joint dysfunction right SI L5-S1 motion unit C5-C6 level active myofascial  triggers throughout.    Lower extremity neuroexam fails to reveal any evidence of focal deficit.  He does not have any nerve root tension signs appreciated and straight leg raising.  Good motor strength.

## 2025-06-23 ENCOUNTER — TELEPHONE (OUTPATIENT)
Age: 70
End: 2025-06-23

## 2025-06-23 NOTE — TELEPHONE ENCOUNTER
Spoke with patient, verbalized understanding. He's asking if he wake up in the night what should he do, please advise.

## 2025-06-23 NOTE — TELEPHONE ENCOUNTER
Pt called in stating that it is taking 300 mg of gabapentin to fall asleep. He is still waking up between 2-3am and falling back asleep an hour later. Pt states that he took zolpidem 5 mg at 2 am one night. Pt would like to know if he is still able to take zolpidem with the gabapentin. If so, he will be a refill. Please advise.

## 2025-06-29 NOTE — ASSESSMENT & PLAN NOTE
Stable on present meds to control his chronic back pain .  Its allowing him to be able to be able to perform ADLS independently.

## 2025-06-29 NOTE — PROGRESS NOTES
Name: Horace Jacobo      : 1955      MRN: [id number]  Encounter Provider: Sherry Meza DO  Encounter Date: 2025   Encounter department: [facility]    Assessment & Plan  Lumbar radiculopathy  Pt to call with his response to medication in approximately ten days time.    Orders:    gabapentin (NEURONTIN) 100 mg capsule; 1-3 tabs hs as directed    Type 2 diabetes mellitus with other skin complications (HCC)  Continue mounjaro therapy.    Lab Results   Component Value Date    HGBA1C 7.0 (A) 2025            AIVR (accelerated idioventricular rhythm) (HCC)  Cotninue with current rate control and follow up as per Cardiology.         Partial idiopathic epilepsy with seizures of localized onset, not intractable, without status epilepticus (HCC)  Continue with keppra therapy as well as follow up with Neurology.         Continuous opioid dependence (HCC)  Stable on present meds to control his chronic back pain .  Its allowing him to be able to be able to perform ADLS independently.         Morbid (severe) obesity due to excess calories (HCC)  Stable on Mounjaro therapy.          History of Present Illness     HPI  Review of Systems  Past Medical History[1]  Past Surgical History[2]  Family History[3]  Social History[4]  Medications[5]  Allergies   Allergen Reactions    Iv Dye [Iodinated Contrast Media] Hives    Penicillins Other (See Comments)     ? Had as a child-Unknown reaction     Immunization History   Administered Date(s) Administered    COVID-19 PFIZER VACCINE 0.3 ML IM 2021, 2021, 2021    COVID-19 Pfizer Vac BIVALENT Cory-sucrose 12 Yr+ IM 10/05/2022    INFLUENZA 2018, 2019, 2022, 2023    Influenza Quadrivalent, 6-35 Months IM 10/13/2015    Influenza, high dose seasonal 0.7 mL 10/02/2020, 11/15/2021    Influenza, seasonal, injectable 2011, 2017    Pneumococcal Conjugate 13-Valent 10/13/2015    Pneumococcal  "Conjugate Vaccine 20-valent (Pcv20), Polysace 07/26/2022    influenza, trivalent, adjuvanted 10/23/2024     Objective   /62   Pulse 86   Temp (!) 97.3 °F (36.3 °C)   Resp 16   Ht 5' 10\" (1.778 m)   Wt 114 kg (251 lb 3.2 oz)   SpO2 98%   BMI 36.04 kg/m²     Physical Exam  Administrative Statements   I have spent a total time of 15 minutes in caring for this patient on the day of the visit/encounter including Diagnostic results, Prognosis, Risks and benefits of tx options, Instructions for management, Patient and family education, Importance of tx compliance, Risk factor reductions, and Impressions.       [1]   Past Medical History:  Diagnosis Date    Allergic     Allergic rhinitis     Anxiety     Asthma     Basal cell carcinoma 04/07/2022    nose    CTS (carpal tunnel syndrome)     Diabetes mellitus (HCC)     Disease of thyroid gland     Eczema     EIC (epidermal inclusion cyst)     GERD (gastroesophageal reflux disease)     Hayfever     History of skin cancer     HL (hearing loss)     wears hearing aides on occasion    Hyperlipidemia     Hypertension     Memory loss, short term     R/O Seizures but placed on Keppra    PONV (postoperative nausea and vomiting)     x1 occurence    Sleep apnea     s/p gastric bypass    Tinnitus    [2]   Past Surgical History:  Procedure Laterality Date    ABDOMINOPLASTY      ADENOIDECTOMY      BACK SURGERY      lower back surgery    CARPAL TUNNEL RELEASE Bilateral     CHOLECYSTECTOMY      COLONOSCOPY      GASTRIC BYPASS      HERNIA REPAIR Right     JOINT REPLACEMENT Bilateral     Knee    KNEE ARTHROSCOPY Bilateral     X3    MOHS SURGERY  06/15/2022    nose-Dr. Patel    AZ RPR 1ST INGUN HRNA AGE 5 YRS/> REDUCIBLE Left 11/26/2018    Procedure: INGUINAL HERNIA REPAIR;  Surgeon: Rubens Casas DO;  Location: [facility];  Service: General    AZ SURGICAL ARTHROSCOPY SHOULDER W/ROTATOR CUFF RPR Left 5/10/2023    Procedure: SHOULDER ARTHROSCOPIC ROTATOR CUFF REPAIR, SUBACROMIAL " DECOMPRESSION, BICEP TENODESIS;  Surgeon: Mario Jackson MD;  Location: [facility];  Service: Orthopedics    DE TENDON SHEATH INCISION Right 04/29/2022    Procedure: RELEASE TRIGGER FINGER RING WITH LONG;  Surgeon: Hay Doty MD;  Location:  MAIN OR;  Service: Orthopedics    TONSILLECTOMY      TRIGGER FINGER RELEASE Right 04/29/2022   [3]   Family History  Problem Relation Name Age of Onset    Heart disease Mother Tash Jacobo     Kidney cancer Mother Tash Jacobo     Cancer Mother Tash Jacobo     Hypertension Father Fausto Jacobo     Bipolar disorder Sister Nella         bipolar disorder NOS    Diabetes Maternal Grandmother Ira Escobedo     Mental illness Sister Nella Jacobo    [4]   Social History  Tobacco Use    Smoking status: Never     Passive exposure: Past    Smokeless tobacco: Never   Vaping Use    Vaping status: Never Used   Substance and Sexual Activity    Alcohol use: Not Currently     Comment: 1 or 2 month    Drug use: No    Sexual activity: Not Currently     Partners: Female     Birth control/protection: Male Sterilization   [5]   Current Outpatient Medications on File Prior to Visit   Medication Sig    albuterol (PROVENTIL HFA,VENTOLIN HFA) 90 mcg/act inhaler Inhale 2 puffs every 4 (four) hours as needed    amoxicillin (AMOXIL) 500 mg capsule Take 500 mg by mouth 4 tablets 1 hour prior to procedure    atorvastatin (LIPITOR) 20 mg tablet Take 20 mg by mouth in the morning.    azelastine (ASTELIN) 0.1 % nasal spray 1-2 sprays into each nostril as needed in the morning and 1-2 sprays as needed in the evening.    Benadryl Allergy 25 MG capsule     celecoxib (CeleBREX) 200 mg capsule TAKE 1 CAPSULE BY MOUTH TWICE A DAY    Cholecalciferol 2000 units CAPS Take 1 capsule by mouth daily after dinner    clobetasol (TEMOVATE) 0.05 % cream Apply 5 g (1 Application total) topically 2 (two) times a day To affected area    Cyanocobalamin (B-12) 1000 MCG CAPS  Take 1 tablet by mouth daily after dinner    diltiazem (DILACOR XR) 240 MG 24 hr capsule Take 240 mg by mouth in the morning.    fentaNYL (DURAGESIC) 50 mcg/hr Place 1 patch on the skin every third day over 72 hours Max Daily Amount: 1 patch    fexofenadine (ALLEGRA) 180 MG tablet TAKE 1 TABLET BY MOUTH EVERY DAY    fluticasone-salmeterol (Advair HFA) 230-21 MCG/ACT inhaler Inhale 2 puffs in the morning and 2 puffs in the evening.    hydrochlorothiazide (HYDRODIURIL) 25 mg tablet Take 1 tablet by mouth daily in the early morning    ibandronate (BONIVA) 150 MG tablet Take 1 tablet (150 mg total) by mouth every 30 (thirty) days    ketoconazole (NIZORAL) 2 % cream Apply 1 Application topically 2 (two) times a day To affected area    levalbuterol (XOPENEX HFA) 45 mcg/act inhaler Inhale 2 puffs every 4 (four) hours as needed for wheezing    levalbuterol (XOPENEX) 1.25 mg/3 mL nebulizer solution     levETIRAcetam (KEPPRA) 500 mg tablet Take 1 tablet (500 mg total) by mouth 2 (two) times a day    losartan (COZAAR) 25 mg tablet Take 25 mg by mouth in the morning.    primidone (MYSOLINE) 50 mg tablet TAKE 5 TABLETS BY MOUTH DAILY    Pseudoeph-Doxylamine-DM-APAP (NYQUIL PO) Take by mouth as needed    Tirzepatide (Mounjaro) 7.5 MG/0.5ML SOAJ INJECT 7.5 MG UNDER THE SKIN ONE TIME PER WEEK    zolpidem (AMBIEN) 10 mg tablet Take 1 tablet (10 mg total) by mouth daily at bedtime as needed for sleep    fluticasone (FLONASE) 50 mcg/act nasal spray 2 sprays into each nostril 2 (two) times a day as needed   (Patient not taking: Reported on 6/19/2025)

## 2025-06-29 NOTE — ASSESSMENT & PLAN NOTE
Pt to call with his response to medication in approximately ten days time.    Orders:    gabapentin (NEURONTIN) 100 mg capsule; 1-3 tabs hs as directed

## 2025-06-29 NOTE — ASSESSMENT & PLAN NOTE
Continue mounjaro therapy.    Lab Results   Component Value Date    HGBA1C 7.0 (A) 05/07/2025

## 2025-06-30 ENCOUNTER — TELEPHONE (OUTPATIENT)
Age: 70
End: 2025-06-30

## 2025-06-30 DIAGNOSIS — M54.16 LUMBAR RADICULOPATHY: ICD-10-CM

## 2025-06-30 RX ORDER — GABAPENTIN 100 MG/1
CAPSULE ORAL
Qty: 180 CAPSULE | Refills: 2 | Status: SHIPPED | OUTPATIENT
Start: 2025-06-30

## 2025-06-30 NOTE — TELEPHONE ENCOUNTER
Patient called in to give Dr. Meza an update on the Gabapentin.    Patient states that he has been taking 4 tablets (400 mg), prior to bed.  He then wakes up 3 hours later to urinate.  He then takes, 2 more tablets (200 mg) and goes back to sleep.    This is working well for him and he would like Dr. Meza's recommendations on this.  He has 7 tablets left and needs a refill.     Please advise.       Preferred Pharmacy:   [id number] - [place] - [phone]

## 2025-07-07 ENCOUNTER — PROCEDURE VISIT (OUTPATIENT)
Age: 70
End: 2025-07-07
Payer: MEDICARE

## 2025-07-07 VITALS — BODY MASS INDEX: 35.93 KG/M2 | HEIGHT: 70 IN | WEIGHT: 251 LBS

## 2025-07-07 DIAGNOSIS — G89.29 CHRONIC BILATERAL LOW BACK PAIN WITH RIGHT-SIDED SCIATICA: ICD-10-CM

## 2025-07-07 DIAGNOSIS — M47.816 LUMBAR SPONDYLOSIS: ICD-10-CM

## 2025-07-07 DIAGNOSIS — M99.01 SEGMENTAL DYSFUNCTION OF CERVICAL REGION: ICD-10-CM

## 2025-07-07 DIAGNOSIS — M54.16 LUMBAR RADICULOPATHY: ICD-10-CM

## 2025-07-07 DIAGNOSIS — M99.05 SEGMENTAL DYSFUNCTION OF PELVIC REGION: Primary | ICD-10-CM

## 2025-07-07 DIAGNOSIS — M96.1 POST LAMINECTOMY SYNDROME: ICD-10-CM

## 2025-07-07 DIAGNOSIS — M99.03 SEGMENTAL DYSFUNCTION OF LUMBAR REGION: ICD-10-CM

## 2025-07-07 DIAGNOSIS — M54.2 NECK PAIN: ICD-10-CM

## 2025-07-07 DIAGNOSIS — M79.18 MYOFASCIAL PAIN: ICD-10-CM

## 2025-07-07 DIAGNOSIS — M99.04 SEGMENTAL DYSFUNCTION OF SACRAL REGION: ICD-10-CM

## 2025-07-07 DIAGNOSIS — M54.41 CHRONIC BILATERAL LOW BACK PAIN WITH RIGHT-SIDED SCIATICA: ICD-10-CM

## 2025-07-07 PROCEDURE — 98941 CHIROPRACT MANJ 3-4 REGIONS: CPT | Performed by: CHIROPRACTOR

## 2025-07-07 RX ORDER — FENTANYL 25 UG/1
1 PATCH TRANSDERMAL
Qty: 5 PATCH | Refills: 0 | Status: SHIPPED | OUTPATIENT
Start: 2025-07-07 | End: 2025-07-16 | Stop reason: SDUPTHER

## 2025-07-07 RX ORDER — FENTANYL 50 UG/1
1 PATCH TRANSDERMAL
Qty: 10 PATCH | Refills: 0 | Status: SHIPPED | OUTPATIENT
Start: 2025-07-07

## 2025-07-07 NOTE — TELEPHONE ENCOUNTER
Patient is calling for a refill of 75mcg of Fentanyl patches.  He stated that if he can get the 75mcg patches then he wount have to cut his 50s in half.  If not he asks that the 50mcg and 25mcg patches be refills.    [facility]

## 2025-07-07 NOTE — TELEPHONE ENCOUNTER
1 [id number] ** 06/27/2025 06/25/2025 oxyCODONE HYDROCHLORIDE 7.5 MG ORAL TABLET/ACETAMINOPHEN 325 MG (Tablet) 120.0 30 325 MG-7.5 MG 45.0 [facility] Medicare 0 / 0 [place]    1 [id number] ** 06/23/2025 06/19/2025 ALPRAZolam (Tablet) 120.0 30 0.25 MG NA [facility] Medicare 0 / 0 [place]    1 [id number] ** 06/19/2025 06/19/2025 fentaNYL (Patch, Extended Release) 5.0 30 25 MCG/1 HR 60.0 [facility] Medicare 0 / 0 [place]    1 [id number] ** 06/13/2025 06/10/2025 fentaNYL (Patch, Extended Release) 10.0 30 50 MCG/1 .0 [facility] Medicare 0 / 0 [place]    1 [id number] ** 05/31/2025 05/21/2025 oxyCODONE HYDROCHLORIDE 7.5 MG ORAL TABLET/ACETAMINOPHEN 325 MG (Tablet) 120.0 30 325 MG-7.5 MG 45.0 [facility] Medicare 0 / 0 [place]    1 [id number] ** 05/27/2025 05/21/2025 ALPRAZolam (Tablet) 120.0 30 0.25 MG NA [facility] Medicare 0 / 0 [place]    1 [id number] ** 05/27/2025 05/21/2025 Zolpidem Tartrate (Tablet) 30.0 30 10 MG NA [facility] Medicare 0 / 0 [place]    1 [id number] ** 05/12/2025 05/07/2025 fentaNYL (Patch, Extended Release) 10.0 30 50 MCG/1 .0 [facility] Medicare 0 / 0 [place]    1 [id number] ** 05/04/2025 05/02/2025 oxyCODONE HYDROCHLORIDE 7.5 MG ORAL TABLET/ACETAMINOPHEN 325 MG (Tablet) 120.0 30 325 MG-7.5 MG 45.0 PARAS STEARNS [facility] Medicare 0 / 0 [place]    1 [id number] ** 04/30/2025 04/25/2025 oxyCODONE HYDROCHLORIDE 7.5 MG ORAL TABLET/ACETAMINOPHEN 325 MG (Tablet) 28.0 7 325 MG-7.5 MG 45.0 BUSHRA VENEGAS [facility]

## 2025-07-07 NOTE — PROGRESS NOTES
Date of first visit: 12/13/2024      H  Assessment:   Diagnosis ICD-10-CM Associated Orders   1. Segmental dysfunction of pelvic region  M99.05       2. Lumbar radiculopathy  M54.16       3. Segmental dysfunction of sacral region  M99.04       4. Segmental dysfunction of lumbar region  M99.03       5. Chronic bilateral low back pain with right-sided sciatica  G89.29     M54.41       6. Post laminectomy syndrome  M96.1       7. Lumbar spondylosis  M47.816       8. Neck pain  M54.2       9. Segmental dysfunction of cervical region  M99.01       10. Myofascial pain  M79.18                     Treatment: 98810  Manipulation to the right innominate, sacrum, L5 via side-lying flexion distraction manipulation.  Manipulation C5-C6 pretreat extensive myofascial release right shoulder girdle.    Discussion:  Reviewed home stretching see him back for follow-up.    HPI:  Horace returns for treatment today primarily of low back pain exacerbated by bending 3 on a pain scale.      The following portions of the patient's history were reviewed and updated as appropriate: allergies, current medications, past family history, past medical history, past social history, past surgical history, and problem list.    Review of Systems    Physical Exam:  Exam reveals a pelvic obliquity leg with any quality biomechanically joint dysfunction right SI L5-S1 motion unit C5-C6 level active myofascial  triggers throughout.    Lower extremity neuroexam fails to reveal any evidence of focal deficit.  He does not have any nerve root tension signs appreciated and straight leg raising.  Good motor strength.

## 2025-07-12 DIAGNOSIS — E11.9 TYPE 2 DIABETES MELLITUS WITHOUT COMPLICATION, WITHOUT LONG-TERM CURRENT USE OF INSULIN (HCC): ICD-10-CM

## 2025-07-14 ENCOUNTER — PROCEDURE VISIT (OUTPATIENT)
Age: 70
End: 2025-07-14
Payer: MEDICARE

## 2025-07-14 VITALS — BODY MASS INDEX: 35.98 KG/M2 | HEIGHT: 70 IN | WEIGHT: 251.32 LBS

## 2025-07-14 DIAGNOSIS — M54.2 NECK PAIN: ICD-10-CM

## 2025-07-14 DIAGNOSIS — M47.816 LUMBAR SPONDYLOSIS: ICD-10-CM

## 2025-07-14 DIAGNOSIS — M99.03 SEGMENTAL DYSFUNCTION OF LUMBAR REGION: ICD-10-CM

## 2025-07-14 DIAGNOSIS — E11.9 TYPE 2 DIABETES MELLITUS WITHOUT COMPLICATION, WITHOUT LONG-TERM CURRENT USE OF INSULIN (HCC): Primary | ICD-10-CM

## 2025-07-14 DIAGNOSIS — M99.04 SEGMENTAL DYSFUNCTION OF SACRAL REGION: ICD-10-CM

## 2025-07-14 DIAGNOSIS — M54.41 CHRONIC BILATERAL LOW BACK PAIN WITH RIGHT-SIDED SCIATICA: ICD-10-CM

## 2025-07-14 DIAGNOSIS — M79.18 MYOFASCIAL PAIN: ICD-10-CM

## 2025-07-14 DIAGNOSIS — G89.29 CHRONIC BILATERAL LOW BACK PAIN WITH RIGHT-SIDED SCIATICA: ICD-10-CM

## 2025-07-14 DIAGNOSIS — M54.16 LUMBAR RADICULOPATHY: ICD-10-CM

## 2025-07-14 DIAGNOSIS — M96.1 POST LAMINECTOMY SYNDROME: ICD-10-CM

## 2025-07-14 DIAGNOSIS — M99.05 SEGMENTAL DYSFUNCTION OF PELVIC REGION: Primary | ICD-10-CM

## 2025-07-14 DIAGNOSIS — M99.01 SEGMENTAL DYSFUNCTION OF CERVICAL REGION: ICD-10-CM

## 2025-07-14 PROCEDURE — 98941 CHIROPRACT MANJ 3-4 REGIONS: CPT | Performed by: CHIROPRACTOR

## 2025-07-14 RX ORDER — TIRZEPATIDE 10 MG/.5ML
10 INJECTION, SOLUTION SUBCUTANEOUS WEEKLY
Qty: 2 ML | Refills: 0 | Status: SHIPPED | OUTPATIENT
Start: 2025-07-14

## 2025-07-14 RX ORDER — TIRZEPATIDE 7.5 MG/.5ML
INJECTION, SOLUTION SUBCUTANEOUS
Refills: 1 | OUTPATIENT
Start: 2025-07-14

## 2025-07-14 NOTE — PROGRESS NOTES
Date of first visit: 12/13/2024      H  Assessment:   Diagnosis ICD-10-CM Associated Orders   1. Segmental dysfunction of pelvic region  M99.05       2. Lumbar radiculopathy  M54.16       3. Segmental dysfunction of sacral region  M99.04       4. Segmental dysfunction of lumbar region  M99.03       5. Chronic bilateral low back pain with right-sided sciatica  G89.29     M54.41       6. Post laminectomy syndrome  M96.1       7. Lumbar spondylosis  M47.816       8. Neck pain  M54.2       9. Myofascial pain  M79.18       10. Segmental dysfunction of cervical region  M99.01                     Treatment: 98927  Manipulation to the right innominate, sacrum, L5 via side-lying flexion distraction manipulation.  Manipulation C5-C6 pretreat extensive myofascial release right shoulder girdle.    Discussion:  Reviewed home stretching see him back for follow-up.    HPI:  Horace returns for treatment today primarily of low back pain exacerbated by bending 5 on a pain scale.      The following portions of the patient's history were reviewed and updated as appropriate: allergies, current medications, past family history, past medical history, past social history, past surgical history, and problem list.    Review of Systems    Physical Exam:  Exam reveals a pelvic obliquity leg with any quality biomechanically joint dysfunction right SI L5-S1 motion unit C5-C6 level active myofascial  triggers throughout.    Lower extremity neuroexam fails to reveal any evidence of focal deficit.  He does not have any nerve root tension signs appreciated and straight leg raising.  Good motor strength.

## 2025-07-16 ENCOUNTER — TELEPHONE (OUTPATIENT)
Age: 70
End: 2025-07-16

## 2025-07-16 DIAGNOSIS — M54.16 LUMBAR RADICULOPATHY: ICD-10-CM

## 2025-07-16 RX ORDER — FENTANYL 25 UG/1
1 PATCH TRANSDERMAL
Qty: 5 PATCH | Refills: 0 | Status: SHIPPED | OUTPATIENT
Start: 2025-07-16

## 2025-07-21 DIAGNOSIS — M25.512 CHRONIC LEFT SHOULDER PAIN: ICD-10-CM

## 2025-07-21 DIAGNOSIS — G89.29 CHRONIC LEFT SHOULDER PAIN: ICD-10-CM

## 2025-07-22 RX ORDER — OXYCODONE AND ACETAMINOPHEN 7.5; 325 MG/1; MG/1
1 TABLET ORAL EVERY 6 HOURS PRN
Qty: 120 TABLET | Refills: 0 | Status: SHIPPED | OUTPATIENT
Start: 2025-07-22

## 2025-07-22 NOTE — TELEPHONE ENCOUNTER
1 [id number] ** 07/17/2025 07/17/2025 Zolpidem Tartrate (Tablet) 30.0 30 10 MG NA [facility] Medicare 0 / 0 [place]    1 [id number] ** 07/16/2025 07/07/2025 fentaNYL (Patch, Extended Release) 5.0 15 25 MCG/1 HR 60.0 [facility] Medicare 0 / 0 [place]    1 [id number] ** 07/10/2025 07/07/2025 fentaNYL TRANSDERMAL SYSTEM (Patch, Extended Release) 10.0 30 50 MCG/1 .0 [facility] Medicare 0 / 0 [place]    1 [id number] ** 06/27/2025 06/25/2025 oxyCODONE HYDROCHLORIDE 7.5 MG ORAL TABLET/ACETAMINOPHEN 325 MG (Tablet) 120.0 30 325 MG-7.5 MG 45.0 [facility] Medicare 0 / 0 [place]    1 [id number] ** 06/23/2025 06/19/2025 ALPRAZolam (Tablet) 120.0 30 0.25 MG NA [facility] Medicare 0 / 0 [place]    1 [id number] ** 06/19/2025 06/19/2025 fentaNYL (Patch, Extended Release) 5.0 30 25 MCG/1 HR 60.0 [facility] Medicare 0 / 0 [place]    1 [id number] ** 06/13/2025 06/10/2025 fentaNYL (Patch, Extended Release) 10.0 30 50 MCG/1 .0 [facility] Medicare 0 / 0 [place]    1 [id number] ** 05/31/2025 05/21/2025 oxyCODONE HYDROCHLORIDE 7.5 MG ORAL TABLET/ACETAMINOPHEN 325 MG (Tablet) 120.0 30 325 MG-7.5 MG 45.0 [facility] Medicare 0 / 0 [place]    1 [id number] ** 05/27/2025 05/21/2025 ALPRAZolam (Tablet) 120.0 30 0.25 MG NA [facility] 0 / 0 [place]    1 [id number] ** 05/27/2025 05/21/2025 Zolpidem Tartrate (Tablet) 30.0 30 10 MG NA [facility] Medicare 0 / 0 [place]

## 2025-07-25 ENCOUNTER — TELEPHONE (OUTPATIENT)
Age: 70
End: 2025-07-25

## 2025-07-25 DIAGNOSIS — E11.9 TYPE 2 DIABETES MELLITUS WITHOUT COMPLICATION, WITHOUT LONG-TERM CURRENT USE OF INSULIN (HCC): ICD-10-CM

## 2025-07-25 NOTE — TELEPHONE ENCOUNTER
Patient called to find out how many mg of Mounjaro he should be taking. He was not aware a 10 mg prescription was sent to [facility] on 7 14 2025.  Patient will have [facility] fill the Mounjaro 10 mg weekly  prescription.    Per patient please disregard his request to fill the 7.5 mg mounjaro. Due to take Monday

## 2025-07-28 RX ORDER — TIRZEPATIDE 7.5 MG/.5ML
INJECTION, SOLUTION SUBCUTANEOUS
Qty: 6 ML | Refills: 1 | Status: SHIPPED | OUTPATIENT
Start: 2025-07-28 | End: 2025-08-05

## 2025-07-29 ENCOUNTER — PROCEDURE VISIT (OUTPATIENT)
Age: 70
End: 2025-07-29
Payer: MEDICARE

## 2025-07-29 VITALS
WEIGHT: 251 LBS | SYSTOLIC BLOOD PRESSURE: 88 MMHG | DIASTOLIC BLOOD PRESSURE: 57 MMHG | BODY MASS INDEX: 35.93 KG/M2 | HEIGHT: 70 IN | HEART RATE: 68 BPM

## 2025-07-29 DIAGNOSIS — M99.03 SEGMENTAL DYSFUNCTION OF LUMBAR REGION: ICD-10-CM

## 2025-07-29 DIAGNOSIS — M47.816 LUMBAR SPONDYLOSIS: ICD-10-CM

## 2025-07-29 DIAGNOSIS — M96.1 POST LAMINECTOMY SYNDROME: ICD-10-CM

## 2025-07-29 DIAGNOSIS — M54.41 CHRONIC BILATERAL LOW BACK PAIN WITH RIGHT-SIDED SCIATICA: ICD-10-CM

## 2025-07-29 DIAGNOSIS — M54.2 NECK PAIN: ICD-10-CM

## 2025-07-29 DIAGNOSIS — M54.16 LUMBAR RADICULOPATHY: ICD-10-CM

## 2025-07-29 DIAGNOSIS — M99.01 SEGMENTAL DYSFUNCTION OF CERVICAL REGION: ICD-10-CM

## 2025-07-29 DIAGNOSIS — M99.05 SEGMENTAL DYSFUNCTION OF PELVIC REGION: Primary | ICD-10-CM

## 2025-07-29 DIAGNOSIS — G89.29 CHRONIC BILATERAL LOW BACK PAIN WITH RIGHT-SIDED SCIATICA: ICD-10-CM

## 2025-07-29 DIAGNOSIS — M99.04 SEGMENTAL DYSFUNCTION OF SACRAL REGION: ICD-10-CM

## 2025-07-29 DIAGNOSIS — M79.18 MYOFASCIAL PAIN: ICD-10-CM

## 2025-07-29 PROCEDURE — 98941 CHIROPRACT MANJ 3-4 REGIONS: CPT | Performed by: CHIROPRACTOR

## 2025-08-02 DIAGNOSIS — E11.9 TYPE 2 DIABETES MELLITUS WITHOUT COMPLICATION, WITHOUT LONG-TERM CURRENT USE OF INSULIN (HCC): ICD-10-CM

## 2025-08-05 DIAGNOSIS — E11.9 TYPE 2 DIABETES MELLITUS WITHOUT COMPLICATION, WITHOUT LONG-TERM CURRENT USE OF INSULIN (HCC): Primary | ICD-10-CM

## 2025-08-05 RX ORDER — TIRZEPATIDE 10 MG/.5ML
INJECTION, SOLUTION SUBCUTANEOUS
OUTPATIENT
Start: 2025-08-05

## 2025-08-05 RX ORDER — TIRZEPATIDE 12.5 MG/.5ML
12.5 INJECTION, SOLUTION SUBCUTANEOUS WEEKLY
Qty: 2 ML | Refills: 0 | Status: SHIPPED | OUTPATIENT
Start: 2025-08-05

## 2025-08-06 ENCOUNTER — TELEPHONE (OUTPATIENT)
Dept: NEUROLOGY | Facility: CLINIC | Age: 70
End: 2025-08-06

## 2025-08-07 ENCOUNTER — PROCEDURE VISIT (OUTPATIENT)
Dept: PODIATRY | Facility: CLINIC | Age: 70
End: 2025-08-07
Payer: MEDICARE

## 2025-08-07 VITALS — WEIGHT: 251 LBS | BODY MASS INDEX: 35.93 KG/M2 | HEIGHT: 70 IN

## 2025-08-07 DIAGNOSIS — B35.1 ONYCHOMYCOSIS: Primary | ICD-10-CM

## 2025-08-07 DIAGNOSIS — E11.42 DIABETIC POLYNEUROPATHY ASSOCIATED WITH TYPE 2 DIABETES MELLITUS (HCC): ICD-10-CM

## 2025-08-07 DIAGNOSIS — E11.9 TYPE 2 DIABETES MELLITUS WITHOUT COMPLICATION, WITHOUT LONG-TERM CURRENT USE OF INSULIN (HCC): ICD-10-CM

## 2025-08-07 PROCEDURE — 11721 DEBRIDE NAIL 6 OR MORE: CPT | Performed by: PODIATRIST

## 2025-08-11 ENCOUNTER — TELEPHONE (OUTPATIENT)
Age: 70
End: 2025-08-11

## 2025-08-19 ENCOUNTER — PROCEDURE VISIT (OUTPATIENT)
Age: 70
End: 2025-08-19
Payer: MEDICARE

## 2025-08-19 VITALS
HEART RATE: 68 BPM | BODY MASS INDEX: 35.93 KG/M2 | SYSTOLIC BLOOD PRESSURE: 129 MMHG | WEIGHT: 251 LBS | DIASTOLIC BLOOD PRESSURE: 68 MMHG | HEIGHT: 70 IN

## 2025-08-19 DIAGNOSIS — M47.816 LUMBAR SPONDYLOSIS: ICD-10-CM

## 2025-08-19 DIAGNOSIS — M96.1 POST LAMINECTOMY SYNDROME: ICD-10-CM

## 2025-08-19 DIAGNOSIS — G89.29 CHRONIC BILATERAL LOW BACK PAIN WITH RIGHT-SIDED SCIATICA: ICD-10-CM

## 2025-08-19 DIAGNOSIS — M79.18 MYOFASCIAL PAIN: ICD-10-CM

## 2025-08-19 DIAGNOSIS — M54.16 LUMBAR RADICULOPATHY: ICD-10-CM

## 2025-08-19 DIAGNOSIS — M54.41 CHRONIC BILATERAL LOW BACK PAIN WITH RIGHT-SIDED SCIATICA: ICD-10-CM

## 2025-08-19 DIAGNOSIS — M99.03 SEGMENTAL DYSFUNCTION OF LUMBAR REGION: ICD-10-CM

## 2025-08-19 DIAGNOSIS — M99.01 SEGMENTAL DYSFUNCTION OF CERVICAL REGION: ICD-10-CM

## 2025-08-19 DIAGNOSIS — M99.04 SEGMENTAL DYSFUNCTION OF SACRAL REGION: ICD-10-CM

## 2025-08-19 DIAGNOSIS — M99.05 SEGMENTAL DYSFUNCTION OF PELVIC REGION: Primary | ICD-10-CM

## 2025-08-19 DIAGNOSIS — M54.2 NECK PAIN: ICD-10-CM

## 2025-08-19 PROCEDURE — 98941 CHIROPRACT MANJ 3-4 REGIONS: CPT | Performed by: CHIROPRACTOR

## (undated) DEVICE — SUT VICRYL 2-0 SH 27 IN UNDYED J417H

## (undated) DEVICE — SUT MONOCRYL 4-0 PS-2 18 IN Y496G

## (undated) DEVICE — SHOULDER SUSPENSION KIT 6 PER BOX

## (undated) DEVICE — GAUZE SPONGES,16 PLY: Brand: CURITY

## (undated) DEVICE — SUT MONOCRYL 4-0 PS-2 27 IN Y426H

## (undated) DEVICE — THREADED CLEAR CANNULA WITH OBTURATOR 8.5MM X 75MM

## (undated) DEVICE — CHLORAPREP HI-LITE 26ML ORANGE

## (undated) DEVICE — DRAPE EQUIPMENT RF WAND

## (undated) DEVICE — DRESSING MEPILEX AG BORDER 4 X 4 IN

## (undated) DEVICE — GLOVE SRG BIOGEL ORTHOPEDIC 8

## (undated) DEVICE — GLOVE SRG BIOGEL 7.5

## (undated) DEVICE — INTENDED FOR TISSUE SEPARATION, AND OTHER PROCEDURES THAT REQUIRE A SHARP SURGICAL BLADE TO PUNCTURE OR CUT.: Brand: BARD-PARKER ® CARBON RIB-BACK BLADES

## (undated) DEVICE — OCCLUSIVE GAUZE STRIP,3% BISMUTH TRIBROMOPHENATE IN PETROLATUM BLEND: Brand: XEROFORM

## (undated) DEVICE — DISPOSABLE EQUIPMENT COVER: Brand: SMALL TOWEL DRAPE

## (undated) DEVICE — PACK PBDS SHOULDER ARTHROSCOPY RF

## (undated) DEVICE — GLOVE SRG BIOGEL 7

## (undated) DEVICE — GLOVE INDICATOR PI UNDERGLOVE SZ 7.5 BLUE

## (undated) DEVICE — GLOVE SRG BIOGEL ECLIPSE 7

## (undated) DEVICE — SPONGE PVP SCRUB WING STERILE

## (undated) DEVICE — NEEDLE 22 G X 1 1/2 SAFETY

## (undated) DEVICE — MINI BLADE ROUND TIP SHARP ON ONE SIDE

## (undated) DEVICE — TOWEL SURG XR DETECT GREEN STRL RFD

## (undated) DEVICE — BLADE SHAVER DISSECTOR 4MM 13CM COOLCUT

## (undated) DEVICE — BETHLEHEM UNIVERSAL MINOR GEN: Brand: CARDINAL HEALTH

## (undated) DEVICE — ADHESIVE SKN CLSR HISTOACRYL FLEX 0.5ML LF

## (undated) DEVICE — SCD SEQUENTIAL COMPRESSION COMFORT SLEEVE MEDIUM KNEE LENGTH: Brand: KENDALL SCD

## (undated) DEVICE — ACE WRAP 4 IN STERILE

## (undated) DEVICE — 3M™ IOBAN™ 2 ANTIMICROBIAL INCISE DRAPE 6650EZ: Brand: IOBAN™ 2

## (undated) DEVICE — PLUMEPEN PRO 10FT

## (undated) DEVICE — 3M™ STERI-STRIP™ REINFORCED ADHESIVE SKIN CLOSURES, R1547, 1/2 IN X 4 IN (12 MM X 100 MM), 6 STRIPS/ENVELOPE: Brand: 3M™ STERI-STRIP™

## (undated) DEVICE — TRAY FOLEY 16FR URIMETER SURESTEP

## (undated) DEVICE — ADHESIVE SKIN HIGH VISCOSITY EXOFIN 1ML

## (undated) DEVICE — PADDING CAST 6IN COTTON STRL

## (undated) DEVICE — STRETCH BANDAGE: Brand: CURITY

## (undated) DEVICE — CUFF TOURNIQUET 18 X 4 IN QUICK CONNECT DISP 1 BLADDER

## (undated) DEVICE — PAD GROUNDING ADULT

## (undated) DEVICE — TUBING SUCTION 5MM X 12 FT

## (undated) DEVICE — STERILE BETHLEHEM PLASTIC HAND: Brand: CARDINAL HEALTH

## (undated) DEVICE — VAPR COOLPULSE 90 ELECTRODE 90 DEGREES SUCTION WITH INTEGRATED HANDPIECE: Brand: VAPR COOLPULSE

## (undated) DEVICE — VIAL DECANTER

## (undated) DEVICE — INTENDED FOR TISSUE SEPARATION, AND OTHER PROCEDURES THAT REQUIRE A SHARP SURGICAL BLADE TO PUNCTURE OR CUT.: Brand: BARD-PARKER SAFETY BLADES SIZE 15, STERILE

## (undated) DEVICE — LIGHT HANDLE COVER SLEEVE DISP BLUE STELLAR

## (undated) DEVICE — GLOVE INDICATOR PI UNDERGLOVE SZ 7 BLUE

## (undated) DEVICE — BLADE SHAVER DISSECTOR 3.5MM 13CM COOLCUT

## (undated) DEVICE — EXPRESSEW III SUTURE NEEDLE FOR USE WITH EXPRESSEW II OR III SUTURE PASSER: Brand: EXPRESSEW

## (undated) DEVICE — THREADED CLEAR CANNULA WITH OBTURATOR 7MM X 75MM

## (undated) DEVICE — PENROSE DRAIN, 18 X 3 8: Brand: CARDINAL HEALTH

## (undated) DEVICE — NEEDLE 25G X 1 1/2